# Patient Record
Sex: MALE | Race: WHITE | Employment: OTHER | URBAN - METROPOLITAN AREA
[De-identification: names, ages, dates, MRNs, and addresses within clinical notes are randomized per-mention and may not be internally consistent; named-entity substitution may affect disease eponyms.]

---

## 2017-07-17 ENCOUNTER — HOSPITAL ENCOUNTER (OUTPATIENT)
Age: 62
Setting detail: SPECIMEN
Discharge: HOME | End: 2017-07-17
Payer: COMMERCIAL

## 2017-07-17 DIAGNOSIS — E78.00 PURE HYPERCHOLESTEROLEMIA: ICD-10-CM

## 2017-07-17 DIAGNOSIS — Z13.9 SCREENING: ICD-10-CM

## 2017-07-17 DIAGNOSIS — I10 ESSENTIAL HYPERTENSION: ICD-10-CM

## 2017-07-17 DIAGNOSIS — R73.09 OTHER ABNORMAL GLUCOSE: ICD-10-CM

## 2017-07-17 DIAGNOSIS — I48.20 CHRONIC ATRIAL FIBRILLATION (HCC): ICD-10-CM

## 2017-07-17 LAB
ABSOLUTE EOS #: 0.1 K/UL (ref 0–0.4)
ABSOLUTE LYMPH #: 2.6 K/UL (ref 1–4.8)
ABSOLUTE MONO #: 0.5 K/UL (ref 0.1–1.2)
ALBUMIN SERPL-MCNC: 4.3 G/DL (ref 3.5–5.2)
ALBUMIN/GLOBULIN RATIO: 1.6 (ref 1–2.5)
ALP BLD-CCNC: 78 U/L (ref 40–129)
ALT SERPL-CCNC: 40 U/L (ref 5–41)
ANION GAP SERPL CALCULATED.3IONS-SCNC: 14 MMOL/L (ref 9–17)
AST SERPL-CCNC: 25 U/L
BASOPHILS # BLD: 1 %
BASOPHILS ABSOLUTE: 0 K/UL (ref 0–0.2)
BILIRUB SERPL-MCNC: 0.42 MG/DL (ref 0.3–1.2)
BILIRUBIN DIRECT: 0.11 MG/DL
BILIRUBIN, INDIRECT: 0.31 MG/DL (ref 0–1)
BUN BLDV-MCNC: 12 MG/DL (ref 8–23)
BUN/CREAT BLD: ABNORMAL (ref 9–20)
CALCIUM SERPL-MCNC: 9.1 MG/DL (ref 8.6–10.4)
CHLORIDE BLD-SCNC: 104 MMOL/L (ref 98–107)
CHOLESTEROL/HDL RATIO: 5.1
CHOLESTEROL: 143 MG/DL
CO2: 23 MMOL/L (ref 20–31)
CREAT SERPL-MCNC: 0.8 MG/DL (ref 0.7–1.2)
DIFFERENTIAL TYPE: NORMAL
EOSINOPHILS RELATIVE PERCENT: 2 %
ESTIMATED AVERAGE GLUCOSE: 114 MG/DL
GFR AFRICAN AMERICAN: >60 ML/MIN
GFR NON-AFRICAN AMERICAN: >60 ML/MIN
GFR SERPL CREATININE-BSD FRML MDRD: ABNORMAL ML/MIN/{1.73_M2}
GFR SERPL CREATININE-BSD FRML MDRD: ABNORMAL ML/MIN/{1.73_M2}
GLOBULIN: NORMAL G/DL (ref 1.5–3.8)
GLUCOSE BLD-MCNC: 119 MG/DL (ref 70–99)
HBA1C MFR BLD: 5.6 % (ref 4–6)
HCT VFR BLD CALC: 46.7 % (ref 41–53)
HDLC SERPL-MCNC: 28 MG/DL
HEMOGLOBIN: 15.8 G/DL (ref 13.5–17.5)
LDL CHOLESTEROL: 72 MG/DL (ref 0–130)
LYMPHOCYTES # BLD: 30 %
MCH RBC QN AUTO: 31.2 PG (ref 26–34)
MCHC RBC AUTO-ENTMCNC: 33.8 G/DL (ref 31–37)
MCV RBC AUTO: 92.2 FL (ref 80–100)
MONOCYTES # BLD: 6 %
PDW BLD-RTO: 14 % (ref 12.5–15.4)
PLATELET # BLD: 210 K/UL (ref 140–450)
PLATELET ESTIMATE: NORMAL
PMV BLD AUTO: 7.6 FL (ref 6–12)
POTASSIUM SERPL-SCNC: 4.5 MMOL/L (ref 3.7–5.3)
PROSTATE SPECIFIC ANTIGEN: 19.94 UG/L
RBC # BLD: 5.06 M/UL (ref 4.5–5.9)
RBC # BLD: NORMAL 10*6/UL
SEG NEUTROPHILS: 61 %
SEGMENTED NEUTROPHILS ABSOLUTE COUNT: 5.3 K/UL (ref 1.8–7.7)
SODIUM BLD-SCNC: 141 MMOL/L (ref 135–144)
TOTAL PROTEIN: 7 G/DL (ref 6.4–8.3)
TRIGL SERPL-MCNC: 216 MG/DL
TSH SERPL DL<=0.05 MIU/L-ACNC: 2.24 MIU/L (ref 0.3–5)
VLDLC SERPL CALC-MCNC: ABNORMAL MG/DL (ref 1–30)
WBC # BLD: 8.6 K/UL (ref 3.5–11)
WBC # BLD: NORMAL 10*3/UL

## 2017-08-02 PROBLEM — C61 PROSTATE CANCER (HCC): Status: ACTIVE | Noted: 2017-08-02

## 2017-08-14 ENCOUNTER — HOSPITAL ENCOUNTER (OUTPATIENT)
Age: 62
Setting detail: SPECIMEN
Discharge: HOME | End: 2017-08-14
Payer: COMMERCIAL

## 2017-08-14 DIAGNOSIS — C61 PROSTATE CANCER (HCC): ICD-10-CM

## 2017-08-14 LAB
BUN BLDV-MCNC: 16 MG/DL (ref 8–23)
CREAT SERPL-MCNC: 0.78 MG/DL (ref 0.7–1.2)
GFR AFRICAN AMERICAN: >60 ML/MIN
GFR NON-AFRICAN AMERICAN: >60 ML/MIN
GFR SERPL CREATININE-BSD FRML MDRD: NORMAL ML/MIN/{1.73_M2}
GFR SERPL CREATININE-BSD FRML MDRD: NORMAL ML/MIN/{1.73_M2}

## 2017-09-07 ENCOUNTER — HOSPITAL ENCOUNTER (OUTPATIENT)
Dept: PREADMISSION TESTING | Age: 62
Discharge: HOME | End: 2017-09-07
Payer: COMMERCIAL

## 2017-09-07 VITALS
WEIGHT: 283 LBS | TEMPERATURE: 98.1 F | HEIGHT: 70 IN | HEART RATE: 79 BPM | BODY MASS INDEX: 40.52 KG/M2 | DIASTOLIC BLOOD PRESSURE: 91 MMHG | RESPIRATION RATE: 20 BRPM | OXYGEN SATURATION: 97 % | SYSTOLIC BLOOD PRESSURE: 147 MMHG

## 2017-09-07 LAB
BUN BLDV-MCNC: 16 MG/DL (ref 8–23)
CREAT SERPL-MCNC: 0.64 MG/DL (ref 0.7–1.2)
EKG ATRIAL RATE: 81 BPM
EKG P AXIS: 31 DEGREES
EKG P-R INTERVAL: 136 MS
EKG Q-T INTERVAL: 390 MS
EKG QRS DURATION: 84 MS
EKG QTC CALCULATION (BAZETT): 453 MS
EKG R AXIS: 17 DEGREES
EKG T AXIS: 62 DEGREES
EKG VENTRICULAR RATE: 81 BPM
GFR AFRICAN AMERICAN: >60 ML/MIN
GFR NON-AFRICAN AMERICAN: >60 ML/MIN
GFR SERPL CREATININE-BSD FRML MDRD: ABNORMAL ML/MIN/{1.73_M2}
GFR SERPL CREATININE-BSD FRML MDRD: ABNORMAL ML/MIN/{1.73_M2}
GLUCOSE BLD-MCNC: 114 MG/DL (ref 70–99)

## 2017-09-07 PROCEDURE — 84520 ASSAY OF UREA NITROGEN: CPT

## 2017-09-07 PROCEDURE — 82565 ASSAY OF CREATININE: CPT

## 2017-09-07 PROCEDURE — 93005 ELECTROCARDIOGRAM TRACING: CPT

## 2017-09-07 PROCEDURE — 36415 COLL VENOUS BLD VENIPUNCTURE: CPT

## 2017-09-07 PROCEDURE — 82947 ASSAY GLUCOSE BLOOD QUANT: CPT

## 2017-09-07 RX ORDER — SODIUM CHLORIDE, SODIUM LACTATE, POTASSIUM CHLORIDE, CALCIUM CHLORIDE 600; 310; 30; 20 MG/100ML; MG/100ML; MG/100ML; MG/100ML
1000 INJECTION, SOLUTION INTRAVENOUS CONTINUOUS
Status: CANCELLED | OUTPATIENT
Start: 2017-09-07

## 2017-09-19 ENCOUNTER — HOSPITAL ENCOUNTER (OUTPATIENT)
Dept: CARDIAC CATH/INVASIVE PROCEDURES | Age: 62
Discharge: HOME | End: 2017-09-19
Payer: COMMERCIAL

## 2017-09-19 VITALS
DIASTOLIC BLOOD PRESSURE: 80 MMHG | SYSTOLIC BLOOD PRESSURE: 127 MMHG | TEMPERATURE: 98.3 F | RESPIRATION RATE: 15 BRPM | HEIGHT: 70 IN | BODY MASS INDEX: 40.09 KG/M2 | HEART RATE: 72 BPM | WEIGHT: 280 LBS | OXYGEN SATURATION: 97 %

## 2017-09-19 LAB
BUN BLDV-MCNC: 13 MG/DL (ref 8–23)
GFR NON-AFRICAN AMERICAN: >60 ML/MIN
GFR SERPL CREATININE-BSD FRML MDRD: >60 ML/MIN
GFR SERPL CREATININE-BSD FRML MDRD: NORMAL ML/MIN/{1.73_M2}
GLUCOSE BLD-MCNC: 105 MG/DL (ref 74–100)
PLATELET # BLD: 210 K/UL (ref 140–450)
POC CHLORIDE: 107 MMOL/L (ref 98–107)
POC CREATININE: 0.91 MG/DL (ref 0.51–1.19)
POC HEMATOCRIT: 46 % (ref 41–53)
POC HEMOGLOBIN: 15.5 G/DL (ref 13.5–17.5)
POC POTASSIUM: 5.5 MMOL/L (ref 3.5–4.5)
POC SODIUM: 140 MMOL/L (ref 138–146)

## 2017-09-19 PROCEDURE — 84295 ASSAY OF SERUM SODIUM: CPT

## 2017-09-19 PROCEDURE — 84520 ASSAY OF UREA NITROGEN: CPT

## 2017-09-19 PROCEDURE — 7100000011 HC PHASE II RECOVERY - ADDTL 15 MIN

## 2017-09-19 PROCEDURE — C1760 CLOSURE DEV, VASC: HCPCS

## 2017-09-19 PROCEDURE — C1894 INTRO/SHEATH, NON-LASER: HCPCS

## 2017-09-19 PROCEDURE — 82435 ASSAY OF BLOOD CHLORIDE: CPT

## 2017-09-19 PROCEDURE — 85014 HEMATOCRIT: CPT

## 2017-09-19 PROCEDURE — 82565 ASSAY OF CREATININE: CPT

## 2017-09-19 PROCEDURE — C1769 GUIDE WIRE: HCPCS

## 2017-09-19 PROCEDURE — 7100000010 HC PHASE II RECOVERY - FIRST 15 MIN

## 2017-09-19 PROCEDURE — 84132 ASSAY OF SERUM POTASSIUM: CPT

## 2017-09-19 PROCEDURE — C1725 CATH, TRANSLUMIN NON-LASER: HCPCS

## 2017-09-19 PROCEDURE — 82947 ASSAY GLUCOSE BLOOD QUANT: CPT

## 2017-09-19 PROCEDURE — 6360000002 HC RX W HCPCS

## 2017-09-19 PROCEDURE — 85049 AUTOMATED PLATELET COUNT: CPT

## 2017-09-19 PROCEDURE — 93458 L HRT ARTERY/VENTRICLE ANGIO: CPT | Performed by: INTERNAL MEDICINE

## 2017-09-19 RX ORDER — SODIUM CHLORIDE 9 MG/ML
INJECTION, SOLUTION INTRAVENOUS CONTINUOUS
Status: DISCONTINUED | OUTPATIENT
Start: 2017-09-19 | End: 2017-09-20 | Stop reason: HOSPADM

## 2017-09-19 RX ORDER — ONDANSETRON 2 MG/ML
4 INJECTION INTRAMUSCULAR; INTRAVENOUS EVERY 6 HOURS PRN
Status: DISCONTINUED | OUTPATIENT
Start: 2017-09-19 | End: 2017-09-20 | Stop reason: HOSPADM

## 2017-09-19 RX ORDER — ACETAMINOPHEN 325 MG/1
650 TABLET ORAL EVERY 4 HOURS PRN
Status: DISCONTINUED | OUTPATIENT
Start: 2017-09-19 | End: 2017-09-20 | Stop reason: HOSPADM

## 2017-09-19 RX ORDER — SODIUM CHLORIDE 0.9 % (FLUSH) 0.9 %
10 SYRINGE (ML) INJECTION PRN
Status: DISCONTINUED | OUTPATIENT
Start: 2017-09-19 | End: 2017-09-20 | Stop reason: HOSPADM

## 2017-09-19 RX ORDER — SODIUM CHLORIDE 0.9 % (FLUSH) 0.9 %
10 SYRINGE (ML) INJECTION EVERY 12 HOURS SCHEDULED
Status: DISCONTINUED | OUTPATIENT
Start: 2017-09-19 | End: 2017-09-20 | Stop reason: HOSPADM

## 2017-09-19 RX ORDER — ASPIRIN 81 MG/1
81 TABLET ORAL DAILY
Qty: 30 TABLET | Refills: 3 | Status: ON HOLD | OUTPATIENT
Start: 2017-09-19 | End: 2017-10-18 | Stop reason: HOSPADM

## 2017-09-19 RX ADMIN — SODIUM CHLORIDE: 9 INJECTION, SOLUTION INTRAVENOUS at 12:51

## 2017-10-17 ENCOUNTER — ANESTHESIA EVENT (OUTPATIENT)
Dept: OPERATING ROOM | Age: 62
DRG: 707 | End: 2017-10-17
Payer: COMMERCIAL

## 2017-10-17 ENCOUNTER — HOSPITAL ENCOUNTER (INPATIENT)
Age: 62
LOS: 1 days | Discharge: HOME | DRG: 707 | End: 2017-10-18
Attending: SPECIALIST | Admitting: SPECIALIST
Payer: COMMERCIAL

## 2017-10-17 ENCOUNTER — ANESTHESIA (OUTPATIENT)
Dept: OPERATING ROOM | Age: 62
DRG: 707 | End: 2017-10-17
Payer: COMMERCIAL

## 2017-10-17 VITALS — DIASTOLIC BLOOD PRESSURE: 79 MMHG | OXYGEN SATURATION: 97 % | TEMPERATURE: 97.2 F | SYSTOLIC BLOOD PRESSURE: 149 MMHG

## 2017-10-17 LAB
ABO/RH: NORMAL
ANION GAP SERPL CALCULATED.3IONS-SCNC: 14 MMOL/L (ref 9–17)
ANTIBODY SCREEN: NEGATIVE
ARM BAND NUMBER: NORMAL
BUN BLDV-MCNC: 12 MG/DL (ref 8–23)
BUN/CREAT BLD: ABNORMAL (ref 9–20)
CALCIUM SERPL-MCNC: 8.3 MG/DL (ref 8.6–10.4)
CHLORIDE BLD-SCNC: 103 MMOL/L (ref 98–107)
CO2: 20 MMOL/L (ref 20–31)
CREAT SERPL-MCNC: 0.85 MG/DL (ref 0.7–1.2)
EXPIRATION DATE: NORMAL
GFR AFRICAN AMERICAN: >60 ML/MIN
GFR NON-AFRICAN AMERICAN: >60 ML/MIN
GFR NON-AFRICAN AMERICAN: >60 ML/MIN
GFR SERPL CREATININE-BSD FRML MDRD: >60 ML/MIN
GFR SERPL CREATININE-BSD FRML MDRD: ABNORMAL ML/MIN/{1.73_M2}
GFR SERPL CREATININE-BSD FRML MDRD: ABNORMAL ML/MIN/{1.73_M2}
GFR SERPL CREATININE-BSD FRML MDRD: NORMAL ML/MIN/{1.73_M2}
GLUCOSE BLD-MCNC: 120 MG/DL (ref 74–100)
GLUCOSE BLD-MCNC: 173 MG/DL (ref 70–99)
HCT VFR BLD CALC: 48.8 % (ref 41–53)
HEMOGLOBIN: 16.2 G/DL (ref 13.5–17.5)
PLATELET # BLD: 57 K/UL (ref 140–450)
POC CREATININE: 0.54 MG/DL (ref 0.51–1.19)
POC HEMATOCRIT: 53 % (ref 41–53)
POC HEMOGLOBIN: 18 G/DL (ref 13.5–17.5)
POC POTASSIUM: 4 MMOL/L (ref 3.5–4.5)
POTASSIUM SERPL-SCNC: 4.5 MMOL/L (ref 3.7–5.3)
SODIUM BLD-SCNC: 137 MMOL/L (ref 135–144)

## 2017-10-17 PROCEDURE — 84132 ASSAY OF SERUM POTASSIUM: CPT

## 2017-10-17 PROCEDURE — 7100000001 HC PACU RECOVERY - ADDTL 15 MIN: Performed by: SPECIALIST

## 2017-10-17 PROCEDURE — 2780000010 HC IMPLANT OTHER: Performed by: SPECIALIST

## 2017-10-17 PROCEDURE — 3700000001 HC ADD 15 MINUTES (ANESTHESIA): Performed by: SPECIALIST

## 2017-10-17 PROCEDURE — 85014 HEMATOCRIT: CPT

## 2017-10-17 PROCEDURE — 1200000000 HC SEMI PRIVATE

## 2017-10-17 PROCEDURE — 82947 ASSAY GLUCOSE BLOOD QUANT: CPT

## 2017-10-17 PROCEDURE — 82565 ASSAY OF CREATININE: CPT

## 2017-10-17 PROCEDURE — 2500000003 HC RX 250 WO HCPCS: Performed by: NURSE ANESTHETIST, CERTIFIED REGISTERED

## 2017-10-17 PROCEDURE — 86901 BLOOD TYPING SEROLOGIC RH(D): CPT

## 2017-10-17 PROCEDURE — 8E0W4CZ ROBOTIC ASSISTED PROCEDURE OF TRUNK REGION, PERCUTANEOUS ENDOSCOPIC APPROACH: ICD-10-PCS | Performed by: SPECIALIST

## 2017-10-17 PROCEDURE — 2580000003 HC RX 258: Performed by: NURSE ANESTHETIST, CERTIFIED REGISTERED

## 2017-10-17 PROCEDURE — 6360000002 HC RX W HCPCS: Performed by: ANESTHESIOLOGY

## 2017-10-17 PROCEDURE — 87086 URINE CULTURE/COLONY COUNT: CPT

## 2017-10-17 PROCEDURE — 2580000003 HC RX 258: Performed by: ANESTHESIOLOGY

## 2017-10-17 PROCEDURE — 0VT04ZZ RESECTION OF PROSTATE, PERCUTANEOUS ENDOSCOPIC APPROACH: ICD-10-PCS | Performed by: SPECIALIST

## 2017-10-17 PROCEDURE — 86900 BLOOD TYPING SEROLOGIC ABO: CPT

## 2017-10-17 PROCEDURE — 85018 HEMOGLOBIN: CPT

## 2017-10-17 PROCEDURE — 2720000003 HC MISC SUTURE/STAPLES/RELOADS/ETC: Performed by: SPECIALIST

## 2017-10-17 PROCEDURE — 2580000003 HC RX 258: Performed by: SPECIALIST

## 2017-10-17 PROCEDURE — 3700000000 HC ANESTHESIA ATTENDED CARE: Performed by: SPECIALIST

## 2017-10-17 PROCEDURE — 6360000002 HC RX W HCPCS: Performed by: SPECIALIST

## 2017-10-17 PROCEDURE — C1760 CLOSURE DEV, VASC: HCPCS | Performed by: SPECIALIST

## 2017-10-17 PROCEDURE — 86850 RBC ANTIBODY SCREEN: CPT

## 2017-10-17 PROCEDURE — 6360000002 HC RX W HCPCS: Performed by: NURSE ANESTHETIST, CERTIFIED REGISTERED

## 2017-10-17 PROCEDURE — 80048 BASIC METABOLIC PNL TOTAL CA: CPT

## 2017-10-17 PROCEDURE — 6370000000 HC RX 637 (ALT 250 FOR IP): Performed by: UROLOGY

## 2017-10-17 PROCEDURE — 88307 TISSUE EXAM BY PATHOLOGIST: CPT

## 2017-10-17 PROCEDURE — 2580000003 HC RX 258: Performed by: UROLOGY

## 2017-10-17 PROCEDURE — S2900 ROBOTIC SURGICAL SYSTEM: HCPCS | Performed by: SPECIALIST

## 2017-10-17 PROCEDURE — 07TC4ZZ RESECTION OF PELVIS LYMPHATIC, PERCUTANEOUS ENDOSCOPIC APPROACH: ICD-10-PCS | Performed by: SPECIALIST

## 2017-10-17 PROCEDURE — 2500000003 HC RX 250 WO HCPCS: Performed by: SPECIALIST

## 2017-10-17 PROCEDURE — 85049 AUTOMATED PLATELET COUNT: CPT

## 2017-10-17 PROCEDURE — 7100000000 HC PACU RECOVERY - FIRST 15 MIN: Performed by: SPECIALIST

## 2017-10-17 PROCEDURE — 3600000009 HC SURGERY ROBOT BASE: Performed by: SPECIALIST

## 2017-10-17 PROCEDURE — 3600000019 HC SURGERY ROBOT ADDTL 15MIN: Performed by: SPECIALIST

## 2017-10-17 PROCEDURE — 88309 TISSUE EXAM BY PATHOLOGIST: CPT

## 2017-10-17 PROCEDURE — A6258 TRANSPARENT FILM >16<=48 IN: HCPCS | Performed by: SPECIALIST

## 2017-10-17 PROCEDURE — 6360000002 HC RX W HCPCS: Performed by: UROLOGY

## 2017-10-17 RX ORDER — ONDANSETRON 2 MG/ML
4 INJECTION INTRAMUSCULAR; INTRAVENOUS
Status: DISCONTINUED | OUTPATIENT
Start: 2017-10-17 | End: 2017-10-17 | Stop reason: HOSPADM

## 2017-10-17 RX ORDER — LISINOPRIL 5 MG/1
5 TABLET ORAL DAILY
Status: DISCONTINUED | OUTPATIENT
Start: 2017-10-17 | End: 2017-10-18 | Stop reason: HOSPADM

## 2017-10-17 RX ORDER — MAGNESIUM HYDROXIDE 1200 MG/15ML
LIQUID ORAL CONTINUOUS PRN
Status: DISCONTINUED | OUTPATIENT
Start: 2017-10-17 | End: 2017-10-17 | Stop reason: HOSPADM

## 2017-10-17 RX ORDER — ROCURONIUM BROMIDE 10 MG/ML
INJECTION, SOLUTION INTRAVENOUS PRN
Status: DISCONTINUED | OUTPATIENT
Start: 2017-10-17 | End: 2017-10-17 | Stop reason: SDUPTHER

## 2017-10-17 RX ORDER — DOCUSATE SODIUM 100 MG/1
100 CAPSULE, LIQUID FILLED ORAL 2 TIMES DAILY
Status: DISCONTINUED | OUTPATIENT
Start: 2017-10-17 | End: 2017-10-18 | Stop reason: HOSPADM

## 2017-10-17 RX ORDER — GLYCOPYRROLATE 0.2 MG/ML
INJECTION INTRAMUSCULAR; INTRAVENOUS PRN
Status: DISCONTINUED | OUTPATIENT
Start: 2017-10-17 | End: 2017-10-17 | Stop reason: SDUPTHER

## 2017-10-17 RX ORDER — SODIUM CHLORIDE 9 MG/ML
INJECTION, SOLUTION INTRAVENOUS CONTINUOUS
Status: DISCONTINUED | OUTPATIENT
Start: 2017-10-17 | End: 2017-10-18 | Stop reason: HOSPADM

## 2017-10-17 RX ORDER — ONDANSETRON 2 MG/ML
INJECTION INTRAMUSCULAR; INTRAVENOUS PRN
Status: DISCONTINUED | OUTPATIENT
Start: 2017-10-17 | End: 2017-10-17 | Stop reason: SDUPTHER

## 2017-10-17 RX ORDER — OXYCODONE HYDROCHLORIDE AND ACETAMINOPHEN 5; 325 MG/1; MG/1
1 TABLET ORAL EVERY 4 HOURS PRN
Status: DISCONTINUED | OUTPATIENT
Start: 2017-10-17 | End: 2017-10-18 | Stop reason: HOSPADM

## 2017-10-17 RX ORDER — MORPHINE SULFATE 10 MG/ML
INJECTION, SOLUTION INTRAMUSCULAR; INTRAVENOUS PRN
Status: DISCONTINUED | OUTPATIENT
Start: 2017-10-17 | End: 2017-10-17 | Stop reason: SDUPTHER

## 2017-10-17 RX ORDER — WATER 1000 ML/1000ML
INJECTION, SOLUTION INTRAVENOUS PRN
Status: DISCONTINUED | OUTPATIENT
Start: 2017-10-17 | End: 2017-10-17 | Stop reason: HOSPADM

## 2017-10-17 RX ORDER — MEPERIDINE HYDROCHLORIDE 50 MG/ML
12.5 INJECTION INTRAMUSCULAR; INTRAVENOUS; SUBCUTANEOUS EVERY 5 MIN PRN
Status: DISCONTINUED | OUTPATIENT
Start: 2017-10-17 | End: 2017-10-17 | Stop reason: HOSPADM

## 2017-10-17 RX ORDER — MORPHINE SULFATE 2 MG/ML
2 INJECTION, SOLUTION INTRAMUSCULAR; INTRAVENOUS
Status: DISCONTINUED | OUTPATIENT
Start: 2017-10-17 | End: 2017-10-18 | Stop reason: HOSPADM

## 2017-10-17 RX ORDER — KETOROLAC TROMETHAMINE 30 MG/ML
30 INJECTION, SOLUTION INTRAMUSCULAR; INTRAVENOUS EVERY 6 HOURS
Status: DISCONTINUED | OUTPATIENT
Start: 2017-10-17 | End: 2017-10-18 | Stop reason: HOSPADM

## 2017-10-17 RX ORDER — FENTANYL CITRATE 50 UG/ML
INJECTION, SOLUTION INTRAMUSCULAR; INTRAVENOUS PRN
Status: DISCONTINUED | OUTPATIENT
Start: 2017-10-17 | End: 2017-10-17 | Stop reason: SDUPTHER

## 2017-10-17 RX ORDER — OXYCODONE HYDROCHLORIDE AND ACETAMINOPHEN 5; 325 MG/1; MG/1
2 TABLET ORAL EVERY 4 HOURS PRN
Status: DISCONTINUED | OUTPATIENT
Start: 2017-10-17 | End: 2017-10-18 | Stop reason: HOSPADM

## 2017-10-17 RX ORDER — ATORVASTATIN CALCIUM 80 MG/1
80 TABLET, FILM COATED ORAL EVERY EVENING
Status: DISCONTINUED | OUTPATIENT
Start: 2017-10-17 | End: 2017-10-18 | Stop reason: HOSPADM

## 2017-10-17 RX ORDER — MORPHINE SULFATE 2 MG/ML
4 INJECTION, SOLUTION INTRAMUSCULAR; INTRAVENOUS
Status: DISCONTINUED | OUTPATIENT
Start: 2017-10-17 | End: 2017-10-18 | Stop reason: HOSPADM

## 2017-10-17 RX ORDER — DEXAMETHASONE SODIUM PHOSPHATE 10 MG/ML
INJECTION INTRAMUSCULAR; INTRAVENOUS PRN
Status: DISCONTINUED | OUTPATIENT
Start: 2017-10-17 | End: 2017-10-17 | Stop reason: SDUPTHER

## 2017-10-17 RX ORDER — FENTANYL CITRATE 50 UG/ML
50 INJECTION, SOLUTION INTRAMUSCULAR; INTRAVENOUS EVERY 5 MIN PRN
Status: DISCONTINUED | OUTPATIENT
Start: 2017-10-17 | End: 2017-10-17 | Stop reason: HOSPADM

## 2017-10-17 RX ORDER — SODIUM CHLORIDE 0.9 % (FLUSH) 0.9 %
10 SYRINGE (ML) INJECTION PRN
Status: DISCONTINUED | OUTPATIENT
Start: 2017-10-17 | End: 2017-10-18 | Stop reason: HOSPADM

## 2017-10-17 RX ORDER — ONDANSETRON 2 MG/ML
4 INJECTION INTRAMUSCULAR; INTRAVENOUS EVERY 6 HOURS PRN
Status: DISCONTINUED | OUTPATIENT
Start: 2017-10-17 | End: 2017-10-18 | Stop reason: HOSPADM

## 2017-10-17 RX ORDER — LIDOCAINE HYDROCHLORIDE 10 MG/ML
INJECTION, SOLUTION INFILTRATION; PERINEURAL PRN
Status: DISCONTINUED | OUTPATIENT
Start: 2017-10-17 | End: 2017-10-17 | Stop reason: SDUPTHER

## 2017-10-17 RX ORDER — SODIUM CHLORIDE 0.9 % (FLUSH) 0.9 %
10 SYRINGE (ML) INJECTION EVERY 12 HOURS SCHEDULED
Status: DISCONTINUED | OUTPATIENT
Start: 2017-10-17 | End: 2017-10-18 | Stop reason: HOSPADM

## 2017-10-17 RX ORDER — FENTANYL CITRATE 50 UG/ML
25 INJECTION, SOLUTION INTRAMUSCULAR; INTRAVENOUS EVERY 5 MIN PRN
Status: DISCONTINUED | OUTPATIENT
Start: 2017-10-17 | End: 2017-10-17 | Stop reason: HOSPADM

## 2017-10-17 RX ORDER — PROPOFOL 10 MG/ML
INJECTION, EMULSION INTRAVENOUS PRN
Status: DISCONTINUED | OUTPATIENT
Start: 2017-10-17 | End: 2017-10-17 | Stop reason: SDUPTHER

## 2017-10-17 RX ORDER — FAMOTIDINE 20 MG/1
20 TABLET, FILM COATED ORAL 2 TIMES DAILY
Status: DISCONTINUED | OUTPATIENT
Start: 2017-10-17 | End: 2017-10-18 | Stop reason: HOSPADM

## 2017-10-17 RX ORDER — ASPIRIN 81 MG/1
81 TABLET ORAL DAILY
Status: DISCONTINUED | OUTPATIENT
Start: 2017-10-17 | End: 2017-10-18 | Stop reason: HOSPADM

## 2017-10-17 RX ORDER — PROPAFENONE HYDROCHLORIDE 150 MG/1
150 TABLET, FILM COATED ORAL EVERY 8 HOURS
Status: DISCONTINUED | OUTPATIENT
Start: 2017-10-17 | End: 2017-10-18 | Stop reason: HOSPADM

## 2017-10-17 RX ORDER — SODIUM CHLORIDE, SODIUM LACTATE, POTASSIUM CHLORIDE, CALCIUM CHLORIDE 600; 310; 30; 20 MG/100ML; MG/100ML; MG/100ML; MG/100ML
INJECTION, SOLUTION INTRAVENOUS CONTINUOUS
Status: DISCONTINUED | OUTPATIENT
Start: 2017-10-17 | End: 2017-10-17

## 2017-10-17 RX ADMIN — KETOROLAC TROMETHAMINE 30 MG: 30 INJECTION, SOLUTION INTRAMUSCULAR at 17:00

## 2017-10-17 RX ADMIN — PHENYLEPHRINE HYDROCHLORIDE 25 MCG/MIN: 10 INJECTION INTRAMUSCULAR; INTRAVENOUS; SUBCUTANEOUS at 11:43

## 2017-10-17 RX ADMIN — SODIUM CHLORIDE: 9 INJECTION, SOLUTION INTRAVENOUS at 18:11

## 2017-10-17 RX ADMIN — ONDANSETRON 4 MG: 2 INJECTION, SOLUTION INTRAMUSCULAR; INTRAVENOUS at 15:12

## 2017-10-17 RX ADMIN — DEXTROSE MONOHYDRATE 3 G: 50 INJECTION, SOLUTION INTRAVENOUS at 21:53

## 2017-10-17 RX ADMIN — ROCURONIUM BROMIDE 10 MG: 10 INJECTION INTRAVENOUS at 14:28

## 2017-10-17 RX ADMIN — ROCURONIUM BROMIDE 10 MG: 10 INJECTION INTRAVENOUS at 12:05

## 2017-10-17 RX ADMIN — DEXAMETHASONE SODIUM PHOSPHATE 10 MG: 10 INJECTION INTRAMUSCULAR; INTRAVENOUS at 11:08

## 2017-10-17 RX ADMIN — ROCURONIUM BROMIDE 50 MG: 10 INJECTION INTRAVENOUS at 11:01

## 2017-10-17 RX ADMIN — GLYCOPYRROLATE 0.4 MG: 0.2 INJECTION, SOLUTION INTRAMUSCULAR; INTRAVENOUS at 15:21

## 2017-10-17 RX ADMIN — Medication 3 G: at 11:15

## 2017-10-17 RX ADMIN — MORPHINE SULFATE 4 MG: 2 INJECTION, SOLUTION INTRAMUSCULAR; INTRAVENOUS at 23:35

## 2017-10-17 RX ADMIN — ROCURONIUM BROMIDE 10 MG: 10 INJECTION INTRAVENOUS at 13:21

## 2017-10-17 RX ADMIN — NEOSTIGMINE METHYLSULFATE 2 MG: 1 INJECTION, SOLUTION INTRAMUSCULAR; INTRAVENOUS; SUBCUTANEOUS at 15:21

## 2017-10-17 RX ADMIN — PROPAFENONE HYDROCHLORIDE 150 MG: 150 TABLET, FILM COATED ORAL at 18:44

## 2017-10-17 RX ADMIN — ROCURONIUM BROMIDE 10 MG: 10 INJECTION INTRAVENOUS at 12:26

## 2017-10-17 RX ADMIN — Medication 10 ML: at 21:23

## 2017-10-17 RX ADMIN — SODIUM CHLORIDE, POTASSIUM CHLORIDE, SODIUM LACTATE AND CALCIUM CHLORIDE: 600; 310; 30; 20 INJECTION, SOLUTION INTRAVENOUS at 10:21

## 2017-10-17 RX ADMIN — GLYCOPYRROLATE 0.2 MG: 0.2 INJECTION, SOLUTION INTRAMUSCULAR; INTRAVENOUS at 11:43

## 2017-10-17 RX ADMIN — ROCURONIUM BROMIDE 10 MG: 10 INJECTION INTRAVENOUS at 11:39

## 2017-10-17 RX ADMIN — FENTANYL CITRATE 50 MCG: 50 INJECTION, SOLUTION INTRAMUSCULAR; INTRAVENOUS at 16:08

## 2017-10-17 RX ADMIN — Medication 3 G: at 14:00

## 2017-10-17 RX ADMIN — PHENYLEPHRINE HYDROCHLORIDE 50 MCG: 10 INJECTION INTRAMUSCULAR; INTRAVENOUS; SUBCUTANEOUS at 11:40

## 2017-10-17 RX ADMIN — PROPOFOL 150 MG: 10 INJECTION, EMULSION INTRAVENOUS at 11:04

## 2017-10-17 RX ADMIN — FENTANYL CITRATE 50 MCG: 50 INJECTION, SOLUTION INTRAMUSCULAR; INTRAVENOUS at 16:30

## 2017-10-17 RX ADMIN — KETOROLAC TROMETHAMINE 30 MG: 30 INJECTION, SOLUTION INTRAMUSCULAR at 23:35

## 2017-10-17 RX ADMIN — LIDOCAINE HYDROCHLORIDE 50 MG: 10 INJECTION, SOLUTION INFILTRATION; PERINEURAL at 11:01

## 2017-10-17 RX ADMIN — MORPHINE SULFATE 2 MG: 2 INJECTION, SOLUTION INTRAMUSCULAR; INTRAVENOUS at 18:11

## 2017-10-17 RX ADMIN — FENTANYL CITRATE 25 MCG: 50 INJECTION INTRAMUSCULAR; INTRAVENOUS at 15:30

## 2017-10-17 RX ADMIN — FAMOTIDINE 20 MG: 20 TABLET, FILM COATED ORAL at 21:22

## 2017-10-17 RX ADMIN — FENTANYL CITRATE 100 MCG: 50 INJECTION INTRAMUSCULAR; INTRAVENOUS at 12:04

## 2017-10-17 RX ADMIN — PROPOFOL 200 MG: 10 INJECTION, EMULSION INTRAVENOUS at 11:01

## 2017-10-17 RX ADMIN — FENTANYL CITRATE 25 MCG: 50 INJECTION INTRAMUSCULAR; INTRAVENOUS at 15:15

## 2017-10-17 RX ADMIN — MORPHINE SULFATE 4 MG: 2 INJECTION, SOLUTION INTRAMUSCULAR; INTRAVENOUS at 21:14

## 2017-10-17 RX ADMIN — FENTANYL CITRATE 150 MCG: 50 INJECTION INTRAMUSCULAR; INTRAVENOUS at 11:31

## 2017-10-17 RX ADMIN — DOCUSATE SODIUM 100 MG: 100 CAPSULE, LIQUID FILLED ORAL at 21:23

## 2017-10-17 RX ADMIN — ROCURONIUM BROMIDE 10 MG: 10 INJECTION INTRAVENOUS at 13:06

## 2017-10-17 RX ADMIN — MORPHINE SULFATE 5 MG: 10 INJECTION, SOLUTION INTRAMUSCULAR; INTRAVENOUS at 15:17

## 2017-10-17 RX ADMIN — FENTANYL CITRATE 100 MCG: 50 INJECTION INTRAMUSCULAR; INTRAVENOUS at 11:01

## 2017-10-17 ASSESSMENT — PAIN SCALES - GENERAL
PAINLEVEL_OUTOF10: 7
PAINLEVEL_OUTOF10: 9
PAINLEVEL_OUTOF10: 7
PAINLEVEL_OUTOF10: 5
PAINLEVEL_OUTOF10: 5
PAINLEVEL_OUTOF10: 8
PAINLEVEL_OUTOF10: 8

## 2017-10-17 ASSESSMENT — PAIN DESCRIPTION - PAIN TYPE
TYPE: SURGICAL PAIN
TYPE: SURGICAL PAIN

## 2017-10-17 ASSESSMENT — PAIN DESCRIPTION - LOCATION
LOCATION: ABDOMEN
LOCATION: ABDOMEN

## 2017-10-17 ASSESSMENT — PAIN - FUNCTIONAL ASSESSMENT: PAIN_FUNCTIONAL_ASSESSMENT: 0-10

## 2017-10-17 NOTE — ANESTHESIA PRE PROCEDURE
Component Value Date     07/17/2017    K 4.5 07/17/2017     07/17/2017    CO2 23 07/17/2017    BUN 13 09/19/2017    CREATININE 0.91 09/19/2017    CREATININE 0.64 09/07/2017    GFRAA >60 09/07/2017    LABGLOM >60 09/19/2017    GLUCOSE 114 09/07/2017    PROT 7.0 07/17/2017    CALCIUM 9.1 07/17/2017    BILITOT 0.42 07/17/2017    ALKPHOS 78 07/17/2017    AST 25 07/17/2017    ALT 40 07/17/2017       POC Tests: No results for input(s): POCGLU, POCNA, POCK, POCCL, POCBUN, POCHEMO, POCHCT in the last 72 hours. Coags: No results found for: PROTIME, INR, APTT    HCG (If Applicable): No results found for: PREGTESTUR, PREGSERUM, HCG, HCGQUANT     ABGs: No results found for: PHART, PO2ART, EGB5WZH, RPF3ADE, BEART, K8CSDDFV     Type & Screen (If Applicable):  No results found for: LABABO, LABRH    EKG 9/2017  Normal sinus rhythm  Nonspecific T wave abnormality  Abnormal ECG    Cardiac cath 9/2017  Left main: NL  LAD: mid 30% stenosis  LCX: Codominant Vessel, distal 30% stenosis  OM1: 10% stenosis  OM2: 10% stenosis  RCA: Codominant Vessel, mid to distal 30% stenosis  The LV gram was performed in the GODOY 30 position. LVEF: 55%. LV Wall Motion: Normal      Conclusions:  1. Non obstructive CAD. 2. Overall preserved LV function.         Recommendation:  1. Low risk for prostate surgery, ok to proceed. 2. Medical treatments. 3. Risk factor modification.       Anesthesia Evaluation  Patient summary reviewed and Nursing notes reviewed no history of anesthetic complications:   Airway: Mallampati: III  TM distance: >3 FB   Neck ROM: full  Mouth opening: > = 3 FB Dental:    (+) lower dentures and partials      Pulmonary:normal exam    (+) sleep apnea: on CPAP,                             Cardiovascular:    (+) hypertension:, CAD: non-obstructive,                   Neuro/Psych:   (+) psychiatric history:            GI/Hepatic/Renal: neg ROS  (+) renal disease:,           Endo/Other: negative ROS Comments: Prostate CA Abdominal:   (+) obese,         Vascular:                                    Anesthesia Plan      general     ASA 3     (ASA 3 for CAD with multiple co-morbidities including ADRIAN, HPN, obesity  Cardiology clearance in chart  Anesthesia consent obtained from patient)  Induction: intravenous. arterial line    Anesthetic plan and risks discussed with patient. Plan discussed with CRNA.                 Raj White MD   10/17/2017

## 2017-10-17 NOTE — LETTER
Alcides Freitas MD [facility], [address] [place] [address],[address]  [place], [facility]  P: [phone] / F: [phone]    Brief Postoperative Note  Surgical Facility: [facility]   10/17/17    Mark Galvez  [id number]  1955    Dear Meri Mallory MD,    I've enclosed a Brief Op note on a procedure performed on your patient, Gardenia Rivera today. Pre-operative Diagnosis: Prostate cancer  Post-operative Diagnosis: Same    Procedure: Robotic assisted laparoscopic radical prostatectomy and Pelvic Lymphadenectomy     Anesthesia: General    Surgeon: Robby Sanon; Resident: Daniel Dunn: Observe overnight for possible discharge tomorrow. Thank you for allowing me to participate in the care of this patient. I will keep you updated on this patient's follow up and I look forward to serving you and your patients again in the future.         Electronically signed by Navjot Eagle MD, FACS

## 2017-10-17 NOTE — OP NOTE
FACILITY:  [address], [address] [facility]  [facility]  1955  [id number]     DATE: 10/17/17     SURGEON: Dr. Indra Adams M.D.  Gladis Gee: Dr. Darrin OCONNELL MD  PREOPERATIVE DIAGNOSIS:  Prostate cancer.   POSTOPERATIVE DIAGNOSIS: Prostate cancer. OPERATION:  1. Robotic radical prostatectomy with bilateral pelvic lymph node dissection. ANESTHESIA:  General.   COMPLICATIONS:  None.   ESTIMATED BLOOD LOSS:  Minimal.  FLUIDS:  Crystalloid. DRAINS:  Urethral ceballos catheter,  15 fr jeffrey drain. SPECIMENS:  None.     INDICATIONS FOR THE PROCEDURE:  Harmony Espinosa is a 64 y.o. male with a history of Prostate cancer. After treatment options were discussed including risks, benefits, alternatives, goals and possible complications,  the patient elected to proceed with today's procedure. To note the patients BMI > 40. PSA:   Lab Results   Component Value Date    PSA 19.94 07/17/2017     His prostate volume was 48.64 grams. His clinical TNM stage was: T1c   Prostate cancer with elevated PSA of 19.94 on 7/17/17;   7/28/17 bx (48.64 mL); G3+3=6 RLM (30%), G3+4=7 RM (50%), RA (90%)  He had a prostate biopsy consisting of a total of 14 cores with 3 positive cores. DESCRIPTION OF PROCEDURE:  This patient was given preoperative anticoagulation and antibiotics and was brought back to the operating room and positioned in the supine position. General anesthesia was started. He was secured to the bed and then he was sterilely prepped and draped in standard fashion. An 25 [place] Ceballos catheter was placed and a supraumbilical skin incision was made, a Veress needle was placed through this into the peritoneum. Pneumoperitoneum was obtained. An 8 mm port was placed through this incision into the peritoneum. The peritoneum was examined. No injuries were noted. The rest of the ports were placed in the usual fashion.  Three robotic 8 mm ports were placed into assistant, one 12 mm, one 5 mm ports were placed under

## 2017-10-17 NOTE — H&P
prostate cancer. His staging CT and bone scan were negative except for one spot in the skull which is probably traumatic in nature. He is scheduled for a CT skull on Wednesday. Assuming this is negative and after discussing the various Rx options the patient would like to proceed with Robotic assisted laparoscopic radical prostatectomy and Pelvic Lymphadenectomy .      Procedures Today: N/A     Urinalysis today:  No results found for this visit on 08/28/17. Last several PSA's:        Lab Results   Component Value Date     PSA 19.94 (H) 07/17/2017      Last total testosterone:  No results found for: TESTOSTERONE     Last AUA Symptom Score (QOL): 2 (0)     Last BUN and creatinine:        Lab Results   Component Value Date     BUN 16 08/14/2017            Lab Results   Component Value Date     CREATININE 0.78 08/14/2017         Additional Lab/Culture results: none     Imaging Reviewed during this Office Visit:   8/16/17 CT Abd & Pelvis: No evidence of metastatic disease. 8/16/17 Bone Scan: Single abnormal focus of tracer uptake overlying the left calvarium. Further evaluation with CT head W and WO contrast recommended.   (results were independently reviewed by physician and radiology report verified)     PAST MEDICAL, FAMILY AND SOCIAL HISTORY UPDATE:  Past Medical History        Past Medical History:   Diagnosis Date    A-fib ([address] 4/6/2015    Allergic rhinitis 4/6/2015    Back pain      Basal cell carcinoma of skin      Caffeine use       1 cup tea and 2 cans pop/day    Decreased libido      Diverticulitis of colon (without mention of hemorrhage) 4/6/2015    ED (erectile dysfunction)      Elevated prostate specific antigen (PSA) 4/6/2015    HTN (hypertension) 4/6/2015    Obesity 4/6/2015    Other abnormal glucose 4/6/2015    Pure hypercholesterolemia 4/6/2015    Sebaceous cyst 4/6/2015    Tinnitus      Tobacco use disorder 4/6/2015         Past Surgical History         Past Surgical History: Procedure Laterality Date    COLONOSCOPY        RHINOPLASTY        SKIN CANCER EXCISION   09/09     L Upper Lip Basal CA    TONSILLECTOMY        VASECTOMY             Family History         Family History   Problem Relation Age of Onset    Coronary Art Dis Mother      High Blood Pressure Mother      High Cholesterol Father      Seizures Sister           Current Facility-Administered Medications          Current Outpatient Prescriptions   Medication Sig Dispense Refill    rosuvastatin (CRESTOR) 40 MG tablet Take 1 tablet by mouth every evening 90 tablet 1    lisinopril (PRINIVIL;ZESTRIL) 5 MG tablet TAKE 1 TABLET DAILY 90 tablet 0    propafenone (RYTHMOL) 150 MG tablet Take 1 tablet by mouth every 8 hours 30 tablet 1      No current facility-administered medications for this visit. Review of patient's allergies indicates no known allergies. History   Smoking Status    Former Smoker    Types: Cigarettes    Quit date: 4/1/2016   Smokeless Tobacco    Not on file      (If patient a smoker, smoking cessation counseling offered)        History   Alcohol Use    3.6 oz/week    5 Cans of beer, 1 Standard drinks or equivalent per week         REVIEW OF SYSTEMS:  Constitutional: negative  Eyes: negative  Neurological: negative  Endocrine: negative  Gastrointestinal: negative  Cardiovascular: negative  Skin: negative   Musculoskeletal: negative  Ears/Nose/Throat: negative  Respiratory: negative  Hematological/Lymphatic: negative  Psychological: negative     Physical Exam:    There were no vitals filed for this visit. There is no height or weight on file to calculate BMI. Patient is a 64 y.o. male in no acute distress and alert and oriented to person, place and time. Lungs CTA  Heart R3 without murmur  Abdomen: Soft NT ND       Assessment and Plan      1. Prostate cancer ([address]    2. Elevated prostate specific antigen (PSA)    3.  BPH (benign prostatic hypertrophy) with urinary obstruction

## 2017-10-18 VITALS
RESPIRATION RATE: 14 BRPM | SYSTOLIC BLOOD PRESSURE: 91 MMHG | TEMPERATURE: 98.3 F | HEIGHT: 70 IN | BODY MASS INDEX: 40.94 KG/M2 | OXYGEN SATURATION: 97 % | WEIGHT: 286 LBS | HEART RATE: 60 BPM | DIASTOLIC BLOOD PRESSURE: 46 MMHG

## 2017-10-18 LAB
ANION GAP SERPL CALCULATED.3IONS-SCNC: 13 MMOL/L (ref 9–17)
BUN BLDV-MCNC: 15 MG/DL (ref 8–23)
BUN/CREAT BLD: ABNORMAL (ref 9–20)
CALCIUM SERPL-MCNC: 8 MG/DL (ref 8.6–10.4)
CHLORIDE BLD-SCNC: 100 MMOL/L (ref 98–107)
CO2: 22 MMOL/L (ref 20–31)
CREAT SERPL-MCNC: 1.02 MG/DL (ref 0.7–1.2)
CULTURE: NO GROWTH
CULTURE: NORMAL
GFR AFRICAN AMERICAN: >60 ML/MIN
GFR NON-AFRICAN AMERICAN: >60 ML/MIN
GFR SERPL CREATININE-BSD FRML MDRD: ABNORMAL ML/MIN/{1.73_M2}
GFR SERPL CREATININE-BSD FRML MDRD: ABNORMAL ML/MIN/{1.73_M2}
GLUCOSE BLD-MCNC: 155 MG/DL (ref 70–99)
HCT VFR BLD CALC: 38.6 % (ref 41–53)
HEMOGLOBIN: 13.3 G/DL (ref 13.5–17.5)
Lab: NORMAL
MCH RBC QN AUTO: 31.9 PG (ref 26–34)
MCHC RBC AUTO-ENTMCNC: 34.4 G/DL (ref 31–37)
MCV RBC AUTO: 92.6 FL (ref 80–100)
PDW BLD-RTO: 13.9 % (ref 12.5–15.4)
PLATELET # BLD: 237 K/UL (ref 140–450)
PMV BLD AUTO: 7.3 FL (ref 6–12)
POTASSIUM SERPL-SCNC: 4.6 MMOL/L (ref 3.7–5.3)
RBC # BLD: 4.17 M/UL (ref 4.5–5.9)
SODIUM BLD-SCNC: 135 MMOL/L (ref 135–144)
SPECIMEN DESCRIPTION: NORMAL
STATUS: NORMAL
WBC # BLD: 13.5 K/UL (ref 3.5–11)

## 2017-10-18 PROCEDURE — 85027 COMPLETE CBC AUTOMATED: CPT

## 2017-10-18 PROCEDURE — 2580000003 HC RX 258: Performed by: UROLOGY

## 2017-10-18 PROCEDURE — 94762 N-INVAS EAR/PLS OXIMTRY CONT: CPT

## 2017-10-18 PROCEDURE — 6360000002 HC RX W HCPCS: Performed by: UROLOGY

## 2017-10-18 PROCEDURE — G8978 MOBILITY CURRENT STATUS: HCPCS

## 2017-10-18 PROCEDURE — G8979 MOBILITY GOAL STATUS: HCPCS

## 2017-10-18 PROCEDURE — G8980 MOBILITY D/C STATUS: HCPCS

## 2017-10-18 PROCEDURE — 6370000000 HC RX 637 (ALT 250 FOR IP): Performed by: UROLOGY

## 2017-10-18 PROCEDURE — 36415 COLL VENOUS BLD VENIPUNCTURE: CPT

## 2017-10-18 PROCEDURE — 97162 PT EVAL MOD COMPLEX 30 MIN: CPT

## 2017-10-18 PROCEDURE — 80048 BASIC METABOLIC PNL TOTAL CA: CPT

## 2017-10-18 PROCEDURE — 97530 THERAPEUTIC ACTIVITIES: CPT

## 2017-10-18 PROCEDURE — 82570 ASSAY OF URINE CREATININE: CPT

## 2017-10-18 RX ORDER — DOCUSATE SODIUM 100 MG/1
100 CAPSULE, LIQUID FILLED ORAL 2 TIMES DAILY PRN
Qty: 30 CAPSULE | Refills: 1 | Status: SHIPPED | OUTPATIENT
Start: 2017-10-18 | End: 2017-11-20 | Stop reason: ALTCHOICE

## 2017-10-18 RX ORDER — CIPROFLOXACIN 500 MG/1
500 TABLET, FILM COATED ORAL 2 TIMES DAILY
Qty: 6 TABLET | Refills: 0 | Status: SHIPPED | OUTPATIENT
Start: 2017-10-18 | End: 2017-10-21

## 2017-10-18 RX ORDER — OXYCODONE HYDROCHLORIDE AND ACETAMINOPHEN 5; 325 MG/1; MG/1
1-2 TABLET ORAL EVERY 4 HOURS PRN
Qty: 35 TABLET | Refills: 0 | Status: SHIPPED | OUTPATIENT
Start: 2017-10-18 | End: 2017-10-25

## 2017-10-18 RX ADMIN — DOCUSATE SODIUM 100 MG: 100 CAPSULE, LIQUID FILLED ORAL at 09:34

## 2017-10-18 RX ADMIN — FAMOTIDINE 20 MG: 20 TABLET, FILM COATED ORAL at 09:34

## 2017-10-18 RX ADMIN — OXYCODONE HYDROCHLORIDE AND ACETAMINOPHEN 2 TABLET: 5; 325 TABLET ORAL at 13:57

## 2017-10-18 RX ADMIN — OXYCODONE HYDROCHLORIDE AND ACETAMINOPHEN 2 TABLET: 5; 325 TABLET ORAL at 07:48

## 2017-10-18 RX ADMIN — PROPAFENONE HYDROCHLORIDE 150 MG: 150 TABLET, FILM COATED ORAL at 09:34

## 2017-10-18 RX ADMIN — DEXTROSE MONOHYDRATE 3 G: 50 INJECTION, SOLUTION INTRAVENOUS at 05:38

## 2017-10-18 RX ADMIN — KETOROLAC TROMETHAMINE 30 MG: 30 INJECTION, SOLUTION INTRAMUSCULAR at 05:38

## 2017-10-18 RX ADMIN — PROPAFENONE HYDROCHLORIDE 150 MG: 150 TABLET, FILM COATED ORAL at 02:02

## 2017-10-18 RX ADMIN — LISINOPRIL 5 MG: 5 TABLET ORAL at 09:34

## 2017-10-18 RX ADMIN — MORPHINE SULFATE 2 MG: 2 INJECTION, SOLUTION INTRAMUSCULAR; INTRAVENOUS at 01:57

## 2017-10-18 RX ADMIN — KETOROLAC TROMETHAMINE 30 MG: 30 INJECTION, SOLUTION INTRAMUSCULAR at 11:41

## 2017-10-18 RX ADMIN — Medication 10 ML: at 09:35

## 2017-10-18 ASSESSMENT — PAIN SCALES - GENERAL
PAINLEVEL_OUTOF10: 4
PAINLEVEL_OUTOF10: 7
PAINLEVEL_OUTOF10: 7
PAINLEVEL_OUTOF10: 5
PAINLEVEL_OUTOF10: 3
PAINLEVEL_OUTOF10: 4

## 2017-10-18 ASSESSMENT — PAIN DESCRIPTION - LOCATION
LOCATION: BUTTOCKS
LOCATION: BUTTOCKS;ABDOMEN

## 2017-10-18 NOTE — CARE COORDINATION
Case Management Initial Discharge Plan  Shira Powell Lenny,         Readmission Risk              Readmission Risk:        4.5       Age 72 or Greater:  0    Admitted from SNF or Requires Paid or Family Care:  0    Currently has CHF,COPD,ARF,CRI,or is on dialysis:  0    Takes more than 5 Prescription Medications:  0    Takes Digoxin,Insulin,Anticoagulants,Narcotics or ASA/Plavix:  [address] in Past 12 Months:  0    On Disability:  0    Patient Considers own Health:  2.5            Met with:patient to discuss discharge plans.    Information verified: address, contacts, phone number, , insurance Yes  PCP: Natha Nageotte, MD  Date of last visit: past year    Insurance Provider: DONALD/[facility] [place]    Discharge Planning  Current Residence:  Private Residence  Living Arrangements:  Spouse/Significant Other   Home has 1 stories/2 stairs to climb  Support Systems:  Spouse/Significant Other  Current Services PTA:  C-pap Supplier:   Patient able to perform ADL's:Independent  DME used to aid ambulation prior to admission:  None /during admission none    Potential Assistance Needed:  N/A    Pharmacy: [facility]   Potential Assistance Purchasing Medications:  No  Does patient want to participate in local refill/ meds to beds program?  No    Patient agreeable to home care: No  [place] of choice provided:  n/a      Type of Home Care Services:  None  Patient expects to be discharged to:  Home    Prior SNF/Rehab Placement and Facility:   Agreeable to SNF/Rehab: No  [place] of choice provided: n/a   Evaluation: no    Expected Discharge date:  10/18/17  Follow Up Appointment: Best Day/ Time: Wednesday PM    Transportation provider:  self/family  Transportation arrangements needed for discharge: Yes    Discharge Plan:  Home independently        Electronically signed by Sal RN on 10/18/17 at 3:51 PM

## 2017-10-18 NOTE — PROGRESS NOTES
Tania Spring MD FACS    Urology Progress Note    Subjective: POD#1 s/p RALP. No ambulation yet. No f/c/n/v/cp/sob overnight. Seen with CPAP device on. Pain is well controlled. He has had parasthesias in both hands since last night, which is about the same. No other issues. Denies nerve damage, CVA history, DM, or other pertinent history.      Patient Vitals for the past 24 hrs:   BP Temp Temp src Pulse Resp SpO2 Height Weight   10/18/17 0512 107/60 97.8 °F (36.6 °C) Oral 63 12 96 % - -   10/18/17 0154 110/66 97.5 °F (36.4 °C) Oral 63 14 95 % - -   10/17/17 2000 117/68 97.7 °F (36.5 °C) Oral 79 16 98 % - -   10/17/17 1810 (!) 145/74 97.3 °F (36.3 °C) Oral 81 18 99 % - -   10/17/17 1715 (!) 150/77 97.3 °F (36.3 °C) Temporal 65 12 100 % - -   10/17/17 1700 (!) 143/94 - - 63 12 99 % - -   10/17/17 1645 (!) 162/74 97.3 °F (36.3 °C) Temporal 72 16 99 % - -   10/17/17 1630 (!) 160/83 - - 69 12 100 % - -   10/17/17 1615 (!) 156/87 - - 75 17 100 % - -   10/17/17 1600 (!) 173/99 - - 68 14 100 % - -   10/17/17 1552 (!) 178/84 - - 74 16 98 % - -   10/17/17 1547 - 97.7 °F (36.5 °C) Temporal - 12 - - -   10/17/17 1015 (!) 155/82 97.9 °F (36.6 °C) Temporal 74 16 98 % - -   10/17/17 1000 - - - - - - 5' 10\" (1.778 m) 286 lb (129.7 kg)       Intake/Output Summary (Last 24 hours) at 10/18/17 0600  Last data filed at 10/18/17 0557   Gross per 24 hour   Intake             2150 ml   Output             1275 ml   Net              875 ml       Recent Labs      10/17/17   1637  10/18/17   0604   WBC   --   13.5*   HGB  16.2  13.3*   HCT  48.8  38.6*   MCV   --   92.6   PLT  57*  237     Recent Labs      10/17/17   1014  10/17/17   1637  10/18/17   0604   NA   --   137  135   K   --   4.5  4.6   CL   --   103  100   CO2   --   20  22   BUN   --   12  15   CREATININE  0.54  0.85  1.02       No results for input(s): COLORU, PHUR, LABCAST, WBCUA, RBCUA, MUCUS, TRICHOMONAS, YEAST, BACTERIA, CLARITYU, SPECGRAV, LEUKOCYTESUR,

## 2017-10-18 NOTE — FLOWSHEET NOTE
visited patient per other  referral for post-surgery visit. Patient was sitting up in hospital bed with wife at bedside tending to him. Patient shared that he underwent surgery yesterday to [facility] [place] his prostate. \" Patient shared that surgery \"went very well. \" Patient stated that he is concerned about \"numbness in his hands,\" however. Patient stated that he would be talking to his medical team today about his concerns. Patient and wife were hopeful for his recovery.  provided presence, support, and blessings for patient's health and healing. Chaplains will remain available to offer spiritual and emotional support as needed. Karlie Vega     10/18/17 0605   Encounter Summary   Services provided to: Patient and family together   Referral/Consult From: Other    Support System Spouse   Place of [address] [place] [address] Visiting (10/17/2017)   Complexity of Encounter Low   Length of Encounter 15 minutes   Spiritual Assessment Completed Yes   Routine   Type Initial   Assessment Calm; Approachable; Anxious; Hopeful;Helplessness   Intervention Active listening;Explored feelings, thoughts, concerns;Nurtured hope;[place];Sustaining presence/ Ministry of presence   Outcome Comfort;Expressed gratitude   Spiritual/[employer]   Type Spiritual support

## 2017-10-18 NOTE — PROGRESS NOTES
Physical Therapy    [facility]  Initial Assessment    NAME: Wendy Younger  : 1955  MRN: [id number]    Date of Service: 10/18/2017   Patient has prostate cancer which was first diagnosed on 17. Patient is POD #1 s/p LORELEI on 10/17. Patient Diagnosis(es): There were no encounter diagnoses. has a past medical history of A-fib [address] Allergic rhinitis; Back pain; Basal cell carcinoma of skin; Decreased libido; Diverticulitis of colon (without mention of hemorrhage)(562.11); ED (erectile dysfunction); Elevated prostate specific antigen (PSA); HTN (hypertension); Obesity; [facility] Pure hypercholesterolemia; Sleep apnea; Tinnitus; Tobacco use disorder; and Wears glasses. has a past surgical history that includes rhinoplasty; Skin cancer excision (); Tonsillectomy; Colonoscopy; Vasectomy; Tympanostomy tube placement; Cardiac catheterization (2017); ostatectomy (10/17/2017); and ostatectomy (N/A, 10/17/2017).     Restrictions  Restrictions/Precautions  Restrictions/Precautions: General Precautions  Vision/Hearing  Vision: Within Functional Limits  Hearing: Within functional limits     Subjective  General  Chart Reviewed: Yes  Family / Caregiver Present: No  Subjective  Subjective: Patient reports that he is doing well today  Pain Screening  Patient Currently in Pain: Denies  Vital Signs  Patient Currently in Pain: Denies       Orientation  Orientation  Overall Orientation Status: Within Functional Limits    Social/Functional History  Social/Functional History  Lives With: Spouse (wife)  Type of Home: House  Home Layout: One level  Home Access: Stairs to enter without rails  Entrance Stairs - Number of Steps: 2   Bathroom Shower/Tub: Tub/Shower unit  Home Equipment: Rolling walker, Cane (Were used by wife, pt reports he never used any home equipment)  ADL Assistance: Independent  Homemaking Assistance: Independent  Ambulation Assistance: Independent  Transfer Assistance: Independent  Active : Yes  Mode of Transportation: Car  Occupation: Retired  Additional Comments: Patient reports that 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 Patient reports that his wife is fine now  Objective     Observation/Palpation  Body Mechanics: Forward shoulders and head in seated and standing position    AROM RLE (degrees)  RLE AROM: WFL  AROM LLE (degrees)  LLE AROM : WFL  AROM RUE (degrees)  RUE AROM : WFL  AROM LUE (degrees)  LUE AROM : WFL  Strength RLE  Strength RLE: WFL  Comment: Grossly 5/5 except hip flx 4-/5  Strength LLE  Strength LLE: WFL  Comment: Grossly 5/5 except hip flx 4-/5  Strength RUE  Strength RUE: WFL  Comment: Grossly 5/5  Strength LUE  Strength LUE: WFL  Comment: Grossly 5/5  Motor Control  Gross Motor?: WFL  Sensation  Overall Sensation Status: WFL (Patient reports tingling in index, middle, and thumb from forearm down since surgery on 10/17)  Bed mobility  Rolling to Left: Modified independent  Supine to Sit: Modified independent  Sit to Supine: Modified independent  Scooting: Modified independent  Comment: Patient utilized hospital bed for bed mobility.  Patient reported no signs and symptoms of dizziness during bed mobility  Transfers  Sit to Stand: Stand by assistance  Stand to sit: Stand by assistance  Comment: Patient reported no sign and symptoms of dizzines during transfers  Ambulation  Ambulation?: Yes  More Ambulation?: Yes  Ambulation 1  Surface: level tile  Device: Rolling Walker  Assistance: Stand by assistance  Quality of Gait: decreased step and stride length  Distance: 300 ft  Comments: Patient reported no signs and symptoms of dizziness, pt reported he was tired due to not walking as much recently  Ambulation 2  Surface - 2: level tile  Device 2: No device  Assistance 2: Contact guard assistance  Quality of Gait 2: decresed step and stride length  Distance: 75 ft  Comments: Patient reported no signs and symptoms of dizziness, pt reported he was tired due to not walking as much recently     Balance  Posture: Good  Sitting - Static: Good;-  Sitting - Dynamic: Fair;+  Standing - Static: Good  Standing - Dynamic: Fair;+        Assessment   Assessment: Patient tolerated evaluation well. Patient was Glo for bed mobility, SBA-CGA for ambulation, and SBA for functional transfers. Patient was able to ambulate 300ft using RW w/ SBA followed by 75ft using no assistive device w/ CGA. Patient reports that he has no worries about going home. Patient does not require acute PT at this time. Decision Making: Medium Complexity  No Skilled PT: Safe to return home  REQUIRES PT FOLLOW UP: No  Activity Tolerance  Activity Tolerance: Patient Tolerated treatment well  PT Equipment Recommendations  Equipment Needed: No     Discharge Recommendations:  Home with assist PRN      Plan   Safety Devices  Type of devices: Call light within reach, Left in bed, Gait belt (Patient had 4 arm rests in place upon entrance for evaluation, put back into place upon completion of eval)    G-Code  PT G-Codes  Functional Assessment Tool Used: [place] tool  Score: 23  Functional Limitation: Mobility: Walking and moving around  Mobility: Walking and Moving Around Current Status (): At least 1 percent but less than 20 percent impaired, limited or restricted  Mobility: Walking and Moving Around Goal Status (): At least 1 percent but less than 20 percent impaired, limited or restricted  Mobility: Walking and Moving Around Discharge Status (): At least 1 percent but less than 20 percent impaired, limited or restricted    Therapy Time   Individual Concurrent Group Co-treatment   Time In 0857         Time Out 0924         Minutes 32                 Nile Mic, SHARON  Evaluation/treatment performed by Student PT under the supervision of co-signing PT who agrees with all evaluation/treatment and documentation.

## 2017-10-18 NOTE — FLOWSHEET NOTE
Visited and prayed with patient at his bedside. Patient was in high spirit and seemed to appreciate the treatment and care he was receiving. Family was not present at the time. However, patient did indicate he had strong family support.  maintained listening presence, offered support and reassured patient that he was in good hands. Patient said he was raised Orthodox and a member of [employer]. Patient received sacrament of anointing of the sick. Follow up visits recommended for more spiritual and emotional support. 10/18/17 1050   Encounter Summary   Services provided to: Patient   Support System Family members   [facility] Visiting (10/18/2017)   Complexity of Encounter Moderate   Length of Encounter 30 minutes   Spiritual Assessment Completed Yes   Routine   Type Follow up   Spiritual/Moravian   Type Spiritual support   Assessment Calm; Approachable; Hopeful   Intervention Active listening;Nurtured hope;Prayer; Anointing   Outcome Expressed gratitude   Sacraments   Sacrament of Sick-Anointing Anointed

## 2017-10-19 ENCOUNTER — CARE COORDINATION (OUTPATIENT)
Dept: CASE MANAGEMENT | Age: 62
End: 2017-10-19

## 2017-10-19 LAB
CREATININE FLUID: 1.1 MG/DL
SPECIMEN TYPE: NORMAL

## 2017-10-24 ENCOUNTER — CARE COORDINATION (OUTPATIENT)
Dept: CASE MANAGEMENT | Age: 62
End: 2017-10-24

## 2017-10-27 ENCOUNTER — HOSPITAL ENCOUNTER (OUTPATIENT)
Dept: GENERAL RADIOLOGY | Age: 62
Discharge: HOME | End: 2017-10-27
Payer: COMMERCIAL

## 2017-10-27 DIAGNOSIS — C61 PROSTATE CANCER (HCC): ICD-10-CM

## 2017-10-27 PROCEDURE — 51600 INJECTION FOR BLADDER X-RAY: CPT

## 2017-10-27 PROCEDURE — 6360000004 HC RX CONTRAST MEDICATION: Performed by: SPECIALIST

## 2017-10-27 RX ADMIN — IOTHALAMATE MEGLUMINE 250 ML: 172 INJECTION URETERAL at 09:27

## 2017-10-30 ENCOUNTER — CARE COORDINATION (OUTPATIENT)
Dept: CASE MANAGEMENT | Age: 62
End: 2017-10-30

## 2017-10-30 LAB — SURGICAL PATHOLOGY REPORT: NORMAL

## 2017-10-30 NOTE — CARE COORDINATION
[facility] Transitions Follow Up Call    10/30/2017    Patient: Shikha Galvez  Patient : 1955   MRN: [id number]  Reason for Admission: There are no discharge diagnoses documented for the most recent discharge. Discharge Date: 10/18/17 RARS: Risk Score: 4.5       Spoke with: Shikha Galvez    Patient states he is doing so much better. Denies any ongoing issues. He had cysto on Friday and Serrano catheter is now removed. He has had no urinary symptoms and is voiding clear yellow. He states the numbness and tingling to hands is resolved and steri strips are almost all gone from incisions. Denies any ongoing needs. Care transition episode resolved. Care Transitions Subsequent and Final Call    Schedule Follow Up Appointment with PCP:  Completed  Subsequent and Final Calls  Do you have any ongoing symptoms?:  No  Have your medications changed?:  No  Do you have any questions related to your medications?:  No  Do you currently have any active services?:  No  Do you have any needs or concerns that I can assist you with?:  No  Identified Barriers:  None  Care Transitions Interventions  Other Interventions:             Follow Up  Future Appointments  Date Time Provider Madeleine William   2017 10:00 AM MD Yves Lieberman FP None   2017 11:10 AM Esteban Barrett MD [facility]       [place]illa [place], [place]

## 2017-11-13 ENCOUNTER — HOSPITAL ENCOUNTER (OUTPATIENT)
Age: 62
Setting detail: SPECIMEN
Discharge: HOME | End: 2017-11-13
Payer: COMMERCIAL

## 2017-11-13 LAB — PROSTATE SPECIFIC ANTIGEN: 0.03 UG/L

## 2017-11-14 PROBLEM — Z90.79 HX OF PROSTATECTOMY: Status: ACTIVE | Noted: 2017-11-14

## 2017-11-20 PROBLEM — N39.3 MALE STRESS INCONTINENCE: Status: ACTIVE | Noted: 2017-11-20

## 2017-11-29 NOTE — DISCHARGE SUMMARY
DISCHARGE SUMMARY NOTE:      Patient Identification  PATIENT: Sara Trotter is a 64 y.o. male. MRN: [id number]  :  1955  Admit Date:  10/17/2017  Discharge date:   10/18/2017  3:15 PM                                   Disposition: home  Discharged Condition:  good  Discharge Diagnoses:   Patient Active Problem List   Diagnosis    Pure hypercholesterolemia    HTN (hypertension)    Allergic rhinitis    Diverticulitis of colon    Sebaceous cyst    Other abnormal glucose    Elevated prostate specific antigen (PSA)    A-fib (HCC)    [facility]    History of tobacco abuse    Prostate cancer ([facility]    Hx of prostatectomy    Male stress incontinence       Consults: none    Surgery: RALP, BL PLND    Patient Instructions: Activity: No heavy lifting, no strenuous physical activity  Diet: As tolerated  Patient told to follow up with Dr. Juan C Winkler in 1 week(s). Discharge Medications:    [address] Medication Instructions MMN:[id number]    Printed on:17 9925   Medication Information                      lisinopril (PRINIVIL;ZESTRIL) 5 MG tablet  TAKE 1 TABLET DAILY             nicotine polacrilex (NICORETTE) 2 MG gum  Take 2 mg by mouth as needed for Smoking cessation             rosuvastatin (CRESTOR) 40 MG tablet  Take 1 tablet by mouth every evening                 Hospital course: The patient did well in their hospital course. The patient had pain controlled with PO meds, tolerated diet, and was ambulating appropriately. The patient was afebrile for 24 hrs before discharge. All unnecessary drains and catheters were removed at the appropriate times or proper follow up for removal was scheduled. The patient agrees to discharge, understands discharge instructions, and agrees to follow up.   OF note, patient underwent RALP for prostate cancer, final path was:  1.  PROSTATE AND SEMINAL VESICLES, RADICAL PROSTATECTOMY:       -  ADENOCARCINOMA, GILBERTO SCORE 4+5 = 9, INVOLVING BILATERAL   LOBES.          -  INVASIVE ADENOCARCINOMA EXTENDS TO THE LEFT AND RIGHT   ANTERIOR CAPSULAR MARGINS.     -  PERINEURAL INVASION PRESENT.       -  pT2c, pN0, R1.     2.  LYMPH NODES, BILATERAL PELVIC, DISSECTION:       -  24 LYMPH NODES, NEGATIVE FOR MALIGNANCY (0/24).     He did well and was discharged on POD#1 with appropriate follow up care.   Dc Drains: Serrano catheter  Wound instruction: Keep incisions clean and dry  Rx medications: Percocet, Colace, cipro for catheter removal      Linda Talavera  5:54 PM 11/29/2017

## 2018-02-19 ENCOUNTER — HOSPITAL ENCOUNTER (OUTPATIENT)
Age: 63
Setting detail: SPECIMEN
Discharge: HOME | End: 2018-02-19
Payer: COMMERCIAL

## 2018-02-19 DIAGNOSIS — R73.09 ABNORMAL GLUCOSE: ICD-10-CM

## 2018-02-19 DIAGNOSIS — E78.00 PURE HYPERCHOLESTEROLEMIA: ICD-10-CM

## 2018-02-19 DIAGNOSIS — C61 PROSTATE CANCER (HCC): ICD-10-CM

## 2018-02-19 DIAGNOSIS — I10 ESSENTIAL HYPERTENSION: ICD-10-CM

## 2018-02-19 DIAGNOSIS — Z85.46 PERSONAL HISTORY OF PROSTATE CANCER: ICD-10-CM

## 2018-02-19 LAB
ABSOLUTE EOS #: 0.14 K/UL (ref 0–0.44)
ABSOLUTE IMMATURE GRANULOCYTE: 0.09 K/UL (ref 0–0.3)
ABSOLUTE LYMPH #: 1.85 K/UL (ref 1.1–3.7)
ABSOLUTE MONO #: 0.41 K/UL (ref 0.1–1.2)
ALBUMIN SERPL-MCNC: 4.1 G/DL (ref 3.5–5.2)
ALBUMIN/GLOBULIN RATIO: 1.4 (ref 1–2.5)
ALP BLD-CCNC: 87 U/L (ref 40–129)
ALT SERPL-CCNC: 29 U/L (ref 5–41)
ANION GAP SERPL CALCULATED.3IONS-SCNC: 13 MMOL/L (ref 9–17)
AST SERPL-CCNC: 22 U/L
BASOPHILS # BLD: 1 % (ref 0–2)
BASOPHILS ABSOLUTE: 0.08 K/UL (ref 0–0.2)
BILIRUB SERPL-MCNC: 0.46 MG/DL (ref 0.3–1.2)
BILIRUBIN DIRECT: 0.09 MG/DL
BILIRUBIN, INDIRECT: 0.37 MG/DL (ref 0–1)
BUN BLDV-MCNC: 13 MG/DL (ref 8–23)
BUN/CREAT BLD: ABNORMAL (ref 9–20)
CALCIUM SERPL-MCNC: 9.3 MG/DL (ref 8.6–10.4)
CHLORIDE BLD-SCNC: 101 MMOL/L (ref 98–107)
CO2: 25 MMOL/L (ref 20–31)
CREAT SERPL-MCNC: 0.7 MG/DL (ref 0.7–1.2)
DIFFERENTIAL TYPE: ABNORMAL
EOSINOPHILS RELATIVE PERCENT: 2 % (ref 1–4)
ESTIMATED AVERAGE GLUCOSE: 117 MG/DL
GFR AFRICAN AMERICAN: >60 ML/MIN
GFR NON-AFRICAN AMERICAN: >60 ML/MIN
GFR SERPL CREATININE-BSD FRML MDRD: ABNORMAL ML/MIN/{1.73_M2}
GFR SERPL CREATININE-BSD FRML MDRD: ABNORMAL ML/MIN/{1.73_M2}
GLOBULIN: NORMAL G/DL (ref 1.5–3.8)
GLUCOSE BLD-MCNC: 104 MG/DL (ref 70–99)
HBA1C MFR BLD: 5.7 % (ref 4–6)
HCT VFR BLD CALC: 46.7 % (ref 40.7–50.3)
HEMOGLOBIN: 15.2 G/DL (ref 13–17)
IMMATURE GRANULOCYTES: 1 %
LYMPHOCYTES # BLD: 26 % (ref 24–43)
MCH RBC QN AUTO: 30.2 PG (ref 25.2–33.5)
MCHC RBC AUTO-ENTMCNC: 32.5 G/DL (ref 28.4–34.8)
MCV RBC AUTO: 92.8 FL (ref 82.6–102.9)
MONOCYTES # BLD: 6 % (ref 3–12)
NRBC AUTOMATED: 0 PER 100 WBC
PDW BLD-RTO: 12.8 % (ref 11.8–14.4)
PLATELET # BLD: 250 K/UL (ref 138–453)
PLATELET ESTIMATE: ABNORMAL
PMV BLD AUTO: 9.2 FL (ref 8.1–13.5)
POTASSIUM SERPL-SCNC: 4.6 MMOL/L (ref 3.7–5.3)
PROSTATE SPECIFIC ANTIGEN: <0.01 UG/L
RBC # BLD: 5.03 M/UL (ref 4.21–5.77)
RBC # BLD: ABNORMAL 10*6/UL
SEG NEUTROPHILS: 64 % (ref 36–65)
SEGMENTED NEUTROPHILS ABSOLUTE COUNT: 4.43 K/UL (ref 1.5–8.1)
SODIUM BLD-SCNC: 139 MMOL/L (ref 135–144)
TOTAL PROTEIN: 7.1 G/DL (ref 6.4–8.3)
WBC # BLD: 7 K/UL (ref 3.5–11.3)
WBC # BLD: ABNORMAL 10*3/UL

## 2018-08-25 ENCOUNTER — HOSPITAL ENCOUNTER (OUTPATIENT)
Age: 63
Discharge: HOME | End: 2018-08-25
Payer: COMMERCIAL

## 2018-08-25 LAB — PROSTATE SPECIFIC ANTIGEN: <0.01 UG/L

## 2018-08-25 PROCEDURE — 36415 COLL VENOUS BLD VENIPUNCTURE: CPT

## 2018-08-25 PROCEDURE — 84153 ASSAY OF PSA TOTAL: CPT

## 2018-08-27 PROBLEM — N52.31 ERECTILE DYSFUNCTION AFTER RADICAL PROSTATECTOMY: Status: ACTIVE | Noted: 2018-08-27

## 2018-08-27 PROBLEM — Z85.46 PERSONAL HISTORY OF PROSTATE CANCER: Status: ACTIVE | Noted: 2018-08-27

## 2019-02-21 ENCOUNTER — OFFICE VISIT (OUTPATIENT)
Dept: FAMILY MEDICINE CLINIC | Age: 64
End: 2019-02-21
Payer: COMMERCIAL

## 2019-02-21 VITALS
HEART RATE: 73 BPM | SYSTOLIC BLOOD PRESSURE: 106 MMHG | BODY MASS INDEX: 40.8 KG/M2 | HEIGHT: 70 IN | WEIGHT: 285 LBS | DIASTOLIC BLOOD PRESSURE: 67 MMHG | RESPIRATION RATE: 16 BRPM | TEMPERATURE: 97.9 F

## 2019-02-21 DIAGNOSIS — E66.01 MORBID OBESITY (HCC): ICD-10-CM

## 2019-02-21 DIAGNOSIS — K63.5 BENIGN COLONIC POLYP: ICD-10-CM

## 2019-02-21 DIAGNOSIS — I48.0 PAROXYSMAL ATRIAL FIBRILLATION (HCC): ICD-10-CM

## 2019-02-21 DIAGNOSIS — E78.00 PURE HYPERCHOLESTEROLEMIA: ICD-10-CM

## 2019-02-21 DIAGNOSIS — I10 ESSENTIAL HYPERTENSION: Primary | ICD-10-CM

## 2019-02-21 DIAGNOSIS — Z87.891 PERSONAL HISTORY OF TOBACCO USE: ICD-10-CM

## 2019-02-21 DIAGNOSIS — Z83.3 FAMILY HISTORY OF DIABETES MELLITUS (DM): ICD-10-CM

## 2019-02-21 PROBLEM — Z85.46 PERSONAL HISTORY OF PROSTATE CANCER: Status: RESOLVED | Noted: 2018-08-27 | Resolved: 2019-02-21

## 2019-02-21 LAB — HBA1C MFR BLD: 5.2 %

## 2019-02-21 PROCEDURE — 99204 OFFICE O/P NEW MOD 45 MIN: CPT | Performed by: INTERNAL MEDICINE

## 2019-02-21 PROCEDURE — G0296 VISIT TO DETERM LDCT ELIG: HCPCS | Performed by: INTERNAL MEDICINE

## 2019-02-21 PROCEDURE — 83036 HEMOGLOBIN GLYCOSYLATED A1C: CPT | Performed by: INTERNAL MEDICINE

## 2019-02-21 RX ORDER — ROSUVASTATIN CALCIUM 40 MG/1
40 TABLET, COATED ORAL DAILY
Qty: 30 TABLET | Refills: 5 | Status: SHIPPED | OUTPATIENT
Start: 2019-02-21 | End: 2019-06-04 | Stop reason: SDUPTHER

## 2019-02-21 RX ORDER — PROPAFENONE HYDROCHLORIDE 150 MG/1
150 TABLET, FILM COATED ORAL EVERY 8 HOURS
Qty: 90 TABLET | Refills: 5 | Status: SHIPPED | OUTPATIENT
Start: 2019-02-21 | End: 2019-06-04 | Stop reason: SDUPTHER

## 2019-02-21 RX ORDER — LISINOPRIL 5 MG/1
5 TABLET ORAL DAILY
Qty: 30 TABLET | Refills: 5 | Status: SHIPPED | OUTPATIENT
Start: 2019-02-21 | End: 2019-06-04 | Stop reason: SDUPTHER

## 2019-02-21 ASSESSMENT — PATIENT HEALTH QUESTIONNAIRE - PHQ9
2. FEELING DOWN, DEPRESSED OR HOPELESS: 0
1. LITTLE INTEREST OR PLEASURE IN DOING THINGS: 0
SUM OF ALL RESPONSES TO PHQ QUESTIONS 1-9: 0
SUM OF ALL RESPONSES TO PHQ9 QUESTIONS 1 & 2: 0
SUM OF ALL RESPONSES TO PHQ QUESTIONS 1-9: 0

## 2019-02-26 ENCOUNTER — HOSPITAL ENCOUNTER (OUTPATIENT)
Age: 64
Setting detail: SPECIMEN
Discharge: HOME | End: 2019-02-26
Payer: COMMERCIAL

## 2019-02-26 DIAGNOSIS — Z85.46 PERSONAL HISTORY OF PROSTATE CANCER: ICD-10-CM

## 2019-02-26 DIAGNOSIS — E66.01 MORBID OBESITY (HCC): ICD-10-CM

## 2019-02-26 LAB — PROSTATE SPECIFIC ANTIGEN: <0.01 UG/L

## 2019-02-27 LAB
ANION GAP SERPL CALCULATED.3IONS-SCNC: 15 MMOL/L (ref 9–17)
BUN BLDV-MCNC: 13 MG/DL (ref 8–23)
BUN/CREAT BLD: ABNORMAL (ref 9–20)
CALCIUM SERPL-MCNC: 8.9 MG/DL (ref 8.6–10.4)
CHLORIDE BLD-SCNC: 107 MMOL/L (ref 98–107)
CO2: 17 MMOL/L (ref 20–31)
CREAT SERPL-MCNC: 0.78 MG/DL (ref 0.7–1.2)
GFR AFRICAN AMERICAN: >60 ML/MIN
GFR NON-AFRICAN AMERICAN: >60 ML/MIN
GFR SERPL CREATININE-BSD FRML MDRD: ABNORMAL ML/MIN/{1.73_M2}
GFR SERPL CREATININE-BSD FRML MDRD: ABNORMAL ML/MIN/{1.73_M2}
GLUCOSE BLD-MCNC: 117 MG/DL (ref 70–99)
POTASSIUM SERPL-SCNC: 4.3 MMOL/L (ref 3.7–5.3)
SODIUM BLD-SCNC: 139 MMOL/L (ref 135–144)

## 2019-03-06 ENCOUNTER — TELEPHONE (OUTPATIENT)
Dept: SURGERY | Age: 64
End: 2019-03-06

## 2019-03-06 RX ORDER — POLYETHYLENE GLYCOL 3350, SODIUM CHLORIDE, SODIUM BICARBONATE, POTASSIUM CHLORIDE 420; 11.2; 5.72; 1.48 G/4L; G/4L; G/4L; G/4L
4000 POWDER, FOR SOLUTION ORAL ONCE
Qty: 4000 ML | Refills: 0 | Status: SHIPPED | OUTPATIENT
Start: 2019-03-06 | End: 2019-03-06

## 2019-03-06 NOTE — LETTER
COLONOSCOPY           Day Before Examination: April 25, 2019       Morning:   Mix bowel prep according to directions and refrigerate. Only CLEAR  LIQUIDS are permitted until midnight. NO FOOD THIS DAY!! Clear liquids including any of the following:   Water   Clear broth or bouillon   7-up, Sprite or Ginger ale   Gatorade or Raulito-Aid   Popsicles   Coffee or tea (without milk or sweetener)   Plain Jell-o   NOTHING RED OR PURPLE     At 12 noon take two (2) Dulcolax tablets     Evening: Begin drinking prep at 4:00pm. Drink an 8 ounce glass every 10 minutes. It is  best to drink the whole glass rapidly rather than sipping small amounts. Continue  drinking until the bottle is empty. Bowel movements should begin approximately one(1) hour after the first glass of prep. They will continue periodically one (1) to two (2) hours after drinking the first glass. If you experience bloating, cramping or nausea set the prep aside for 30-40 minutes, the start again. This is temporary and will disappear once bowel movements begin.

## 2019-03-06 NOTE — TELEPHONE ENCOUNTER
[address] [facility]  Dept: [phone]  Loc: [phone]    Medical History Form for Colonoscopy  Name: Nathalie Galvez  YOB: 1955  Procedure: colonoscopy Physician: Dr. Gigi Murry  Chief Complaint (Reason for Procedure): family history of colon cancer  Who is your Primary Care Physician: Dr. Fredrick Allen    Do you smoke? [] Yes    [x] No    How much caffeine do you drink in a day? Cup of tea      Do you drink alcohol? [x] Yes    [] No    Do you have a family history of colon cancer? [x] Yes-mother   [] No    Have you ever had a colonoscopy before? [x] Yes   [] No  When:  2014 Where: [facility]    In the last six (6) months have you experienced any of the following symptoms? [] Blood from the rectum or stool  [] Abdominal Pain   [] Diarrhea  [] Itching of rectum   [] Vomiting  [] Constipation   [] Black stools  [] Bloating   [] Mucous in your stool  [] [place]   [] Change in bowel habits    Do you have allergies? [] Yes  If yes, please list:   [x] No    Do you take Warfarin, Coumadin, Plavix, Eliquis, Xarelto, or aspirin OR do you take a medication that thins your blood?   [] Yes  [x] No    Please list all of your medications including over-the-counter and herbal supplements  Lisinopril 5 mg daily    Propafenone 150 every 8 hours    Rosuvastatin 40 mg daily    rhinoplasty              List your past surgical procedures    Cardiac cath    colonoscopy    prostatectomy    Skin cancer excision to upper lip    Tympanostomy tube placement    vasectomy       Medical History  Do you have any history of:  [] None [] Heart Disease [x] Hypertension  [] Diabetes [] Seizures [] Respiratory/Asthma  [] Sleep Apnea [] G.E.R.D [] Blood Disorder  [] Vascular Disease [] Depression    List the medical problems you are being treated for    A-fib Allergic rhinitis   Diverticulitis of colon Back pain   Elevated PSA Basal cell carcinoma of skin obesity Prostate cancer   Pure hypercholesterolemia Sleep apnea   tinnitus      Spoke with patient at this time scheduled for a colonoscopy. Surgery instructions and bowel prep went over with patient at this time, denies any questions. Bowel prep sent to pharmacy of choice and all instructions mailed at this time. Acknowledges understanding of procedure and will call with any further questions.       Colonoscopy:                   Screen-No     Diagnostic-Yes  Abdominal Pain-No              Melena-No  Anemia-No                           Hematochezia-No  Bloating-No                          Rectal Bleeding-No  Diarrhea-No                         Rectal/Anal Pain-No  Constipation-NoNo                   Pruritis-No  Family History colon cancer-Yes  Previous Colon Cancer/Polyp-Yes  Change in Bowels-No  Decrease Caliber of Stool-No  Change in Color of Stool-No      Surgery Date: April 26, 2019    Surgery Time: 10:00 am    Pharmacy: order signed

## 2019-03-27 ENCOUNTER — TELEPHONE (OUTPATIENT)
Dept: ONCOLOGY | Age: 64
End: 2019-03-27

## 2019-04-02 ENCOUNTER — HOSPITAL ENCOUNTER (OUTPATIENT)
Dept: CT IMAGING | Age: 64
Discharge: HOME | End: 2019-04-04
Payer: COMMERCIAL

## 2019-04-02 DIAGNOSIS — Z87.891 PERSONAL HISTORY OF TOBACCO USE: ICD-10-CM

## 2019-04-02 PROCEDURE — G0297 LDCT FOR LUNG CA SCREEN: HCPCS

## 2019-04-12 ENCOUNTER — TELEPHONE (OUTPATIENT)
Dept: CASE MANAGEMENT | Age: 64
End: 2019-04-12

## 2019-04-12 NOTE — TELEPHONE ENCOUNTER
Navigator reviewing chart and pt. With recent Screening suspicious findings 4A. Pt. Scheduled to see pulmonary 4/18 with PFT.  Plan to follow

## 2019-04-18 ENCOUNTER — OFFICE VISIT (OUTPATIENT)
Dept: PULMONOLOGY | Age: 64
End: 2019-04-18
Payer: COMMERCIAL

## 2019-04-18 VITALS
DIASTOLIC BLOOD PRESSURE: 89 MMHG | HEIGHT: 70 IN | SYSTOLIC BLOOD PRESSURE: 136 MMHG | OXYGEN SATURATION: 99 % | BODY MASS INDEX: 41.52 KG/M2 | RESPIRATION RATE: 18 BRPM | WEIGHT: 290 LBS | HEART RATE: 80 BPM

## 2019-04-18 VITALS — WEIGHT: 290 LBS | OXYGEN SATURATION: 99 % | BODY MASS INDEX: 41.52 KG/M2 | HEART RATE: 80 BPM | HEIGHT: 70 IN

## 2019-04-18 DIAGNOSIS — R91.1 LUNG NODULE: ICD-10-CM

## 2019-04-18 DIAGNOSIS — I10 ESSENTIAL HYPERTENSION: ICD-10-CM

## 2019-04-18 DIAGNOSIS — G47.33 OBSTRUCTIVE SLEEP APNEA: Primary | ICD-10-CM

## 2019-04-18 DIAGNOSIS — C61 PROSTATE CANCER (HCC): ICD-10-CM

## 2019-04-18 DIAGNOSIS — Z90.79 HX OF PROSTATECTOMY: ICD-10-CM

## 2019-04-18 DIAGNOSIS — Z99.89 OSA ON CPAP: ICD-10-CM

## 2019-04-18 DIAGNOSIS — J96.10 CHRONIC RESPIRATORY FAILURE, UNSPECIFIED WHETHER WITH HYPOXIA OR HYPERCAPNIA (HCC): ICD-10-CM

## 2019-04-18 DIAGNOSIS — J42 CHRONIC BRONCHITIS, UNSPECIFIED CHRONIC BRONCHITIS TYPE (HCC): Primary | ICD-10-CM

## 2019-04-18 DIAGNOSIS — N52.31 ERECTILE DYSFUNCTION AFTER RADICAL PROSTATECTOMY: ICD-10-CM

## 2019-04-18 DIAGNOSIS — I48.0 PAROXYSMAL ATRIAL FIBRILLATION (HCC): ICD-10-CM

## 2019-04-18 DIAGNOSIS — G47.33 OSA ON CPAP: ICD-10-CM

## 2019-04-18 DIAGNOSIS — E66.01 MORBID OBESITY (HCC): ICD-10-CM

## 2019-04-18 DIAGNOSIS — E66.01 CLASS 2 SEVERE OBESITY DUE TO EXCESS CALORIES WITH SERIOUS COMORBIDITY IN ADULT, UNSPECIFIED BMI (HCC): ICD-10-CM

## 2019-04-18 DIAGNOSIS — N39.3 MALE STRESS INCONTINENCE: ICD-10-CM

## 2019-04-18 DIAGNOSIS — J42 CHRONIC BRONCHITIS, UNSPECIFIED CHRONIC BRONCHITIS TYPE (HCC): ICD-10-CM

## 2019-04-18 PROCEDURE — 94726 PLETHYSMOGRAPHY LUNG VOLUMES: CPT | Performed by: INTERNAL MEDICINE

## 2019-04-18 PROCEDURE — 94010 BREATHING CAPACITY TEST: CPT | Performed by: INTERNAL MEDICINE

## 2019-04-18 PROCEDURE — 94729 DIFFUSING CAPACITY: CPT | Performed by: INTERNAL MEDICINE

## 2019-04-18 PROCEDURE — 94375 RESPIRATORY FLOW VOLUME LOOP: CPT | Performed by: INTERNAL MEDICINE

## 2019-04-18 PROCEDURE — 99204 OFFICE O/P NEW MOD 45 MIN: CPT | Performed by: INTERNAL MEDICINE

## 2019-04-18 RX ORDER — ALBUTEROL SULFATE 90 UG/1
2 AEROSOL, METERED RESPIRATORY (INHALATION) 4 TIMES DAILY
Qty: 1 INHALER | Refills: 11 | Status: SHIPPED | OUTPATIENT
Start: 2019-04-18 | End: 2019-07-29 | Stop reason: ALTCHOICE

## 2019-04-18 ASSESSMENT — SLEEP AND FATIGUE QUESTIONNAIRES
HOW LIKELY ARE YOU TO NOD OFF OR FALL ASLEEP WHILE WATCHING TV: 0
HOW LIKELY ARE YOU TO NOD OFF OR FALL ASLEEP WHILE SITTING INACTIVE IN A PUBLIC PLACE: 0
HOW LIKELY ARE YOU TO NOD OFF OR FALL ASLEEP WHILE SITTING AND TALKING TO SOMEONE: 0
HOW LIKELY ARE YOU TO NOD OFF OR FALL ASLEEP WHILE SITTING QUIETLY AFTER LUNCH WITHOUT ALCOHOL: 0
ESS TOTAL SCORE: 3
HOW LIKELY ARE YOU TO NOD OFF OR FALL ASLEEP WHILE LYING DOWN TO REST IN THE AFTERNOON WHEN CIRCUMSTANCES PERMIT: 3
HOW LIKELY ARE YOU TO NOD OFF OR FALL ASLEEP WHILE SITTING AND READING: 0
HOW LIKELY ARE YOU TO NOD OFF OR FALL ASLEEP WHEN YOU ARE A PASSENGER IN A CAR FOR AN HOUR WITHOUT A BREAK: 0
HOW LIKELY ARE YOU TO NOD OFF OR FALL ASLEEP IN A CAR, WHILE STOPPED FOR A FEW MINUTES IN TRAFFIC: 0

## 2019-04-18 NOTE — PATIENT INSTRUCTIONS
Patient PET Scan is scheduled at [address] [place] [address] on 04/29/2019 at 8:00am arrival 7:45am. 04/18/2019. Patient was notified. Patient DME order and progress note was faxed to [address] [place] [address] Patient at [phone] on 05/15/2019 TS.

## 2019-04-26 ENCOUNTER — ANESTHESIA (OUTPATIENT)
Dept: OPERATING ROOM | Facility: CLINIC | Age: 64
End: 2019-04-26
Payer: COMMERCIAL

## 2019-04-26 ENCOUNTER — ANESTHESIA EVENT (OUTPATIENT)
Dept: OPERATING ROOM | Facility: CLINIC | Age: 64
End: 2019-04-26
Payer: COMMERCIAL

## 2019-04-26 ENCOUNTER — HOSPITAL ENCOUNTER (OUTPATIENT)
Facility: CLINIC | Age: 64
Setting detail: OUTPATIENT SURGERY
Discharge: HOME | End: 2019-04-26
Attending: SURGERY | Admitting: SURGERY
Payer: COMMERCIAL

## 2019-04-26 VITALS
RESPIRATION RATE: 15 BRPM | BODY MASS INDEX: 40.09 KG/M2 | HEART RATE: 76 BPM | DIASTOLIC BLOOD PRESSURE: 86 MMHG | SYSTOLIC BLOOD PRESSURE: 138 MMHG | TEMPERATURE: 96.8 F | WEIGHT: 280 LBS | OXYGEN SATURATION: 94 % | HEIGHT: 70 IN

## 2019-04-26 VITALS — SYSTOLIC BLOOD PRESSURE: 172 MMHG | DIASTOLIC BLOOD PRESSURE: 106 MMHG | OXYGEN SATURATION: 95 %

## 2019-04-26 PROCEDURE — 7100000000 HC PACU RECOVERY - FIRST 15 MIN: Performed by: SURGERY

## 2019-04-26 PROCEDURE — 3609010400 HC COLONOSCOPY POLYPECTOMY HOT BIOPSY: Performed by: SURGERY

## 2019-04-26 PROCEDURE — 7100000001 HC PACU RECOVERY - ADDTL 15 MIN: Performed by: SURGERY

## 2019-04-26 PROCEDURE — 2709999900 HC NON-CHARGEABLE SUPPLY: Performed by: SURGERY

## 2019-04-26 PROCEDURE — 88305 TISSUE EXAM BY PATHOLOGIST: CPT

## 2019-04-26 PROCEDURE — 7100000010 HC PHASE II RECOVERY - FIRST 15 MIN: Performed by: SURGERY

## 2019-04-26 PROCEDURE — 45384 COLONOSCOPY W/LESION REMOVAL: CPT | Performed by: SURGERY

## 2019-04-26 PROCEDURE — 2500000003 HC RX 250 WO HCPCS: Performed by: NURSE ANESTHETIST, CERTIFIED REGISTERED

## 2019-04-26 PROCEDURE — 3700000000 HC ANESTHESIA ATTENDED CARE: Performed by: SURGERY

## 2019-04-26 PROCEDURE — 3700000001 HC ADD 15 MINUTES (ANESTHESIA): Performed by: SURGERY

## 2019-04-26 PROCEDURE — 2580000003 HC RX 258: Performed by: NURSE ANESTHETIST, CERTIFIED REGISTERED

## 2019-04-26 PROCEDURE — 6360000002 HC RX W HCPCS: Performed by: NURSE ANESTHETIST, CERTIFIED REGISTERED

## 2019-04-26 RX ORDER — PROPOFOL 10 MG/ML
INJECTION, EMULSION INTRAVENOUS PRN
Status: DISCONTINUED | OUTPATIENT
Start: 2019-04-26 | End: 2019-04-26 | Stop reason: SDUPTHER

## 2019-04-26 RX ORDER — MORPHINE SULFATE 2 MG/ML
2 INJECTION, SOLUTION INTRAMUSCULAR; INTRAVENOUS EVERY 5 MIN PRN
Status: DISCONTINUED | OUTPATIENT
Start: 2019-04-26 | End: 2019-04-26 | Stop reason: HOSPADM

## 2019-04-26 RX ORDER — LIDOCAINE HYDROCHLORIDE 10 MG/ML
INJECTION, SOLUTION EPIDURAL; INFILTRATION; INTRACAUDAL; PERINEURAL PRN
Status: DISCONTINUED | OUTPATIENT
Start: 2019-04-26 | End: 2019-04-26 | Stop reason: SDUPTHER

## 2019-04-26 RX ORDER — OXYCODONE HYDROCHLORIDE AND ACETAMINOPHEN 5; 325 MG/1; MG/1
2 TABLET ORAL PRN
Status: DISCONTINUED | OUTPATIENT
Start: 2019-04-26 | End: 2019-04-26 | Stop reason: HOSPADM

## 2019-04-26 RX ORDER — SODIUM CHLORIDE, SODIUM LACTATE, POTASSIUM CHLORIDE, CALCIUM CHLORIDE 600; 310; 30; 20 MG/100ML; MG/100ML; MG/100ML; MG/100ML
INJECTION, SOLUTION INTRAVENOUS CONTINUOUS PRN
Status: DISCONTINUED | OUTPATIENT
Start: 2019-04-26 | End: 2019-04-26 | Stop reason: SDUPTHER

## 2019-04-26 RX ORDER — OXYCODONE HYDROCHLORIDE AND ACETAMINOPHEN 5; 325 MG/1; MG/1
1 TABLET ORAL PRN
Status: DISCONTINUED | OUTPATIENT
Start: 2019-04-26 | End: 2019-04-26 | Stop reason: HOSPADM

## 2019-04-26 RX ORDER — DIPHENHYDRAMINE HYDROCHLORIDE 50 MG/ML
12.5 INJECTION INTRAMUSCULAR; INTRAVENOUS
Status: DISCONTINUED | OUTPATIENT
Start: 2019-04-26 | End: 2019-04-26 | Stop reason: HOSPADM

## 2019-04-26 RX ORDER — PROPOFOL 10 MG/ML
INJECTION, EMULSION INTRAVENOUS CONTINUOUS PRN
Status: DISCONTINUED | OUTPATIENT
Start: 2019-04-26 | End: 2019-04-26 | Stop reason: SDUPTHER

## 2019-04-26 RX ORDER — LABETALOL HYDROCHLORIDE 5 MG/ML
5 INJECTION, SOLUTION INTRAVENOUS EVERY 10 MIN PRN
Status: DISCONTINUED | OUTPATIENT
Start: 2019-04-26 | End: 2019-04-26 | Stop reason: HOSPADM

## 2019-04-26 RX ORDER — FENTANYL CITRATE 50 UG/ML
25 INJECTION, SOLUTION INTRAMUSCULAR; INTRAVENOUS EVERY 5 MIN PRN
Status: DISCONTINUED | OUTPATIENT
Start: 2019-04-26 | End: 2019-04-26 | Stop reason: HOSPADM

## 2019-04-26 RX ORDER — ONDANSETRON 2 MG/ML
4 INJECTION INTRAMUSCULAR; INTRAVENOUS
Status: DISCONTINUED | OUTPATIENT
Start: 2019-04-26 | End: 2019-04-26 | Stop reason: HOSPADM

## 2019-04-26 RX ADMIN — SODIUM CHLORIDE, POTASSIUM CHLORIDE, SODIUM LACTATE AND CALCIUM CHLORIDE: 600; 310; 30; 20 INJECTION, SOLUTION INTRAVENOUS at 09:46

## 2019-04-26 RX ADMIN — PROPOFOL 30 MG: 10 INJECTION, EMULSION INTRAVENOUS at 09:58

## 2019-04-26 RX ADMIN — PROPOFOL 50 MG: 10 INJECTION, EMULSION INTRAVENOUS at 09:49

## 2019-04-26 RX ADMIN — LIDOCAINE HYDROCHLORIDE 50 MG: 10 INJECTION, SOLUTION EPIDURAL; INFILTRATION; INTRACAUDAL at 09:49

## 2019-04-26 RX ADMIN — PROPOFOL 20 MG: 10 INJECTION, EMULSION INTRAVENOUS at 10:00

## 2019-04-26 RX ADMIN — PROPOFOL 100 MCG/KG/MIN: 10 INJECTION, EMULSION INTRAVENOUS at 09:49

## 2019-04-26 ASSESSMENT — PULMONARY FUNCTION TESTS
PIF_VALUE: 0

## 2019-04-26 ASSESSMENT — LIFESTYLE VARIABLES: SMOKING_STATUS: 0

## 2019-04-26 ASSESSMENT — PAIN - FUNCTIONAL ASSESSMENT: PAIN_FUNCTIONAL_ASSESSMENT: 0-10

## 2019-04-26 NOTE — ANESTHESIA POSTPROCEDURE EVALUATION
Department of Anesthesiology  Postprocedure Note    Patient: Dennys Byrd  MRN: [id number]  YOB: 1955  Date of evaluation: 4/26/2019  Time:  11:08 AM     Procedure Summary     Date:  04/26/19 Room / Location:  [facility] [address] [place] [address] [facility]    Anesthesia Start:  6669 Anesthesia Stop:  1263    Procedure:  COLONOSCOPY POLYPECTOMY COLD BIOPSY Diagnosis:  (FAMILY HISTORY COLON CANCER)    Surgeon:  Keron Henderson MD Responsible Provider:  Maribell Ordoñez MD    Anesthesia Type:  MAC ASA Status:  4          Anesthesia Type: MAC    Jimena Phase I: Jimena Score: 8    Jimena Phase II: Jimena Score: 9    Last vitals: Reviewed and per EMR flowsheets.        Anesthesia Post Evaluation    Patient location during evaluation: PACU  Patient participation: complete - patient participated  Level of consciousness: awake and alert  Pain score: 0  Nausea & Vomiting: no nausea  Cardiovascular status: hemodynamically stable  Respiratory status: room air  Hydration status: euvolemic

## 2019-04-26 NOTE — H&P
Jodie Baer is a 61 y.o. male          CC:    . No chief complaint on file.       HISTORY OF PRESENT ILLNESS:    Colonoscopy:                   Screen-No     Diagnostic-Yes  Abdominal Pain-No              Melena-No  Anemia-No                           Hematochezia-No  Bloating-No                          Rectal Bleeding-No  Diarrhea-No                         Rectal/Anal Pain-No  Constipation-NoNo                   Pruritis-No  Family History colon cancer-Yes  Previous Colon Cancer/Polyp-Yes  Change in Bowels-No  Decrease Caliber of Stool-No  Change in Color of Stool-No                Review of Systems:    General:  Fever: Negative  Weight Change:Negative  Night Sweats: Negative    Eye:  Blurry Vision:Negative  Double Vision: Negative    Ent:  Headaches: Negative  Sore throat: Negative    Allergy/Immunology:  Hives: Negative  Latex allergy: Negative    Hematology/Lymphatic:  Bleeding Problems: Negative  Blood Clots: Negative  Swollen Lymph Nodes: Negative    Lungs:  Cough: Negative  SOB: Negative  Wheezing:Negative    Cardiovascular:  Chest Pain: Negative  Palpitations:Negative    GI:   Decreased Appetite: Negative  Heartburn: Negative  Dysphagia: Negative  Nausea/Vomiting: Negative  Abdominal Pain: Negative  Change in Bowels:Negative  Constipation: Negative  Diarrhea: Negative  Rectal Bleeding: Negative    :   Dysuria: Negative  Increase Urinary Frequency/Urgency: Negative    Neuro:  Seizures: Negative  Confusion: Negative        PAST MEDICAL HISTORY:        Family History   Problem Relation Age of Onset    Coronary Art Dis Mother     High Blood Pressure Mother     High Cholesterol Father     Seizures Sister      Social History     Socioeconomic History    Marital status:      Spouse name: Not on file    Number of children: 2    Years of education: Not on file    Highest education level: Not on file   Occupational History    Occupation: [employer]     Employer: [employer]     Comment: retired    Occupation: Retired   Social Needs    Financial resource strain: Not on file    Food insecurity:     Worry: Not on file     Inability: Not on file   [employer] needs:     Medical: Not on file     Non-medical: Not on file   Tobacco Use    Smoking status: Former Smoker     Packs/day: 1.00     Years: 40.00     Pack years: 40.00     Types: Cigarettes     Start date: 1976     Last attempt to quit: 4/1/2016     Years since quitting: 3.0    Smokeless tobacco: Never Used   Substance and Sexual Activity    Alcohol use:  Yes     Alcohol/week: 3.6 oz     Types: 5 Cans of beer, 1 Standard drinks or equivalent per week     Comment: per week    Drug use: No    Sexual activity: Not Currently     Partners: Female   Lifestyle    Physical activity:     Days per week: Not on file     Minutes per session: Not on file    Stress: Not on file   Relationships    Social connections:     Talks on phone: Not on file     Gets together: Not on file     Attends Buddhism service: Not on file     Active member of club or organization: Not on file     Attends meetings of clubs or organizations: Not on file     Relationship status: Not on file    Intimate partner violence:     Fear of current or ex partner: Not on file     Emotionally abused: Not on file     Physically abused: Not on file     Forced sexual activity: Not on file   Other Topics Concern    Not on file   Social History Narrative    Not on file     Past Surgical History:   Procedure Laterality Date    CARDIAC CATHETERIZATION  09/19/2017    LAD: mid 30% stenosis  /  LM NLM / EF 55%  /  LCX: Codominant Vessel, distal 30% stenosis / OM1 AND OM2 10%    COLONOSCOPY      PROSTATECTOMY  10/17/2017    robotic with schuyler pelvic lymph node dissection    PROSTATECTOMY N/A 10/17/2017    XI ROBOTIC PROSTATECTOMY LAPAROSCOPIC WITH PELVIC LYMPH NODE DISSECTION performed by Kendy Rivera MD [facility]  09/09    L Upper Lip Basal CA  TONSILLECTOMY      TYMPANOSTOMY TUBE PLACEMENT      VASECTOMY       Past Medical History:   Diagnosis Date    A-fib ([facility] 4/6/2015    Allergic rhinitis 4/6/2015    Back pain     Basal cell carcinoma of skin     Decreased libido     Diverticulitis of colon (without mention of hemorrhage)(562.11) 4/6/2015    ED (erectile dysfunction)     Elevated prostate specific antigen (PSA) 4/6/2015    HTN (hypertension) 4/6/2015    Obesity 4/6/2015    Prostate cancer ([facility] 09/2017    needs removed    Pure hypercholesterolemia 4/6/2015    Sleep apnea     C-pap    Tinnitus     Tobacco use disorder 4/6/2015    Wears glasses      No current facility-administered medications on file prior to encounter. Current Outpatient Medications on File Prior to Encounter   Medication Sig Dispense Refill    sildenafil (REVATIO) 20 MG tablet Take 1 tablet by mouth as needed (ED) Take 1-5 tablets 1/2 hour prior to sexual intercourse prn ED 30 tablet 11    lisinopril (PRINIVIL;ZESTRIL) 5 MG tablet Take 1 tablet by mouth daily 30 tablet 5    propafenone (RYTHMOL) 150 MG tablet Take 1 tablet by mouth every 8 hours 90 tablet 5    rosuvastatin (CRESTOR) 40 MG tablet Take 1 tablet by mouth daily Due appt 30 tablet 5     Allergies as of 03/20/2019    (No Known Allergies)     PHYSICAL EXAM:    Blood pressure (!) 151/73, pulse 80, temperature 96.8 °F (36 °C), temperature source Temporal, resp. rate 18, height 5' 10\" (1.778 m), weight 280 lb (127 kg), SpO2 97 %. Gen:  A and O x 3, NAD, well nourished  Eyes:  Sclera non icterus, PERRL  Lungs:  CTA, symmetrical  Chest:  RRR, no murmurs  Abd:  Soft, NT, ND, no HSM, no bruits        ASSESS MENT:    1. Hx of polyps  2.   Family hx colon cancer      PLAN:    1.  colonoscopy

## 2019-04-26 NOTE — ANESTHESIA PRE PROCEDURE
Department of Anesthesiology  Preprocedure Note       Name:  Lilia Ellison   Age:  61 y.o.  :  1955                                          MRN:  [id number]         Date:  2019      Surgeon: Myah Ribera):  Lakhwinder Rocha MD    Procedure: COLONOSCOPY DIAGNOSTIC (N/A )    Department of Anesthesiology  Pre-Anesthesia Evaluation/Consultation         Name:  Lilia Ellison                                         Age:  61 y.o. MRN:  [id number]             Medications  No current facility-administered medications for this encounter.         No Known Allergies  Patient Active Problem List   Diagnosis    Pure hypercholesterolemia    Diverticulitis of colon    Morbid obesity ([address]    Prostate cancer ([address]    Hx of prostatectomy    Male stress incontinence    Erectile dysfunction after radical prostatectomy    Paroxysmal atrial fibrillation ([address]    Essential hypertension     Past Medical History:   Diagnosis Date    A-fib ([address] 2015    Allergic rhinitis 2015    Back pain     Basal cell carcinoma of skin     Decreased libido     Diverticulitis of colon (without mention of hemorrhage)(562.11) 2015    ED (erectile dysfunction)     Elevated prostate specific antigen (PSA) 2015    HTN (hypertension) 2015    Obesity 2015    Prostate cancer ([address] 2017    needs removed    Pure hypercholesterolemia 2015    Sleep apnea     C-pap    Tinnitus     Tobacco use disorder 2015    Wears glasses      Past Surgical History:   Procedure Laterality Date    CARDIAC CATHETERIZATION  2017    LAD: mid 30% stenosis  /  LM NLM / EF 55%  /  LCX: Codominant Vessel, distal 30% stenosis / OM1 AND OM2 10%    COLONOSCOPY      PROSTATECTOMY  10/17/2017    robotic with schuyler pelvic lymph node dissection    PROSTATECTOMY N/A 10/17/2017    XI ROBOTIC PROSTATECTOMY LAPAROSCOPIC WITH PELVIC LYMPH NODE DISSECTION performed by Therese Thomson MD at [facility] 09/09    L Upper Lip Basal CA    TONSILLECTOMY      TYMPANOSTOMY TUBE PLACEMENT      VASECTOMY       Social History     Tobacco Use    Smoking status: Former Smoker     Packs/day: 1.00     Years: 40.00     Pack years: 40.00     Types: Cigarettes     Start date: 1976     Last attempt to quit: 4/1/2016     Years since quitting: 3.0    Smokeless tobacco: Never Used   Substance Use Topics    Alcohol use: Yes     Alcohol/week: 3.6 oz     Types: 5 Cans of beer, 1 Standard drinks or equivalent per week     Comment: per week    Drug use: No         Vital Signs (Current) There were no vitals filed for this visit. Vital Signs Statistics (for past 48 hrs)     No data recorded  BP Readings from Last 3 Encounters:   04/18/19 136/89   02/27/19 (!) 146/84   02/21/19 106/67       BMI  There is no height or weight on file to calculate BMI. CBC   Lab Results   Component Value Date    WBC 7.0 02/19/2018    RBC 5.03 02/19/2018    HGB 15.2 02/19/2018    HCT 46.7 02/19/2018    MCV 92.8 02/19/2018    RDW 12.8 02/19/2018     02/19/2018       CMP    Lab Results   Component Value Date     02/26/2019    K 4.3 02/26/2019     02/26/2019    CO2 17 02/26/2019    BUN 13 02/26/2019    CREATININE 0.78 02/26/2019    GFRAA >60 02/26/2019    LABGLOM >60 02/26/2019    GLUCOSE 117 02/26/2019    PROT 7.1 02/19/2018    CALCIUM 8.9 02/26/2019    BILITOT 0.46 02/19/2018    ALKPHOS 87 02/19/2018    AST 22 02/19/2018    ALT 29 02/19/2018       BMP    Lab Results   Component Value Date     02/26/2019    K 4.3 02/26/2019     02/26/2019    CO2 17 02/26/2019    BUN 13 02/26/2019    CREATININE 0.78 02/26/2019    CALCIUM 8.9 02/26/2019    GFRAA >60 02/26/2019    LABGLOM >60 02/26/2019    GLUCOSE 117 02/26/2019       POC Testing  No results for input(s): POCGLU, POCNA, POCK, POCCL, POCBUN, POCHEMO, POCHCT in the last 72 hours.     Coags  No results found for: PROTIME, INR, APTT    HCG (If Applicable) No results found for: PREGTESTUR, PREGSERUM, HCG, HCGQUANT     ABGs No results found for: PHART, PO2ART, BDH4JLM, LSY4HTO, BEART, U3WJXFOU     Type & Screen (If Applicable)  No results found for: LABABO, [address]    Radiology (If Applicable)    Cardiac Testing (If Applicable) nl EF    EKG (If Applicable) NSST changes          Medications prior to admission:   Prior to Admission medications    Medication Sig Start Date End Date Taking? Authorizing Provider   albuterol sulfate HFA (PROAIR HFA) 108 (90 Base) MCG/ACT inhaler Inhale 2 puffs into the lungs 4 times daily 4/18/19 5/18/19  Duane Harris, MD   sildenafil (REVATIO) 20 MG tablet Take 1 tablet by mouth as needed (ED) Take 1-5 tablets 1/2 hour prior to sexual intercourse prn ED 2/27/19   Ike Parrish MD   lisinopril (PRINIVIL;ZESTRIL) 5 MG tablet Take 1 tablet by mouth daily 2/21/19   Rony Tidwell MD   propafenone (RYTHMOL) 150 MG tablet Take 1 tablet by mouth every 8 hours 2/21/19   Rony Tidwell MD   rosuvastatin (CRESTOR) 40 MG tablet Take 1 tablet by mouth daily Due appt 2/21/19   Rony Tidwell MD       Current medications:    No current facility-administered medications for this encounter.         Allergies:  No Known Allergies    Problem List:    Patient Active Problem List   Diagnosis Code    Pure hypercholesterolemia E78.00    Diverticulitis of colon K57.32    Morbid obesity ([facility] E66.01    Prostate cancer ([facility] C61    Hx of prostatectomy Z90.79    Male stress incontinence N39.3    Erectile dysfunction after radical prostatectomy N52.31    Paroxysmal atrial fibrillation ([facility] I48.0    Essential hypertension I10       Past Medical History:        Diagnosis Date    A-fib ([facility] 4/6/2015    Allergic rhinitis 4/6/2015    Back pain     Basal cell carcinoma of skin     Decreased libido     Diverticulitis of colon (without mention of hemorrhage)(562.11) 4/6/2015    ED (erectile dysfunction)     Elevated prostate specific antigen (PSA) 4/6/2015    HTN (hypertension) 4/6/2015    Obesity 4/6/2015    Prostate cancer ([facility] 09/2017    needs removed    Pure hypercholesterolemia 4/6/2015    Sleep apnea     C-pap    Tinnitus     Tobacco use disorder 4/6/2015    Wears glasses        Past Surgical History:        Procedure Laterality Date    CARDIAC CATHETERIZATION  09/19/2017    LAD: mid 30% stenosis  /  LM NLM / EF 55%  /  LCX: Codominant Vessel, distal 30% stenosis / OM1 AND OM2 10%    COLONOSCOPY      PROSTATECTOMY  10/17/2017    robotic with schuyler pelvic lymph node dissection    PROSTATECTOMY N/A 10/17/2017    XI ROBOTIC PROSTATECTOMY LAPAROSCOPIC WITH PELVIC LYMPH NODE DISSECTION [facility]  [facility]    L Upper Lip Basal CA    TONSILLECTOMY      TYMPANOSTOMY TUBE PLACEMENT      VASECTOMY         Social History:    Social History     Tobacco Use    Smoking status: Former Smoker     Packs/day: 1.00     Years: 40.00     Pack years: 40.00     Types: Cigarettes     Start date: 1976     Last attempt to quit: 4/1/2016     Years since quitting: 3.0    Smokeless tobacco: Never Used   Substance Use Topics    Alcohol use: Yes     Alcohol/week: 3.6 oz     Types: 5 Cans of beer, 1 Standard drinks or equivalent per week     Comment: per week                                Counseling given: Not Answered      Vital Signs (Current): There were no vitals filed for this visit.                                            BP Readings from Last 3 Encounters:   04/18/19 136/89   02/27/19 (!) 146/84   02/21/19 106/67       NPO Status: Time of last liquid consumption: 2300                        Time of last solid consumption: 2200                        Date of last liquid consumption: 04/25/19                        Date of last solid food consumption: 04/24/19    BMI:   Wt Readings from Last 3 Encounters:   04/18/19 290 lb (131.5 kg)   04/18/19 290 lb (131.5 kg)   02/27/19 279 lb (126.6 kg) There is no height or weight on file to calculate BMI.    CBC:   Lab Results   Component Value Date    WBC 7.0 02/19/2018    RBC 5.03 02/19/2018    HGB 15.2 02/19/2018    HCT 46.7 02/19/2018    MCV 92.8 02/19/2018    RDW 12.8 02/19/2018     02/19/2018       CMP:   Lab Results   Component Value Date     02/26/2019    K 4.3 02/26/2019     02/26/2019    CO2 17 02/26/2019    BUN 13 02/26/2019    CREATININE 0.78 02/26/2019    GFRAA >60 02/26/2019    LABGLOM >60 02/26/2019    GLUCOSE 117 02/26/2019    PROT 7.1 02/19/2018    CALCIUM 8.9 02/26/2019    BILITOT 0.46 02/19/2018    ALKPHOS 87 02/19/2018    AST 22 02/19/2018    ALT 29 02/19/2018       POC Tests: No results for input(s): POCGLU, POCNA, POCK, POCCL, POCBUN, POCHEMO, POCHCT in the last 72 hours. Coags: No results found for: PROTIME, INR, APTT    HCG (If Applicable): No results found for: PREGTESTUR, PREGSERUM, HCG, HCGQUANT     ABGs: No results found for: PHART, PO2ART, PHP5ISL, IFK1GRD, BEART, H5QJZILI     Type & Screen (If Applicable):  No results found for: LABABO, LABRH    Anesthesia Evaluation   no history of anesthetic complications:   Airway: Mallampati: III     Neck ROM: full   Dental:          Pulmonary:   (+) COPD:  sleep apnea: on CPAP,      (-) recent URI and not a current smoker                          ROS comment: 40 pk yrs   Cardiovascular:    (+) hypertension:, CAD: non-obstructive, dysrhythmias: atrial fibrillation,                   Neuro/Psych:      (-) seizures and CVA           GI/Hepatic/Renal:        (-) GERD      ROS comment: Prostate CA. Endo/Other:        (-) diabetes mellitus               Abdominal:           Vascular:                                        Anesthesia Plan      MAC     ASA 4     (Asa 3 thomas)  Induction: intravenous.                           Maged Holland MD   4/26/2019

## 2019-04-26 NOTE — OP NOTE
COLONOSCOPY PROCEDURE NOTE:      Pre op diagnosis:    1. Family hx colon cancer  2. Hx of polyps    Operative Procedure:    1. Colonoscopy with cold bx    Surgeon:    Dr. Keysha Abreu     Anesthesia:    IV sedation per CRNA    EBL:  minimal    Procedure:    Patient was taken to the endoscopy suite and placed in a left lateral decubitus position. They were given IV sedation as mentioned above. A digital rectal exam was performed. There were no masses and anal tone was normal.  The colonoscope was inserted into the rectum and carefully manipulated up through the sigmoid colon, transverse colon, right colon and into the cecum. The cecum was identified by the ileal cecal valve and transabdominal illumination. Then the scope was slowly withdrawn. The scope was retroflexed in the rectum and the dentate line was examined. The scope was removed. The patient tolerated the procedure well. The following findings were noted. Final Diagnosis:    1. Mild sigmoid diverticulosis  2.  5 mm polyp at 10 cm, removed with cold bx    Plan:    1. Await bx  2. Repeat CS in 5 years due to hx of polyps and a + fam hx colon cancer    ADDENDUM:  Endo Review :  5/10/2019    Pathology:      -- Diagnosis --   COLON, 10 CM, BIOPSY:        -  ADENOMATOUS POLYP (TUBULAR ADENOMA). Plan:    1.   As above in the op note plan

## 2019-04-30 ENCOUNTER — TELEPHONE (OUTPATIENT)
Dept: CASE MANAGEMENT | Age: 64
End: 2019-04-30

## 2019-04-30 LAB — SURGICAL PATHOLOGY REPORT: NORMAL

## 2019-05-02 ENCOUNTER — TELEPHONE (OUTPATIENT)
Dept: ONCOLOGY | Age: 64
End: 2019-05-02

## 2019-05-02 ENCOUNTER — TELEPHONE (OUTPATIENT)
Dept: CASE MANAGEMENT | Age: 64
End: 2019-05-02

## 2019-05-02 NOTE — TELEPHONE ENCOUNTER
Navigator calling pt. To offer rescheduling of PET scan. Pt. Rescheduled for 5/24 SA arrival 10:15, pt. Informed.

## 2019-05-10 NOTE — PROGRESS NOTES
REASON FOR THE CONSULTATION:  COPD    Right apical lung nodule  HISTORY OF PRESENT ILLNESS:    Verner Ludwig is a 61y.o. year old male here for evaluation of right apical lung nodule. It seems to be nodules which were detected on lung screening for carcinoma. Patient be referred to me for further evaluation. I do not have any previous x-rays or CT scan to compare review. Patient has been a chronic smoker and has clinical features of chronic obstructive lung disease. He smoked for more than 30 pack years. He is still smoking. He denies any excessive cough or sputum production. Sputum is not purulent. No hemoptysis. No chest pain, no fever, chills are regular. No weight loss. He has history of carcinoma of the prostate for which he underwent surgery in the past and has developed stress incontinence following the surgery and also developed erectile dysfunction. THE prostatectomy. He has no recurrence of the prostate cancer. No immaturity a, no back pain. No other evidence of any metastatic disease. Patient has about grade 2, effort dyspnea. He is a note to experience wheezing on effort. He also has been having cough which is productive of mucopurulent sputum, no blood in it. No chest pain. He has not had to go to the emergency room or the hospital frequently. No history of any frequent pneumonias. He does not have any home oxygen. He is using bronchodilator for symptomatic relief. Patient also has a clinical features of obstructive sleep apnea syndrome. He does have a CPAP, which according the patient. He does use it fairly regularly. His also known to have systemic hypertension that is under good control. He continues to be morbidly obese. He have had a paroxysmal atrial fibrillation. He is not known to have any hypercapnia.             LUNG CANCER SCREENING     1. CRITERIA MET    [x]     CT ORDERED  []      2. CRITERIA NOT MET   []      3. REFUSED                    []        REASON Celia Grey MD at [facility]  10/17/2017    robotic with schuyler pelvic lymph node dissection    PROSTATECTOMY N/A 10/17/2017    XI ROBOTIC PROSTATECTOMY LAPAROSCOPIC WITH PELVIC LYMPH NODE DISSECTION performed by Radhames Max MD at [facility]  09/09    L Upper Lip Basal CA    TONSILLECTOMY      TYMPANOSTOMY TUBE PLACEMENT      VASECTOMY             SOCIAL AND OCCUPATIONAL HEALTH:      There  is no history of TB or TB exposure. There  is no asbestos or silica dust exposure. The patient reports  no coal, foundry, quarry or [employer] exposure. Travel history reveals negative. There  is no history of recreational or IV drug use. There  is no hot tube exposure. Pets  negative    Occupational history not significant    TOBACCO:   reports that he quit smoking about 3 years ago. His smoking use included cigarettes. He started smoking about 43 years ago. He has a 40.00 pack-year smoking history. He has never used smokeless tobacco.  ETOH:   reports that he drinks about 3.6 oz of alcohol per week. ALLERGIES:      No Known Allergies      Home Meds:   Prior to Admission medications    Medication Sig Start Date End Date Taking?  Authorizing Provider   albuterol sulfate HFA (PROAIR HFA) 108 (90 Base) MCG/ACT inhaler Inhale 2 puffs into the lungs 4 times daily 4/18/19 5/18/19 Yes Mari Burton MD   sildenafil (REVATIO) 20 MG tablet Take 1 tablet by mouth as needed (ED) Take 1-5 tablets 1/2 hour prior to sexual intercourse prn ED 2/27/19  Yes Radhames Max MD   lisinopril (PRINIVIL;ZESTRIL) 5 MG tablet Take 1 tablet by mouth daily 2/21/19  Yes Rony Graves MD   propafenone (RYTHMOL) 150 MG tablet Take 1 tablet by mouth every 8 hours 2/21/19  Yes Rony Graves MD   rosuvastatin (CRESTOR) 40 MG tablet Take 1 tablet by mouth daily Due appt 2/21/19  Yes Rony Graves MD              REVIEW OF SYSTEMS:    CONSTITUTIONAL:  negative for  fevers, chills, sweats, fatigue, malaise, anorexia and weight loss. Overweight, no distress. Are using accessory muscles  EYES:  negative for  double vision, blurred vision, dry eyes, eye discharge and redness. No Giselle's syndrome or jaundice  HEENT:  negative for  hearing loss, tinnitus, ear drainage, earaches and nasal congestion. No nasal polyps. No sinus tenderness  RESPIRATORY:  See hpi  CARDIOVASCULAR:  negative for  chest pain,, palpitations, orthopnea, PND, known to have hypertension. No angina. No coronary artery disease  GASTROINTESTINAL:  negative for nausea, vomiting, change in bowel habits, diarrhea, constipation, abdominal pain, pruritus, abdominal mass and abdominal distention. No nausea, vomiting or diarrhea or abdominal pain  GENITOURINARY:  negative for frequency, dysuria, nocturia, urinary incontinence and hesitancy history of stress incontinence, history of prostatectomy. Her lung Due to prostate cancer. erectile dysfunction after the surgery  INTEGUMENT  negative for rash, skin lesion(s), dryness, skin color change, changes in lesion, pruritus and changes in hair  HEMATOLOGIC/LYMPHATIC:  negative for easy bruising, bleeding, lymphadenopathy, petechiae and swelling/edema. No anemia, no enlarged lymph nodes  ALLERGIC/IMMUNOLOGIC:  negative for recurrent infections, urticaria and drug reactions  ENDOCRINE:  negative for heat intolerance, cold intolerance, tremor, weight changes and change in bowel habits. No diabetes  MUSCULOSKELETAL:  negative for  myalgias, arthralgias, pain, joint swelling, stiff joints and decreased range of motion no acute arthritis  NEUROLOGICAL:  negative for headaches, dizziness, seizures, memory problems, speech problems, visual disturbance and coordination problems, no deficit.   Motor SENSORY  BEHAVIOR/PSYCH:  negative for poor appetite, increased appetite, decreased sleep, increased sleep, decreased energy level, increased energy level and poor concentration no psychotic problem  Skin no rash no dermatitis, no skin  Vitals:  /89   Pulse 80   Resp 18   Ht 5' 10\" (1.778 m)   Wt 290 lb (131.5 kg)   SpO2 99% Comment: room air at rest  BMI 41.61 kg/m²     PHYSICAL EXAM:  General Appearance:    Alert, cooperative, no distress, appears stated age, obese, not in any respiratory distress, not using accessory muscles    Head:    Normocephalic, without obvious abnormality, atraumatic      Eyes:    PERRL, conjunctiva/corneas clear, EOM's intact, no jaundice. No Giselle's syndrome    Ears:    Normal  external ear canals, both ears no polyps or sinus tenderness    Nose:   Nares normal, septum midline, mucosa normal, no drainage        or sinus tenderness   Throat:   Lips, mucosa, and tongue normal; teeth and gums normal, oropharyngeal orifice not compromise    Neck:   Supple, symmetrical, trachea midline, no adenopathy;     thyroid:  no enlargement/tenderness/nodules; no carotid    bruit ,JVD not elevated    Back:     Symmetric, no curvature, ROM normal, no CVA tenderness   Lungs:     AP diameter is increased. Percussion note is hyperresonant expiration prolonged. No rales, rhonchi are audible. No pleural friction rub is audible    Chest Wall:    No tenderness or deformity      Heart:    Regular rate and rhythm, S1 and S2 normal, no murmur, rub        or gallop no rvh                           Abdomen:                                                 Pulses:                              Skin:                  Lymph nodes:                    Neurologic:                  Soft, non-tender, bowel sounds active all four quadrants,     no masses, no organomegaly         2+ and symmetric all extremities     Skin color, texture, turgor normal, no rashes or lesions       Cervical, supraclavicular not enlarged or matted or tender      CNII-XII intact, normal strength 5/5 .   Sensation grossly normal  and reflexes normal 2+  throughout Clubbing No  Lower ext edema . Negative  Upper ext edema . Absent         Musculoskeletal no synovitis. No joint swelling or tenderness          Thyroid:   Lab Results   Component Value Date    TSH 2.24 07/17/2017     Urinalysis: No results for input(s): BACTERIA, BLOODU, CLARITYU, COLORU, PHUR, PROTEINU, RBCUA, SPECGRAV, BILIRUBINUR, NITRU, WBCUA, LEUKOCYTESUR, GLUCOSEU in the last 72 hours. CXR   Consistent with COPD lung nodule in the right apical region. No hilar prominence or mediastinal widening      CT Scans  CT scan confirmed the above findings    Echo    No recent echo            IMPRESSION:    Visit Diagnoses       Codes    Chronic bronchitis, unspecified chronic bronchitis type ([address]    -  Primary J42    Class 2 severe obesity due to excess calories with serious comorbidity in adult, unspecified BMI ([facility])     E66.01    ADRIAN on CPAP     G47.33, Z99.89    Lung nodule     R91.1    Chronic respiratory failure, unspecified whether with hypoxia or hypercapnia ([address]     J96.10        :                PLAN:      Patient has a right apical lung nodule. He is a chronic smoker. There is a high risk for bronchogenic carcinoma. I did arrange for him to have a PET scan done. Depending on the finding of the PET scan. We'll plan further management. We may consider either biopsy of the nodule or proceed to the section. Patient continue the use of bronchodilators as before. He was advised to give up smoking. He'll continue the use of CPAP. Continue present treatment for hypertension. 8.  Continue the present management of the most prostatectomy incontinence under care of urology. He already had a Pneumovax and influenza vaccines. He is a candidate for lung cancer screening. Is not a candidate for home oxygen.     Plan to see in follow-up after the PET scan is performed      Requested Prescriptions     Signed Prescriptions Disp Refills    albuterol sulfate HFA (PROAIR HFA) 108 (90 Base) MCG/ACT inhaler 1 Inhaler 11     Sig: Inhale 2 puffs into the lungs 4 times daily       There are no discontinued medications. Cr Fung received counseling on the following healthy behaviors: nutrition, exercise and medication adherence    Patient given educational materials : see patient instruction       Discussed use, benefit, and side effects of prescribed medications. Barriers to medication compliance addressed. All patient questions answered. Pt voiced understanding. I hope this updates you on my evaluation and clinical thinking. Thank you for allowing me to participate in his care. Sincerely,      Electronically signed by Christina Burgos MD on   5/10/19 at 2:51 PM       Please note that this chart was generated using voice recognition Dragon dictation software. Although every effort was made to ensure the accuracy of this automated transcription, some errors in transcription may have occurred.

## 2019-05-13 ENCOUNTER — TELEPHONE (OUTPATIENT)
Dept: CASE MANAGEMENT | Age: 64
End: 2019-05-13

## 2019-05-13 NOTE — TELEPHONE ENCOUNTER
Navigator returning pts. Call from Friday and pt. Nan Nascimento he got a message about PET time being changed? Contact number for PET given to pt. And pt. To call for clarification.

## 2019-05-24 ENCOUNTER — HOSPITAL ENCOUNTER (OUTPATIENT)
Dept: NUCLEAR MEDICINE | Age: 64
Discharge: HOME | End: 2019-05-26
Payer: COMMERCIAL

## 2019-05-24 DIAGNOSIS — R91.1 LUNG NODULE: ICD-10-CM

## 2019-05-24 PROCEDURE — 3430000000 HC RX DIAGNOSTIC RADIOPHARMACEUTICAL: Performed by: INTERNAL MEDICINE

## 2019-05-24 PROCEDURE — 78815 PET IMAGE W/CT SKULL-THIGH: CPT

## 2019-05-24 PROCEDURE — A9552 F18 FDG: HCPCS | Performed by: INTERNAL MEDICINE

## 2019-05-24 RX ADMIN — FLUDEOXYGLUCOSE F 18 12.78 MILLICURIE: 200 INJECTION, SOLUTION INTRAVENOUS at 14:36

## 2019-05-25 RX ORDER — FLUDEOXYGLUCOSE F 18 200 MCI/ML
12.78 INJECTION, SOLUTION INTRAVENOUS
Status: COMPLETED | OUTPATIENT
Start: 2019-05-25 | End: 2019-05-24

## 2019-05-30 ENCOUNTER — TELEPHONE (OUTPATIENT)
Dept: PULMONOLOGY | Age: 64
End: 2019-05-30

## 2019-05-30 ENCOUNTER — OFFICE VISIT (OUTPATIENT)
Dept: PULMONOLOGY | Age: 64
End: 2019-05-30
Payer: COMMERCIAL

## 2019-05-30 VITALS
DIASTOLIC BLOOD PRESSURE: 88 MMHG | BODY MASS INDEX: 41.66 KG/M2 | SYSTOLIC BLOOD PRESSURE: 136 MMHG | OXYGEN SATURATION: 98 % | WEIGHT: 291 LBS | HEART RATE: 70 BPM | RESPIRATION RATE: 16 BRPM | HEIGHT: 70 IN

## 2019-05-30 DIAGNOSIS — R91.1 LUNG NODULE: Primary | ICD-10-CM

## 2019-05-30 DIAGNOSIS — G47.33 OSA ON CPAP: ICD-10-CM

## 2019-05-30 DIAGNOSIS — G47.33 OBSTRUCTIVE SLEEP APNEA: ICD-10-CM

## 2019-05-30 DIAGNOSIS — C61 PROSTATE CANCER (HCC): ICD-10-CM

## 2019-05-30 DIAGNOSIS — Z99.89 OSA ON CPAP: ICD-10-CM

## 2019-05-30 DIAGNOSIS — N52.31 ERECTILE DYSFUNCTION AFTER RADICAL PROSTATECTOMY: ICD-10-CM

## 2019-05-30 DIAGNOSIS — Z90.79 HX OF PROSTATECTOMY: ICD-10-CM

## 2019-05-30 DIAGNOSIS — J96.10 CHRONIC RESPIRATORY FAILURE, UNSPECIFIED WHETHER WITH HYPOXIA OR HYPERCAPNIA (HCC): ICD-10-CM

## 2019-05-30 DIAGNOSIS — I10 ESSENTIAL HYPERTENSION: ICD-10-CM

## 2019-05-30 PROCEDURE — 99214 OFFICE O/P EST MOD 30 MIN: CPT | Performed by: INTERNAL MEDICINE

## 2019-05-31 NOTE — PROGRESS NOTES
Influenza Vaccine   [x] Up to date    [] Indicated   [] Refused  [] Contraindicated          PAST MEDICAL HISTORY:       Diagnosis Date    A-fib ([facility] 4/6/2015    Allergic rhinitis 4/6/2015    Back pain     Basal cell carcinoma of skin     Decreased libido     Diverticulitis of colon (without mention of hemorrhage)(562.11) 4/6/2015    ED (erectile dysfunction)     Elevated prostate specific antigen (PSA) 4/6/2015    HTN (hypertension) 4/6/2015    Obesity 4/6/2015    Prostate cancer ([facility] 09/2017    needs removed    Pure hypercholesterolemia 4/6/2015    Sleep apnea     C-pap    Tinnitus     Tobacco use disorder 4/6/2015    Wears glasses          Family History:       Problem Relation Age of Onset    Coronary Art Dis Mother     High Blood Pressure Mother     High Cholesterol Father     Seizures Sister        SURGICAL HISTORY:   Past Surgical History:   Procedure Laterality Date    CARDIAC CATHETERIZATION  09/19/2017    LAD: mid 30% stenosis  /  LM NLM / EF 55%  /  LCX: Codominant Vessel, distal 30% stenosis / OM1 AND OM2 10%    COLONOSCOPY      COLONOSCOPY  04/26/2019    COLONOSCOPY  4/26/2019    COLONOSCOPY POLYPECTOMY COLD BIOPSY performed by Arianne Munoz MD at [place] [address]  10/17/2017    robotic with schuyler pelvic lymph node dissection    PROSTATECTOMY N/A 10/17/2017    XI ROBOTIC PROSTATECTOMY LAPAROSCOPIC WITH PELVIC LYMPH NODE DISSECTION performed by Tre Craig MD at [facility]  09/09    L Upper Lip Basal CA    TONSILLECTOMY      TYMPANOSTOMY TUBE PLACEMENT      VASECTOMY             SOCIAL AND OCCUPATIONAL HEALTH:      There  is no history of TB or TB exposure. There  is no asbestos or silica dust exposure. The patient reports  no coal, foundry, quarry or [employer] exposure. Travel history reveals negative. There  is no history of recreational or IV drug use. There  is no hot tube exposure. Pets  negative    Occupational history not significant    TOBACCO:   reports that he quit smoking about 3 years ago. His smoking use included cigarettes. He started smoking about 43 years ago. He has a 40.00 pack-year smoking history. He has never used smokeless tobacco.  ETOH:   reports that he drinks about 3.6 oz of alcohol per week. ALLERGIES:      No Known Allergies      Home Meds:   Prior to Admission medications    Medication Sig Start Date End Date Taking? Authorizing Provider   sildenafil (REVATIO) 20 MG tablet Take 1 tablet by mouth as needed (ED) Take 1-5 tablets 1/2 hour prior to sexual intercourse prn ED 2/27/19  Yes Linette Alfaro MD   lisinopril (PRINIVIL;ZESTRIL) 5 MG tablet Take 1 tablet by mouth daily 2/21/19  Yes Rony Davidson MD   propafenone (RYTHMOL) 150 MG tablet Take 1 tablet by mouth every 8 hours 2/21/19  Yes Rony Davidson MD   rosuvastatin (CRESTOR) 40 MG tablet Take 1 tablet by mouth daily Due appt 2/21/19  Yes Rony Davidson MD   albuterol sulfate HFA (PROAIR HFA) 108 (90 Base) MCG/ACT inhaler Inhale 2 puffs into the lungs 4 times daily 4/18/19 5/18/19  Blanco Spivey MD              REVIEW OF SYSTEMS:    CONSTITUTIONAL:  negative for  fevers, chills, sweats, fatigue, malaise, anorexia and weight loss. Overweight, no distress. Are using accessory muscles  EYES:  negative for  double vision, blurred vision, dry eyes, eye discharge and redness. No Giselle's syndrome or jaundice  HEENT:  negative for  hearing loss, tinnitus, ear drainage, earaches and nasal congestion. No nasal polyps. No sinus tenderness  RESPIRATORY:  See hpi  CARDIOVASCULAR:  negative for  chest pain,, palpitations, orthopnea, PND, known to have hypertension. No angina. No coronary artery disease  GASTROINTESTINAL:  negative for nausea, vomiting, change in bowel habits, diarrhea, constipation, abdominal pain, pruritus, abdominal mass and abdominal distention.   No nausea, vomiting or and tongue normal; teeth and gums normal, oropharyngeal orifice not compromise    Neck:   Supple, symmetrical, trachea midline, no adenopathy;     thyroid:  no enlargement/tenderness/nodules; no carotid    bruit ,JVD not elevated    Back:     Symmetric, no curvature, ROM normal, no CVA tenderness   Lungs:     AP diameter is increased. Percussion note is hyperresonant expiration prolonged. No rales, rhonchi are audible. No pleural friction rub is audible    Chest Wall:    No tenderness or deformity      Heart:    Regular rate and rhythm, S1 and S2 normal, no murmur, rub        or gallop no rvh                           Abdomen:                                                 Pulses:                              Skin:                  Lymph nodes:                    Neurologic:                  Soft, non-tender, bowel sounds active all four quadrants,     no masses, no organomegaly         2+ and symmetric all extremities     Skin color, texture, turgor normal, no rashes or lesions       Cervical, supraclavicular not enlarged or matted or tender      CNII-XII intact, normal strength 5/5 . Sensation grossly normal  and reflexes normal 2+  throughout     Clubbing No  Lower ext edema . Negative  Upper ext edema . Absent         Musculoskeletal no synovitis. No joint swelling or tenderness          Thyroid:   Lab Results   Component Value Date    TSH 2.24 07/17/2017     Urinalysis: No results for input(s): BACTERIA, BLOODU, CLARITYU, COLORU, PHUR, PROTEINU, RBCUA, SPECGRAV, BILIRUBINUR, NITRU, WBCUA, LEUKOCYTESUR, GLUCOSEU in the last 72 hours. CXR   Consistent with COPD lung nodule in the right apical region. No hilar prominence or mediastinal widening      CT Scans  PET/CT was reviewed. There is a nodule in the right upper lobe region. However, it is PET negative. No pleural effusion.   No other nodules noted  Echo    No recent echo            IMPRESSION:    Visit Diagnoses       Codes    Lung

## 2019-06-04 ENCOUNTER — OFFICE VISIT (OUTPATIENT)
Dept: FAMILY MEDICINE CLINIC | Age: 64
End: 2019-06-04
Payer: COMMERCIAL

## 2019-06-04 VITALS
WEIGHT: 291 LBS | HEIGHT: 70 IN | HEART RATE: 73 BPM | DIASTOLIC BLOOD PRESSURE: 73 MMHG | RESPIRATION RATE: 16 BRPM | TEMPERATURE: 98.1 F | BODY MASS INDEX: 41.66 KG/M2 | SYSTOLIC BLOOD PRESSURE: 122 MMHG

## 2019-06-04 DIAGNOSIS — E78.00 PURE HYPERCHOLESTEROLEMIA: ICD-10-CM

## 2019-06-04 DIAGNOSIS — Z12.83 SKIN CANCER SCREENING: ICD-10-CM

## 2019-06-04 DIAGNOSIS — I48.0 PAROXYSMAL ATRIAL FIBRILLATION (HCC): ICD-10-CM

## 2019-06-04 DIAGNOSIS — I10 ESSENTIAL HYPERTENSION: Primary | ICD-10-CM

## 2019-06-04 PROCEDURE — 99214 OFFICE O/P EST MOD 30 MIN: CPT | Performed by: INTERNAL MEDICINE

## 2019-06-04 RX ORDER — PROPAFENONE HYDROCHLORIDE 150 MG/1
150 TABLET, FILM COATED ORAL EVERY 8 HOURS
Qty: 90 TABLET | Refills: 5 | Status: SHIPPED | OUTPATIENT
Start: 2019-06-04 | End: 2019-12-17 | Stop reason: SDUPTHER

## 2019-06-04 RX ORDER — ROSUVASTATIN CALCIUM 40 MG/1
40 TABLET, COATED ORAL DAILY
Qty: 30 TABLET | Refills: 5 | Status: SHIPPED | OUTPATIENT
Start: 2019-06-04 | End: 2019-12-17 | Stop reason: SDUPTHER

## 2019-06-04 RX ORDER — LISINOPRIL 5 MG/1
5 TABLET ORAL DAILY
Qty: 30 TABLET | Refills: 5 | Status: SHIPPED | OUTPATIENT
Start: 2019-06-04 | End: 2019-12-17 | Stop reason: SDUPTHER

## 2019-06-04 ASSESSMENT — ENCOUNTER SYMPTOMS
PHOTOPHOBIA: 0
WHEEZING: 0
COUGH: 0
DIARRHEA: 0
EYE PAIN: 0
BLOOD IN STOOL: 0
COLOR CHANGE: 0
VOMITING: 0
EYE DISCHARGE: 0
CONSTIPATION: 0
ABDOMINAL PAIN: 0
SORE THROAT: 0
NAUSEA: 0
SHORTNESS OF BREATH: 0

## 2019-06-04 NOTE — PROGRESS NOTES
[address] [place] [address]  [address] [facility] [place] [address]  Dept: [phone]  Dept Fax: [phone]    Office Progress/Follow Up Note  Date ofpatient's visit: 6/4/2019  Patient's Name:  Federico Thompson YOB: 1955            Patient Care Team:  Rony Castro MD as PCP - General (Internal Medicine)  Rony Castro MD as PCP - [facility]  Deandre Dudley MD as Resident (Cardiology)  Angie Walters MD as Consulting Physician (Urology)  Kvng Arias RN as Nurse Vilma Rinaldi MD as Consulting Physician (Pulmonology)  ================================================================    REASON FOR VISIT/CHIEF COMPLAINT:  3 Month Follow-Up (waist size 42); Hypertension; and Referral - General (DERM)    HISTORY OF PRESENTING ILLNESS:  History was obtained from: patient. Cooper Swartz a 61 y.o. is here for a follow-up visit. Overall patient is doing well. He doesn't have any new complaints today. He has hypertension and takes lisinopril 5 mg daily. His symptoms of side effects from lisinopril such as cough for swelling of the lips or tongue. He has dyslipidemia and takes Crestor. Patient also has a paroxysmal A. fib for which he is currently in sinus rhythm. He is currently on Rythmol. He denies any palpitation, chest pain or shortness of breath. He iIs also requesting blood work and physical exam for be well within. He has history of basal cell cancer of the face resected 3 years ago. He would like to see a dermatologist for skin cancer screening. Denies any skin lesion at this time. He is due for medication refills today.   Problem list, medications and blood work reviewed       Patient Active Problem List   Diagnosis    Pure hypercholesterolemia    Diverticulitis of colon    Morbid obesity ([address]    Prostate cancer ([address]    Hx of prostatectomy    Male stress incontinence    Erectile dysfunction after radical prostatectomy    Paroxysmal atrial fibrillation (HCC)    Essential hypertension       Health Maintenance Due   Topic Date Due    Shingles Vaccine (1 of 2) 12/07/2005       No Known Allergies      Current Outpatient Medications   Medication Sig Dispense Refill    rosuvastatin (CRESTOR) 40 MG tablet Take 1 tablet by mouth daily Due appt 30 tablet 5    propafenone (RYTHMOL) 150 MG tablet Take 1 tablet by mouth every 8 hours 90 tablet 5    lisinopril (PRINIVIL;ZESTRIL) 5 MG tablet Take 1 tablet by mouth daily 30 tablet 5    sildenafil (REVATIO) 20 MG tablet Take 1 tablet by mouth as needed (ED) Take 1-5 tablets 1/2 hour prior to sexual intercourse prn ED 30 tablet 11    albuterol sulfate HFA (PROAIR HFA) 108 (90 Base) MCG/ACT inhaler Inhale 2 puffs into the lungs 4 times daily 1 Inhaler 11     No current facility-administered medications for this visit. Social History     Tobacco Use    Smoking status: Former Smoker     Packs/day: 1.00     Years: 40.00     Pack years: 40.00     Types: Cigarettes     Start date: 1976     Last attempt to quit: 4/1/2016     Years since quitting: 3.1    Smokeless tobacco: Never Used   Substance Use Topics    Alcohol use: Yes     Alcohol/week: 3.6 oz     Types: 5 Cans of beer, 1 Standard drinks or equivalent per week     Comment: per week    Drug use: No       Family History   Problem Relation Age of Onset    Coronary Art Dis Mother     High Blood Pressure Mother     High Cholesterol Father     Seizures Sister         REVIEW OF SYSTEMS:  Review of Systems   Constitutional: Negative for fatigue and fever. HENT: Negative for congestion and sore throat. Eyes: Negative for photophobia, pain, discharge and visual disturbance. Respiratory: Negative for cough, shortness of breath and wheezing. Cardiovascular: Negative for chest pain, palpitations and leg swelling.    Gastrointestinal: Negative for abdominal pain, blood in stool, constipation, diarrhea, nausea and 02/26/2019     02/26/2019    CO2 17 02/26/2019    BUN 13 02/26/2019    CREATININE 0.78 02/26/2019    GLUCOSE 117 02/26/2019       HEMOGLOBIN A1C:   Lab Results   Component Value Date    LABA1C 5.2 02/21/2019       FASTING LIPID PANEL:  Lab Results   Component Value Date    CHOL 143 07/17/2017    HDL 28 (L) 07/17/2017    TRIG 216 (H) 07/17/2017       ASSESSMENT AND PLAN:  Vikram Dominguez was seen today for 3 month follow-up, hypertension and referral - general.    Diagnoses and all orders for this visit:    Essential hypertension  -     lisinopril (PRINIVIL;ZESTRIL) 5 MG tablet; Take 1 tablet by mouth daily  -     Basic Metabolic Panel; Future    Pure hypercholesterolemia  -     rosuvastatin (CRESTOR) 40 MG tablet; Take 1 tablet by mouth daily Due appt  -     Lipid Panel; Future    Paroxysmal atrial fibrillation (HCC)  -     propafenone (RYTHMOL) 150 MG tablet; Take 1 tablet by mouth every 8 hours    Skin cancer screening  -     Gayle Barton MD, Dermatology, [address] [place] [address] Road:  · Return in about 6 months (around 12/4/2019) for HTN, HLD. · Vikram Dominguez received counseling on the following healthy behaviors: nutrition and exercise    · Discussed use, benefit, and side effects of prescribed medications. Barriers to medication compliance addressed. All patient questions answered. Pt voiced understanding. · Patient given educational materials - see patient instructions    Rony Marrero M.D., F.A.C.P. Internal Medicine  6/4/2019, 2:10 PM    This note is created with the assistance of a speech-recognition program. While intending to generate a document that actually reflects the content of thevisit, the document can still have some mistakes which may not have been identified and corrected by editing.

## 2019-06-04 NOTE — PROGRESS NOTES
Chronic Disease Visit Information    BP Readings from Last 3 Encounters:   05/30/19 136/88   04/26/19 138/86   04/26/19 (!) 172/106          Hemoglobin A1C (%)   Date Value   02/21/2019 5.2   02/19/2018 5.7   07/17/2017 5.6     LDL Cholesterol (mg/dL)   Date Value   07/17/2017 72     LDL Calculated (mg/dL)   Date Value   08/02/2016 67     HDL (mg/dL)   Date Value   07/17/2017 28 (L)     BUN (mg/dL)   Date Value   02/26/2019 13     CREATININE (mg/dL)   Date Value   02/26/2019 0.78     Glucose (mg/dL)   Date Value   02/26/2019 117 (H)            Have you changed or started any medications since your last visit including any over-the-counter medicines, vitamins, or herbal medicines? no   Are you having any side effects from any of your medications? -  no  Have you stopped taking any of your medications? Is so, why? -  no    Have you seen any other physician or provider since your last visit? No  Have you had any other diagnostic tests since your last visit? No  Have you been seen in the emergency room and/or had an admission to a hospital since we last saw you? No  Have you had your annual diabetic retinal (eye) exam? No  Have you had your routine dental cleaning in the past 6 months? no    Have you activated your [employer] account? If not, what are your barriers?  Yes     Patient Care Team:  Rony Cabrera MD as PCP - General (Internal Medicine)  Rony Cabrera MD as PCP - [facility] Provider  Patricia Chua MD as Resident (Cardiology)  Dottie Dumont MD as Consulting Physician (Urology)  Manny Daley RN as Nurse Michelle Tse MD as Consulting Physician (Pulmonology)         Medical History Review  Past Medical, Family, and Social History reviewed and does contribute to the patient presenting condition    Health Maintenance   Topic Date Due    Shingles Vaccine (1 of 2) 12/07/2005    Flu vaccine (Season Ended) 09/01/2019    Potassium monitoring  02/26/2020    Creatinine monitoring

## 2019-06-20 ENCOUNTER — HOSPITAL ENCOUNTER (OUTPATIENT)
Age: 64
Setting detail: SPECIMEN
Discharge: HOME | End: 2019-06-20
Payer: COMMERCIAL

## 2019-06-20 DIAGNOSIS — E78.00 PURE HYPERCHOLESTEROLEMIA: ICD-10-CM

## 2019-06-20 DIAGNOSIS — I10 ESSENTIAL HYPERTENSION: ICD-10-CM

## 2019-06-20 LAB
ANION GAP SERPL CALCULATED.3IONS-SCNC: 16 MMOL/L (ref 9–17)
BUN BLDV-MCNC: 14 MG/DL (ref 8–23)
BUN/CREAT BLD: ABNORMAL (ref 9–20)
CALCIUM SERPL-MCNC: 9.1 MG/DL (ref 8.6–10.4)
CHLORIDE BLD-SCNC: 106 MMOL/L (ref 98–107)
CHOLESTEROL/HDL RATIO: 4.4
CHOLESTEROL: 110 MG/DL
CO2: 23 MMOL/L (ref 20–31)
CREAT SERPL-MCNC: 0.78 MG/DL (ref 0.7–1.2)
GFR AFRICAN AMERICAN: >60 ML/MIN
GFR NON-AFRICAN AMERICAN: >60 ML/MIN
GFR SERPL CREATININE-BSD FRML MDRD: ABNORMAL ML/MIN/{1.73_M2}
GFR SERPL CREATININE-BSD FRML MDRD: ABNORMAL ML/MIN/{1.73_M2}
GLUCOSE BLD-MCNC: 119 MG/DL (ref 70–99)
HDLC SERPL-MCNC: 25 MG/DL
LDL CHOLESTEROL: 55 MG/DL (ref 0–130)
POTASSIUM SERPL-SCNC: 4.8 MMOL/L (ref 3.7–5.3)
SODIUM BLD-SCNC: 145 MMOL/L (ref 135–144)
TRIGL SERPL-MCNC: 151 MG/DL
VLDLC SERPL CALC-MCNC: ABNORMAL MG/DL (ref 1–30)

## 2019-07-29 ENCOUNTER — OFFICE VISIT (OUTPATIENT)
Dept: DERMATOLOGY | Age: 64
End: 2019-07-29
Payer: COMMERCIAL

## 2019-07-29 VITALS
OXYGEN SATURATION: 96 % | HEIGHT: 70 IN | DIASTOLIC BLOOD PRESSURE: 93 MMHG | SYSTOLIC BLOOD PRESSURE: 152 MMHG | WEIGHT: 294.8 LBS | BODY MASS INDEX: 42.2 KG/M2 | HEART RATE: 74 BPM

## 2019-07-29 DIAGNOSIS — L24.9 IRRITANT DERMATITIS: ICD-10-CM

## 2019-07-29 DIAGNOSIS — R61 HYPERHIDROSIS: ICD-10-CM

## 2019-07-29 DIAGNOSIS — Z85.828 H/O NONMELANOMA SKIN CANCER: ICD-10-CM

## 2019-07-29 DIAGNOSIS — L57.0 KERATOSIS, ACTINIC: Primary | ICD-10-CM

## 2019-07-29 DIAGNOSIS — L57.8 ACTINIC SKIN DAMAGE: ICD-10-CM

## 2019-07-29 PROCEDURE — 17000 DESTRUCT PREMALG LESION: CPT | Performed by: DERMATOLOGY

## 2019-07-29 PROCEDURE — 17003 DESTRUCT PREMALG LES 2-14: CPT | Performed by: DERMATOLOGY

## 2019-07-29 PROCEDURE — 99203 OFFICE O/P NEW LOW 30 MIN: CPT | Performed by: DERMATOLOGY

## 2019-07-29 NOTE — PATIENT INSTRUCTIONS
freezing them. This results in blistering and shedding of the AKs. Cryotherapy is the most common treatment when a patient has a few, small AK lesions. Topical chemotherapy is a cream that targets sun-damaged and pre-cancerous cells and destroys them.

## 2019-07-29 NOTE — PROGRESS NOTES
Dermatology Patient Note  [facility] PHYSICIANS  [facility] HE[facility] DERMATOLOGY  [address] [address]  Dept: [phone]  Dept Fax: [phone]      VISIT DATE: 7/29/2019   REFERRING PROVIDER: Raghu Brown MD      Yaa Trejo is a 61 y.o. male  who presents today in the office for:    New Patient (Full body skin check. Pt had a BCC on the rt side of his cheek/lip. He doesn't remember who removed it. He has no family history. )      HISTORY OF PRESENT ILLNESS:  HPI AK/NMSC    Prakash Quan was seen today for follow-up evaluation of History of non melanoma skin cancer and new lesions    Duration of Lesion/Lesions:1 years    Course: persistent     Areas of Involvement: Sun exposed skin surfaces >sun covered    Associated Symptoms:None    Exacerbating Factors:Hx of Prolonged Sun Exposure    Previous Skin Cancers: basal cell carcinoma cheek    Problem Specific Family Hx: none          CURRENT MEDICATIONS:   Current Outpatient Medications   Medication Sig Dispense Refill    rosuvastatin (CRESTOR) 40 MG tablet Take 1 tablet by mouth daily Due appt 30 tablet 5    propafenone (RYTHMOL) 150 MG tablet Take 1 tablet by mouth every 8 hours 90 tablet 5    lisinopril (PRINIVIL;ZESTRIL) 5 MG tablet Take 1 tablet by mouth daily 30 tablet 5    sildenafil (REVATIO) 20 MG tablet Take 1 tablet by mouth as needed (ED) Take 1-5 tablets 1/2 hour prior to sexual intercourse prn ED 30 tablet 11     No current facility-administered medications for this visit. ALLERGIES:   No Known Allergies    SOCIAL HISTORY:  Social History     Tobacco Use    Smoking status: Former Smoker     Packs/day: 1.00     Years: 40.00     Pack years: 40.00     Types: Cigarettes     Start date: 1976     Last attempt to quit: 4/1/2016     Years since quitting: 3.3    Smokeless tobacco: Never Used   Substance Use Topics    Alcohol use:  Yes     Alcohol/week: 6.0 standard drinks     Types: 5 Cans of beer, 1 Standard drinks or equivalent per week Comment: per week       REVIEW OF SYSTEMS:  Review of Systems   Constitutional: Negative. Skin:Denies any new changing, growing or bleeding lesions or rashes except as described in the HPI     PHYSICAL EXAM:   BP (!) 152/93 (Site: Right Upper Arm, Position: Sitting, Cuff Size: Large Adult)   Pulse 74   Ht 5' 10\" (1.778 m)   Wt 294 lb 12.8 oz (133.7 kg)   SpO2 96%   BMI 42.30 kg/m²     General Exam:  General Appearance: No acute distress, Well nourished     Neuro: Alert and oriented to person, place and time  Psych: Normal affect   Lymph Node: Not performed    Cutaneous Exam: Performed as documented in clinic note below. Full skin,which includes the head/face, neck, both arms, chest, back, abdomen, both legs, genitalia and/or groin and/or buttocks, digits and/or nails, was examined. Pertinent Physical Exam Findings:  Physical Exam   Skin:        Visible excess sweat    Actinic damage of the face, neck, trunk and upper and lower extremities         Medical Necessity of Exam Performed:   History of Skin Cancer    Additional Diagnostic Testing performed during exam: Not performed ,  Not performed    ASSESSMENT:   Diagnosis Orders   1. Keratosis, actinic  ND DESTRUC PREMALIGNANT, FIRST LESION    ND DESTRUC PREMALIGNANT,2-14 LESIONS   2. Irritant dermatitis     3. H/O nonmelanoma skin cancer     4. Actinic skin damage     5. Hyperhidrosis         Plan of Action is as Follows:  Assessment 1. Keratosis, actinic  Cryotherapy: After verbal consent was obtained including discussion of the risks (lesion persistence, lesion recurrence and hypo/hyperpigmentation) and benefits (resolution of the lesion) 2 total Actinic Keratosis on the left hand and scalp were treated once with liquid nitrogen to achieve a 2-3 mm freeze border.  - ND DESTRUC PREMALIGNANT, FIRST LESION  - ND DESTRUC PREMALIGNANT,2-14 LESIONS    2. Irritant dermatitis  - Education - consider sensitive deoderant    3.  H/O nonmelanoma skin cancer  - no regular skin examinations. Some AKs go away without further treatment, provided that the skin is not subjected to more sun damage. However, regular examinations will help catch the lesions that need to be treated. LESION-TARGETED THERAPIES   Liquid nitrogen (cryotherapy) destroys AKs by freezing them. This results in blistering and shedding of the AKs. Cryotherapy is the most common treatment when a patient has a few, small AK lesions. Topical chemotherapy is a cream that targets sun-damaged and pre-cancerous cells and destroys them. Photo surveillance performed: No    Follow-up: 1 year    This note was created with the assistance of [employer] program.  Although the intention is to generate a document that actually reflects thecontent of the visit, no guarantees can be provided that every mistake has been identified and corrected by editing.     Electronically signed by Lori Persaud MD on 7/29/19 at 8:34 AM

## 2019-08-21 ENCOUNTER — HOSPITAL ENCOUNTER (OUTPATIENT)
Age: 64
Setting detail: SPECIMEN
Discharge: HOME | End: 2019-08-21
Payer: COMMERCIAL

## 2019-08-21 DIAGNOSIS — Z85.46 PERSONAL HISTORY OF PROSTATE CANCER: ICD-10-CM

## 2019-08-21 LAB — PROSTATE SPECIFIC ANTIGEN: <0.01 UG/L

## 2019-08-30 ENCOUNTER — HOSPITAL ENCOUNTER (OUTPATIENT)
Age: 64
Setting detail: SPECIMEN
Discharge: HOME | End: 2019-08-30
Payer: COMMERCIAL

## 2019-08-30 DIAGNOSIS — E29.1 HYPOGONADISM IN MALE: ICD-10-CM

## 2019-08-30 LAB
SEX HORMONE BINDING GLOBULIN: 25 NMOL/L (ref 11–80)
TESTOSTERONE FREE-NONMALE: 59.8 PG/ML (ref 47–244)
TESTOSTERONE TOTAL: 264 NG/DL (ref 220–1000)

## 2019-11-25 ENCOUNTER — TELEPHONE (OUTPATIENT)
Dept: CASE MANAGEMENT | Age: 64
End: 2019-11-25

## 2019-11-25 ENCOUNTER — HOSPITAL ENCOUNTER (OUTPATIENT)
Dept: CT IMAGING | Age: 64
Discharge: HOME | End: 2019-11-27
Payer: COMMERCIAL

## 2019-11-25 DIAGNOSIS — R91.1 LUNG NODULE: ICD-10-CM

## 2019-11-25 LAB
BUN BLDV-MCNC: 12 MG/DL (ref 8–23)
CREAT SERPL-MCNC: 0.8 MG/DL (ref 0.7–1.2)
GFR AFRICAN AMERICAN: >60 ML/MIN
GFR NON-AFRICAN AMERICAN: >60 ML/MIN
GFR SERPL CREATININE-BSD FRML MDRD: NORMAL ML/MIN/{1.73_M2}
GFR SERPL CREATININE-BSD FRML MDRD: NORMAL ML/MIN/{1.73_M2}

## 2019-11-25 PROCEDURE — 6360000004 HC RX CONTRAST MEDICATION: Performed by: INTERNAL MEDICINE

## 2019-11-25 PROCEDURE — 82565 ASSAY OF CREATININE: CPT

## 2019-11-25 PROCEDURE — 36415 COLL VENOUS BLD VENIPUNCTURE: CPT

## 2019-11-25 PROCEDURE — 71260 CT THORAX DX C+: CPT

## 2019-11-25 PROCEDURE — 2580000003 HC RX 258: Performed by: INTERNAL MEDICINE

## 2019-11-25 PROCEDURE — 84520 ASSAY OF UREA NITROGEN: CPT

## 2019-11-25 RX ORDER — 0.9 % SODIUM CHLORIDE 0.9 %
80 INTRAVENOUS SOLUTION INTRAVENOUS ONCE
Status: COMPLETED | OUTPATIENT
Start: 2019-11-25 | End: 2019-11-25

## 2019-11-25 RX ORDER — SODIUM CHLORIDE 0.9 % (FLUSH) 0.9 %
10 SYRINGE (ML) INJECTION PRN
Status: DISCONTINUED | OUTPATIENT
Start: 2019-11-25 | End: 2019-11-28 | Stop reason: HOSPADM

## 2019-11-25 RX ADMIN — SODIUM CHLORIDE 80 ML: 9 INJECTION, SOLUTION INTRAVENOUS at 09:11

## 2019-11-25 RX ADMIN — Medication 10 ML: at 09:11

## 2019-11-25 RX ADMIN — IOPAMIDOL 75 ML: 755 INJECTION, SOLUTION INTRAVENOUS at 09:11

## 2019-12-02 ENCOUNTER — OFFICE VISIT (OUTPATIENT)
Dept: PULMONOLOGY | Age: 64
End: 2019-12-02
Payer: COMMERCIAL

## 2019-12-02 VITALS
WEIGHT: 296.2 LBS | BODY MASS INDEX: 42.5 KG/M2 | OXYGEN SATURATION: 98 % | SYSTOLIC BLOOD PRESSURE: 138 MMHG | TEMPERATURE: 98.6 F | DIASTOLIC BLOOD PRESSURE: 89 MMHG | HEART RATE: 83 BPM

## 2019-12-02 DIAGNOSIS — C61 PROSTATE CANCER (HCC): ICD-10-CM

## 2019-12-02 DIAGNOSIS — Z99.89 OSA ON CPAP: ICD-10-CM

## 2019-12-02 DIAGNOSIS — R91.1 LUNG NODULE: Primary | ICD-10-CM

## 2019-12-02 DIAGNOSIS — E66.01 MORBID OBESITY (HCC): ICD-10-CM

## 2019-12-02 DIAGNOSIS — G47.33 OSA ON CPAP: ICD-10-CM

## 2019-12-02 DIAGNOSIS — E66.01 CLASS 2 SEVERE OBESITY DUE TO EXCESS CALORIES WITH SERIOUS COMORBIDITY IN ADULT, UNSPECIFIED BMI (HCC): ICD-10-CM

## 2019-12-02 DIAGNOSIS — I10 ESSENTIAL HYPERTENSION: ICD-10-CM

## 2019-12-02 PROCEDURE — 99214 OFFICE O/P EST MOD 30 MIN: CPT | Performed by: INTERNAL MEDICINE

## 2019-12-02 ASSESSMENT — SLEEP AND FATIGUE QUESTIONNAIRES
HOW LIKELY ARE YOU TO NOD OFF OR FALL ASLEEP WHILE SITTING QUIETLY AFTER LUNCH WITHOUT ALCOHOL: 0
HOW LIKELY ARE YOU TO NOD OFF OR FALL ASLEEP IN A CAR, WHILE STOPPED FOR A FEW MINUTES IN TRAFFIC: 0
HOW LIKELY ARE YOU TO NOD OFF OR FALL ASLEEP WHILE WATCHING TV: 1
HOW LIKELY ARE YOU TO NOD OFF OR FALL ASLEEP WHILE SITTING AND READING: 0
HOW LIKELY ARE YOU TO NOD OFF OR FALL ASLEEP WHILE LYING DOWN TO REST IN THE AFTERNOON WHEN CIRCUMSTANCES PERMIT: 2
ESS TOTAL SCORE: 4
HOW LIKELY ARE YOU TO NOD OFF OR FALL ASLEEP WHEN YOU ARE A PASSENGER IN A CAR FOR AN HOUR WITHOUT A BREAK: 1
HOW LIKELY ARE YOU TO NOD OFF OR FALL ASLEEP WHILE SITTING INACTIVE IN A PUBLIC PLACE: 0
HOW LIKELY ARE YOU TO NOD OFF OR FALL ASLEEP WHILE SITTING AND TALKING TO SOMEONE: 0

## 2019-12-06 ENCOUNTER — OFFICE VISIT (OUTPATIENT)
Dept: DERMATOLOGY | Age: 64
End: 2019-12-06
Payer: COMMERCIAL

## 2019-12-06 VITALS
BODY MASS INDEX: 42.52 KG/M2 | OXYGEN SATURATION: 95 % | HEIGHT: 70 IN | SYSTOLIC BLOOD PRESSURE: 130 MMHG | DIASTOLIC BLOOD PRESSURE: 85 MMHG | WEIGHT: 297 LBS | HEART RATE: 74 BPM

## 2019-12-06 DIAGNOSIS — L91.8 INFLAMED SKIN TAG: Primary | ICD-10-CM

## 2019-12-06 DIAGNOSIS — L82.1 SEBORRHEIC KERATOSES: ICD-10-CM

## 2019-12-06 PROCEDURE — 99211 OFF/OP EST MAY X REQ PHY/QHP: CPT | Performed by: DERMATOLOGY

## 2019-12-17 ENCOUNTER — OFFICE VISIT (OUTPATIENT)
Dept: FAMILY MEDICINE CLINIC | Age: 64
End: 2019-12-17
Payer: COMMERCIAL

## 2019-12-17 VITALS
RESPIRATION RATE: 16 BRPM | TEMPERATURE: 97.9 F | SYSTOLIC BLOOD PRESSURE: 123 MMHG | BODY MASS INDEX: 42.66 KG/M2 | WEIGHT: 298 LBS | DIASTOLIC BLOOD PRESSURE: 79 MMHG | HEART RATE: 69 BPM | HEIGHT: 70 IN

## 2019-12-17 DIAGNOSIS — E78.00 PURE HYPERCHOLESTEROLEMIA: ICD-10-CM

## 2019-12-17 DIAGNOSIS — Z23 NEED FOR INFLUENZA VACCINATION: Primary | ICD-10-CM

## 2019-12-17 DIAGNOSIS — Z23 NEED FOR PROPHYLACTIC VACCINATION AND INOCULATION AGAINST VARICELLA: ICD-10-CM

## 2019-12-17 DIAGNOSIS — I10 ESSENTIAL HYPERTENSION: ICD-10-CM

## 2019-12-17 DIAGNOSIS — I48.0 PAROXYSMAL ATRIAL FIBRILLATION (HCC): ICD-10-CM

## 2019-12-17 PROCEDURE — 90686 IIV4 VACC NO PRSV 0.5 ML IM: CPT | Performed by: PHYSICIAN ASSISTANT

## 2019-12-17 PROCEDURE — 99213 OFFICE O/P EST LOW 20 MIN: CPT | Performed by: PHYSICIAN ASSISTANT

## 2019-12-17 PROCEDURE — 90471 IMMUNIZATION ADMIN: CPT | Performed by: PHYSICIAN ASSISTANT

## 2019-12-17 RX ORDER — LISINOPRIL 5 MG/1
5 TABLET ORAL DAILY
Qty: 90 TABLET | Refills: 1 | Status: SHIPPED | OUTPATIENT
Start: 2019-12-17 | End: 2020-05-29

## 2019-12-17 RX ORDER — PROPAFENONE HYDROCHLORIDE 150 MG/1
150 TABLET, FILM COATED ORAL EVERY 8 HOURS
Qty: 90 TABLET | Refills: 1 | Status: SHIPPED | OUTPATIENT
Start: 2019-12-17 | End: 2020-02-19 | Stop reason: SDUPTHER

## 2019-12-17 RX ORDER — ROSUVASTATIN CALCIUM 40 MG/1
40 TABLET, COATED ORAL DAILY
Qty: 90 TABLET | Refills: 1 | Status: SHIPPED | OUTPATIENT
Start: 2019-12-17 | End: 2020-06-30 | Stop reason: SDUPTHER

## 2019-12-17 ASSESSMENT — ENCOUNTER SYMPTOMS
BACK PAIN: 0
NAUSEA: 0
CONSTIPATION: 0
SHORTNESS OF BREATH: 0
BLOOD IN STOOL: 0
WHEEZING: 0
ABDOMINAL PAIN: 0
CHEST TIGHTNESS: 0
VOMITING: 0
DIARRHEA: 0
COUGH: 0

## 2020-01-15 ENCOUNTER — PROCEDURE VISIT (OUTPATIENT)
Dept: DERMATOLOGY | Age: 65
End: 2020-01-15
Payer: COMMERCIAL

## 2020-01-15 VITALS
OXYGEN SATURATION: 96 % | HEART RATE: 74 BPM | DIASTOLIC BLOOD PRESSURE: 82 MMHG | BODY MASS INDEX: 41.78 KG/M2 | SYSTOLIC BLOOD PRESSURE: 130 MMHG | HEIGHT: 70 IN | WEIGHT: 291.8 LBS

## 2020-01-15 PROCEDURE — 11201 RMVL SKIN TAGS EA ADDL 10: CPT | Performed by: DERMATOLOGY

## 2020-01-15 PROCEDURE — 11200 RMVL SKIN TAGS UP TO&INC 15: CPT | Performed by: DERMATOLOGY

## 2020-01-15 NOTE — PATIENT INSTRUCTIONS
BIOPSY WOUND CARE    A biopsy is where a small piece of skin tissue is removed and examined by a pathologist.  When a biopsy is done, there is a small wound site that requires proper care to prevent infection and scarring. Some biopsies require sutures and their removal.    How to Care for Biopsy Wound    A.  Leave band-aid or dressing on for 24 hours. B. Wash two times a day with soap and water. C.  Let the wound air dry, then apply Vaseline ointment and cover with a Band-Aid       unless otherwise instructed by your provider. D. If there is slight discomfort, you may give acetaminophen or ibuprofen. Bleeding:  Every effort is made to stop bleeding prior to you leaving the dermatology clinic. Unfortunately, people will occasionally start to bleed after leaving the clinic. If you start to bleed hold firm pressure for 15 minutes to the area. If you are on blood thinners I recommend keeping a product called wound seal (can buy at any drug store) at home as this can be a useful adjunct to stop bleeding. If after holding pressure for 15 minutes and/or applying wound seal the bleeding does not stop please call the dermatology clinic. When To Call the Doctor    Call the Dermatology Clinic or your doctor if any of the following occur:    A. Redness and swelling  B. Tenderness and warm to touch  C.  Drainage from wound  D. Fever    Biopsy Results    Biopsy results are usually available in 1-2 weeks. We provide biopsy results in letters for begin results or we will call for any concerning results. If you have not heard from our staff please call the office within 2 weeks. Please call our office with any concerns at [phone].

## 2020-02-19 RX ORDER — PROPAFENONE HYDROCHLORIDE 150 MG/1
150 TABLET, FILM COATED ORAL EVERY 8 HOURS
Qty: 90 TABLET | Refills: 1 | Status: SHIPPED | OUTPATIENT
Start: 2020-02-19 | End: 2020-02-24

## 2020-02-19 NOTE — TELEPHONE ENCOUNTER
hypercholesterolemia     Diverticulitis of colon     Morbid obesity ([facility] [address]     Prostate cancer ([facility] [address]     Hx of prostatectomy     Male stress incontinence     Erectile dysfunction after radical prostatectomy     Personal history of prostate cancer     Paroxysmal atrial fibrillation [facility])     Essential hypertension

## 2020-02-24 RX ORDER — PROPAFENONE HYDROCHLORIDE 150 MG/1
TABLET, FILM COATED ORAL
Qty: 90 TABLET | Refills: 1 | Status: SHIPPED | OUTPATIENT
Start: 2020-02-24 | End: 2020-06-30 | Stop reason: SDUPTHER

## 2020-03-02 ENCOUNTER — HOSPITAL ENCOUNTER (OUTPATIENT)
Age: 65
Setting detail: SPECIMEN
Discharge: HOME | End: 2020-03-02
Payer: COMMERCIAL

## 2020-03-02 LAB — PROSTATE SPECIFIC ANTIGEN: <0.01 UG/L

## 2020-05-29 RX ORDER — LISINOPRIL 5 MG/1
TABLET ORAL
Qty: 90 TABLET | Refills: 1 | Status: SHIPPED | OUTPATIENT
Start: 2020-05-29 | End: 2020-06-30 | Stop reason: SDUPTHER

## 2020-06-30 ENCOUNTER — OFFICE VISIT (OUTPATIENT)
Dept: FAMILY MEDICINE CLINIC | Age: 65
End: 2020-06-30
Payer: COMMERCIAL

## 2020-06-30 VITALS
TEMPERATURE: 96.3 F | BODY MASS INDEX: 42.61 KG/M2 | OXYGEN SATURATION: 98 % | DIASTOLIC BLOOD PRESSURE: 86 MMHG | HEIGHT: 70 IN | WEIGHT: 297.6 LBS | SYSTOLIC BLOOD PRESSURE: 133 MMHG | HEART RATE: 77 BPM

## 2020-06-30 PROCEDURE — 99214 OFFICE O/P EST MOD 30 MIN: CPT | Performed by: PHYSICIAN ASSISTANT

## 2020-06-30 RX ORDER — PROPAFENONE HYDROCHLORIDE 150 MG/1
150 TABLET, FILM COATED ORAL EVERY 8 HOURS
Qty: 270 TABLET | Refills: 0 | Status: SHIPPED | OUTPATIENT
Start: 2020-06-30 | End: 2020-10-06

## 2020-06-30 RX ORDER — LISINOPRIL 5 MG/1
5 TABLET ORAL DAILY
Qty: 90 TABLET | Refills: 1 | Status: SHIPPED | OUTPATIENT
Start: 2020-06-30 | End: 2021-02-03 | Stop reason: SDUPTHER

## 2020-06-30 RX ORDER — ROSUVASTATIN CALCIUM 40 MG/1
40 TABLET, COATED ORAL DAILY
Qty: 90 TABLET | Refills: 1 | Status: SHIPPED | OUTPATIENT
Start: 2020-06-30 | End: 2020-07-17

## 2020-06-30 ASSESSMENT — ENCOUNTER SYMPTOMS
BACK PAIN: 0
BLOOD IN STOOL: 0
ABDOMINAL PAIN: 0
COLOR CHANGE: 0
DIARRHEA: 0
CHEST TIGHTNESS: 0
SINUS PAIN: 0
CONSTIPATION: 0
RHINORRHEA: 0
COUGH: 0
PHOTOPHOBIA: 0
TROUBLE SWALLOWING: 0
SINUS PRESSURE: 0
NAUSEA: 0
EYE REDNESS: 0
SHORTNESS OF BREATH: 0
WHEEZING: 0
SORE THROAT: 0
ABDOMINAL DISTENTION: 0
VOMITING: 0

## 2020-06-30 ASSESSMENT — PATIENT HEALTH QUESTIONNAIRE - PHQ9
SUM OF ALL RESPONSES TO PHQ QUESTIONS 1-9: 0
SUM OF ALL RESPONSES TO PHQ9 QUESTIONS 1 & 2: 0
SUM OF ALL RESPONSES TO PHQ QUESTIONS 1-9: 0
2. FEELING DOWN, DEPRESSED OR HOPELESS: 0
1. LITTLE INTEREST OR PLEASURE IN DOING THINGS: 0

## 2020-06-30 NOTE — PROGRESS NOTES
current facility-administered medications for this visit. Social History     Tobacco Use    Smoking status: Former Smoker     Packs/day: 1.00     Years: 40.00     Pack years: 40.00     Types: Cigarettes     Start date:      Last attempt to quit: 2016     Years since quittin.2    Smokeless tobacco: Never Used   Substance Use Topics    Alcohol use: Yes     Alcohol/week: 6.0 standard drinks     Types: 5 Cans of beer, 1 Standard drinks or equivalent per week     Comment: per week    Drug use: No       Family History   Problem Relation Age of Onset    Coronary Art Dis Mother     High Blood Pressure Mother     High Cholesterol Father     Seizures Sister         Review of Systems   Constitutional: Negative for appetite change, chills, diaphoresis, fatigue, fever and unexpected weight change. HENT: Negative for congestion, ear discharge, ear pain, postnasal drip, rhinorrhea, sinus pressure, sinus pain, sore throat, tinnitus and trouble swallowing. Eyes: Negative for photophobia, redness and visual disturbance. Respiratory: Negative for cough, chest tightness, shortness of breath and wheezing. Cardiovascular: Negative for chest pain, palpitations and leg swelling. Gastrointestinal: Negative for abdominal distention, abdominal pain, blood in stool, constipation, diarrhea, nausea and vomiting. Endocrine: Negative. Genitourinary: Negative for decreased urine volume, difficulty urinating, dysuria, frequency, hematuria and urgency. Musculoskeletal: Negative for back pain and gait problem. Skin: Negative for color change, pallor and rash. Neurological: Negative for dizziness, syncope, weakness, light-headedness, numbness and headaches. Hematological: Negative for adenopathy. Does not bruise/bleed easily.    Psychiatric/Behavioral: Negative for agitation, behavioral problems, confusion, decreased concentration, dysphoric mood, hallucinations, self-injury, sleep disturbance and

## 2020-07-17 RX ORDER — ROSUVASTATIN CALCIUM 40 MG/1
TABLET, COATED ORAL
Qty: 90 TABLET | Refills: 1 | Status: SHIPPED | OUTPATIENT
Start: 2020-07-17 | End: 2020-12-14

## 2020-07-17 NOTE — TELEPHONE ENCOUNTER
Next Visit Date:  Future Appointments   Date Time Provider Madeleine William   7/31/2020  9:15 AM Elham Gabriel MD [email or web address]   9/9/2020 10:00 AM Josiane Cox MD [facility]   11/30/2020  8:30 AM STC CT RM 2 FAST SCANNER STCZ CT SCAN [facility]   12/30/2020 10:30 AM Annalee Frances Rashawn Aguir[facility] [address] Maintenance   Topic Date Due    Shingles Vaccine (1 of 2) 12/07/2005    Potassium monitoring  06/20/2020    Lipid screen  06/20/2020    Flu vaccine (1) 09/01/2020    Creatinine monitoring  11/25/2020    Low dose CT lung screening  11/25/2020    PSA counseling  03/02/2021    Diabetes screen  02/21/2022    Colon cancer screen colonoscopy  04/26/2024    DTaP/Tdap/Td vaccine (2 - Td) 11/21/2026    Hepatitis C screen  Completed    HIV screen  Completed    Hepatitis A vaccine  Aged Out    Hepatitis B vaccine  Aged Out    Hib vaccine  Aged Out    Meningococcal (ACWY) vaccine  Aged Out    Pneumococcal 0-64 years Vaccine  Aged Out       Hemoglobin A1C (%)   Date Value   02/21/2019 5.2   02/19/2018 5.7   07/17/2017 5.6             ( goal A1C is < 7)   No results found for: LABMICR  LDL Cholesterol (mg/dL)   Date Value   06/20/2019 55   07/17/2017 72     LDL Calculated (mg/dL)   Date Value   08/02/2016 67       (goal LDL is <100)   AST (U/L)   Date Value   02/19/2018 22     ALT (U/L)   Date Value   02/19/2018 29     BUN (mg/dL)   Date Value   11/25/2019 12     BP Readings from Last 3 Encounters:   06/30/20 133/86   03/04/20 (!) 164/86   01/15/20 130/82          (goal 120/80)    All Future Testing planned in [facility]  Lab Frequency Next Occurrence   CT CHEST HIGH RESOLUTION Once 12/02/2020   PSA, Diagnostic Once 08/31/2020   Lipid, Fasting Once 07/30/2020   CBC Auto Differential Once 07/07/2020   Comprehensive Metabolic Panel Once 59/65/1521               Patient Active Problem List:     Pure hypercholesterolemia     Diverticulitis of colon     Morbid obesity [facility])     Prostate cancer ([facility] [address]     Hx of prostatectomy     Male stress incontinence     Erectile dysfunction after radical prostatectomy     Personal history of prostate cancer     Paroxysmal atrial fibrillation [facility])     Essential hypertension

## 2020-08-03 ENCOUNTER — HOSPITAL ENCOUNTER (OUTPATIENT)
Age: 65
Setting detail: SPECIMEN
Discharge: HOME | End: 2020-08-03
Payer: COMMERCIAL

## 2020-08-03 ENCOUNTER — OFFICE VISIT (OUTPATIENT)
Dept: DERMATOLOGY | Age: 65
End: 2020-08-03
Payer: COMMERCIAL

## 2020-08-03 VITALS
WEIGHT: 299.8 LBS | TEMPERATURE: 96.4 F | HEART RATE: 75 BPM | BODY MASS INDEX: 42.92 KG/M2 | DIASTOLIC BLOOD PRESSURE: 93 MMHG | OXYGEN SATURATION: 97 % | SYSTOLIC BLOOD PRESSURE: 155 MMHG | HEIGHT: 70 IN

## 2020-08-03 PROCEDURE — 17000 DESTRUCT PREMALG LESION: CPT | Performed by: DERMATOLOGY

## 2020-08-03 PROCEDURE — 11102 TANGNTL BX SKIN SINGLE LES: CPT | Performed by: DERMATOLOGY

## 2020-08-03 PROCEDURE — 17003 DESTRUCT PREMALG LES 2-14: CPT | Performed by: DERMATOLOGY

## 2020-08-03 PROCEDURE — 99214 OFFICE O/P EST MOD 30 MIN: CPT | Performed by: DERMATOLOGY

## 2020-08-03 RX ORDER — AMMONIUM LACTATE 12 G/100G
LOTION TOPICAL
Qty: 222 ML | Refills: 11 | Status: SHIPPED | OUTPATIENT
Start: 2020-08-03 | End: 2021-03-10

## 2020-08-03 RX ORDER — LIDOCAINE HYDROCHLORIDE AND EPINEPHRINE 10; 10 MG/ML; UG/ML
0.5 INJECTION, SOLUTION INFILTRATION; PERINEURAL ONCE
Status: COMPLETED | OUTPATIENT
Start: 2020-08-03 | End: 2020-08-03

## 2020-08-03 RX ADMIN — LIDOCAINE HYDROCHLORIDE AND EPINEPHRINE 0.5 ML: 10; 10 INJECTION, SOLUTION INFILTRATION; PERINEURAL at 12:47

## 2020-08-03 NOTE — PATIENT INSTRUCTIONS
- Amlactin lotion daily to arms  -Follow up in the office in 1 year    Cryotherapy    Liquid Nitrogen - \"freeze\" (Cryotherapy)  Your doctor has treated your skin lesions with a very cold substance. The liquid nitrogen is so cold that it may feel like the skin is burning during application. A clear blister or blood blister may form after treatment and may later form a scab. Leave the area alone. Usually this scab will fall of within 1-2 weeks. The area should be kept clean and can be covered with Vaseline or an antibiotic ointment (such as polysporin) and a Band-Aid if needed. If a large blister develops it is ok to use a clean needle to gently pop the blister. Please call our office with any concerns at [phone]. BIOPSY WOUND CARE    A biopsy is where a small piece of skin tissue is removed and examined by a pathologist.  When a biopsy is done, there is a small wound site that requires proper care to prevent infection and scarring. Some biopsies require sutures and their removal.    How to Care for Biopsy Wound    A.  Leave band-aid or dressing on for 24 hours. B. Wash two times a day with soap and water. C.  Let the wound air dry, then apply Vaseline ointment and cover with a Band-Aid       unless otherwise instructed by your provider. D. If there is slight discomfort, you may give acetaminophen or ibuprofen. Bleeding:  Every effort is made to stop bleeding prior to you leaving the dermatology clinic. Unfortunately, people will occasionally start to bleed after leaving the clinic. If you start to bleed hold firm pressure for 15 minutes to the area. If you are on blood thinners I recommend keeping a product called wound seal (can buy at any drug store) at home as this can be a useful adjunct to stop bleeding. If after holding pressure for 15 minutes and/or applying wound seal the bleeding does not stop please call the dermatology clinic.     When To Call the Doctor    Call the Dermatology Clinic or your doctor if any of the following occur:    A. Redness and swelling  B. Tenderness and warm to touch  C.  Drainage from wound  D. Fever    Biopsy Results    Biopsy results are usually available in 1-2 weeks. We provide biopsy results in letters for begin results or we will call for any concerning results. If you have not heard from our staff please call the office within 2 weeks. Please call our office with any concerns at [phone]. Cryotherapy    Liquid Nitrogen - \"freeze\" (Cryotherapy)  Your doctor has treated your skin lesions with a very cold substance. The liquid nitrogen is so cold that it may feel like the skin is burning during application. A clear blister or blood blister may form after treatment and may later form a scab. Leave the area alone. Usually this scab will fall of within 1-2 weeks. The area should be kept clean and can be covered with Vaseline or an antibiotic ointment (such as polysporin) and a Band-Aid if needed. If a large blister develops it is ok to use a clean needle to gently pop the blister. Please call our office with any concerns at [phone]. Actinic Keratosis (AKs)   Actinic Keratosis are skin lesions caused by long-term exposure to the sun. They are scaly, rough, to the touch, irregularly shaped, and skin-colored, reddish brown, or yellowish in color. Recent Studies suggest that some AK lesions actually may be a very early form of a type of skin cancer, squamous cell carcinoma (SCC), that has not spread beyond a small, confined area. It is not yet possible to tell which AK lesions will go on to become skin cancer. Experts from the [facility], the [address] [place] [address], and the [facility] have recommended that all patients with AK lesions be evaluated and undergo some form of treatment. Your dermatologist can determine which type of treatment-either alone or in combination-is right for you.      SUN PROTECTION AND OBSERVATION  Your dermatologist may recommend that you use a sun block, wear a hat and clothing to prevent sun exposure, and have regular skin examinations. Some AKs go away without further treatment, provided that the skin is not subjected to more sun damage. However, regular examinations will help catch the lesions that need to be treated. LESION-TARGETED THERAPIES   Liquid nitrogen (cryotherapy) destroys AKs by freezing them. This results in blistering and shedding of the AKs. Cryotherapy is the most common treatment when a patient has a few, small AK lesions. Topical chemotherapy is a cream that targets sun-damaged and pre-cancerous cells and destroys them. Seborrheic Keratosis  Seborrheic keratoses are common benign growths of unknown cause seen in adults due to a thickening of an area of the top skin layer. Who's At Risk  Although they can occur anytime after puberty, almost everyone over 48 has one or more of these and they increase in number with age. Some families have an inherited tendency to grow multiple lesions. Men and women are equally as likely to develop seborrheic keratoses. Dark-skinned people are less affected than those with light skin; a variant seen in blacks is called dermatosis papulosa nigra. Signs & Symptoms  One or more spots can occur anywhere on the body, except for palms, soles, and mucous membranes (eg, in the mouth or rectum). They do not go away. They do not turn into cancers, but some cancers resemble seborrheic keratosis. They start as light brown to skin-colored, flat areas, which are round to oval and of varying size (usually less than a half inch, but sometimes much larger). As they grow thicker and rise above the skin surface, seborrheic keratoses may become dark brown to almost black with a \"stuck on\" appearance. The surface may feel smooth or rough.   Self-Care Guidelines  No treatment is needed unless there is irritation from clothing with itching or bleeding. There is no way to prevent new spots from forming. Some lotions with alpha hydroxyl acids may make the areas feel smoother with regular use but will not eliminate them. OTC freezing techniques are available but usually not effective. When to [facility]  If a spot on the skin is growing, bleeding, painful, or itchy, or any other concerning changes, then see your doctor.

## 2020-08-03 NOTE — PROGRESS NOTES
Dermatology Patient Note  [facility]  [address]  [address]  Dept: [phone]  Dept Fax: [phone]      VISIT DATE: 8/3/2020   REFERRING PROVIDER: No ref. provider found      Leighton Tran is a 59 y.o. male  who presents today in the office for:    Follow-up (1 year FBSE-spot on the forehead thats rough, lt ear and a rash possibly on the lt arm. Not itchy, just bumpy)      HISTORY OF PRESENT ILLNESS:  HPI AK/NMSC    Adarsh Torres was seen today for follow-up evaluation of new lesions    Duration of Lesion/Lesions:a few months    Course: rough, scaly     Areas of Involvement: forehead, ear    Associated Symptoms:Irritation    Exacerbating Factors:Hx of Prolonged Sun Exposure    Previous Skin Cancers: actinic keratosis      CURRENT MEDICATIONS:   Current Outpatient Medications   Medication Sig Dispense Refill    ammonium lactate (LAC-HYDRIN) 12 % lotion Apply topically daily to forearms 222 mL 11    rosuvastatin (CRESTOR) 40 MG tablet TAKE 1 TABLET BY MOUTH ONE TIME A DAY DUE FOR APPOINTMENT 90 tablet 1    lisinopril (PRINIVIL;ZESTRIL) 5 MG tablet Take 1 tablet by mouth daily 90 tablet 1    propafenone (RYTHMOL) 150 MG tablet Take 1 tablet by mouth every 8 hours 270 tablet 0    sildenafil (VIAGRA) 100 MG tablet Take 1 tablet by mouth as needed for Erectile Dysfunction 10 tablet 11     Current Facility-Administered Medications   Medication Dose Route Frequency Provider Last Rate Last Dose    lidocaine-EPINEPHrine 1 percent-1:226333 injection 0.5 mL  0.5 mL Intradermal Once Pietro Castellanos MD           ALLERGIES:   No Known Allergies    SOCIAL HISTORY:  Social History     Tobacco Use    Smoking status: Former Smoker     Packs/day: 1.00     Years: 40.00     Pack years: 40.00     Types: Cigarettes     Start date:      Last attempt to quit: 2016     Years since quittin.3    Smokeless tobacco: Never Used   Substance Use Topics    Alcohol use:  Yes Alcohol/week: 6.0 standard drinks     Types: 5 Cans of beer, 1 Standard drinks or equivalent per week     Comment: per week       REVIEW OF SYSTEMS:  Review of Systems   Constitutional: Negative. Skin:Denies any new changing, growing or bleeding lesions or rashes except as described in the HPI     PHYSICAL EXAM:   BP (!) 155/93   Pulse 75   Temp 96.4 °F (35.8 °C)   Ht 5' 10\" (1.778 m)   Wt 299 lb 12.8 oz (136 kg)   SpO2 97%   BMI 43.02 kg/m²     General Exam:  General Appearance: No acute distress, Well nourished     Neuro: Alert and oriented to person, place and time  Psych: Normal affect   Lymph Node: Not performed    Cutaneous Exam: Performed as documented in clinic note below. Total skin excluding undergarment areas, which includes the head/face, neck, both arms, chest, back, abdomen, both legs, digits and/or nails, was examined. Pertinent Physical Exam Findings:  Physical Exam  Skin:               Medical Necessity of Exam Performed:   Distribution of patient concerns    Additional Diagnostic Testing performed during exam: Not performed ,  Not performed    ASSESSMENT:   Diagnosis Orders   1. Actinic keratosis  NC DESTRUC PREMALIGNANT, FIRST LESION    NC DESTRUC PREMALIGNANT,2-14 LESIONS   2. Neoplasm of uncertain behavior of skin  Surgical Pathology    NC TANGENTIAL BIOPSY SKIN SINGLE LESION       Plan of Action is as Follows:  Assessment 1. Actinic keratosis  Cryotherapy: After verbal consent was obtained including discussion of the risks (lesion persistence, lesion recurrence and hypo/hyperpigmentation) and benefits (resolution of the lesion) 2 total Actinic Keratosis on the forehead and ear were treated once with liquid nitrogen to achieve a 2-3 mm freeze border.  - NC DESTRUC PREMALIGNANT, FIRST LESION  - NC DESTRUC PREMALIGNANT,2-14 LESIONS    2.  Neoplasm of uncertain behavior of skin  - atypical lesion posterior shoulder   Shave Biopsy: After verbal consent including the risks of bleeding any concerning results. If you have not heard from our staff please call the office within 2 weeks. Please call our office with any concerns at [phone]. Cryotherapy    Liquid Nitrogen - \"freeze\" (Cryotherapy)  Your doctor has treated your skin lesions with a very cold substance. The liquid nitrogen is so cold that it may feel like the skin is burning during application. A clear blister or blood blister may form after treatment and may later form a scab. Leave the area alone. Usually this scab will fall of within 1-2 weeks. The area should be kept clean and can be covered with Vaseline or an antibiotic ointment (such as polysporin) and a Band-Aid if needed. If a large blister develops it is ok to use a clean needle to gently pop the blister. Please call our office with any concerns at [phone]. Actinic Keratosis (AKs)   Actinic Keratosis are skin lesions caused by long-term exposure to the sun. They are scaly, rough, to the touch, irregularly shaped, and skin-colored, reddish brown, or yellowish in color. Recent Studies suggest that some AK lesions actually may be a very early form of a type of skin cancer, squamous cell carcinoma (SCC), that has not spread beyond a small, confined area. It is not yet possible to tell which AK lesions will go on to become skin cancer. Experts from the [employer], the [address] [place] [address], and the [facility] have recommended that all patients with AK lesions be evaluated and undergo some form of treatment. Your dermatologist can determine which type of treatment-either alone or in combination-is right for you. SUN PROTECTION AND OBSERVATION  Your dermatologist may recommend that you use a sun block, wear a hat and clothing to prevent sun exposure, and have regular skin examinations. Some AKs go away without further treatment, provided that the skin is not subjected to more sun damage.  However, regular examinations will help catch the lesions that need to be treated. LESION-TARGETED THERAPIES   Liquid nitrogen (cryotherapy) destroys AKs by freezing them. This results in blistering and shedding of the AKs. Cryotherapy is the most common treatment when a patient has a few, small AK lesions. Topical chemotherapy is a cream that targets sun-damaged and pre-cancerous cells and destroys them. Seborrheic Keratosis  Seborrheic keratoses are common benign growths of unknown cause seen in adults due to a thickening of an area of the top skin layer. Who's At Risk  Although they can occur anytime after puberty, almost everyone over 48 has one or more of these and they increase in number with age. Some families have an inherited tendency to grow multiple lesions. Men and women are equally as likely to develop seborrheic keratoses. Dark-skinned people are less affected than those with light skin; a variant seen in blacks is called dermatosis papulosa nigra. Signs & Symptoms  One or more spots can occur anywhere on the body, except for palms, soles, and mucous membranes (eg, in the mouth or rectum). They do not go away. They do not turn into cancers, but some cancers resemble seborrheic keratosis. They start as light brown to skin-colored, flat areas, which are round to oval and of varying size (usually less than a half inch, but sometimes much larger). As they grow thicker and rise above the skin surface, seborrheic keratoses may become dark brown to almost black with a \"stuck on\" appearance. The surface may feel smooth or rough. Self-Care Guidelines  No treatment is needed unless there is irritation from clothing with itching or bleeding. There is no way to prevent new spots from forming. Some lotions with alpha hydroxyl acids may make the areas feel smoother with regular use but will not eliminate them. OTC freezing techniques are available but usually not effective.   When to [facility]  If a spot on the skin is growing, bleeding, painful, or itchy, or any other concerning changes, then see your doctor. Photo surveillance performed: Yes    Follow-up: 1 year, sooner if needed    This note was created with the assistance of aspeech-recognition program.  Although the intention is to generate a document that actually reflects thecontent of the visit, no guarantees can be provided that every mistake has been identified and corrected by editing.     Electronically signed by Cara Frances MD on 8/3/20 at 9:09 AM EDT

## 2020-08-05 LAB — DERMATOLOGY PATHOLOGY REPORT: NORMAL

## 2020-09-05 ENCOUNTER — HOSPITAL ENCOUNTER (OUTPATIENT)
Age: 65
Discharge: HOME | End: 2020-09-05
Payer: COMMERCIAL

## 2020-09-05 LAB — PROSTATE SPECIFIC ANTIGEN: <0.01 UG/L

## 2020-09-05 PROCEDURE — 36415 COLL VENOUS BLD VENIPUNCTURE: CPT

## 2020-09-05 PROCEDURE — 84153 ASSAY OF PSA TOTAL: CPT

## 2020-10-06 RX ORDER — PROPAFENONE HYDROCHLORIDE 150 MG/1
TABLET, FILM COATED ORAL
Qty: 270 TABLET | Refills: 0 | Status: SHIPPED | OUTPATIENT
Start: 2020-10-06 | End: 2020-12-14

## 2020-10-06 NOTE — TELEPHONE ENCOUNTER
Next Visit Date:  Future Appointments   Date Time Provider Madeleine William   10/9/2020  9:00 AM Tiana Briseno MD Resp Spec [facility]   11/30/2020  8:30 AM STC CT RM 2 FAST Farrukh Stewart CT SCAN [address] [facility] Radiolog   12/30/2020 10:30 AM Constance Mines [facility]   3/10/2021 10:50 AM Tate Michelle MD [facility]   8/4/2021  9:00 AM Pamela Jaime MD [facility] [address] Maintenance   Topic Date Due    Shingles Vaccine (1 of 2) 12/07/2005    Lipid screen  06/20/2020    Potassium monitoring  06/20/2020    Flu vaccine (1) 09/01/2020    Creatinine monitoring  11/25/2020    Low dose CT lung screening  11/25/2020    PSA counseling  09/05/2021    Diabetes screen  02/21/2022    Colon cancer screen colonoscopy  04/26/2024    DTaP/Tdap/Td vaccine (2 - Td) 11/21/2026    Hepatitis C screen  Completed    HIV screen  Completed    Hepatitis A vaccine  Aged Out    Hepatitis B vaccine  Aged Out    Hib vaccine  Aged Out    Meningococcal (ACWY) vaccine  Aged Out    Pneumococcal 0-64 years Vaccine  Aged Out       Hemoglobin A1C (%)   Date Value   02/21/2019 5.2   02/19/2018 5.7   07/17/2017 5.6             ( goal A1C is < 7)   No results found for: LABMICR  LDL Cholesterol (mg/dL)   Date Value   06/20/2019 55   07/17/2017 72     LDL Calculated (mg/dL)   Date Value   08/02/2016 67       (goal LDL is <100)   AST (U/L)   Date Value   02/19/2018 22     ALT (U/L)   Date Value   02/19/2018 29     BUN (mg/dL)   Date Value   11/25/2019 12     BP Readings from Last 3 Encounters:   08/03/20 (!) 155/93   06/30/20 133/86   03/04/20 (!) 164/86          (goal 120/80)    All Future Testing planned in [facility]  Lab Frequency Next Occurrence   CT CHEST HIGH RESOLUTION Once 12/02/2020   Lipid, Fasting Once 07/30/2020   CBC Auto Differential Once 07/07/2020   Comprehensive Metabolic Panel Once 82/75/4346   Surgical Pathology Once 08/03/2020   PSA, Diagnostic Once 03/08/2021               Patient

## 2020-10-09 ENCOUNTER — OFFICE VISIT (OUTPATIENT)
Dept: PULMONOLOGY | Age: 65
End: 2020-10-09
Payer: COMMERCIAL

## 2020-10-09 VITALS
DIASTOLIC BLOOD PRESSURE: 90 MMHG | RESPIRATION RATE: 16 BRPM | HEART RATE: 68 BPM | BODY MASS INDEX: 41.52 KG/M2 | SYSTOLIC BLOOD PRESSURE: 147 MMHG | WEIGHT: 290 LBS | OXYGEN SATURATION: 100 % | HEIGHT: 70 IN

## 2020-10-09 PROCEDURE — 99214 OFFICE O/P EST MOD 30 MIN: CPT | Performed by: INTERNAL MEDICINE

## 2020-10-09 ASSESSMENT — SLEEP AND FATIGUE QUESTIONNAIRES
HOW LIKELY ARE YOU TO NOD OFF OR FALL ASLEEP WHEN YOU ARE A PASSENGER IN A CAR FOR AN HOUR WITHOUT A BREAK: 1
HOW LIKELY ARE YOU TO NOD OFF OR FALL ASLEEP WHILE SITTING AND TALKING TO SOMEONE: 0
HOW LIKELY ARE YOU TO NOD OFF OR FALL ASLEEP WHILE SITTING AND READING: 1
HOW LIKELY ARE YOU TO NOD OFF OR FALL ASLEEP WHILE SITTING QUIETLY AFTER LUNCH WITHOUT ALCOHOL: 0
HOW LIKELY ARE YOU TO NOD OFF OR FALL ASLEEP WHILE SITTING INACTIVE IN A PUBLIC PLACE: 0
HOW LIKELY ARE YOU TO NOD OFF OR FALL ASLEEP WHILE LYING DOWN TO REST IN THE AFTERNOON WHEN CIRCUMSTANCES PERMIT: 3
HOW LIKELY ARE YOU TO NOD OFF OR FALL ASLEEP IN A CAR, WHILE STOPPED FOR A FEW MINUTES IN TRAFFIC: 0
ESS TOTAL SCORE: 6
HOW LIKELY ARE YOU TO NOD OFF OR FALL ASLEEP WHILE WATCHING TV: 1

## 2020-10-09 NOTE — PATIENT INSTRUCTIONS
Order, dictation, sleep study, and demographics sent to [facility] at [phone].  [facility] 10/9/2020

## 2020-10-09 NOTE — PROGRESS NOTES
REASON FOR THE CONSULTATION:  COPD    Chronic respiratory failure  Right apical lung nodule  HISTORY OF PRESENT ILLNESS:  .  Patient known to have chronic obstructive lung disease due to chronic bronchitis and emphysema secondary to chronic smoking. He gave up smoking about 4 years back. He is responding well to the use of bronchodilator therapy. And no evidence of acute exacerbation of COPD no hemoptysis no chest pain no increase in volume of the sputum or change in the color of the sputum. He does have a lung nodule in the right upper lobe which is PET negative and very likely benign. He does not have any pedal edema or thromboembolic process he probably has cor pulmonale. He also has CPAP which he uses every night regularly for about 6 hours this will help to relieve the sleep apnea. He does use supplemental oxygen at night along with a CPAP machine. Does have a history of carcinoma the prostate and have had a prostatectomy. No recurrence of the tumor. His hypertension is under good control with present treatment. No history of PND or chest pain. He continues to be overweight and has not lost any weight. He he already had pneumonia Pneumovax and influenza vaccine. Cancer screening    1. CRITERIA MET    [x]     CT ORDERED  []      2. CRITERIA NOT MET   []      3. REFUSED                    []        REASON CRITERIA NOT MET     1. SMOKING LESS THAN 30 PY  []      2. AGE LESS THAN 55 or GREATER 77 YEARS  []      3. QUIT SMOKING 15 YEARS OR GREATER   []      4. RECENT CT WITH IN 11 MONTHS    []      5. LIFE EXPECTANCY < 5 YEARS   []      6.  SIGNS  AND SYMPTOMS OF LUNG CANCER   []         Immunization   Immunization History   Administered Date(s) Administered    Influenza Vaccine, unspecified formulation 10/26/2016    Influenza, Quadv, IM, PF (6 mo and older Fluzone, Flulaval, Fluarix, and 3 yrs and older Afluria) 11/14/2017, 12/17/2019    Pneumococcal Polysaccharide ([id number]) 11/14/2017    Td, unspecified formulation 02/01/2003    Tdap (Boostrix, Adacel) 11/21/2016        Pneumococcal Vaccine     [x] Up to date    [] Indicated   [] Refused  [] Contraindicated       Influenza Vaccine   [x] Up to date    [] Indicated   [] Refused  [] Contraindicated          PAST MEDICAL HISTORY:       Diagnosis Date    [facility]    Acute and chronic respiratory failure, unspecified whether with hypoxia or hypercapnia (HCC)     Allergic rhinitis 4/6/2015    Back pain     Basal cell carcinoma of skin     Decreased libido     Diverticulitis of colon (without mention of hemorrhage)(562.11) 4/6/2015    ED (erectile dysfunction)     Elevated prostate specific antigen (PSA) 4/6/2015    Former smoker     HTN (hypertension) 4/6/2015    Lung nodule     Obesity 4/6/2015    Obstructive sleep apnea     on cpap    Prostate cancer ([facility] 09/2017    needs removed    Pure hypercholesterolemia 4/6/2015    Tinnitus     Tobacco use disorder 4/6/2015    Wears glasses          Family History:       Problem Relation Age of Onset    Coronary Art Dis Mother     High Blood Pressure Mother     High Cholesterol Father     Seizures Sister        SURGICAL HISTORY:   Past Surgical History:   Procedure Laterality Date    CARDIAC CATHETERIZATION  09/19/2017    LAD: mid 30% stenosis  /  LM NLM / EF 55%  /  LCX: Codominant Vessel, distal 30% stenosis / OM1 AND OM2 10%    COLONOSCOPY      COLONOSCOPY  04/26/2019    COLONOSCOPY  4/26/2019    COLONOSCOPY POLYPECTOMY COLD BIOPSY performed by Rayray Brown MD at [place] [address]  10/17/2017    robotic with schuyler pelvic lymph node dissection    PROSTATECTOMY N/A 10/17/2017    XI ROBOTIC PROSTATECTOMY LAPAROSCOPIC WITH PELVIC LYMPH NODE DISSECTION performed by Tejinder Calderon MD at [facility]  09/09    L Upper Lip Basal CA    TONSILLECTOMY      TYMPANOSTOMY TUBE PLACEMENT      VASECTOMY SOCIAL AND OCCUPATIONAL HEALTH:      There  is no history of TB or TB exposure. There  is no asbestos or silica dust exposure. The patient reports  no coal, foundry, quarry or [employer] exposure. Travel history reveals negative. There  is no history of recreational or IV drug use. There  is no hot tube exposure. Pets  negative    Occupational history not significant    TOBACCO:   reports that he quit smoking about 4 years ago. His smoking use included cigarettes. He started smoking about 44 years ago. He has a 40.00 pack-year smoking history. He has never used smokeless tobacco.  ETOH:   reports current alcohol use of about 6.0 standard drinks of alcohol per week. ALLERGIES:      No Known Allergies      Home Meds:   Prior to Admission medications    Medication Sig Start Date End Date Taking? Authorizing Provider   propafenone (RYTHMOL) 150 MG tablet TAKE ONE TABLET BY MOUTH EVERY 8 HOURS 10/6/20  Yes Bertrand Felton PA-C   ammonium lactate (LAC-HYDRIN) 12 % lotion Apply topically daily to forearms 8/3/20  Yes Genny Cantu MD   rosuvastatin (CRESTOR) 40 MG tablet TAKE 1 TABLET BY MOUTH ONE TIME A DAY DUE FOR APPOINTMENT 7/17/20  Yes Bertrand Felton PA-C   lisinopril (PRINIVIL;ZESTRIL) 5 MG tablet Take 1 tablet by mouth daily 6/30/20  Yes Bertrand Felton PA-C   sildenafil (VIAGRA) 100 MG tablet Take 1 tablet by mouth as needed for Erectile Dysfunction 3/4/20  Yes Víctor Bartlett MD              REVIEW OF SYSTEMS:    CONSTITUTIONAL:  negative for  fevers, chills, sweats, fatigue, malaise, anorexia and weight loss. Overweight, no distress. Are using accessory muscles  EYES:  negative for  double vision, blurred vision, dry eyes, eye discharge and redness. No Giselle's syndrome or jaundice  HEENT:  negative for  hearing loss, tinnitus, ear drainage, earaches and nasal congestion. No nasal polyps.   No sinus tenderness  RESPIRATORY:  See hpi  CARDIOVASCULAR:  negative for chest pain,, palpitations, orthopnea, PND, known to have hypertension. No angina. No coronary artery disease  GASTROINTESTINAL:  negative for nausea, vomiting, change in bowel habits, diarrhea, constipation, abdominal pain, pruritus, abdominal mass and abdominal distention. No nausea, vomiting or diarrhea or abdominal pain  GENITOURINARY:  negative for frequency, dysuria, nocturia, urinary incontinence and hesitancy history of stress incontinence, history of prostatectomy. Her lung Due to prostate cancer. erectile dysfunction after the surgery  INTEGUMENT  negative for rash, skin lesion(s), dryness, skin color change, changes in lesion, pruritus and changes in hair  HEMATOLOGIC/LYMPHATIC:  negative for easy bruising, bleeding, lymphadenopathy, petechiae and swelling/edema. No anemia, no enlarged lymph nodes  ALLERGIC/IMMUNOLOGIC:  negative for recurrent infections, urticaria and drug reactions  ENDOCRINE:  negative for heat intolerance, cold intolerance, tremor, weight changes and change in bowel habits. No diabetes  MUSCULOSKELETAL:  negative for  myalgias, arthralgias, pain, joint swelling, stiff joints and decreased range of motion no acute arthritis  NEUROLOGICAL:  negative for headaches, dizziness, seizures, memory problems, speech problems, visual disturbance and coordination problems, no deficit.   Motor SENSORY  BEHAVIOR/PSYCH:  negative for poor appetite, increased appetite, decreased sleep, increased sleep, decreased energy level, increased energy level and poor concentration no psychotic problem  Skin no rash no dermatitis, no skin  Vitals:  BP (!) 147/90   Pulse 68   Resp 16   Ht 5' 10\" (1.778 m)   Wt 290 lb (131.5 kg)   SpO2 100% Comment: Room air at rest  BMI 41.61 kg/m²     PHYSICAL EXAM: Cooperative no respiratory distress not using accessory muscles  General Appearance:    Alert, cooperative, no distress, appears stated age, obese, not in any respiratory distress, not using accessory muscles    Head:    Normocephalic, without obvious abnormality, atraumatic      Eyes:    PERRL, conjunctiva/corneas clear, EOM's intact, no jaundice. No Giselle's syndrome    Ears:    Normal  external ear canals, both ears no polyps or sinus tenderness    Nose:   Nares normal, septum midline, mucosa normal, no drainage        or sinus tenderness   Throat:   Lips, mucosa, and tongue normal; teeth and gums normal, oropharyngeal orifice not compromise    Neck:   Supple, symmetrical, trachea midline, no adenopathy;     thyroid:  no enlargement/tenderness/nodules; no carotid    bruit ,JVD not elevated    Back:     Symmetric, no curvature, ROM normal, no CVA tenderness   Lungs:     AP diameter is increased. Percussion note is hyperresonant expiration prolonged. No rales, rhonchi are audible. No pleural friction rub is audible    Chest Wall:    No tenderness or deformity      Heart:    Regular rate and rhythm, S1 and S2 normal, no murmur, rub        or gallop no rvh                           Abdomen:                                                 Pulses:                              Skin:                  Lymph nodes:                    Neurologic:                  Soft, non-tender, bowel sounds active all four quadrants,     no masses, no organomegaly         2+ and symmetric all extremities     Skin color, texture, turgor normal, no rashes or lesions       Cervical, supraclavicular not enlarged or matted or tender      CNII-XII intact, normal strength 5/5 . Sensation grossly normal  and reflexes normal 2+  throughout     Clubbing No  Lower ext edema . Negative  Upper ext edema . Absent         Musculoskeletal no synovitis.   No joint swelling or tenderness          Thyroid:   Lab Results   Component Value Date    TSH 2.24 07/17/2017     Urinalysis: No results for input(s): BACTERIA, BLOODU, CLARITYU, COLORU, PHUR, PROTEINU, RBCUA, SPECGRAV, BILIRUBINUR, NITRU, WBCUA, LEUKOCYTESUR, GLUCOSEU in the last 72 hours.            CXR  No recent chest x-ray  CT Scans  . No recent PET scan. Echo    No recent echo            IMPRESSION:    Visit Diagnoses       Codes    ADRIAN on CPAP    -  Primary G47.33, Z99.89    Lung nodule     R91.1    Chronic bronchitis, unspecified [facility] bronch[facility] type [facility]     J42        :                PLAN:      Patient has a right a  REASON FOR THE CONSULTATION:  COPD    Chronic respiratory failure  Right apical lung nodule  HISTORY OF PRESENT ILLNESS:  .  This patient is known to have a chronic obstructive lung disease due to chronic bronchitis and emphysema secondary to prior smoking. Fortunately has given up smoking for the last 4 years. His pulmonary status is stable denies any excessive dyspnea cough or sputum production no hemoptysis no chest pain    There is no increase in the volume or change in the color of the sputum. Patient also been noted to have a lung nodule in the right upper lobe region which is PET negative. It is not changed since the last visit. Most likely is benign. He has not noted any edema. No thromboembolic process. He is known to have sleep apnea syndrome which is responding well to the use of CPAP that he uses regularly every night for about 6 hours. He does use supplemental oxygen at night along with a CPAP machine as well. He have had a history of carcinoma the prostate and had prostatectomy. There is been no recurrence of the tumor. Patient has a history of systemic hypertension that is under good control does not have any diabetes no no history of myocardial infarction. He is overweight and has not been able to lose much weight. Cancer screening    4. CRITERIA MET    [x]     CT ORDERED  []      5. CRITERIA NOT MET   []      6. REFUSED                    []        REASON CRITERIA NOT MET     7. SMOKING LESS THAN 30 PY  []      8. AGE LESS THAN 55 or GREATER 77 YEARS  []      9.  QUIT SMOKING 15 YEARS OR GREATER   []      10. RECENT CT WITH IN 11 MONTHS    []      11. LIFE EXPECTANCY < 5 YEARS   []      12.  SIGNS  AND SYMPTOMS OF LUNG CANCER   []         Immunization   Immunization History   Administered Date(s) Administered    Influenza Vaccine, unspecified formulation 10/26/2016    Influenza, Quadv, IM, PF (6 mo and older Fluzone, Flulaval, Fluarix, and 3 yrs and older Afluria) 11/14/2017, 12/17/2019    Pneumococcal Polysaccharide ([id number]) 11/14/2017    Td, unspecified formulation 02/01/2003    Tdap (Boostrix, Adacel) 11/21/2016        Pneumococcal Vaccine     [x] Up to date    [] Indicated   [] Refused  [] Contraindicated       Influenza Vaccine   [x] Up to date    [] Indicated   [] Refused  [] Contraindicated          PAST MEDICAL HISTORY:       Diagnosis Date    [facility]    Acute and chronic respiratory failure, unspecified whether with hypoxia or hypercapnia (HCC)     Allergic rhinitis 4/6/2015    Back pain     Basal cell carcinoma of skin     Decreased libido     Diverticulitis of colon (without mention of hemorrhage)(562.11) 4/6/2015    ED (erectile dysfunction)     Elevated prostate specific antigen (PSA) 4/6/2015    Former smoker     HTN (hypertension) 4/6/2015    Lung nodule     Obesity 4/6/2015    Obstructive sleep apnea     on cpap    [facility]    needs removed    Pure hypercholesterolemia 4/6/2015    Tinnitus     Tobacco use disorder 4/6/2015    Wears glasses          Family History:       Problem Relation Age of Onset    Coronary Art Dis Mother     High Blood Pressure Mother     High Cholesterol Father     Seizures Sister        SURGICAL HISTORY:   Past Surgical History:   Procedure Laterality Date    CARDIAC CATHETERIZATION  09/19/2017    LAD: mid 30% stenosis  /  LM NLM / EF 55%  /  LCX: Codominant Vessel, distal 30% stenosis / OM1 AND OM2 10%    COLONOSCOPY      COLONOSCOPY  04/26/2019    COLONOSCOPY  4/26/2019    COLONOSCOPY POLYPECTOMY COLD BIOPSY performed by Z[facility]  10/17/2017    robotic with schuyler pelvic lymph node dissection    PROSTATECTOMY N/A 10/17/2017    XI ROBOTIC PROSTATECTOMY LAPAROSCOPIC WITH PELVIC LYMPH NODE DISSECTION performed by Gil [facility]onso [facility] at [facility]  09/09    L Upper Lip Basal CA    TONSILLECTOMY      TYMPANOSTOMY TUBE PLACEMENT      VASECTOMY             SOCIAL AND OCCUPATIONAL HEALTH:      There  is no history of TB or TB exposure. There  is no asbestos or silica dust exposure. The patient reports  no coal, foundry, quarry or [employer] exposure. Travel history reveals negative. There  is no history of recreational or IV drug use. There  is no hot tube exposure. Pets  negative    Occupational history not significant    TOBACCO:   reports that he quit smoking about 4 years ago. His smoking use included cigarettes. He started smoking about 44 years ago. He has a 40.00 pack-year smoking history. He has never used smokeless tobacco.  ETOH:   reports current alcohol use of about 6.0 standard drinks of alcohol per week. ALLERGIES:      No Known Allergies      Home Meds:   Prior to Admission medications    Medication Sig Start Date End Date Taking?  Authorizing Provider   propafenone (RYTHMOL) 150 MG tablet TAKE ONE TABLET BY MOUTH EVERY 8 HOURS 10/6/20  Yes Farzana Rainey PA-C   ammonium lactate (LAC-HYDRIN) 12 % lotion Apply topically daily to forearms 8/3/20  Yes Cyrus Wetzel MD   rosuvastatin (CRESTOR) 40 MG tablet TAKE 1 TABLET BY MOUTH ONE TIME A DAY DUE FOR APPOINTMENT 7/17/20  Yes Farzana Rainey PA-C   lisinopril (PRINIVIL;ZESTRIL) 5 MG tablet Take 1 tablet by mouth daily 6/30/20  Yes Farzana Rainey PA-C   sildenafil (VIAGRA) 100 MG tablet Take 1 tablet by mouth as needed for Erectile Dysfunction 3/4/20  Yes Gil Alonso MD              REVIEW OF SYSTEMS:    CONSTITUTIONAL:  negative for  fevers, chills, problem  Skin no rash no dermatitis, no skin  Vitals:  BP (!) 147/90   Pulse 68   Resp 16   Ht 5' 10\" (1.778 m)   Wt 290 lb (131.5 kg)   SpO2 100% Comment: Room air at rest  BMI 41.61 kg/m²     PHYSICAL EXAM:  General Appearance:    Alert, cooperative, no distress, appears stated age, obese, not in any respiratory distress, not using accessory muscles    Head:    Normocephalic, without obvious abnormality, atraumatic      Eyes:    PERRL, conjunctiva/corneas clear, EOM's intact, no jaundice. No Giselle's syndrome    Ears:    Normal  external ear canals, both ears no polyps or sinus tenderness    Nose:   Nares normal, septum midline, mucosa normal, no drainage        or sinus tenderness   Throat:   Lips, mucosa, and tongue normal; teeth and gums normal, oropharyngeal orifice not compromise    Neck:   Supple, symmetrical, trachea midline, no adenopathy;     thyroid:  no enlargement/tenderness/nodules; no carotid    bruit ,JVD not elevated    Back:     Symmetric, no curvature, ROM normal, no CVA tenderness   Lungs:     AP diameter is increased. Percussion note is hyperresonant expiration prolonged. No rales, rhonchi are audible. No pleural friction rub is audible    Chest Wall:    No tenderness or deformity      Heart:    Regular rate and rhythm, S1 and S2 normal, no murmur, rub        or gallop no rvh                           Abdomen:                                                 Pulses:                              Skin:                  Lymph nodes:                    Neurologic:                  Soft, non-tender, bowel sounds active all four quadrants,     no masses, no organomegaly         2+ and symmetric all extremities     Skin color, texture, turgor normal, no rashes or lesions       Cervical, supraclavicular not enlarged or matted or tender      CNII-XII intact, normal strength 5/5 . Sensation grossly normal  and reflexes normal 2+  throughout     Clubbing No  Lower ext edema .   Negative  Upper ext edema . Absent         Musculoskeletal no synovitis. No joint swelling or tenderness          Thyroid:   Lab Results   Component Value Date    TSH 2.24 07/17/2017     Urinalysis: No results for input(s): BACTERIA, BLOODU, CLARITYU, COLORU, PHUR, PROTEINU, RBCUA, SPECGRAV, BILIRUBINUR, NITRU, WBCUA, LEUKOCYTESUR, GLUCOSEU in the last 72 hours. CXR  No recent chest x-ray  CT Scans  PET CT was negative for most of them for the lung nodule. Echo    No recent echo            IMPRESSION:    Visit Diagnoses       Codes    ADRIAN on CPAP    -  Primary G47.33, Z99.89    Lung nodule     R91.1    Chronic bronchitis, unspecified chronic bronchitis type ([facility]     J42        :                PLAN:      2 lung nodule is stable is very likely nonmalignant. We will continue to follow we will get a repeat CT in a year. His COPD is also under good control denies any evidence of an acute exacerbation no yellow sputum no increase in the volume of sputum no chest pain no hemoptysis    Sleep apnea is under good control. He will continue use of CPAP as before    He using his bronchodilators regularly. He already had his flu vaccine. No evidence of any pedal edema although very likely has cor pulmonale. He has respiratory failure and is on home oxygen that he uses every night. Patient questions were answered. He was accompanied by his wife.

## 2020-11-30 ENCOUNTER — HOSPITAL ENCOUNTER (OUTPATIENT)
Dept: CT IMAGING | Age: 65
Discharge: HOME | End: 2020-12-02
Payer: COMMERCIAL

## 2020-11-30 PROCEDURE — 71250 CT THORAX DX C-: CPT

## 2020-12-14 RX ORDER — PROPAFENONE HYDROCHLORIDE 150 MG/1
TABLET, FILM COATED ORAL
Qty: 270 TABLET | Refills: 0 | Status: SHIPPED | OUTPATIENT
Start: 2020-12-14 | End: 2021-02-03 | Stop reason: SDUPTHER

## 2020-12-14 RX ORDER — ROSUVASTATIN CALCIUM 40 MG/1
TABLET, COATED ORAL
Qty: 90 TABLET | Refills: 1 | Status: SHIPPED | OUTPATIENT
Start: 2020-12-14 | End: 2021-02-03 | Stop reason: SDUPTHER

## 2020-12-14 NOTE — TELEPHONE ENCOUNTER
Electronic medication refill request. Pharmacy on file. Please advise.       Next Visit Date:  Future Appointments   Date Time Provider Madeleine Nataliia   12[facility]30[facility]2020 10[facility]   3/10/2021 10:50 AM Rico Maddox MD [facility]   4/9/2021  9:30 AM Tequila Kim MD Resp Spec [facility]   8/4/2021  9:00 AM Carlita Driver MD [facility] [address] Maintenance   Topic Date Due    Shingles Vaccine (1 of 2) 12/07/2005    Lipid screen  06/20/2020    Potassium monitoring  06/20/2020    Creatinine monitoring  11/25/2020    PSA counseling  09/05/2021    Low dose CT lung screening  11/30/2021    Diabetes screen  02/21/2022    Pneumococcal 65+ years Vaccine (1 of 1 - PPSV23) 11/14/2022    Colon cancer screen colonoscopy  04/26/2024    DTaP/Tdap/Td vaccine (2 - Td) 11/21/2026    Flu vaccine  Completed    AAA screen  Completed    Hepatitis C screen  Completed    HIV screen  Completed    Hepatitis A vaccine  Aged Out    Hepatitis B vaccine  Aged Out    Hib vaccine  Aged Out    Meningococcal (ACWY) vaccine  Aged Out       Hemoglobin A1C (%)   Date Value   02/21/2019 5.2   02/19/2018 5.7   07/17/2017 5.6             ( goal A1C is < 7)   No results found for: LABMICR  LDL Cholesterol (mg/dL)   Date Value   06/20/2019 55   07/17/2017 72     LDL Calculated (mg/dL)   Date Value   08/02/2016 67       (goal LDL is <100)   AST (U/L)   Date Value   02/19/2018 22     ALT (U/L)   Date Value   02/19/2018 29     BUN (mg/dL)   Date Value   11/25/2019 12     BP Readings from Last 3 Encounters:   10/09/20 (!) 147/90   08/03/20 (!) 155/93   06/30/20 133/86          (goal 120/80)    All Future Testing planned in [facility]  Lab Frequency Next Occurrence   Lipid, Fasting Once 07/30/2020   CBC Auto Differential Once 07/07/2020   Comprehensive Metabolic Panel Once 06/77/0780   Surgical Pathology Once 08/03/2020   PSA, Diagnostic Once 03/08/2021               Patient Active Problem List:     Pure hypercholesterolemia     Diverticulitis of colon     Morbid obesity ([address]     Prostate cancer ([facility]     Hx of prostatectomy     Male stress incontinence     Erectile dysfunction after radical prostatectomy     Personal history of prostate cancer     Paroxysmal atrial fibrillation [facility])     Essential hypertension

## 2020-12-30 ENCOUNTER — OFFICE VISIT (OUTPATIENT)
Dept: FAMILY MEDICINE CLINIC | Age: 65
End: 2020-12-30
Payer: COMMERCIAL

## 2020-12-30 VITALS
TEMPERATURE: 98.6 F | SYSTOLIC BLOOD PRESSURE: 134 MMHG | DIASTOLIC BLOOD PRESSURE: 88 MMHG | WEIGHT: 299.2 LBS | RESPIRATION RATE: 14 BRPM | HEART RATE: 73 BPM | OXYGEN SATURATION: 99 % | HEIGHT: 70 IN | BODY MASS INDEX: 42.83 KG/M2

## 2020-12-30 PROCEDURE — G8427 DOCREV CUR MEDS BY ELIG CLIN: HCPCS | Performed by: PHYSICIAN ASSISTANT

## 2020-12-30 PROCEDURE — 3017F COLORECTAL CA SCREEN DOC REV: CPT | Performed by: PHYSICIAN ASSISTANT

## 2020-12-30 PROCEDURE — 1036F TOBACCO NON-USER: CPT | Performed by: PHYSICIAN ASSISTANT

## 2020-12-30 PROCEDURE — 99214 OFFICE O/P EST MOD 30 MIN: CPT | Performed by: PHYSICIAN ASSISTANT

## 2020-12-30 PROCEDURE — G8417 CALC BMI ABV UP PARAM F/U: HCPCS | Performed by: PHYSICIAN ASSISTANT

## 2020-12-30 PROCEDURE — 4040F PNEUMOC VAC/ADMIN/RCVD: CPT | Performed by: PHYSICIAN ASSISTANT

## 2020-12-30 PROCEDURE — G8484 FLU IMMUNIZE NO ADMIN: HCPCS | Performed by: PHYSICIAN ASSISTANT

## 2020-12-30 PROCEDURE — 1123F ACP DISCUSS/DSCN MKR DOCD: CPT | Performed by: PHYSICIAN ASSISTANT

## 2020-12-30 PROCEDURE — 3288F FALL RISK ASSESSMENT DOCD: CPT | Performed by: PHYSICIAN ASSISTANT

## 2020-12-30 RX ORDER — ZOSTER VACCINE RECOMBINANT, ADJUVANTED 50 MCG/0.5
0.5 KIT INTRAMUSCULAR ONCE
Qty: 1 EACH | Refills: 0 | Status: SHIPPED | OUTPATIENT
Start: 2020-12-30 | End: 2020-12-30

## 2020-12-30 RX ORDER — BUPROPION HYDROCHLORIDE 150 MG/1
150 TABLET ORAL EVERY MORNING
Qty: 30 TABLET | Refills: 3 | Status: SHIPPED | OUTPATIENT
Start: 2020-12-30 | End: 2021-01-29 | Stop reason: SDUPTHER

## 2020-12-30 ASSESSMENT — ENCOUNTER SYMPTOMS
VOMITING: 0
RECTAL PAIN: 0
DIARRHEA: 0
NAUSEA: 0
CHEST TIGHTNESS: 0
BACK PAIN: 0
BLOOD IN STOOL: 0
CONSTIPATION: 0
SHORTNESS OF BREATH: 0
ABDOMINAL DISTENTION: 0
ABDOMINAL PAIN: 0
ANAL BLEEDING: 0
COUGH: 0
WHEEZING: 0

## 2020-12-30 NOTE — PROGRESS NOTES
[address] PRIMARY CARE  [address] [place] [address] [facility]  Dept: [phone]  Dept Fax: [phone]    Office Progress Note  Date of patient's visit: 12/30/2020  Patient's Name:  Tom Mcwilliams YOB: 1955            CHON VALERIO  ================================================================    REASON FOR VISIT/CHIEF COMPLAINT:  Hypertension (6 month follow up )    HISTORY OF PRESENTING ILLNESS:  History was obtained from: patient. Tom Mcwilliams is a 72 y.o. is here for follow up for hypertension and hyperlipidemia. Blood pressure well controlled with medications. He denies any chest pain, shortness of breath, headaches or dizziness. Patient has had some mild depression symptoms which she states have worsened recently due to stress of his elderly demented father living with him. He states that he is struggling to do daily activities due to his mood and feeling unmotivated. He denies any suicidal or homicidal ideations. Patient has never been on medications previously.       Patient Active Problem List   Diagnosis    Pure hypercholesterolemia    Diverticulitis of colon    Morbid obesity ([address]    Prostate cancer ([address]    Hx of prostatectomy    Male stress incontinence    Erectile dysfunction after radical prostatectomy    Personal history of prostate cancer    Paroxysmal atrial fibrillation ([address]    Essential hypertension       Health Maintenance Due   Topic Date Due    Shingles Vaccine (1 of 2) 12/07/2005    Lipid screen  06/20/2020    Potassium monitoring  06/20/2020    Creatinine monitoring  11/25/2020       No Known Allergies      Current Outpatient Medications   Medication Sig Dispense Refill    zoster recombinant adjuvanted vaccine (SHINGRIX) 50 MCG/0.5ML SUSR injection Inject 0.5 mLs into the muscle once for 1 dose 1 each 0    buPROPion (WELLBUTRIN XL) 150 MG extended release tablet Take 1 tablet by mouth every morning 30 tablet 3  rosuvastatin (CRESTOR) 40 MG tablet TAKE 1 TABLET BY MOUTH ONE TIME A DAY ( DUE FOR A APPOINTMENT ) 90 tablet 1    propafenone (RYTHMOL) 150 MG tablet TAKE ONE TABLET BY MOUTH EVERY 8 HOURS 270 tablet 0    lisinopril (PRINIVIL;ZESTRIL) 5 MG tablet Take 1 tablet by mouth daily 90 tablet 1    ammonium lactate (LAC-HYDRIN) 12 % lotion Apply topically daily to forearms (Patient not taking: Reported on 2020) 222 mL 11    sildenafil (VIAGRA) 100 MG tablet Take 1 tablet by mouth as needed for Erectile Dysfunction (Patient not taking: Reported on 2020) 10 tablet 11     No current facility-administered medications for this visit. Social History     Tobacco Use    Smoking status: Former Smoker     Packs/day: 1.00     Years: 40.00     Pack years: 40.00     Types: Cigarettes     Start date: 12     Quit date: 2016     Years since quittin.7    Smokeless tobacco: Never Used   Substance Use Topics    Alcohol use: Yes     Alcohol/week: 6.0 standard drinks     Types: 5 Cans of beer, 1 Standard drinks or equivalent per week     Comment: per week    Drug use: No       Family History   Problem Relation Age of Onset    Coronary Art Dis Mother     High Blood Pressure Mother     High Cholesterol Father     Seizures Sister         Review of Systems   Constitutional: Negative for appetite change, chills, diaphoresis, fatigue, fever and unexpected weight change. Respiratory: Negative for cough, chest tightness, shortness of breath and wheezing. Cardiovascular: Negative for chest pain, palpitations and leg swelling. Gastrointestinal: Negative for abdominal distention, abdominal pain, anal bleeding, blood in stool, constipation, diarrhea, nausea, rectal pain and vomiting. Genitourinary: Negative for dysuria, frequency and urgency. Musculoskeletal: Negative for back pain and myalgias. Neurological: Negative for dizziness, syncope, weakness, light-headedness and headaches. Psychiatric/Behavioral: Positive for dysphoric mood and sleep disturbance. Negative for agitation, behavioral problems, confusion, decreased concentration, hallucinations, self-injury and suicidal ideas. The patient is not nervous/anxious and is not hyperactive. Physical Exam  Vitals signs and nursing note reviewed. Constitutional:       General: He is not in acute distress. Appearance: Normal appearance. He is well-developed. He is not ill-appearing, toxic-appearing or diaphoretic. HENT:      Head: Normocephalic and atraumatic. Eyes:      General: No scleral icterus. Right eye: No discharge. Left eye: No discharge. Conjunctiva/sclera: Conjunctivae normal.   Neck:      Musculoskeletal: Normal range of motion and neck supple. Thyroid: No thyroid mass, thyromegaly or thyroid tenderness. Cardiovascular:      Rate and Rhythm: Normal rate and regular rhythm. Heart sounds: Normal heart sounds. No murmur. No friction rub. No gallop. Pulmonary:      Effort: Pulmonary effort is normal. No respiratory distress. Breath sounds: Normal breath sounds. No stridor. No wheezing, rhonchi or rales. Chest:      Chest wall: No tenderness. Abdominal:      General: Bowel sounds are normal.      Palpations: Abdomen is soft. Tenderness: There is no abdominal tenderness. Musculoskeletal: Normal range of motion. General: No tenderness. Skin:     General: Skin is warm and dry. Neurological:      General: No focal deficit present. Mental Status: He is alert and oriented to person, place, and time. Psychiatric:         Attention and Perception: Attention and perception normal.         Mood and Affect: Mood and affect normal.         Speech: Speech normal.         Behavior: Behavior normal. Behavior is cooperative. Thought Content:  Thought content normal.         Cognition and Memory: Cognition and memory normal.         Judgment: Judgment normal. Vitals:    12/30/20 1038   BP: 134/88   Pulse: 73   Resp: 14   Temp: 98.6 °F (37 °C)   TempSrc: Temporal   SpO2: 99%   Weight: 299 lb 3.2 oz (135.7 kg)   Height: 5' 10\" (1.778 m)     BP Readings from Last 3 Encounters:   12/30/20 134/88   10/09/20 (!) 147/90   08/03/20 (!) 155/93              DIAGNOSTIC FINDINGS:  CBC:  Lab Results   Component Value Date    WBC 7.0 02/19/2018    HGB 15.2 02/19/2018     02/19/2018       BMP:    Lab Results   Component Value Date     06/20/2019    K 4.8 06/20/2019     06/20/2019    CO2 23 06/20/2019    BUN 12 11/25/2019    CREATININE 0.80 11/25/2019    GLUCOSE 119 06/20/2019         FASTING LIPID PANEL:  Lab Results   Component Value Date    CHOL 110 06/20/2019    HDL 25 (L) 06/20/2019    TRIG 151 (H) 06/20/2019       No results found for this visit on 12/30/20. ASSESSMENT AND PLAN:   Diagnosis Orders   1. Current mild episode of major depressive disorder without prior episode (HCC)  buPROPion (WELLBUTRIN XL) 150 MG extended release tablet   2. Need for shingles vaccine  zoster recombinant adjuvanted vaccine [facility]) 50 MCG/0.5ML SUSR injection   3. Pure hypercholesterolemia  Continue meds   4. Essential hypertension   well-controlled. Continue medications as previously prescribed       FOLLOW UP AND INSTRUCTIONS:  · Return in about 4 weeks (around 1/27/2021), or if symptoms worsen or fail to improve. · Discussed use, benefit, and side effects of prescribed medications. Barriers to medication compliance addressed. All patient questions answered. Pt voiced understanding. · Patient instructed to return to the office if symptoms do not resolve or go directly to the ER if the symptoms worsen - patient voiced understanding.     · Patient given educational materials - see patient instructions    [address] PA  [facility]  12/30/2020, 12:19 PM    This note is created with the assistance of a speech-recognition program. While intending to generate a document that actually reflects the content of the visit, the document can still have some mistakes which may not have been identified and corrected by editing.

## 2021-01-29 DIAGNOSIS — F32.0 CURRENT MILD EPISODE OF MAJOR DEPRESSIVE DISORDER WITHOUT PRIOR EPISODE (HCC): ICD-10-CM

## 2021-01-29 RX ORDER — BUPROPION HYDROCHLORIDE 150 MG/1
150 TABLET ORAL EVERY MORNING
Qty: 30 TABLET | Refills: 3 | Status: SHIPPED | OUTPATIENT
Start: 2021-01-29 | End: 2021-06-11 | Stop reason: SINTOL

## 2021-01-29 NOTE — TELEPHONE ENCOUNTER
Script was called into [email or web address], PT said he called and canceled it and needs it called into [employer]. Please contact PT if you have any additional questions. Thank you.

## 2021-02-03 DIAGNOSIS — I48.0 PAROXYSMAL ATRIAL FIBRILLATION (HCC): ICD-10-CM

## 2021-02-03 DIAGNOSIS — I10 ESSENTIAL HYPERTENSION: ICD-10-CM

## 2021-02-03 DIAGNOSIS — E78.00 PURE HYPERCHOLESTEROLEMIA: ICD-10-CM

## 2021-02-03 RX ORDER — ROSUVASTATIN CALCIUM 40 MG/1
40 TABLET, COATED ORAL DAILY
Qty: 90 TABLET | Refills: 1 | Status: SHIPPED | OUTPATIENT
Start: 2021-02-03 | End: 2021-06-11 | Stop reason: SDUPTHER

## 2021-02-03 RX ORDER — LISINOPRIL 5 MG/1
5 TABLET ORAL DAILY
Qty: 90 TABLET | Refills: 1 | Status: SHIPPED | OUTPATIENT
Start: 2021-02-03 | End: 2021-06-11 | Stop reason: SDUPTHER

## 2021-02-03 RX ORDER — PROPAFENONE HYDROCHLORIDE 150 MG/1
150 TABLET, FILM COATED ORAL EVERY 8 HOURS
Qty: 270 TABLET | Refills: 0 | Status: SHIPPED | OUTPATIENT
Start: 2021-02-03 | End: 2021-06-11 | Stop reason: SDUPTHER

## 2021-02-03 NOTE — TELEPHONE ENCOUNTER
Electronic medication refill request. Pharmacy on file. Please advise.         Next Visit Date:  Future Appointments   Date Time Provider Madeleine Damoni   3/10/2021 10:50 AM Beau Casillas MD [facility]   4/9/2021  9:30 AM Gm Biggs MD Resp Spec [facility]   6/9/2021  1:30 PM Yoshi Cansa MD fp sc [id number]   8/4/2021  9:00 AM Janie Garcia MD [facility] [address] Maintenance   Topic Date Due    Shingles Vaccine (1 of 2) 12/07/2005    Lipid screen  06/20/2020    Potassium monitoring  06/20/2020    Creatinine monitoring  11/25/2020    Annual Wellness Visit (AWV)  12/30/2020    PSA counseling  09/05/2021    Low dose CT lung screening  11/30/2021    Diabetes screen  02/21/2022    Pneumococcal 65+ years Vaccine (1 of 1 - PPSV23) 11/14/2022    Colon cancer screen colonoscopy  04/26/2024    DTaP/Tdap/Td vaccine (2 - Td) 11/21/2026    Flu vaccine  Completed    AAA screen  Completed    Hepatitis C screen  Completed    HIV screen  Completed    Hepatitis A vaccine  Aged Out    Hepatitis B vaccine  Aged Out    Hib vaccine  Aged Out    Meningococcal (ACWY) vaccine  Aged Out       Hemoglobin A1C (%)   Date Value   02/21/2019 5.2   02/19/2018 5.7   07/17/2017 5.6             ( goal A1C is < 7)   No results found for: LABMICR  LDL Cholesterol (mg/dL)   Date Value   06/20/2019 55   07/17/2017 72     LDL Calculated (mg/dL)   Date Value   08/02/2016 67       (goal LDL is <100)   AST (U/L)   Date Value   02/19/2018 22     ALT (U/L)   Date Value   02/19/2018 29     BUN (mg/dL)   Date Value   11/25/2019 12     BP Readings from Last 3 Encounters:   12/30/20 134/88   10/09/20 (!) 147/90   08/03/20 (!) 155/93          (goal 120/80)    All Future Testing planned in [facility]  Lab Frequency Next Occurrence   Lipid, Fasting Once 07/30/2020   PSA, Diagnostic Once 03/08/2021               Patient Active Problem List:     Pure hypercholesterolemia     Diverticulitis of colon     Morbid obesity ([facility]     Prostate cancer ([facility]     Hx of prostatectomy     Male stress incontinence     Erectile dysfunction after radical prostatectomy     Personal history of prostate cancer     Paroxysmal atrial fibrillation [facility])     Essential hypertension

## 2021-02-23 ENCOUNTER — TELEPHONE (OUTPATIENT)
Dept: PULMONOLOGY | Age: 66
End: 2021-02-23

## 2021-03-04 ENCOUNTER — HOSPITAL ENCOUNTER (OUTPATIENT)
Age: 66
Setting detail: SPECIMEN
Discharge: HOME | End: 2021-03-04
Payer: MEDICARE

## 2021-03-04 DIAGNOSIS — Z85.46 PERSONAL HISTORY OF PROSTATE CANCER: ICD-10-CM

## 2021-03-04 LAB — PROSTATE SPECIFIC ANTIGEN: <0.01 UG/L

## 2021-04-09 ENCOUNTER — OFFICE VISIT (OUTPATIENT)
Dept: PULMONOLOGY | Age: 66
End: 2021-04-09
Payer: MEDICARE

## 2021-04-09 VITALS
TEMPERATURE: 96 F | OXYGEN SATURATION: 99 % | RESPIRATION RATE: 16 BRPM | SYSTOLIC BLOOD PRESSURE: 130 MMHG | HEART RATE: 61 BPM | BODY MASS INDEX: 42.92 KG/M2 | DIASTOLIC BLOOD PRESSURE: 79 MMHG | WEIGHT: 299.8 LBS | HEIGHT: 70 IN

## 2021-04-09 DIAGNOSIS — Z90.79 HX OF PROSTATECTOMY: ICD-10-CM

## 2021-04-09 DIAGNOSIS — J44.0 CHRONIC OBSTRUCTIVE PULMONARY DISEASE WITH ACUTE LOWER RESPIRATORY INFECTION (HCC): Primary | ICD-10-CM

## 2021-04-09 DIAGNOSIS — G47.33 OSA (OBSTRUCTIVE SLEEP APNEA): ICD-10-CM

## 2021-04-09 DIAGNOSIS — E66.01 MORBID OBESITY (HCC): ICD-10-CM

## 2021-04-09 DIAGNOSIS — C61 PROSTATE CANCER (HCC): ICD-10-CM

## 2021-04-09 DIAGNOSIS — R91.1 LUNG NODULE: ICD-10-CM

## 2021-04-09 PROCEDURE — G8427 DOCREV CUR MEDS BY ELIG CLIN: HCPCS | Performed by: INTERNAL MEDICINE

## 2021-04-09 PROCEDURE — 1123F ACP DISCUSS/DSCN MKR DOCD: CPT | Performed by: INTERNAL MEDICINE

## 2021-04-09 PROCEDURE — 99214 OFFICE O/P EST MOD 30 MIN: CPT | Performed by: INTERNAL MEDICINE

## 2021-04-09 PROCEDURE — 3017F COLORECTAL CA SCREEN DOC REV: CPT | Performed by: INTERNAL MEDICINE

## 2021-04-09 PROCEDURE — 4040F PNEUMOC VAC/ADMIN/RCVD: CPT | Performed by: INTERNAL MEDICINE

## 2021-04-09 NOTE — PROGRESS NOTES
PLACEMENT      VASECTOMY             SOCIAL AND OCCUPATIONAL HEALTH:      There  is no history of TB or TB exposure. There  is no asbestos or silica dust exposure. The patient reports  no coal, foundry, quarry or [employer] exposure. Travel history reveals negative. There  is no history of recreational or IV drug use. There  is no hot tube exposure. Pets  negative    Occupational history not significant    TOBACCO:   reports that he quit smoking about 5 years ago. His smoking use included cigarettes. He started smoking about 45 years ago. He has a 40.00 pack-year smoking history. He has never used smokeless tobacco.  ETOH:   reports current alcohol use of about 6.0 standard drinks of alcohol per week. ALLERGIES:      No Known Allergies      Home Meds:   Prior to Admission medications    Medication Sig Start Date End Date Taking? Authorizing Provider   lisinopril (PRINIVIL;ZESTRIL) 5 MG tablet Take 1 tablet by mouth daily 2/3/21  Yes Jenny Aguilera MD   rosuvastatin (CRESTOR) 40 MG tablet Take 1 tablet by mouth daily 2/3/21  Yes Jenny Aguilera MD   propafenone (RYTHMOL) 150 MG tablet Take 1 tablet by mouth every 8 hours 2/3/21  Yes Jenny Aguilera MD   buPROPion (WELLBUTRIN XL) 150 MG extended release tablet Take 1 tablet by mouth every morning  Patient not taking: Reported on 4/9/2021 1/29/21   Jenny Aguilera MD              REVIEW OF SYSTEMS:    CONSTITUTIONAL:  negative for  fevers, chills, sweats, fatigue, malaise, anorexia and weight loss. Overweight, no distress. Are using accessory muscles  EYES:  negative for  double vision, blurred vision, dry eyes, eye discharge and redness. No Giselle's syndrome or jaundice  HEENT:  negative for  hearing loss, tinnitus, ear drainage, earaches and nasal congestion. No nasal polyps. No sinus tenderness  RESPIRATORY:  See hpi  CARDIOVASCULAR:  negative for  chest pain,, palpitations, orthopnea, PND, known to have hypertension. No angina.   No coronary artery disease  GASTROINTESTINAL:  negative for nausea, vomiting, change in bowel habits, diarrhea, constipation, abdominal pain, pruritus, abdominal mass and abdominal distention. No nausea, vomiting or diarrhea or abdominal pain  GENITOURINARY:  negative for frequency, dysuria, nocturia, urinary incontinence and hesitancy history of stress incontinence, history of prostatectomy. Her lung Due to prostate cancer. erectile dysfunction after the surgery  INTEGUMENT  negative for rash, skin lesion(s), dryness, skin color change, changes in lesion, pruritus and changes in hair  HEMATOLOGIC/LYMPHATIC:  negative for easy bruising, bleeding, lymphadenopathy, petechiae and swelling/edema. No anemia, no enlarged lymph nodes  ALLERGIC/IMMUNOLOGIC:  negative for recurrent infections, urticaria and drug reactions  ENDOCRINE:  negative for heat intolerance, cold intolerance, tremor, weight changes and change in bowel habits. No diabetes  MUSCULOSKELETAL:  negative for  myalgias, arthralgias, pain, joint swelling, stiff joints and decreased range of motion no acute arthritis  NEUROLOGICAL:  negative for headaches, dizziness, seizures, memory problems, speech problems, visual disturbance and coordination problems, no deficit.   Motor SENSORY  BEHAVIOR/PSYCH:  negative for poor appetite, increased appetite, decreased sleep, increased sleep, decreased energy level, increased energy level and poor concentration no psychotic problem  Skin no rash no dermatitis, no skin  Vitals:  /79   Pulse 61   Temp 96 °F (35.6 °C)   Resp 16   Ht 5' 10\" (1.778 m)   Wt 299 lb 12.8 oz (136 kg)   SpO2 99%   BMI 43.02 kg/m²     PHYSICAL EXAM: Cooperative no respiratory distress not using accessory muscles  General Appearance:    Alert, cooperative, no distress, appears stated age, obese, not in any respiratory distress, not using accessory muscles    Head:    Normocephalic, without obvious abnormality, atraumatic      Eyes:    PERRL, conjunctiva/corneas clear, EOM's intact, no jaundice. No Giselle's syndrome    Ears:    Normal  external ear canals, both ears no polyps or sinus tenderness    Nose:   Nares normal, septum midline, mucosa normal, no drainage        or sinus tenderness   Throat:   Lips, mucosa, and tongue normal; teeth and gums normal, oropharyngeal orifice not compromise    Neck:   Supple, symmetrical, trachea midline, no adenopathy;     thyroid:  no enlargement/tenderness/nodules; no carotid    bruit ,JVD not elevated    Back:     Symmetric, no curvature, ROM normal, no CVA tenderness   Lungs:     AP diameter is increased. Percussion note is hyperresonant expiration prolonged. No rales, rhonchi are audible. No pleural friction rub is audible    Chest Wall:    No tenderness or deformity      Heart:    Regular rate and rhythm, S1 and S2 normal, no murmur, rub        or gallop no rvh                           Abdomen:                                                 Pulses:                              Skin:                  Lymph nodes:                    Neurologic:                  Soft, non-tender, bowel sounds active all four quadrants,     no masses, no organomegaly         2+ and symmetric all extremities     Skin color, texture, turgor normal, no rashes or lesions       Cervical, supraclavicular not enlarged or matted or tender      CNII-XII intact, normal strength 5/5 . Sensation grossly normal  and reflexes normal 2+  throughout     Clubbing No  Lower ext edema . Negative  Upper ext edema . Absent         Musculoskeletal no synovitis. No joint swelling or tenderness          Thyroid:   Lab Results   Component Value Date    TSH 2.24 07/17/2017     Urinalysis: No results for input(s): BACTERIA, BLOODU, CLARITYU, COLORU, PHUR, PROTEINU, RBCUA, SPECGRAV, BILIRUBINUR, NITRU, WBCUA, LEUKOCYTESUR, GLUCOSEU in the last 72 hours. CXR  No recent chest x-ray  CT Scans  . No recent PET scan.   Echo    No recent echo            IMPRESSION:    COPD no acute exacerbation  Hypertension  Possible cor pulmonale  Morbid obesity  Sleep apnea syndrome  History of carcinoma of the prostate treated with prostatectomy  Lung nodule PET negative  :                PLAN:      Patient pulmonary status very stable. No evidence of an acute exacerbation    Continue present bronchodilator therapy    He has given up smoking    He already had Pneumovax influenza vaccine and Covid vaccines. He has a lung nodule which is being followed with radiological studies neck CT scan is scheduled for November of this year. His hypertension is under good control. Sleep apnea responding well to use of CPAP. Encouraged him to lose weight. He probably has cor pulmonale although there is no clinical evidence of heart failure. REASON FOR THE CONSULTATION:  COPD    Chronic respiratory failure  Right apical lung nodule  HISTORY OF PRESENT ILLNESS:  .  This patient is known to have a chronic obstructive lung disease due to chronic bronchitis and emphysema secondary to prior smoking. Fortunately has given up smoking for the last 4 years. His pulmonary status is stable denies any excessive dyspnea cough or sputum production no hemoptysis no chest pain    There is no increase in the volume or change in the color of the sputum. Patient also been noted to have a lung nodule in the right upper lobe region which is PET negative. It is not changed since the last visit. Most likely is benign. He has not noted any edema. No thromboembolic process. He is known to have sleep apnea syndrome which is responding well to the use of CPAP that he uses regularly every night for about 6 hours. He does use supplemental oxygen at night along with a CPAP machine as well. He have had a history of carcinoma the prostate and had prostatectomy. There is been no recurrence of the tumor.     Patient has a history of systemic hypertension that is under good control does not have any diabetes no no history of myocardial infarction. He is overweight and has not been able to lose much weight. Cancer screening    4. CRITERIA MET    [x]     CT ORDERED  []      5. CRITERIA NOT MET   []      6. REFUSED                    []        REASON CRITERIA NOT MET     7. SMOKING LESS THAN 30 PY  []      8. AGE LESS THAN 55 or GREATER 77 YEARS  []      9. QUIT SMOKING 15 YEARS OR GREATER   []      10. RECENT CT WITH IN 11 MONTHS    []      11. LIFE EXPECTANCY < 5 YEARS   []      12.  SIGNS  AND SYMPTOMS OF LUNG CANCER   []         Immunization   Immunization History   Administered Date(s) Administered    COVID-19, Moderna, PF, 100mcg/0.5mL 02/26/2021, 03/27/2021    Influenza Vaccine, unspecified formulation 10/26/2016    Influenza, Quadv, IM, PF (6 mo and older Fluzone, Flulaval, Fluarix, and 3 yrs and older Afluria) 11/14/2017, 12/17/2019    Pneumococcal Polysaccharide ([id number]) 11/14/2017    Td, unspecified formulation 02/01/2003    Tdap (Boostrix, Adacel) 11/21/2016        Pneumococcal Vaccine     [x] Up to date    [] Indicated   [] Refused  [] Contraindicated       Influenza Vaccine   [x] Up to date    [] Indicated   [] Refused  [] Contraindicated          PAST MEDICAL HISTORY:       Diagnosis Date    A-fib ([facility] [address] 4/6/2015    Acute and chronic respiratory failure, unspecified whether with hypoxia or hypercapnia (HCC)     Allergic rhinitis 4/6/2015    Back pain     Basal cell carcinoma of skin     Decreased libido     Diverticulitis of colon (without mention of hemorrhage)(562.11) 4/6/2015    ED (erectile dysfunction)     Elevated prostate specific antigen (PSA) 4/6/2015    Former smoker     HTN (hypertension) 4/6/2015    Lung nodule     Obesity 4/6/2015    Obstructive sleep apnea     on cpap    Prostate cancer ([facility] [address] 09/2017    needs removed    Pure hypercholesterolemia 4/6/2015    Tinnitus     Tobacco use disorder 4/6/2015    Wears glasses tablet by mouth daily 2/3/21  Yes Argelia Longo MD   propafenone (RYTHMOL) 150 MG tablet Take 1 tablet by mouth every 8 hours 2/3/21  Yes Argelia Longo MD   buPROPion (WELLBUTRIN XL) 150 MG extended release tablet Take 1 tablet by mouth every morning  Patient not taking: Reported on 4/9/2021 1/29/21   Argelia Longo MD              REVIEW OF SYSTEMS:    CONSTITUTIONAL:  negative for  fevers, chills, sweats, fatigue, malaise, anorexia and weight loss. Overweight, no distress. Are using accessory muscles  EYES:  negative for  double vision, blurred vision, dry eyes, eye discharge and redness. No Giselle's syndrome or jaundice  HEENT:  negative for  hearing loss, tinnitus, ear drainage, earaches and nasal congestion. No nasal polyps. No sinus tenderness  RESPIRATORY:  See hpi  CARDIOVASCULAR:  negative for  chest pain,, palpitations, orthopnea, PND, known to have hypertension. No angina. No coronary artery disease  GASTROINTESTINAL:  negative for nausea, vomiting, change in bowel habits, diarrhea, constipation, abdominal pain, pruritus, abdominal mass and abdominal distention. No nausea, vomiting or diarrhea or abdominal pain  GENITOURINARY:  negative for frequency, dysuria, nocturia, urinary incontinence and hesitancy history of stress incontinence, history of prostatectomy. Her lung Due to prostate cancer. erectile dysfunction after the surgery  INTEGUMENT  negative for rash, skin lesion(s), dryness, skin color change, changes in lesion, pruritus and changes in hair  HEMATOLOGIC/LYMPHATIC:  negative for easy bruising, bleeding, lymphadenopathy, petechiae and swelling/edema. No anemia, no enlarged lymph nodes  ALLERGIC/IMMUNOLOGIC:  negative for recurrent infections, urticaria and drug reactions  ENDOCRINE:  negative for heat intolerance, cold intolerance, tremor, weight changes and change in bowel habits.   No diabetes  MUSCULOSKELETAL:  negative for  myalgias, arthralgias, pain, joint swelling, stiff joints and decreased range of motion no acute arthritis  NEUROLOGICAL:  negative for headaches, dizziness, seizures, memory problems, speech problems, visual disturbance and coordination problems, no deficit. Motor SENSORY  BEHAVIOR/PSYCH:  negative for poor appetite, increased appetite, decreased sleep, increased sleep, decreased energy level, increased energy level and poor concentration no psychotic problem  Skin no rash no dermatitis, no skin  Vitals:  /79   Pulse 61   Temp 96 °F (35.6 °C)   Resp 16   Ht 5' 10\" (1.778 m)   Wt 299 lb 12.8 oz (136 kg)   SpO2 99%   BMI 43.02 kg/m²     PHYSICAL EXAM:  General Appearance:    Alert, cooperative, no distress, appears stated age, obese, not in any respiratory distress, not using accessory muscles    Head:    Normocephalic, without obvious abnormality, atraumatic      Eyes:    PERRL, conjunctiva/corneas clear, EOM's intact, no jaundice. No Giselle's syndrome    Ears:    Normal  external ear canals, both ears no polyps or sinus tenderness    Nose:   Nares normal, septum midline, mucosa normal, no drainage        or sinus tenderness   Throat:   Lips, mucosa, and tongue normal; teeth and gums normal, oropharyngeal orifice not compromise    Neck:   Supple, symmetrical, trachea midline, no adenopathy;     thyroid:  no enlargement/tenderness/nodules; no carotid    bruit ,JVD not elevated    Back:     Symmetric, no curvature, ROM normal, no CVA tenderness   Lungs:     AP diameter is increased. Percussion note is hyperresonant expiration prolonged. No rales, rhonchi are audible.   No pleural friction rub is audible    Chest Wall:    No tenderness or deformity      Heart:    Regular rate and rhythm, S1 and S2 normal, no murmur, rub        or gallop no rvh                           Abdomen:                                                 Pulses:                              Skin:                  Lymph nodes:                    Neurologic:                  Soft,

## 2021-06-11 ENCOUNTER — OFFICE VISIT (OUTPATIENT)
Dept: FAMILY MEDICINE CLINIC | Age: 66
End: 2021-06-11
Payer: MEDICARE

## 2021-06-11 VITALS
DIASTOLIC BLOOD PRESSURE: 80 MMHG | OXYGEN SATURATION: 99 % | HEIGHT: 70 IN | TEMPERATURE: 98.4 F | BODY MASS INDEX: 42.66 KG/M2 | WEIGHT: 298 LBS | HEART RATE: 80 BPM | SYSTOLIC BLOOD PRESSURE: 132 MMHG

## 2021-06-11 DIAGNOSIS — F32.0 CURRENT MILD EPISODE OF MAJOR DEPRESSIVE DISORDER WITHOUT PRIOR EPISODE (HCC): ICD-10-CM

## 2021-06-11 DIAGNOSIS — Z87.891 PERSONAL HISTORY OF SMOKING: ICD-10-CM

## 2021-06-11 DIAGNOSIS — G47.33 OSA ON CPAP: ICD-10-CM

## 2021-06-11 DIAGNOSIS — Z80.0 FAMILY HISTORY OF COLON CANCER IN MOTHER: ICD-10-CM

## 2021-06-11 DIAGNOSIS — Z13.6 ENCOUNTER FOR ABDOMINAL AORTIC ANEURYSM (AAA) SCREENING: ICD-10-CM

## 2021-06-11 DIAGNOSIS — Z23 NEED FOR SHINGLES VACCINE: ICD-10-CM

## 2021-06-11 DIAGNOSIS — I10 ESSENTIAL HYPERTENSION: Primary | ICD-10-CM

## 2021-06-11 DIAGNOSIS — E78.5 HYPERLIPIDEMIA WITH TARGET LDL LESS THAN 100: ICD-10-CM

## 2021-06-11 DIAGNOSIS — Z99.89 OSA ON CPAP: ICD-10-CM

## 2021-06-11 DIAGNOSIS — H93.13 TINNITUS AURIUM, BILATERAL: ICD-10-CM

## 2021-06-11 DIAGNOSIS — I48.0 PAROXYSMAL ATRIAL FIBRILLATION (HCC): ICD-10-CM

## 2021-06-11 DIAGNOSIS — Z85.46 PERSONAL HISTORY OF PROSTATE CANCER: ICD-10-CM

## 2021-06-11 PROCEDURE — 1123F ACP DISCUSS/DSCN MKR DOCD: CPT | Performed by: FAMILY MEDICINE

## 2021-06-11 PROCEDURE — G8427 DOCREV CUR MEDS BY ELIG CLIN: HCPCS | Performed by: FAMILY MEDICINE

## 2021-06-11 PROCEDURE — G8417 CALC BMI ABV UP PARAM F/U: HCPCS | Performed by: FAMILY MEDICINE

## 2021-06-11 PROCEDURE — 99215 OFFICE O/P EST HI 40 MIN: CPT | Performed by: FAMILY MEDICINE

## 2021-06-11 PROCEDURE — 4040F PNEUMOC VAC/ADMIN/RCVD: CPT | Performed by: FAMILY MEDICINE

## 2021-06-11 PROCEDURE — 1036F TOBACCO NON-USER: CPT | Performed by: FAMILY MEDICINE

## 2021-06-11 PROCEDURE — 3017F COLORECTAL CA SCREEN DOC REV: CPT | Performed by: FAMILY MEDICINE

## 2021-06-11 RX ORDER — ASPIRIN 81 MG/1
81 TABLET ORAL DAILY
Qty: 90 TABLET | Refills: 1
Start: 2021-06-11 | End: 2022-02-18

## 2021-06-11 RX ORDER — FLUOXETINE 10 MG/1
10 CAPSULE ORAL DAILY
Qty: 30 CAPSULE | Refills: 3 | Status: SHIPPED | OUTPATIENT
Start: 2021-06-11 | End: 2021-10-11

## 2021-06-11 RX ORDER — LISINOPRIL 5 MG/1
5 TABLET ORAL DAILY
Qty: 90 TABLET | Refills: 3 | Status: SHIPPED | OUTPATIENT
Start: 2021-06-11 | End: 2022-03-28

## 2021-06-11 RX ORDER — ROSUVASTATIN CALCIUM 40 MG/1
40 TABLET, COATED ORAL DAILY
Qty: 90 TABLET | Refills: 3 | Status: SHIPPED | OUTPATIENT
Start: 2021-06-11 | End: 2022-05-13

## 2021-06-11 RX ORDER — PROPAFENONE HYDROCHLORIDE 150 MG/1
150 TABLET, FILM COATED ORAL EVERY 8 HOURS
Qty: 270 TABLET | Refills: 3 | Status: SHIPPED | OUTPATIENT
Start: 2021-06-11 | End: 2022-02-18

## 2021-06-11 RX ORDER — ZOSTER VACCINE RECOMBINANT, ADJUVANTED 50 MCG/0.5
0.5 KIT INTRAMUSCULAR SEE ADMIN INSTRUCTIONS
Qty: 0.5 ML | Refills: 1 | Status: SHIPPED | OUTPATIENT
Start: 2021-06-11 | End: 2021-06-12

## 2021-06-11 SDOH — ECONOMIC STABILITY: FOOD INSECURITY: WITHIN THE PAST 12 MONTHS, YOU WORRIED THAT YOUR FOOD WOULD RUN OUT BEFORE YOU GOT MONEY TO BUY MORE.: NEVER TRUE

## 2021-06-11 SDOH — ECONOMIC STABILITY: FOOD INSECURITY: WITHIN THE PAST 12 MONTHS, THE FOOD YOU BOUGHT JUST DIDN'T LAST AND YOU DIDN'T HAVE MONEY TO GET MORE.: NEVER TRUE

## 2021-06-11 ASSESSMENT — PATIENT HEALTH QUESTIONNAIRE - PHQ9
1. LITTLE INTEREST OR PLEASURE IN DOING THINGS: 1
SUM OF ALL RESPONSES TO PHQ9 QUESTIONS 1 & 2: 1
SUM OF ALL RESPONSES TO PHQ QUESTIONS 1-9: 1
SUM OF ALL RESPONSES TO PHQ QUESTIONS 1-9: 1
2. FEELING DOWN, DEPRESSED OR HOPELESS: 0
SUM OF ALL RESPONSES TO PHQ QUESTIONS 1-9: 1

## 2021-06-11 ASSESSMENT — ENCOUNTER SYMPTOMS
ABDOMINAL DISTENTION: 0
VOMITING: 0
APNEA: 1
ABDOMINAL PAIN: 0
CONSTIPATION: 0
NAUSEA: 0
SHORTNESS OF BREATH: 0
WHEEZING: 0
COUGH: 0
CHEST TIGHTNESS: 0
DIARRHEA: 0

## 2021-06-11 ASSESSMENT — SOCIAL DETERMINANTS OF HEALTH (SDOH): HOW HARD IS IT FOR YOU TO PAY FOR THE VERY BASICS LIKE FOOD, HOUSING, MEDICAL CARE, AND HEATING?: NOT VERY HARD

## 2021-06-11 NOTE — PROGRESS NOTES
Rasheed Galvez (:  1955) is a 72 y.o. male,Established patient, prior PCP in [facility],  here for evaluation of the following chief complaint(s): Established New Doctor, Hypertension, Hyperlipidemia, Depression, Tinnitus, and Health Maintenance      ASSESSMENT/PLAN:    1. Essential hypertension  Well controlled. Continue current treatment. Will recheck labs. Discussed low salt diet and BP and pulse monitoring.    -     lisinopril (PRINIVIL;ZESTRIL) 5 MG tablet; Take 1 tablet by mouth daily, Disp-90 tablet, R-3Normal  -     aspirin EC 81 MG EC tablet; Take 1 tablet by mouth daily, Disp-90 tablet, R-1NO PRINT  -     CBC; Future  -     Comprehensive Metabolic Panel; Future  -     TSH without Reflex; Future  -     Magnesium; Future  2. Paroxysmal atrial fibrillation (HCC)  Stable  Start aspirin 81 mg    -     propafenone (RYTHMOL) 150 MG tablet; Take 1 tablet by mouth every 8 hours, Disp-270 tablet, R-3Normal  I advised Rasheed Galvez to make appointment with cardiology for Monday. The patient verbalizes understanding and agrees with the plan. Careful review of urgent symptoms and need for immediate medical attention if condition worsens. Patient expressed understanding. Advised to go to ED if severe symptoms develop. Patient expressed understanding. He declines EKG at this time    3. ADRIAN on CPAP  Improved with CPAP  Patient benefits from CPAP    4. Hyperlipidemia with target LDL less than 100  Improved    -     rosuvastatin (CRESTOR) 40 MG tablet; Take 1 tablet by mouth daily, Disp-90 tablet, R-3Normal  -     Lipid Panel; Future  5. Tinnitus aurium, bilateral  worsening    -     AFL - Emil Andrew MD, Otolaryngology, [place]  6. Current mild episode of major depressive disorder without prior episode (HCC)  worsening    -start     FLUoxetine (PROZAC) 10 MG capsule; Take 1 capsule by mouth daily, Disp-30 capsule, R-3Normal  7.  Personal history of prostate cancer  In remission  He will need to talk with urology for DEXA scan    Lab Results   Component Value Date    PSA <0.01 03/04/2021    PSA <0.01 09/05/2020    PSA <0.01 03/02/2020       8. Family history of colon cancer in mother  colonoscopy in 3 years from prior  5. Encounter for abdominal aortic aneurysm (AAA) screening  -     VL AAA SCREENING; Future  10. Personal history of smoking  -     VL AAA SCREENING; Future  11. Need for shingles vaccine  -     zoster recombinant adjuvanted vaccine [facility]) 50 MCG/0.5ML SUSR injection; Inject 0.5 mLs into the muscle See Admin Instructions for 1 day, Disp-0.5 mL, R-1Normal          Lance Dryer received counseling on the following healthy behaviors: nutrition, exercise, medication adherence and weight loss    Reviewed prior labs and health maintenance  Discussed use, benefit, and side effects of prescribed medications. Barriers to medication compliance addressed. Patient given educational materials - see patient instructions  All patient questions answered. Patient voiced understanding. The patient's past medical,surgical, social, and family history as well as his current medications and allergies were reviewed as documented in today's encounter. Medications, labs, diagnostic studies, consultations and follow-up as documented in this encounter. Return in about 3 months (around 9/11/2021) for MEDICARE, VISION, PHQ9, MINICOG, HRA QUESTIONS. Data Unavailable      Prior to Visit Medications    Medication Sig Taking?  Authorizing Provider   lisinopril (PRINIVIL;ZESTRIL) 5 MG tablet Take 1 tablet by mouth daily Yes Vito Gonzalez MD   rosuvastatin (CRESTOR) 40 MG tablet Take 1 tablet by mouth daily Yes Vito Gonzalez MD   propafenone (RYTHMOL) 150 MG tablet Take 1 tablet by mouth every 8 hours Yes Vito Gonzalez MD   buPROPion (WELLBUTRIN XL) 150 MG extended release tablet Take 1 tablet by mouth every morning  Patient not taking: Reported on 6/11/2021  Vito Gonzalez MD       No Known Allergies    Past Medical History:   Diagnosis Date    A-fib [facility]) 2015    Acute and chronic respiratory failure, unspecified whether with hypoxia or hypercapnia (HCC)     Allergic rhinitis 2015    Back pain     Basal cell carcinoma of skin     Decreased libido     Diverticulitis of colon (without mention of hemorrhage)(562.11) 2015    ED (erectile dysfunction)     Elevated prostate specific antigen (PSA) 2015    Former smoker     HTN (hypertension) 2015    Lung nodule     Obesity 2015    Obstructive sleep apnea     on cpap    Prostate cancer ([address] 2017    needs removed    Pure hypercholesterolemia 2015    Tinnitus     Tobacco use disorder 2015    Wears glasses        Past Surgical History:   Procedure Laterality Date    CARDIAC CATHETERIZATION  2017    LAD: mid 30% stenosis  /  LM NLM / EF 55%  /  LCX: Codominant Vessel, distal 30% stenosis / OM1 AND OM2 10%    COLONOSCOPY      COLONOSCOPY  2019    COLONOSCOPY  2019    COLONOSCOPY POLYPECTOMY COLD BIOPSY performed by Vergil Meckel, MD at [place] [address]  10/17/2017    robotic with schuyler pelvic lymph node dissection    PROSTATECTOMY N/A 10/17/2017    XI ROBOTIC PROSTATECTOMY LAPAROSCOPIC WITH PELVIC LYMPH NODE DISSECTION performed by Linda Brown MD at [address] [place], [place] [address] SKIN CANCER EXCISION      L Upper Lip Basal CA    TONSILLECTOMY      TYMPANOSTOMY TUBE PLACEMENT      VASECTOMY         Family History   Problem Relation Age of Onset    Coronary Art Dis Mother     High Blood Pressure Mother     High Cholesterol Father     Seizures Sister        Social History     Tobacco Use    Smoking status: Former Smoker     Packs/day: 1.00     Years: 40.00     Pack years: 40.00     Types: Cigarettes     Start date:      Quit date: 2016     Years since quittin.2    Smokeless tobacco: Never Used   Vaping Use    Vaping Use: Never used   Substance Use Topics    Alcohol use:  Yes [address] 08/02/2016    LDLCHOLESTEROL 55 06/20/2019     Lab Results   Component Value Date    TRIG 151 (H) 06/20/2019    TRIG 216 (H) 07/17/2017    TRIG 290 08/02/2016         Has History of prostate cancer, had prostate removed , radical prostatectomy 10/17/2017  Sees Dr. Deshawn Fleming  Reports Erectile Dysfunction since the surgery. He never had hormonotherapy  His wife would like him to get a DEXA scan for osteoporosis screening    Lab Results   Component Value Date    PSA <0.01 03/04/2021    PSA <0.01 09/05/2020    PSA <0.01 03/02/2020     He is asking when he is due for colonoscopy. He is due for colonoscopy 2022  Had colonoscopy in 2019 with polypectomy  Has family history of colon cancer in mother  Denies nausea, vomiting, abdominal pain, blood in the stool or constipation    Reports having tinnitus bilateral for many years. Saw ENT 25 yrs ago  Patient keeps TV loud and his wife tells him that sometimes does not hear. Patient says tinnitus is bothering him a lot all the time and is worsening. Denies ear pain    Patient's wife wants him to start an antidepressant medication. He says \"I don't know if depression, just  no motivation to do things, like golfing\"    PHQ-2 Over the past 2 weeks, how often have you been bothered by any of the following problems? Little interest or pleasure in doing things: Several days  Feeling down, depressed, or hopeless: Not at all  PHQ-2 Score: 1  PHQ-9 Over the past 2 weeks, how often have you been bothered by any of the following problems? PHQ-9 Total Score: 1    Denies suicidal ideation, plan or intent. PHQ Scores 6/11/2021 6/30/2020 2/21/2019 3/1/2018 11/14/2017 11/21/2016 8/10/2016   PHQ2 Score 1 0 0 0 0 0 0   PHQ9 Score 1 0 0 0 0 0 0     Patient reports that he quit smoking many years ago. He is due for AAA screening and he would like that ordered. Windy Hoffmann is due for Shingles vaccine. his  indication is age over 48.   Benefits of getting immunization against shingles discussed, and patient is agreeable. he  denies side effects to prior immunizations. Review of Systems   Constitutional: Positive for fatigue. Negative for activity change, appetite change, chills, diaphoresis, fever and unexpected weight change. HENT: Positive for hearing loss and tinnitus. Negative for ear discharge and ear pain. Eyes: Positive for visual disturbance (stable , wears glasses). Respiratory: Positive for apnea (on CPAP). Negative for cough, chest tightness, shortness of breath and wheezing. Cardiovascular: Positive for leg swelling. Negative for chest pain and palpitations. Gastrointestinal: Negative for abdominal distention, abdominal pain, constipation, diarrhea, nausea and vomiting. Endocrine: Negative for cold intolerance, heat intolerance, polydipsia, polyphagia and polyuria. Genitourinary: Negative for difficulty urinating. Neurological: Negative for weakness. Hematological: Does not bruise/bleed easily. Psychiatric/Behavioral: Positive for dysphoric mood. Negative for sleep disturbance. The patient is not nervous/anxious.          -vital signs stable and within normal limits except Morbid obesity per BMI. /80   Pulse 80   Temp 98.4 °F (36.9 °C)   Ht 5' 10\" (1.778 m)   Wt 298 lb (135.2 kg)   SpO2 99%   BMI 42.76 kg/m²        Physical Exam  Vitals and nursing note reviewed. Constitutional:       General: He is not in acute distress. Appearance: Normal appearance. He is well-developed. He is obese. He is not diaphoretic. HENT:      Head: Normocephalic and atraumatic. Right Ear: External ear normal.      Left Ear: External ear normal.      Mouth/Throat:      Comments: I did not examine the mouth due to coronavirus pandemic and wearing masks. Eyes:      General: Lids are normal. No scleral icterus. Right eye: No discharge. Left eye: No discharge. Extraocular Movements: Extraocular movements intact. Conjunctiva/sclera: Conjunctivae normal.   Neck:      Thyroid: No thyromegaly. Comments: Thick neck. Cardiovascular:      Rate and Rhythm: Normal rate and regular rhythm. Extrasystoles are present. Heart sounds: Normal heart sounds. No murmur heard. Pulmonary:      Effort: Pulmonary effort is normal. No respiratory distress. Breath sounds: Normal breath sounds. No wheezing or rales. Chest:      Chest wall: No tenderness. Abdominal:      General: Bowel sounds are normal. There is no distension. Palpations: Abdomen is soft. There is no hepatomegaly or splenomegaly. Tenderness: There is no abdominal tenderness. Comments: Obese abdomen. Musculoskeletal:         General: Normal range of motion. Cervical back: Normal range of motion and neck supple. Right lower le+ Pitting Edema present. Left lower le+ Pitting Edema present. Lymphadenopathy:      Cervical: No cervical adenopathy. Skin:     General: Skin is warm and dry. Capillary Refill: Capillary refill takes less than 2 seconds. Findings: No rash. Neurological:      Mental Status: He is alert and oriented to person, place, and time. Deep Tendon Reflexes: Reflexes are normal and symmetric. Psychiatric:         Mood and Affect: Mood normal.         Behavior: Behavior normal.         Thought Content:  Thought content normal.         Judgment: Judgment normal.       Orders Placed This Encounter   Medications    lisinopril (PRINIVIL;ZESTRIL) 5 MG tablet     Sig: Take 1 tablet by mouth daily     Dispense:  90 tablet     Refill:  3    propafenone (RYTHMOL) 150 MG tablet     Sig: Take 1 tablet by mouth every 8 hours     Dispense:  270 tablet     Refill:  3    rosuvastatin (CRESTOR) 40 MG tablet     Sig: Take 1 tablet by mouth daily     Dispense:  90 tablet     Refill:  3    zoster recombinant adjuvanted vaccine (SHINGRIX) 50 MCG/0.5ML SUSR injection     Sig: Inject 0.5 mLs into the muscle See Admin Instructions for 1 day     Dispense:  0.5 mL     Refill:  1    aspirin EC 81 MG EC tablet     Sig: Take 1 tablet by mouth daily     Dispense:  90 tablet     Refill:  1    FLUoxetine (PROZAC) 10 MG capsule     Sig: Take 1 capsule by mouth daily     Dispense:  30 capsule     Refill:  3       Orders Placed This Encounter   Procedures    VL AAA SCREENING     Standing Status:   Future     Standing Expiration Date:   6/11/2022    CBC     Standing Status:   Future     Standing Expiration Date:   8/7/2021    Comprehensive Metabolic Panel     Standing Status:   Future     Standing Expiration Date:   8/7/2021    Lipid Panel     Standing Status:   Future     Standing Expiration Date:   8/7/2021     Order Specific Question:   Is Patient Fasting?/# of Hours     Answer:   8-10 Hours, water ok to drink    TSH without Reflex     Standing Status:   Future     Standing Expiration Date:   8/7/2021    Magnesium     Standing Status:   Future     Standing Expiration Date:   8/7/2021    NABOR - Leilani Prince MD, Otolaryngology, [place]     Referral Priority:   Routine     Referral Type:   Eval and Treat     Referral Reason:   Specialty Services Required     Referred to Provider:   Delilah Gibson MD     Requested Specialty:   Otolaryngology     Number of Visits Requested:   1       Medications Discontinued During This Encounter   Medication Reason    buPROPion (WELLBUTRIN XL) 150 MG extended release tablet Side effects    lisinopril (PRINIVIL;ZESTRIL) 5 MG tablet REORDER    rosuvastatin (CRESTOR) 40 MG tablet REORDER    propafenone (RYTHMOL) 150 MG tablet REORDER         On this date 6/11/2021 I have spent  45 minutes reviewing previous notes, test results and face to face with the patient discussing the diagnosis and importance of compliance with the treatment plan as well as documenting on the day of the visit.      Future Appointments   Date Time Provider Madeleine William   8/4/2021  9:00 AM Willow Bai MD anth[facility]   10/6/2021 10:30 AM Edith Le MD [facility]   10/15/2021 10:30 AM Trish Gowers, MD Resp Spec [facility]       This note was completed by using the assistance of a speech-recognition program. However, inadvertent computerized transcription errors may be present. Although every effort was made to ensure accuracy, no guarantees can be provided that every mistake has been identified and corrected by editing. An electronic signature was used to authenticate this note.   Electronically signed by Edith Le MD on 6/12/2021 at 10:33 PM

## 2021-06-11 NOTE — PROGRESS NOTES
Visit Information    Have you changed or started any medications since your last visit including any over-the-counter medicines, vitamins, or herbal medicines? no   Are you having any side effects from any of your medications? -  no  Have you stopped taking any of your medications? Is so, why? -  no    Have you seen any other physician or provider since your last visit? Yes - Records Obtained  Have you had any other diagnostic tests since your last visit? Yes - Records Obtained  Have you been seen in the emergency room and/or had an admission to a hospital since we last saw you? No  Have you had your routine dental cleaning in the past 6 months? yes     Have you activated your [employer] account? If not, what are your barriers?  Yes     Patient Care Team:  Ebenezer Benavidez PA-C as PCP - General (Physician Assistant)  Edelmira Abbasi MD as Resident (Cardiology)  Mateo Christianson MD as Consulting Physician (Urology)  Jaiden Orozco RN as Nurse You Up MD as Consulting Physician (Pulmonology)    Medical History Review  Past Medical, Family, and Social History reviewed and does contribute to the patient presenting condition    Health Maintenance   Topic Date Due    Shingles Vaccine (1 of 2) Never done    Lipid screen  06/20/2020    Potassium monitoring  06/20/2020    Creatinine monitoring  11/25/2020    Annual Wellness Visit (AWV)  Never done    Low dose CT lung screening  11/30/2021    Diabetes screen  02/21/2022    PSA counseling  03/04/2022    Pneumococcal 65+ years Vaccine (1 of 1 - PPSV23) 11/14/2022    Colon cancer screen colonoscopy  04/26/2024    DTaP/Tdap/Td vaccine (2 - Td or Tdap) 11/21/2026    Flu vaccine  Completed    COVID-19 Vaccine  Completed    AAA screen  Completed    Hepatitis C screen  Completed    HIV screen  Completed    Hepatitis A vaccine  Aged Out    Hepatitis B vaccine  Aged Out    Hib vaccine  Aged Out    Meningococcal (ACWY) vaccine  Aged Out

## 2021-06-12 PROBLEM — Z99.89 OSA ON CPAP: Status: ACTIVE | Noted: 2021-06-12

## 2021-06-12 PROBLEM — F32.0 CURRENT MILD EPISODE OF MAJOR DEPRESSIVE DISORDER WITHOUT PRIOR EPISODE (HCC): Status: ACTIVE | Noted: 2021-06-12

## 2021-06-12 PROBLEM — H93.13 TINNITUS AURIUM, BILATERAL: Status: ACTIVE | Noted: 2021-06-12

## 2021-06-12 PROBLEM — E78.5 HYPERLIPIDEMIA WITH TARGET LDL LESS THAN 100: Status: ACTIVE | Noted: 2021-06-12

## 2021-06-12 PROBLEM — G47.33 OSA ON CPAP: Status: ACTIVE | Noted: 2021-06-12

## 2021-06-18 ENCOUNTER — HOSPITAL ENCOUNTER (OUTPATIENT)
Age: 66
Setting detail: SPECIMEN
Discharge: HOME | End: 2021-06-18
Payer: MEDICARE

## 2021-06-18 DIAGNOSIS — I10 ESSENTIAL HYPERTENSION: ICD-10-CM

## 2021-06-18 DIAGNOSIS — E78.5 HYPERLIPIDEMIA WITH TARGET LDL LESS THAN 100: ICD-10-CM

## 2021-06-18 LAB
ALBUMIN SERPL-MCNC: 4.2 G/DL (ref 3.5–5.2)
ALBUMIN/GLOBULIN RATIO: 1.5 (ref 1–2.5)
ALP BLD-CCNC: 91 U/L (ref 40–129)
ALT SERPL-CCNC: 32 U/L (ref 5–41)
ANION GAP SERPL CALCULATED.3IONS-SCNC: 9 MMOL/L (ref 9–17)
AST SERPL-CCNC: 21 U/L
BILIRUB SERPL-MCNC: 0.54 MG/DL (ref 0.3–1.2)
BUN BLDV-MCNC: 12 MG/DL (ref 8–23)
BUN/CREAT BLD: ABNORMAL (ref 9–20)
CALCIUM SERPL-MCNC: 9 MG/DL (ref 8.6–10.4)
CHLORIDE BLD-SCNC: 104 MMOL/L (ref 98–107)
CHOLESTEROL/HDL RATIO: 3.8
CHOLESTEROL: 105 MG/DL
CO2: 27 MMOL/L (ref 20–31)
CREAT SERPL-MCNC: 0.77 MG/DL (ref 0.7–1.2)
GFR AFRICAN AMERICAN: >60 ML/MIN
GFR NON-AFRICAN AMERICAN: >60 ML/MIN
GFR SERPL CREATININE-BSD FRML MDRD: ABNORMAL ML/MIN/{1.73_M2}
GFR SERPL CREATININE-BSD FRML MDRD: ABNORMAL ML/MIN/{1.73_M2}
GLUCOSE BLD-MCNC: 121 MG/DL (ref 70–99)
HCT VFR BLD CALC: 52.7 % (ref 40.7–50.3)
HDLC SERPL-MCNC: 28 MG/DL
HEMOGLOBIN: 17.2 G/DL (ref 13–17)
LDL CHOLESTEROL: 50 MG/DL (ref 0–130)
MAGNESIUM: 2.2 MG/DL (ref 1.6–2.6)
MCH RBC QN AUTO: 30.4 PG (ref 25.2–33.5)
MCHC RBC AUTO-ENTMCNC: 32.6 G/DL (ref 28.4–34.8)
MCV RBC AUTO: 93.1 FL (ref 82.6–102.9)
NRBC AUTOMATED: 0 PER 100 WBC
PDW BLD-RTO: 12.8 % (ref 11.8–14.4)
PLATELET # BLD: 203 K/UL (ref 138–453)
PMV BLD AUTO: 9.2 FL (ref 8.1–13.5)
POTASSIUM SERPL-SCNC: 4 MMOL/L (ref 3.7–5.3)
RBC # BLD: 5.66 M/UL (ref 4.21–5.77)
SODIUM BLD-SCNC: 140 MMOL/L (ref 135–144)
TOTAL PROTEIN: 7 G/DL (ref 6.4–8.3)
TRIGL SERPL-MCNC: 137 MG/DL
TSH SERPL DL<=0.05 MIU/L-ACNC: 1.94 MIU/L (ref 0.3–5)
VLDLC SERPL CALC-MCNC: ABNORMAL MG/DL (ref 1–30)
WBC # BLD: 7.3 K/UL (ref 3.5–11.3)

## 2021-08-04 ENCOUNTER — OFFICE VISIT (OUTPATIENT)
Dept: DERMATOLOGY | Age: 66
End: 2021-08-04
Payer: MEDICARE

## 2021-08-04 VITALS
HEART RATE: 126 BPM | WEIGHT: 298.8 LBS | BODY MASS INDEX: 42.78 KG/M2 | SYSTOLIC BLOOD PRESSURE: 175 MMHG | TEMPERATURE: 97.3 F | OXYGEN SATURATION: 97 % | HEIGHT: 70 IN | DIASTOLIC BLOOD PRESSURE: 109 MMHG

## 2021-08-04 DIAGNOSIS — Z85.828 HISTORY OF BASAL CELL CARCINOMA: ICD-10-CM

## 2021-08-04 DIAGNOSIS — L85.8 KERATOSIS PILARIS: ICD-10-CM

## 2021-08-04 DIAGNOSIS — L82.1 SEBORRHEIC KERATOSES: Primary | ICD-10-CM

## 2021-08-04 PROCEDURE — 3017F COLORECTAL CA SCREEN DOC REV: CPT | Performed by: DERMATOLOGY

## 2021-08-04 PROCEDURE — 4040F PNEUMOC VAC/ADMIN/RCVD: CPT | Performed by: DERMATOLOGY

## 2021-08-04 PROCEDURE — 99212 OFFICE O/P EST SF 10 MIN: CPT | Performed by: DERMATOLOGY

## 2021-08-04 PROCEDURE — G8427 DOCREV CUR MEDS BY ELIG CLIN: HCPCS | Performed by: DERMATOLOGY

## 2021-08-04 PROCEDURE — 1123F ACP DISCUSS/DSCN MKR DOCD: CPT | Performed by: DERMATOLOGY

## 2021-08-04 PROCEDURE — G8417 CALC BMI ABV UP PARAM F/U: HCPCS | Performed by: DERMATOLOGY

## 2021-08-04 PROCEDURE — 1036F TOBACCO NON-USER: CPT | Performed by: DERMATOLOGY

## 2021-08-04 NOTE — PROGRESS NOTES
Dermatology Patient Note  [facility] [address]  [address]  [address]  Dept: [phone]  Dept Fax: [phone]      VISITDATE: 8/4/2021   REFERRING PROVIDER: No ref. provider found      Ignacio Wynn is a 72 y.o. male  who presents today in the office for:    Follow-up (1 year FBSE- spot on forehead, had previously been frozen, bumps on arm )      HISTORY OF PRESENT ILLNESS:  As above. Bumps on arms and legs, states that Dr. Magalie Richard prescribed amlactin for. Reports history of BCC ~15 years ago. Patient has high blood pressure today. He denies chest pain, headaches, or other symptoms.     MEDICAL PROBLEMS:  Patient Active Problem List    Diagnosis Date Noted    ADRIAN on CPAP 06/12/2021    Tinnitus aurium, bilateral 06/12/2021    Current mild episode of major depressive disorder without prior episode ([facility] 06/12/2021    Hyperlipidemia with target LDL less than 100 06/12/2021    Family history of colon cancer in mother 06/11/2021    Paroxysmal atrial fibrillation ([facility] 02/21/2019    Essential hypertension 02/21/2019    Erectile dysfunction after radical prostatectomy 08/27/2018    Personal history of prostate cancer 08/27/2018    Male stress incontinence 11/20/2017    Hx of prostatectomy 11/14/2017    Prostate cancer ([facility] 08/02/2017    Personal history of smoking 11/21/2016    Pure hypercholesterolemia 04/06/2015    Diverticulitis of colon 04/06/2015    Morbid obesity ([facility] 04/06/2015       CURRENT MEDICATIONS:   Current Outpatient Medications   Medication Sig Dispense Refill    lisinopril (PRINIVIL;ZESTRIL) 5 MG tablet Take 1 tablet by mouth daily 90 tablet 3    propafenone (RYTHMOL) 150 MG tablet Take 1 tablet by mouth every 8 hours 270 tablet 3    rosuvastatin (CRESTOR) 40 MG tablet Take 1 tablet by mouth daily 90 tablet 3    aspirin EC 81 MG EC tablet Take 1 tablet by mouth daily 90 tablet 1    FLUoxetine (PROZAC) 10 MG capsule Take 1 capsule by mouth daily 30 capsule 3     No current facility-administered medications for this visit. ALLERGIES:   No Known Allergies    SOCIAL HISTORY:  Social History     Tobacco Use    Smoking status: Former Smoker     Packs/day: 1.00     Years: 40.00     Pack years: 40.00     Types: Cigarettes     Start date: 12     Quit date: 2016     Years since quittin.3    Smokeless tobacco: Never Used   Substance Use Topics    Alcohol use: Yes     Alcohol/week: 6.0 standard drinks     Types: 5 Cans of beer, 1 Standard drinks or equivalent per week     Comment: per week       Pertinent ROS:  Review of Systems  Skin: Denies any new changing, growing or bleeding lesions or rashes except as described in the HPI   Constitutional: Denies fevers, chills, and malaise. PHYSICAL EXAM:   BP (!) 164/110   Pulse 95   Temp 97.3 °F (36.3 °C)   Ht 5' 10\" (1.778 m)   Wt 298 lb 12.8 oz (135.5 kg)   SpO2 98%   BMI 42.87 kg/m²     The patient is generally well appearing, well nourished, alert and conversational. Affect is normal.    Cutaneous Exam:  Physical Exam  Total body skin exam excluding external genitalia: head/face, neck, both arms, chest, back, abdomen, both legs, buttocks, digits and/or nails, was examined. Genital exam was deferred as patient denied having any lesions in this area. Complete visualization of scalp may be limited by hair density, length, and/or style    Facial covering was removed during examination. Diagnoses/exam findings/medical history pertinent to this visit are listed below:    Assessment:   Diagnosis Orders   1. Seborrheic keratoses     2. Keratosis pilaris     3.  History of basal cell carcinoma          Plan:  History of BCC  - well healed scar with no nodularity  - no evidence of recurrence    Seborrheic keratoses of face, abdomen, scalp  - reassurance and education    Keratosis pilaris  -reassurance and education  -may continue amlactin    Elevated blood pressure  - patient takes blood pressure meds, will f/u with PCP  - he was very diaphoretic during exam, states he is always like this when nervous  - denies CP, SOB  - seek immediate care if symptoms worsen or if BP cannot be controlled    RTC 1 year     Future Appointments   Date Time Provider Madeleine William   10[facility]6/2021 10:30 AM [facility]   10/15/2021 10:30 AM Nara Chaudhary MD Resp Spec [email or web address]         Patient Instructions   Seborrheic Keratosis  Seborrheic keratoses are common benign growths of unknown cause seen in adults due to a thickening of an area of the top skin layer. Who's At Risk  Although they can occur anytime after puberty, almost everyone over 48 has one or more of these and they increase in number with age. Some families have an inherited tendency to grow multiple lesions. Men and women are equally as likely to develop seborrheic keratoses. Dark-skinned people are less affected than those with light skin; a variant seen in blacks is called dermatosis papulosa nigra. Signs & Symptoms  One or more spots can occur anywhere on the body, except for palms, soles, and mucous membranes (eg, in the mouth or rectum). They do not go away. They do not turn into cancers, but some cancers resemble seborrheic keratosis. They start as light brown to skin-colored, flat areas, which are round to oval and of varying size (usually less than a half inch, but sometimes much larger). As they grow thicker and rise above the skin surface, seborrheic keratoses may become dark brown to almost black with a \"stuck on\" appearance. The surface may feel smooth or rough. Self-Care Guidelines  No treatment is needed unless there is irritation from clothing with itching or bleeding. There is no way to prevent new spots from forming. Some lotions with alpha hydroxyl acids may make the areas feel smoother with regular use but will not eliminate them. OTC freezing techniques are available but usually not effective.   When to Seek Medical Care  If a spot on the skin is growing, bleeding, painful, or itchy, or any other concerning changes, then see your doctor. Sun Protection     There are two types of sun rays that are harmful to the skin. UVA rays cause skin aging and skin cancer, such as melanoma. UVB rays cause sunburns, cataracts, and also contribute to skin cancer. The American-Academy of Dermatology recommends that children and adults wear a broad spectrum, waterproof sunscreen with a Sun Protection Factor (SPF) of 30 or higher. It is important to check the ingredient label to be sure the sunscreen will protect the skin from both UVA and UVB sunrays. Your sunscreen should contain at least one of the following ingredients: titanium dioxide, zinc oxide, or avobenzone. Sunscreen will not be effective unless it is applied to all exposed skin. Sunscreens work best if they are applied 30 minutes before sun exposure. They should be reapplied every 2 hours and after any water exposure. Sunscreen is not perfect. It is important to use other methods to protect the skin from sun exposure also. Wear hats, sunglasses and other sun protective clothing when outdoors. Stay in the shade during the peak hours of sun exposure between 10 AM and 4 PM.        This note was created with the assistance of a speech-recognition program.  Although the intention is to generate a document that actually reflects the content of the visit, no guarantees can be provided that every mistake has been identified and corrected by editing. I, Dr. Nereyda Murphy, personally performed the services described in this documentation, as scribed by [facility] in my presence, and it is both accurate and complete.      Electronically signed by Erasto Grady MD on 8/4/21 at 9:07 AM EDT

## 2021-08-04 NOTE — PATIENT INSTRUCTIONS
a broad spectrum, waterproof sunscreen with a Sun Protection Factor (SPF) of 30 or higher. It is important to check the ingredient label to be sure the sunscreen will protect the skin from both UVA and UVB sunrays. Your sunscreen should contain at least one of the following ingredients: titanium dioxide, zinc oxide, or avobenzone. Sunscreen will not be effective unless it is applied to all exposed skin. Sunscreens work best if they are applied 30 minutes before sun exposure. They should be reapplied every 2 hours and after any water exposure. Sunscreen is not perfect. It is important to use other methods to protect the skin from sun exposure also. Wear hats, sunglasses and other sun protective clothing when outdoors.   Stay in the shade during the peak hours of sun exposure between 10 AM and 4 PM.

## 2021-09-29 ASSESSMENT — PATIENT HEALTH QUESTIONNAIRE - PHQ9
SUM OF ALL RESPONSES TO PHQ QUESTIONS 1-9: 2
1. LITTLE INTEREST OR PLEASURE IN DOING THINGS: 1
SUM OF ALL RESPONSES TO PHQ QUESTIONS 1-9: 2
SUM OF ALL RESPONSES TO PHQ9 QUESTIONS 1 & 2: 2
2. FEELING DOWN, DEPRESSED OR HOPELESS: 1
SUM OF ALL RESPONSES TO PHQ QUESTIONS 1-9: 2

## 2021-09-29 ASSESSMENT — LIFESTYLE VARIABLES
AUDIT TOTAL SCORE: 4
HOW MANY STANDARD DRINKS CONTAINING ALCOHOL DO YOU HAVE ON A TYPICAL DAY: ONE OR TWO
HOW OFTEN DURING THE LAST YEAR HAVE YOU HAD A FEELING OF GUILT OR REMORSE AFTER DRINKING: 0
HOW OFTEN DURING THE LAST YEAR HAVE YOU FAILED TO DO WHAT WAS NORMALLY EXPECTED FROM YOU BECAUSE OF DRINKING: NEVER
AUDIT TOTAL SCORE: 0
HOW OFTEN DO YOU HAVE SIX OR MORE DRINKS ON ONE OCCASION: NEVER
HOW OFTEN DURING THE LAST YEAR HAVE YOU FOUND THAT YOU WERE NOT ABLE TO STOP DRINKING ONCE YOU HAD STARTED: 0
AUDIT-C TOTAL SCORE: 4
HOW OFTEN DO YOU HAVE A DRINK CONTAINING ALCOHOL: FOUR OR MORE TIMES A WEEK
HOW OFTEN DURING THE LAST YEAR HAVE YOU FOUND THAT YOU WERE NOT ABLE TO STOP DRINKING ONCE YOU HAD STARTED: NEVER
HAS A RELATIVE, FRIEND, DOCTOR, OR ANOTHER HEALTH PROFESSIONAL EXPRESSED CONCERN ABOUT YOUR DRINKING OR SUGGESTED YOU CUT DOWN: 0
AUDIT-C TOTAL SCORE: 0
HAVE YOU OR SOMEONE ELSE BEEN INJURED AS A RESULT OF YOUR DRINKING: 0
HAVE YOU OR SOMEONE ELSE BEEN INJURED AS A RESULT OF YOUR DRINKING: NO
HOW OFTEN DURING THE LAST YEAR HAVE YOU NEEDED AN ALCOHOLIC DRINK FIRST THING IN THE MORNING TO GET YOURSELF GOING AFTER A NIGHT OF HEAVY DRINKING: NEVER
HOW OFTEN DO YOU HAVE SIX OR MORE DRINKS ON ONE OCCASION: 0
HOW OFTEN DURING THE LAST YEAR HAVE YOU BEEN UNABLE TO REMEMBER WHAT HAPPENED THE NIGHT BEFORE BECAUSE YOU HAD BEEN DRINKING: 0
HOW OFTEN DURING THE LAST YEAR HAVE YOU NEEDED AN ALCOHOLIC DRINK FIRST THING IN THE MORNING TO GET YOURSELF GOING AFTER A NIGHT OF HEAVY DRINKING: 0
HAS A RELATIVE, FRIEND, DOCTOR, OR ANOTHER HEALTH PROFESSIONAL EXPRESSED CONCERN ABOUT YOUR DRINKING OR SUGGESTED YOU CUT DOWN: NO
HOW OFTEN DURING THE LAST YEAR HAVE YOU BEEN UNABLE TO REMEMBER WHAT HAPPENED THE NIGHT BEFORE BECAUSE YOU HAD BEEN DRINKING: NEVER
HOW MANY STANDARD DRINKS CONTAINING ALCOHOL DO YOU HAVE ON A TYPICAL DAY: 0
HOW OFTEN DO YOU HAVE A DRINK CONTAINING ALCOHOL: 4
HOW OFTEN DURING THE LAST YEAR HAVE YOU HAD A FEELING OF GUILT OR REMORSE AFTER DRINKING: NEVER
HOW OFTEN DURING THE LAST YEAR HAVE YOU FAILED TO DO WHAT WAS NORMALLY EXPECTED FROM YOU BECAUSE OF DRINKING: 0

## 2021-10-06 ENCOUNTER — OFFICE VISIT (OUTPATIENT)
Dept: FAMILY MEDICINE CLINIC | Age: 66
End: 2021-10-06
Payer: MEDICARE

## 2021-10-06 VITALS
OXYGEN SATURATION: 98 % | BODY MASS INDEX: 43.09 KG/M2 | SYSTOLIC BLOOD PRESSURE: 130 MMHG | HEART RATE: 103 BPM | HEIGHT: 70 IN | DIASTOLIC BLOOD PRESSURE: 86 MMHG | TEMPERATURE: 97.6 F | WEIGHT: 301 LBS

## 2021-10-06 DIAGNOSIS — D75.1 POLYCYTHEMIA: ICD-10-CM

## 2021-10-06 DIAGNOSIS — I10 ESSENTIAL HYPERTENSION: ICD-10-CM

## 2021-10-06 DIAGNOSIS — M79.89 SYMPTOM OF LEG SWELLING: ICD-10-CM

## 2021-10-06 DIAGNOSIS — Z23 ENCOUNTER FOR IMMUNIZATION: ICD-10-CM

## 2021-10-06 DIAGNOSIS — Z00.00 ROUTINE GENERAL MEDICAL EXAMINATION AT A HEALTH CARE FACILITY: Primary | ICD-10-CM

## 2021-10-06 DIAGNOSIS — R06.09 DOE (DYSPNEA ON EXERTION): ICD-10-CM

## 2021-10-06 DIAGNOSIS — F32.0 CURRENT MILD EPISODE OF MAJOR DEPRESSIVE DISORDER WITHOUT PRIOR EPISODE (HCC): ICD-10-CM

## 2021-10-06 DIAGNOSIS — R73.9 HYPERGLYCEMIA: ICD-10-CM

## 2021-10-06 DIAGNOSIS — G47.33 OSA ON CPAP: ICD-10-CM

## 2021-10-06 DIAGNOSIS — Z99.89 OSA ON CPAP: ICD-10-CM

## 2021-10-06 DIAGNOSIS — I48.0 PAROXYSMAL ATRIAL FIBRILLATION (HCC): ICD-10-CM

## 2021-10-06 PROCEDURE — 4040F PNEUMOC VAC/ADMIN/RCVD: CPT | Performed by: FAMILY MEDICINE

## 2021-10-06 PROCEDURE — 1123F ACP DISCUSS/DSCN MKR DOCD: CPT | Performed by: FAMILY MEDICINE

## 2021-10-06 PROCEDURE — G0402 INITIAL PREVENTIVE EXAM: HCPCS | Performed by: FAMILY MEDICINE

## 2021-10-06 PROCEDURE — G0008 ADMIN INFLUENZA VIRUS VAC: HCPCS | Performed by: FAMILY MEDICINE

## 2021-10-06 PROCEDURE — 3017F COLORECTAL CA SCREEN DOC REV: CPT | Performed by: FAMILY MEDICINE

## 2021-10-06 PROCEDURE — 90694 VACC AIIV4 NO PRSRV 0.5ML IM: CPT | Performed by: FAMILY MEDICINE

## 2021-10-06 ASSESSMENT — VISUAL ACUITY
OS_CC: 20/20
OD_CC: 20/20

## 2021-10-06 NOTE — PATIENT INSTRUCTIONS
Personalized Preventive Plan for Epi Beckman - 10/6/2021  Medicare offers a range of preventive health benefits. Some of the tests and screenings are paid in full while other may be subject to a deductible, co-insurance, and/or copay. Some of these benefits include a comprehensive review of your medical history including lifestyle, illnesses that may run in your family, and various assessments and screenings as appropriate. After reviewing your medical record and screening and assessments performed today your provider may have ordered immunizations, labs, imaging, and/or referrals for you. A list of these orders (if applicable) as well as your Preventive Care list are included within your After Visit Summary for your review. Other Preventive Recommendations:    · A preventive eye exam performed by an eye specialist is recommended every 1-2 years to screen for glaucoma; cataracts, macular degeneration, and other eye disorders. · A preventive dental visit is recommended every 6 months. · Try to get at least 150 minutes of exercise per week or 10,000 steps per day on a pedometer . · Order or download the FREE \"Exercise & Physical Activity: Your Everyday Guide\" from [employer]. Call 8-641.228.6284 or search [employer] online. · You need 3732-3541 mg of calcium and 2066-8972 IU of vitamin D per day. It is possible to meet your calcium requirement with diet alone, but a vitamin D supplement is usually necessary to meet this goal.  · When exposed to the sun, use a sunscreen that protects against both UVA and UVB radiation with an SPF of 30 or greater. Reapply every 2 to 3 hours or after sweating, drying off with a towel, or swimming. · Always wear a seat belt when traveling in a car. Always wear a helmet when riding a bicycle or motorcycle. Heart-Healthy Diet   Sodium, Fat, and Cholesterol Controlled Diet       What Is a Heart Healthy Diet?    A heart-healthy diet is one that limits sodium , certain types of fat , and cholesterol . This type of diet is recommended for:   People with any form of cardiovascular disease (eg, coronary heart disease , peripheral vascular disease , previous heart attack , previous stroke )   People with risk factors for cardiovascular disease, such as high blood pressure , high cholesterol , or diabetes   Anyone who wants to lower their risk of developing cardiovascular disease   Sodium    Sodium is a mineral found in many foods. In general, most people consume much more sodium than they need. Diets high in sodium can increase blood pressure and lead to edema (water retention). On a heart-healthy diet, you should consume no more than 2,300 mg (milligrams) of sodium per dayabout the amount in one teaspoon of table salt. The foods highest in sodium include table salt (about 50% sodium), processed foods, convenience foods, and preserved foods. Cholesterol    Cholesterol is a fat-like, waxy substance in your blood. Our bodies make some cholesterol. It is also found in animal products, with the highest amounts in fatty meat, egg yolks, whole milk, cheese, shellfish, and organ meats. On a heart-healthy diet, you should limit your cholesterol intake to less than 200 mg per day. It is normal and important to have some cholesterol in your bloodstream. But too much cholesterol can cause plaque to build up within your arteries, which can eventually lead to a heart attack or stroke. The two types of cholesterol that are most commonly referred to are:   Low-density lipoprotein (LDL) cholesterol  Also known as bad cholesterol, this is the cholesterol that tends to build up along your arteries. Bad cholesterol levels are increased by eating fats that are saturated or hydrogenated. Optimal level of this cholesterol is less than 100. Over 130 starts to get risky for heart disease.    High-density lipoprotein (HDL) cholesterol  Also known as good cholesterol, this type of cholesterol actually carries cholesterol away from your arteries and may, therefore, help lower your risk of having a heart attack. You want this level to be high (ideally greater than 60). It is a risk to have a level less than 40. You can raise this good cholesterol by eating olive oil, canola oil, avocados, or nuts. Exercise raises this level, too. Fat    Fat is calorie dense and packs a lot of calories into a small amount of food. Even though fats should be limited due to their high calorie content, not all fats are bad. In fact, some fats are quite healthful. Fat can be broken down into four main types. The good-for-you fats are:   Monounsaturated fat  found in oils such as olive and canola, avocados, and nuts and natural nut butters; can decrease cholesterol levels, while keeping levels of HDL cholesterol high   Polyunsaturated fat  found in oils such as safflower, sunflower, soybean, corn, and sesame; can decrease total cholesterol and LDL cholesterol   Omega-3 fatty acids  particularly those found in fatty fish (such as salmon, trout, tuna, mackerel, herring, and sardines); can decrease risk of arrhythmias, decrease triglyceride levels, and slightly lower blood pressure   The fats that you want to limit are:   Saturated fat  found in animal products, many fast foods, and a few vegetables; increases total blood cholesterol, including LDL levels   Animal fats that are saturated include: butter, lard, whole-milk dairy products, meat fat, and poultry skin   Vegetable fats that are saturated include: hydrogenated shortening, palm oil, coconut oil, cocoa butter   Hydrogenated or trans fat  found in margarine and vegetable shortening, most shelf stable snack foods, and fried foods; increases LDL and decreases HDL     It is generally recommended that you limit your total fat for the day to less than 30% of your total calories.  If you follow an 1800-calorie heart healthy diet, for example, this would mean 60 grams of fat or less per day. Saturated fat and trans fat in your diet raises your blood cholesterol the most, much more than dietary cholesterol does. For this reason, on a heart-healthy diet, less than 7% of your calories should come from saturated fat and ideally 0% from trans fat. On an 1800-calorie diet, this translates into less than 14 grams of saturated fat per day, leaving 46 grams of fat to come from mono- and polyunsaturated fats.    Food Choices on a Heart Healthy Diet   Food Category   Foods Recommended   Foods to Avoid   Grains   Breads and rolls without salted tops Most dry and cooked cereals Unsalted crackers and breadsticks Low-sodium or homemade breadcrumbs or stuffing All rice and pastas   Breads, rolls, and crackers with salted tops High-fat baked goods (eg, muffins, donuts, pastries) Quick breads, self-rising flour, and biscuit mixes Regular bread crumbs Instant hot cereals Commercially prepared rice, pasta, or stuffing mixes   Vegetables   Most fresh, frozen, and low-sodium canned vegetables Low-sodium and salt-free vegetable juices Canned vegetables if unsalted or rinsed   Regular canned vegetables and juices, including sauerkraut and pickled vegetables Frozen vegetables with sauces Commercially prepared potato and vegetable mixes   Fruits   Most fresh, frozen, and canned fruits All fruit juices   Fruits processed with salt or sodium   Milk   Nonfat or low-fat (1%) milk Nonfat or low-fat yogurt Cottage cheese, low-fat ricotta, cheeses labeled as low-fat and low-sodium   Whole milk Reduced-fat (2%) milk Malted and chocolate milk Full fat yogurt Most cheeses (unless low-fat and low salt) Buttermilk (no more than 1 cup per week)   Meats and Beans   Lean cuts of fresh or frozen beef, veal, lamb, or pork (look for the word loin) Fresh or frozen poultry without the skin Fresh or frozen fish and some shellfish Egg whites and egg substitutes (Limit whole eggs to three per week) Tofu Nuts or amounts. For products low in sodium, look for sodium free, very low sodium, low sodium, no added salt, and unsalted   Skip the salt when cooking or at the table; if food needs more flavor, get creative and try out different herbs and spices. Garlic and onion also add substantial flavor to foods. Trim any visible fat off meat and poultry before cooking, and drain the fat off after claire. Use cooking methods that require little or no added fat, such as grilling, boiling, baking, poaching, broiling, roasting, steaming, stir-frying, and sauting. Avoid fast food and convenience food. They tend to be high in saturated and trans fat and have a lot of added salt. Talk to a registered dietitian for individualized diet advice. Last Reviewed: March 2011 Adelina Gao MS, MPH, RD   Updated: 3/29/2011   ·     Heart-Healthy Diet   Sodium, Fat, and Cholesterol Controlled Diet       What Is a Heart Healthy Diet? A heart-healthy diet is one that limits sodium , certain types of fat , and cholesterol . This type of diet is recommended for:   People with any form of cardiovascular disease (eg, coronary heart disease , peripheral vascular disease , previous heart attack , previous stroke )   People with risk factors for cardiovascular disease, such as high blood pressure , high cholesterol , or diabetes   Anyone who wants to lower their risk of developing cardiovascular disease   Sodium    Sodium is a mineral found in many foods. In general, most people consume much more sodium than they need. Diets high in sodium can increase blood pressure and lead to edema (water retention). On a heart-healthy diet, you should consume no more than 2,300 mg (milligrams) of sodium per dayabout the amount in one teaspoon of table salt. The foods highest in sodium include table salt (about 50% sodium), processed foods, convenience foods, and preserved foods. Cholesterol    Cholesterol is a fat-like, waxy substance in your blood. Omega-3 fatty acids  particularly those found in fatty fish (such as salmon, trout, tuna, mackerel, herring, and sardines); can decrease risk of arrhythmias, decrease triglyceride levels, and slightly lower blood pressure   The fats that you want to limit are:   Saturated fat  found in animal products, many fast foods, and a few vegetables; increases total blood cholesterol, including LDL levels   Animal fats that are saturated include: butter, lard, whole-milk dairy products, meat fat, and poultry skin   Vegetable fats that are saturated include: hydrogenated shortening, palm oil, coconut oil, cocoa butter   Hydrogenated or trans fat  found in margarine and vegetable shortening, most shelf stable snack foods, and fried foods; increases LDL and decreases HDL     It is generally recommended that you limit your total fat for the day to less than 30% of your total calories. If you follow an 1800-calorie heart healthy diet, for example, this would mean 60 grams of fat or less per day. Saturated fat and trans fat in your diet raises your blood cholesterol the most, much more than dietary cholesterol does. For this reason, on a heart-healthy diet, less than 7% of your calories should come from saturated fat and ideally 0% from trans fat. On an 1800-calorie diet, this translates into less than 14 grams of saturated fat per day, leaving 46 grams of fat to come from mono- and polyunsaturated fats.    Food Choices on a Heart Healthy Diet   Food Category   Foods Recommended   Foods to Avoid   Grains   Breads and rolls without salted tops Most dry and cooked cereals Unsalted crackers and breadsticks Low-sodium or homemade breadcrumbs or stuffing All rice and pastas   Breads, rolls, and crackers with salted tops High-fat baked goods (eg, muffins, donuts, pastries) Quick breads, self-rising flour, and biscuit mixes Regular bread crumbs Instant hot cereals Commercially prepared rice, pasta, or stuffing mixes   Vegetables   Most fresh, frozen, and low-sodium canned vegetables Low-sodium and salt-free vegetable juices Canned vegetables if unsalted or rinsed   Regular canned vegetables and juices, including sauerkraut and pickled vegetables Frozen vegetables with sauces Commercially prepared potato and vegetable mixes   Fruits   Most fresh, frozen, and canned fruits All fruit juices   Fruits processed with salt or sodium   Milk   Nonfat or low-fat (1%) milk Nonfat or low-fat yogurt Cottage cheese, low-fat ricotta, cheeses labeled as low-fat and low-sodium   Whole milk Reduced-fat (2%) milk Malted and chocolate milk Full fat yogurt Most cheeses (unless low-fat and low salt) Buttermilk (no more than 1 cup per week)   Meats and Beans   Lean cuts of fresh or frozen beef, veal, lamb, or pork (look for the word loin) Fresh or frozen poultry without the skin Fresh or frozen fish and some shellfish Egg whites and egg substitutes (Limit whole eggs to three per week) Tofu Nuts or seeds (unsalted, dry-roasted), low-sodium peanut butter Dried peas, beans, and lentils   Any smoked, cured, salted, or canned meat, fish, or poultry (including freeman, chipped beef, cold cuts, hot dogs, sausages, sardines, and anchovies) Poultry skins Breaded and/or fried fish or meats Canned peas, beans, and lentils Salted nuts   Fats and Oils   Olive oil and canola oil Low-sodium, low-fat salad dressings and mayonnaise   Butter, margarine, coconut and palm oils, freeman fat   Snacks, Sweets, and Condiments   Low-sodium or unsalted versions of broths, soups, soy sauce, and condiments Pepper, herbs, and spices; vinegar, lemon, or lime juice Low-fat frozen desserts (yogurt, sherbet, fruit bars) Sugar, cocoa powder, honey, syrup, jam, and preserves Low-fat, trans-fat free cookies, cakes, and pies Praveen and animal crackers, fig bars, alphonso snaps   High-fat desserts Broth, soups, gravies, and sauces, made from instant mixes or other high-sodium ingredients Salted snack foods Canned olives Meat tenderizers, seasoning salt, and most flavored vinegars   Beverages   Low-sodium carbonated beverages Tea and coffee in moderation Soy milk   Commercially softened water   Suggestions   Make whole grains, fruits, and vegetables the base of your diet. Choose heart-healthy fats such as canola, olive, and flaxseed oil, and foods high in heart-healthy fats, such as nuts, seeds, soybeans, tofu, and fish. Eat fish at least twice per week; the fish highest in omega-3 fatty acids and lowest in mercury include salmon, herring, mackerel, sardines, and canned chunk light tuna. If you eat fish less than twice per week or have high triglycerides, talk to your doctor about taking fish oil supplements. Read food labels. For products low in fat and cholesterol, look for fat free, low-fat, cholesterol free, saturated fat free, and trans fat freeAlso scan the Nutrition Facts Label, which lists saturated fat, trans fat, and cholesterol amounts. For products low in sodium, look for sodium free, very low sodium, low sodium, no added salt, and unsalted   Skip the salt when cooking or at the table; if food needs more flavor, get creative and try out different herbs and spices. Garlic and onion also add substantial flavor to foods. Trim any visible fat off meat and poultry before cooking, and drain the fat off after claire. Use cooking methods that require little or no added fat, such as grilling, boiling, baking, poaching, broiling, roasting, steaming, stir-frying, and sauting. Avoid fast food and convenience food. They tend to be high in saturated and trans fat and have a lot of added salt. Talk to a registered dietitian for individualized diet advice. Last Reviewed: March 2011 Padmini Vale MS, MPH, RD   Updated: 3/29/2011   ·     Alcohol Abuse and Alcoholism   (Alcohol Dependence; Alcohol Use Disorder)       Definition   Alcohol abuse is excessive or problematic alcohol consumption.  It can progress to alcoholism. Alcoholism is chronic alcohol abuse that results in a physical dependence on alcohol (withdrawal symptoms) and an inability to stop or limit drinking. Causes   Several factors can contribute to alcohol abuse and alcoholism, including:   Genes   Brain chemicals that may be different than normal   Social pressure   Emotional stress   Pain   Depression and other mental health problems   Problem drinking behaviors learned from family or friends   Risk Factors   These factors increase your chance of developing alcoholism. Tell your doctor if you have any of these risk factors:   Sex: male   Family members who abuse alcohol (especially men whose fathers or brothers are alcoholic)   Starting to use alcohol at an early age (younger than 15)   Using illicit drugs or non-medical use of prescription drugs   Peer pressure   Easy access to alcoholic beverages   Psychiatric disorders, such as depression or anxiety   Smoking   Symptoms   It is common to deny an alcohol problem. Alcohol abuse can occur without physical dependence. Alcohol abuse symptoms include:   Repeated work, school, or home problems due to drinking   Risking physical safety   Recurring trouble with the law, often including drinking and driving   Continuing to drink despite alcohol-related difficulties   Symptoms of alcoholism include:   Craving a drink   Unable to stop or limit drinking   Needing greater amounts of alcohol to feel the same effect   Giving up activities in order to drink or recover from alcohol   Drinking that continues even when it causes or worsens health problems   Wanting to stop or reduce drinking, but not being able   Withdrawal symptoms if alcohol is stopped include:   Nausea   Sweating   Shaking   Anxiety   Increased blood pressure   Seizures ( delirium tremens [DTs])   The brain, nervous system, heart, liver, stomach, gastrointestinal tract, and pancreas can all be damaged by alcoholism.      Some of the Organs Damaged in Alcohol Abuse        2011 [address] [facility].   [facility]   Doctors ask a series of questions to assess possible alcohol-related problems, including:   Have you tried to reduce your drinking? Have you felt bad about drinking? Have you been annoyed by another person's criticism of your drinking? Do you drink in the morning to steady your nerves or cure a hangover? Do you have problems with a job, your family, or the law? Do you drive under the influence of alcohol? Blood tests may be done to:   Look at the size of your red blood cells and to check for a substance called carbohydrate-deficient transferrin   Check for alcohol-related liver disease and other health problems   Treatment   Treatment for alcohol abuse or dependence is aimed at teaching patients how to manage the disease. Most professionals believe that this means giving up alcohol completely and permanently. The first and most important step is recognizing a problem exists. Successful treatment depends on your desire to change. Your doctor can help you withdraw from alcohol safely. This could require hospitalization in a detoxification center. They will carefully monitor you for side effects. You may need medication while you are undergoing detoxification. Treatments include:   Medications    Drugs can help relieve some of the symptoms of withdrawal and help prevent relapse. The doctor may prescribe medication to reduce cravings for alcohol. Medications used to treat alcoholism and to try to prevent drinking include:   Naltrexone (ReVia, Vivitrol)blocks the high that makes you crave alcohol   Disulfiram (Antabuse)makes you very sick if you drink alcohol   Acamprosate (Campral)reduces your craving for alcohol   A study showed that an anticonvulsant drug, topiramate (Topamax), may reduce alcohol dependence. Education and Counseling    Therapy helps you to recognize alcohol's dangers.  Education raises awareness of underlying issues and lifestyles that promote drinking. In therapy, you work to improve coping skills and learn other ways of dealing with stress or pain. Mentoring and Community Help    Alcoholics Anonymous (AA) helps many people to stop drinking and stay sober. Members meet regularly and support each other. Your family members may also benefit from attending meetings of Marcus. Living with an alcoholic can be a painful, stressful situation. Here are some general statistics on treatment outcomes of individuals one year after attempting to stop drinkin/3 remained abstinent   1/3 resumed drinking but at a lower level   1/3 relapsed completely   If you are diagnosed with alcohol abuse or alcoholism, follow your doctor's instructions . Prevention   Realizing that alcohol causes problems helps some people avoid it. Suggestions to decrease the risk of alcohol abuse and dependence include:   Socialize without alcohol. Avoid going to bars. Do not keep alcohol in your home. Avoid situations and people that encourage drinking. Make new nondrinking friends. Do fun things that do not involve alcohol. Avoid reaching for a drink when stressed or upset. Limit your alcohol intake to a moderate level. Moderate is two or fewer drinks per day for men and one or fewer for women and older adults   A 12-ounce bottle of beer, a five-ounce glass of wine, or 1.5 ounces of liquor is considered one drink   If you are a parent, having a good relationship with your children may reduce their risk of alcohol abuse. Most professionals who treat alcohol abuse and dependence believe that complete abstinence is the only effective prevention. Last Reviewed: 2010 Capo Holbrook MD, PhD, MPH   Updated: 2010   ·   Patient information: Weight loss treatments    INTRODUCTION -- Obesity is a major international problem, and Americans are among the heaviest people in the world.  The percentage of obese people Many people have a \"dream\" weight that is difficult or impossible to achieve. People at high risk of developing diabetes who are able to lose 5 percent of their body weight and maintain this weight will reduce their risk of developing diabetes by about 50 percent and reduce their blood pressure. This is a success. Losing more than 15 percent of your body weight and staying at this weight is an extremely good result, even if you never reach your \"dream\" or \"ideal\" weight. LIFESTYLE CHANGES -- Programs that help you to change your lifestyle are usually run by psychologists or other professionals. The goals of lifestyle changes are to help you change your eating habits, become more active, and be more aware of how much you eat and exercise, helping you to make healthier choices. This type of treatment can be broken down into three steps: The triggers that make you want to eat   Eating   What happens after you eat  Triggers to eat -- Determining what triggers you to eat involves figuring out what foods you eat and where and when you eat. To figure out what triggers you to eat, keep a record for a few days of everything you eat, the places where you eat, how often you eat, and the emotions you were feeling when you ate. For some people, the trigger is related to a certain time of day or night. For others, the trigger is related to a certain place, like sitting at a desk working. Eating -- You can change your eating habits by breaking the chain of events between the trigger for eating and eating itself. There are many ways to do this. For instance, you can:  Limit where you eat to a few places (eg, dining room)   Restrict the number of utensils (eg, only a fork) used for eating   Drink a sip of water between each bite   Chew your food a certain number of times   Get up and stop eating every few minutes  What happens after you eat -- Rewarding yourself for good eating behaviors can help you to develop better habits. This is not a reward for weight loss; instead, it is a reward for changing unhealthy behaviors. Do not use food as a reward. Some people find money, clothing, or personal care (eg, a hair cut, manicure, or massage) to be effective rewards. Treat yourself immediately after making better eating choices to reinforce the value of the good behavior. You need to have clear behavior goals, and you must have a time frame for reaching your goals. Reward small changes along the way to your final goal.  Other factors that contribute to successful weight loss -- Changing your behavior involves more than just changing unhealthy eating habits; it also involves finding people around you to support your weight loss, reducing stress, and learning to be strong when tempted by food. Establish a \"pillo\" system -- Having a friend or family member available to provide support and reinforce good behavior is very helpful. The support person needs to understand your goals. Learn to be strong -- Learning to be strong when tempted by food is an important part of losing weight. As an example, you will need to learn how to say \"no\" and continue to say no when urged to eat at parties and social gatherings. Develop strategies for events before you go, such as eating before you go or taking low-calorie snacks and drinks with you. Develop a support system -- Having a support system is helpful when losing weight. This is why many [employer] groups are successful. Family support is also essential; if your family does not support your efforts to lose weight, this can slow your progress or even keep you from losing weight. Positive thinking -- People often have conversations with themselves in their head; these conversations can be positive or negative. If you eat a piece of cake that was not planned, you may respond by thinking, \"Oh, you stupid idiot, you've blown your diet! \" and as a result, you may eat more cake.   A positive thought for the same event could be, \"Well, I ate cake when it was not on my plan. Now I should do something to get back on track. \" A positive approach is much more likely to be successful than a negative one. Reduce stress -- Although stress is a part of everyday life, it can trigger uncontrolled eating in some people. It is important to find a way to get through these difficult times without eating or by eating low-calorie food, like raw vegetables. It may be helpful to imagine a relaxing place that allows you to temporarily escape from stress. With deep breaths and closed eyes, you can imagine this relaxing place for a few minutes. Self-help programs -- Self-help programs like Bindo Watchers®, Overeaters Anonymous®, and [employer] ([employer])© work for some people. As with all weight loss programs, you are most likely to be successful with these plans if you make long-term changes in how you eat. CHOOSING A DIET -- A calorie is a unit of energy found in food. Your body needs calories to function. The goal of any diet is to burn up more calories than you eat. How quickly you lose weight depends upon several factors, such as your age, gender, and starting weight. Older people have a slower metabolism than young people, so they lose weight more slowly. Men lose more weight than women of similar height and weight when dieting because they use more energy. People who are extremely overweight lose weight more quickly than those who are only mildly overweight. Try not to drink alcohol or drinks with added sugar, and most sweets (candy, cakes, cookies), since they rarely contain important nutrients. Portion-controlled diets -- One simple way to diet is to buy packaged foods, like frozen low-calorie meals or meal-replacement canned drinks.  A typical meal plan for one day may include:  A meal-replacement drink or breakfast bar for breakfast   A meal-replacement drink or a frozen low-calorie (250 to 350 calories) meal for lunch   A frozen low-calorie meal or other prepackaged, calorie-controlled meal, along with extra vegetables for dinner  This would give you 1000 to 1500 calories per day. Low-fat diet -- To reduce the amount of fat in your diet, you can:  Eat low-fat foods. Low-fat foods are those that contain less than 30 percent of calories from fat. Fat is listed on the food facts label (figure 1). Count fat grams. For a 1500 calorie diet, this would mean about 45 g or fewer of fat per day. Low-carbohydrate diet -- Low- and very-low-carbohydrate diets (eg, Atkins diet, [employer]) have become popular ways to lose weight quickly. With a very-low-carbohydrate diet, you eat between 0 and 60 grams of carbohydrates per day (a standard diet contains 200 to 300 grams of carbohydrates)   With a low-carbohydrate diet, you eat between 60 and 130 grams of carbohydrates per day  Carbohydrates are found in fruits, vegetables, and grains (including breads, rice, pasta, and cereal), alcoholic beverages, and in dairy products. Meat and fish do not contain carbohydrates. Side effects of very-low-carbohydrate diets can include constipation, headache, bad breath, muscle cramps, diarrhea, and weakness. Mediterranean diet -- The term \"Mediterranean diet\" refers to a way of eating that is common in [place] regions around the [facility]. Although there is some variation in Mediterranean diets, there are some similarities. Most Mediterranean diets include:  A high level of monounsaturated fats (from olive or canola oil, walnuts, pecans, almonds) and a low level of saturated fats (from butter)   A high amount of vegetables, fruits, legumes, and grains (7 to 10 servings of fruits and vegetables per day)   A moderate amount of milk and dairy products, mostly in the form of cheese. Use low-fat dairy products (skim milk, fat-free yogurt, low-fat cheese). A relatively low amount of red meat and meat products.  Substitute fish or poultry for red meat. For those who drink alcohol, a modest amount (mainly as red wine) may help to protect against cardiovascular disease. A modest amount is up to one (4 ounce) glass per day for women and up to two glasses per day for men. Which diet is best? -- Studies have compared different diets, including:  Very-low-carbohydrate (Atkins)   Macronutrient balance controlling glycemic load (Zone®)   Reduced-calorie (Weight Watchers®)   Very-low-fat (Ornish)  No one diet is \"best\" for weight loss. Any diet will help you to lose weight if you stick with the diet. Therefore, it is important to choose a diet that includes foods you like. Fad diets -- Fad diets often promise quick weight loss (more than 1 to 2 pounds per week) and may claim that you do not need to exercise or give up favorite foods. Some fad diets cost a lot of money, because you have to pay for seminars or pills. Fad diets generally lack any scientific evidence that they are safe and effective, but instead rely on \"before\" and \"after\" photos or testimonials. Diets that sound too good to be true usually are. These plans are a waste of time and money and are not recommended. A doctor, nurse, or nutritionist can help you find a safe and effective way to lose weight and keep it off. WEIGHT LOSS MEDICINES -- Taking a weight loss medicine may be helpful when used in combination with diet, exercise, and lifestyle changes. However, it is important to understand the risks and benefits of these medicines. It is also important to be realistic about your goal weight using a weight loss medicine; you may not reach your \"dream\" weight, but you may be able to reduce your risk of diabetes or heart disease.    Weight loss medicines may be recommended for people who have not been able to lose weight with diet and exercise who have a:  BMI of 30 or more    BMI between 27 and 29.9 and have other medical problems, such as diabetes, high cholesterol, or high blood pressure  Two weight loss medicines are approved in the [facility] for long-term use. These are sibutramine and orlistat. Other weight loss medicines (phentermine, diethylpropion) are available but are only approved for short-term use (up to 12 weeks). Sibutramine -- Sibutramine (Meridia®, Reductil®) is a medicine that reduces your appetite. In people who take the medicine for one year, the average weight loss is 10 percent of the initial body weight (5 percent more than those who took a placebo treatment). Side effects of sibutramine include insomnia, dry mouth, and constipation. Increases in blood pressure can occur. Therefore, blood pressure is usually monitored during treatment. There is no evidence that sibutramine causes heart or lung problems (like dexfenfluramine and fenfluramine (Phen/Fen)). However, experts agree that sibutramine should not used by people with coronary heart disease, heart failure, uncontrolled hypertension, stroke, irregular heart rhythms, or peripheral vascular disease (poor circulation in the legs). Orlistat -- Orlistat (Xenical® 120 mg capsules) is a medicine that reduces the amount of fat your body absorbs from the foods you eat. A lower-dose version is now available without a prescription (To® 60 mg capsules) in many countries, including the [facility]. The medicine is recommended three times per day, taken with a meal; you can skip a dose if you skip a meal or if the meal contains no fat. After one year of treatment with orlistat, the average weight loss is approximately 8 to 10 percent of initial body weight (4 percent more than in those who took a placebo). Cholesterol levels often improve, and blood pressure sometimes falls. In people with diabetes, orlistat may help control blood sugar levels. Side effects occur in 15 to 10 percent of people and may include stomach cramps, gas, diarrhea, leakage of stool, or oily stools.  These problems are more likely when you take orlistat with a high-fat meal (if more than 30 percent of calories in the meal are from fat). Side effects usually improve as you learn to avoid high-fat foods. Severe liver injury has been reported rarely in patients taking orlistat, but it is not known if orlistat caused the liver problems. Diet supplements -- Diet supplements are widely used by people who are trying to lose weight, although the safety and efficacy of these supplements are often unproven. A few of the more common diet supplements are discussed below; none of these are recommended because they have not been studied carefully, and there is no proof they are safe or effective. Chitosan and wheat dextrin are ineffective for weight loss, and their use is not recommended. Ephedra, a compound related to ephedrine, is no longer available in the [place] due to safety concerns. Many nonprescription diet pills previously contained ephedra. Although some studies have shown that ephedra helps with weight loss, there can be serious side effects (psychiatric symptoms, palpitations, and stomach upset), including death. There are not enough data about the safety and efficacy of chromium, ginseng, glucomannan, green tea, hydroxycitric acid, L carnitine, psyllium, pyruvate supplements, Kimball wort, and conjugated linoleic acid. Two supplements from [facility], [address] [place] (also known as the Ruthy Garcia 15 pill) and Herbathin dietary supplement, have been shown to contain prescription drugs. Hoodia gordonii is a dietary supplement derived from a plant in [place]. It is not recommended because there is no proof that it is safe or effective. Bitter orange (Citrus aurantium) can increase your heart rate and blood pressure and is not recommended.   SHOULD I HAVE SURGERY TO LOSE WEIGHT? -- Weight loss surgery is recommended ONLY for people with one of the following:  Severe obesity (body mass index above 40) (calculator 1 and calculator 2) who have not responded to diet, exercise, or weight loss medicines   Body mass index between 35 and 40, along with a serious medical problem (including diabetes, severe joint pain, or sleep apnea) that would improve with weight loss  You should be sure that you understand the potential risks and benefits of weight loss surgery. You must be motivated and willing to make lifelong changes in how you eat to reach and maintain a healthier weight after surgery. You must also be realistic about weight loss after surgery (see 'Effectiveness of weight loss surgery' below). PREPARING FOR WEIGHT LOSS SURGERY -- Most people who have weight loss surgery will meet with several specialists before surgery is scheduled. This often includes a dietitian, mental health counselor, a doctor who specializes in care of obese people, and a surgeon who performs weight loss surgery (bariatric surgeon). You may need to work with these providers for several weeks or months before surgery. The nutritionist will explain what and how much you will be able to eat after surgery. You may also need to lose a small amount of weight before surgery. The mental health specialist will help you to cope with stress and other factors that can make it harder to lose weight or trigger you to eat   The medical doctor will determine whether you need other tests, counseling, or treatment before surgery. He or she might also help you begin a medical weight loss program so that you can lose some weight before surgery. The bariatric surgeon will meet with you to discuss the surgeries available to treat obesity. He or she will also make sure you are a good candidate for surgery. TYPES OF WEIGHT LOSS SURGERY -- There are several types of weight loss surgeries, the most common being lap banding, gastric bypass, and gastric sleeve.   Lap banding -- Laparoscopic adjustable gastric banding (LAGB), or lap banding, is a surgery that uses an adjustable band around the opening to the stomach (figure 1). This reduces the amount of food that you can eat at one time. Lap banding is done through small incisions, with a laparoscope. The band can be adjusted after surgery, allowing you to eat more or less food. Adjustments to the size and tightness of the band are made by using a needle to add or remove fluid from a port (a small container under the skin that is connected to the band). Adding fluid to the band makes it tighter which restricts the amount of food you can eat and may help you to lose more weight. Lap banding is a popular choice because it is relatively simple to perform, can be adjusted or removed, and has a low risk of serious complications immediately after surgery. However, weight loss with the lap band depends on your ability to follow the program closely. You will need to prepare nutritious meals that [facility] with\" the band, not against it. For example, the lap band will not work well if you eat or drink a large amount of liquid calories (like ice cream). The band will not help you to feel full when you eat/drink liquid calories. Weight loss ranges from 45 to 75 percent after two years. As an example, a person who is 120 pounds overweight could expect to lose approximately 54 to 90 pounds in the two years after lap banding. Gastric bypass -- Vic-en-Y gastric bypass, also called gastric bypass, helps you to lose weight by reducing the amount of food you can eat and reducing the number of calories and nutrients you absorb from the food you eat. To perform gastric bypass, a surgeon creates a small stomach pouch by dividing the stomach and attaching it to the small intestine. This helps you to lose weight in two ways: The smaller stomach can hold less food than before surgery. This causes you to feel full after eating a very small amount of food or liquid. Over time, the pouch might stretch, allowing you to eat more food.    The body absorbs fewer calories, since food bypasses most of the stomach as well as the upper small intestine. This new arrangement seems to decrease your appetite and change how you break down foods by changing the release of various hormones. Gastric bypass can be performed as open surgery (through an incision on the abdomen) or laparoscopically, which uses smaller incisions and smaller instruments. Both the laparoscopic and open techniques have risks and benefits. You and your surgeon should work together to decide which surgery, if any, is right for you. Gastric bypass has a high success rate, and people lose an average of 62 to 68 percent of their excess body weight in the first year. Weight loss typically levels off after one to two years, with an overall excess weight loss between 50 and 75 percent. For a person who is 120 pounds overweight, an average of 60 to 90 pounds of weight loss would be expected. Gastric sleeve -- Gastric sleeve, also known as sleeve gastrectomy, is a surgery that reduces the size of the stomach and makes it into a narrow tube (figure 3). The new stomach is much smaller and produces less of the hormone (ghrelin) that causes hunger, helping you feel satisfied with less food. Sleeve gastrectomy is safer than gastric bypass because the intestines are not rearranged, and there is less chance of malnutrition. It also appears to control hunger better than lap banding. It might be safer than the lap banding because no foreign materials are used. The gastric sleeve has a good success rate, and people lose an average of 33 percent of their excess body weight in the first year. For a person who is 120 pounds overweight, this would mean losing about 40 pounds in the first year. WEIGHT LOSS SURGERY COMPLICATIONS -- A variety of complications can occur with weight loss surgery. The risks of surgery depend upon which surgery you have and any medical problems you had before surgery.  Some of the more common early surgical complications (one to six weeks after surgery) include:  Bleeding   Infection   Blockage or tear in the bowels   Need for further surgery  Important medical complications after surgery can include blood clots in the legs or lungs, heart attack, pneumonia, and urinary tract infection. Complications are less likely when surgery is performed in centers that are experienced in weight loss surgery. In general, centers with experience in weight loss surgery have:  Board-certified doctors and surgeons   A team of support staff (dietitians, counselors, nurses)   Long-term follow-up after surgery   Hospital staff experienced with the care of weight loss patients. This includes nurses who are trained in the care of patients immediately after surgery and anesthesiologists who are experienced in caring for the morbidly obese. EFFECTIVENESS OF WEIGHT LOSS SURGERY -- The goal of weight loss surgery is to reduce the risk of illness or death associated with obesity. Weight loss surgery can also help you to feel and look better, reduce the amount of money you spend on medicines, and cut down on sick days. As an example, weight loss surgery can improve health problems related to obesity (diabetes, high blood pressure, high cholesterol, sleep apnea) to the point that you need less or no medicine. Finally, weight loss surgery might reduce your risk of developing heart disease, cancer, and certain infections. AFTER WEIGHT LOSS SURGERY -- You will need to stay in the hospital until your team feels that it is safe for you to leave (on average, one to three days). Do not drive if you are taking prescription pain medicine. Begin exercising as soon as possible once you have healed; most weight loss centers will design an exercise program for you. Once you are home, it is important to eat and drink exactly what your doctor and dietitian recommend.  You will see your doctor, nurse, and dietitian on a regular basis after surgery to monitor your health, diet, and weight loss. You will be able to slowly increase how much you eat over time, although it will always be important to:  Eat small, frequent meals and not skip meals   Chew your food slowly and completely   Avoid eating while \"distracted\" (such as eating while watching TV)   Stop eating when you feel full   Drink liquids at least 30 minutes before or after eating   Avoid foods high in fat or sugar   Take vitamin supplements, as recommended  It can take several months to learn to listen to your body so that you know when you are hungry and when you are full. You may dislike foods you previously loved, and you may begin to prefer new foods. This can be a frustrating process for some people, so talk to your dietitian if you are having trouble. It usually takes between one and two years to lose weight after surgery. After reaching their goal weight, some people have plastic surgery (called \"body contouring\") to remove excess skin from the body, particularly in the abdominal area. Before you decide to have weight loss surgery, you must commit to staying healthy for life. This includes following up with your healthcare team, exercising most days of the week, and eating a sensible diet every day. It can be difficult to develop new eating and exercise habits after weight loss surgery, and you will have to work hard to stick to your goals. Recovering from surgery and losing weight can be stressful and emotional, and it is important to have the support of family and friends. Working with a , therapist, or support group can help you through the ups and downs. WHERE TO GET MORE INFORMATION -- Your healthcare provider is the best source of information for questions and concerns related to your medical problem. This article will be updated as needed every four months on our Web site ([email or web address])  ·     High-Fiber Diet     What Is Fiber?    Dietary fiber is a form of garrido beans All nuts and seeds, especially almonds, peanuts, [place] nuts, cashews, peanut butter, walnuts, sesame and sunflower seeds All meat, poultry, fish, and eggs   Increase fiber in meat dishes by adding garrido beans, kidney beans, black-eyed peas, bran, or oatmeal. If you are following a low-fat diet, use nuts and seeds only in moderation. Fats and Oils   All in moderation   Fats and oils do not provide fiber   Snacks, Sweets, and Condiments   Fruit Nuts Popcorn, whole-wheat pretzels, or trail mix made with dried fruits, nuts, and seeds Cakes, breads, and cookies made with oatmeal or whole-wheat flour   Most snack foods do not provide much fiber. Choose snacks with at least 2 grams of fiber per serving. Last Reviewed: March 2011 Driss Menon MS, MPH, RD   Updated: 3/29/2011   ·     Safer Sex: After Your Visit  Your Care Instructions  Safer sex is a way to reduce your risk of getting an infection spread through sex. It can also help prevent pregnancy. Most infections that are spread through sex, also called sexually transmitted infections or STIs, can be cured. But some can decrease your chances of getting pregnant if they are not treated early. Others, such as herpes, have no cure. And some, such as HIV, can be deadly. Several products can help you practice safer sex and reduce your chance of STIs. One of the best is a condom. There are condoms for men and for women. The female condom is a tube of soft plastic with a closed end that is placed deep into the vagina. You can use a special rubber sheet (dental dam) for protection during oral sex. Latex gloves can keep your hands from touching blood, semen, or other body fluids that can carry infections. Remember that birth control methods such as diaphragms, IUDs, foams, and birth control pills do not stop you from getting STIs. Follow-up care is a key part of your treatment and safety.  Be sure to make and go to all appointments, and call your doctor if you are having problems. Its also a good idea to know your test results and keep a list of the medicines you take. How can you care for yourself at home? Think about getting shots to prevent hepatitis A and hepatitis B. These two diseases can be spread through sex. You also can get hepatitis A if you eat infected food. Use condoms or female condoms each time and every time you have sex. Learn the right way to use a male condom:  Condoms come in several sizes. Make sure you use the right size. A condom that is too small can break easily. A condom that is too big can slip off during sex. Use a new condom each time you have sex. Be careful not to poke a hole in the condom when you open the wrapper. Squeeze the tip of the condom to keep out air. Pull down the loose skin (foreskin) from the head of an uncircumcised penis. While squeezing the tip of the condom, unroll it all the way down to the base of the firm penis. Never use petroleum jelly (such as Vaseline), grease, hand lotion, baby oil, or anything with oil in it. These products can make holes in the condom. After sex, hold the condom on your penis as you remove your penis from your partner. This will keep semen from spilling out of the condom. Learn to use a female condom:  You can put in a female condom up to 8 hours before sex. Squeeze the smaller ring at the closed end and insert it deep into the vagina. The larger ring at the open end should stay outside the vagina. During sex, make sure the penis goes into the condom. After the penis is removed, close the open end of the condom by twisting it. Remove the condom. Do not use a female condom and male condom at the same time. Do not have sex with anyone who has symptoms of an STI, such as sores on the genitals or mouth. The herpes virus that causes cold sores can spread to and from the penis and vagina. Do not drink a lot of alcohol or use drugs before sex.  This can cause you to let down your guard and not practice safer sex. Having one sex partner (who does not have STIs and does not have sex with anyone else) is a sure way to avoid STIs. Talk to your partner before you have sex. Find out if he or she has or is at risk for any STI. Keep in mind that a person may be able to spread an STI even if he or she does not have symptoms. You and your partner may want to get an HIV test. You should get tested again 6 months later. © 2120-0715 Healthwise, Incorporated. Care instructions adapted under license by [facility]. This care instruction is for use with your licensed healthcare professional. If you have questions about a medical condition or this instruction, always ask your healthcare professional. [address] any warranty or liability for your use of this information. Content Version: 9.4.09937; Last Revised: January 19, 2012              ·     Keep Your Memory Garon Wharton       Many factors can affect your ability to remembera hectic lifestyle, aging, stress, chronic disease, and certain medicines. But, there are steps you can take to sharpen your mind and help preserve your memory. Challenge Your Brain   Regularly challenging your mind may help keeps it in top shape. Good mental exercises include:   Crossword puzzlesUse a dictionary if you need it; you will learn more that way. Brainteasers Try some! Crafts, such as wood working and sewing   Hobbies, such as gardening and building model airplanes   SocializingVisit old friends or join groups to meet new ones.    Reading   Learning a new language   Taking a class, whether it be art history or yumiko chi   TravelingExperience the food, history, and culture of your destination   Learning to use a computer   Going to museums, the theater, or thought-provoking movies   Changing things in your daily life, such as reversing your pattern in the grocery store or brushing your teeth using your nondominant hand   Use Memory Aids   There is no need to remember every detail on your own. These memory aids can help:   Calendars and day planners   Electronic organizers to store all sorts of helpful informationThese devices can \"beep\" to remind you of appointments. A book of days to record birthdays, anniversaries, and other occasions that occur on the same date every year   Detailed \"to-do\" lists and strategically placed sticky notes   Quick \"study\" sessionsBefore a gathering, review who will be there so their names will be fresh in your mind. Establish routinesFor example, keep your keys, wallet, and umbrella in the same place all the time or take medicine with your 8:00 AM glass of juice   Live a Healthy Life   Many actions that will keep your body strong will do the same for your mind. For example:   Talk to Your Doctor About Herbs and Supplements    Malnutrition and vitamin deficiencies can impair your mental function. For example, vitamin B12 deficiency can cause a range of symptoms, including confusion. But, what if your nutritional needs are being met? Can herbs and supplements still offer a benefit? Researchers have investigated a range of natural remedies, such as ginkgo , ginseng , and the supplement phosphatidylserine (PS). So far, though, the evidence is inconsistent as to whether these products can improve memory or thinking. If you are interested in taking herbs and supplements, talk to your doctor first because they may interact with other medicines that you are taking. Exercise Regularly    Among the many benefits of regular exercise are increased blood flow to the brain and decreased risk of certain diseases that can interfere with memory function. One study found that even moderate exercise has a beneficial effect.  Examples of \"moderate\" exercise include:   Playing 18 holes of golf once a week, without a cart   Playing tennis twice a week   Walking one mile per day   Manage Stress    It can be tough to remember medicines can cause dizziness. If you have problems with sleep, talk to your doctor. Limit your intake of alcohol. If necessary, use a cane or walker to help maintain your balance. Wear supportive, rubber-soled shoes, even at home. If you live in a region that gets wintry weather, you may want to put special cleats on your shoes to prevent you from slipping on the snow and ice. Exercise regularly to help maintain muscle tone, agility, and balance. Always hold the banister when going up or down stairs. Also, use  bars when getting in or out of the bath or shower, or using the toilet. To avoid dizziness, get up slowly from a lying down position. Sit up first, dangling your legs for a minute or two before rising to a standing position. Overall Home Safety Check   According to the Consumer Product Safety Commision's \"Older Consumer Home Safety Checklist,\" it is important to check for potential hazards in each room. And remember, proper lighting is an essential factor in home safety. If you cannot see clearly, you are more likely to fall. Important questions to ask yourself include:   Are lamp, electric, extension, and telephone cords placed out of the flow of traffic and maintained in good condition? Have frayed cords been replaced? Are all small rugs and runners slip resistant? If not, you can secure them to the floor with a special double-sided carpet tape. Are smoke detectors properly locatedone on every floor of your home and one outside of every sleeping area? Are they in good working order? Are batteries replaced at least once a year? Do you have a well-maintained carbon monoxide detector outside every sleeping are in your home? Does your furniture layout leave plenty of space to maneuver between and around chairs, tables, beds, and sofas? Are hallways, stairs and passages between rooms well lit? Can you reach a lamp without getting out of bed? Are floor surfaces well maintained? Shag rugs, high-pile carpeting, tile floors, and polished wood floors can be particularly slippery. Stairs should always have handrails and be carpeted or fitted with a non-skid tread. Is your telephone easily reachable. Is the cord safely tucked away? Room by Room   According to the Association of Aging, bathrooms and renzo are the two most potentially hazardous rooms in your home. In the Kitchen    Be sure your stove is in proper working order and always make sure burners and the oven are off before you go out or go to sleep. Keep pots on the back burners, turn handles away from the front of the stove, and keep stove clean and free of grease build-up. Kitchen ventilation systems and range exhausts should be working properly. Keep flammable objects such as towels and pot holders away from the cooking area except when in use. Make sure kitchen curtains are tied back. Move cords and appliances away from the sink and hot surfaces. If extension cords are needed, install wiring guides so they do not hang over the sink, range, or working areas. Look for coffee pots, kettles and toaster ovens with automatic shut-offs. Keep a mop handy in the kitchen so you can wipe up spills instantly. You should also have a small fire extinguisher. Arrange your kitchen with frequently used items on lower shelves to avoid the need to stand on a stepstool to reach them. Make sure countertops are well-lit to avoid injuries while cutting and preparing food. In the Bathroom    Use a non-slip mat or decals in the tub and shower, since wet, soapy tile or porcelain surfaces are extremely slippery. Make sure bathroom rugs are non-skid or tape them firmly to the floor. Bathtubs should have at least one, preferably two, grab bars, firmly attached to structural supports in the wall.  (Do not use built-in soap holders or glass shower doors as grab bars.)    Tub seats fitted with non-slip material on the legs allow you to wash sitting down. For people with limited mobility, bathtub transfer benches allow you to slide safely into the tub. Raised toilet seats and toilet safety rails are helpful for those with knee or hip problems. In the [facility]    Make sure you use a nightlight and that the area around your bed is clear of potential obstacles. Be careful with electric blankets and never go to sleep with a heating pad, which can cause serious burns even if on a low setting. Use fire-resistant mattress covers and pillows, and NEVER smoke in bed. Keep a phone next to the bed that is programmed to dial 911 at the push of a button. If you have a chronic condition, you may want to sign on with an automatic call-in service. Typically the system includes a small pendant that connects directly to an emergency medical voice-response system. You should also make arrangements to stay in contact with someonefriend, neighbor, family memberon a regular schedule. Fire Prevention   According to the [facility]. (Smoke Alarms for Every) [address] [place] [address], senior citizens are one of the two highest risk groups for death and serious injuries due to residential fires. When cooking, wear short-sleeved items, never a bulky long-sleeved robe. The [facility]'s Safety Checklist for Older Consumers emphasizes the importance of checking basements, garages, workshops and storage areas for fire hazards, such as volatile liquids, piles of old rags or clothing and overloaded circuits. Never smoke in bed or when lying down on a couch or recliner chair. Small portable electric or kerosene heaters are responsible for many home fires and should be used cautiously if at all. If you do use one, be sure to keep them away from flammable materials. In case of fire, make sure you have a pre-established emergency exit plan. Have a professional check your fireplace and other fuel-burning appliances yearly.     Helping Hands   Baby boomers entering the banks years will continue to see the development of new products to help older adults live safely and independently in spite of age-related changes. Making Life More Livable  , by [employer], lists over 1,000 products for \"living well in the mature years,\" such as bathing and mobility aids, household security devices, ergonomically designed knives and peelers, and faucet valves and knobs for temperature control. Medical supply stores and organizations are good sources of information about products that improve your quality of life and insure your safety. Last Reviewed: November 2009 Samir Ruiz MD   Updated: 3/7/2011     ·        Learning About Living Ar Ammy  What is a living will? A living will, also called a declaration, is a legal form. It tells your family and your doctor your wishes when you can't speak for yourself. It's used by the health professionals who will treat you as you near the end of your life or if you get seriously hurt or ill. If you put your wishes in writing, your loved ones and others will know what kind of care you want. They won't need to guess. This can ease your mind and be helpful to others. And you can change or cancel your living will at any time. A living will is not the same as an estate or property will. An estate will explains what you want to happen with your money and property after you die. How do you use it? A living will is used to describe the kinds of treatment or life support you want as you near the end of your life or if you get seriously hurt or ill. Keep these facts in mind about living almaraz. Your living will is used only if you can't speak or make decisions for yourself. Most often, one or more doctors must certify that you can't speak or decide for yourself before your living will takes effect. If you get better and can speak for yourself again, you can accept or refuse any treatment.  It doesn't matter what you said in your living will. Some states may limit your right to refuse treatment in certain cases. For example, you may need to clearly state in your living will that you don't want artificial hydration and nutrition, such as being fed through a tube. Is a living will a legal document? A living will is a legal document. Each state has its own laws about living almaraz. And a living will may be called something else in your state. Here are some things to know about living almaraz. You don't need an  to complete a living will. But legal advice can be helpful if your state's laws are unclear. It can also help if your health history is complicated or your family can't agree on what should be in your living will. You can change your living will at any time. Some people find that their wishes about end-of-life care change as their health changes. If you make big changes to your living will, complete a new form. If you move to another state, make sure that your living will is legal in the state where you now live. In most cases, doctors will respect your wishes even if you have a form from a different state. You might use a universal form that has been approved by many states. This kind of form can sometimes be filled out and stored online. Your digital copy will then be available wherever you have a connection to the internet. The doctors and nurses who need to treat you can find it right away. Your state may offer an online registry. This is another place where you can store your living will online. It's a good idea to get your living will notarized. This means using a person called a  to watch two people sign, or witness, your living will. What should you know when you create a living will? Here are some questions to ask yourself as you make your living will:  Do you know enough about life support methods that might be used?  If not, talk to your doctor so you know what might be done if you can't breathe on your own, your heart stops, or you can't swallow. What things would you still want to be able to do after you receive life-support methods? Would you want to be able to walk? To speak? To eat on your own? To live without the help of machines? Do you want certain [employer] practices performed if you become very ill? If you have a choice, where do you want to be cared for? In your home? At a hospital or nursing home? If you have a choice at the end of your life, where would you prefer to die? At home? In a hospital or nursing home? Somewhere else? Would you prefer to be buried or cremated? Do you want your organs to be donated after you die? What should you do with your living will? Make sure that your family members and your health care agent have copies of your living will (also called a declaration). Give your doctor a copy of your living will. Ask him or her to keep it as part of your medical record. If you have more than one doctor, make sure that each one has a copy. Put a copy of your living will where it can be easily found. For example, some people may put a copy on their refrigerator door. If you are using a digital copy, be sure your doctor, family members, and health care agent know how to find and access it. Where can you learn more? Go to [email or web address] and sign in to your [employer] account. Enter X615 in the [employer] box to learn more about \"Learning About Living Blake Awad. \"     If you do not have an account, please click on the \"Sign Up Now\" link. Current as of: March 17, 2021               Content Version: 13.0  © 0923-0690 Healthwise, Incorporated. Care instructions adapted under license by [facility] ([facility]). If you have questions about a medical condition or this instruction, always ask your healthcare professional. [place] [address] any warranty or liability for your use of this information.     ·

## 2021-10-06 NOTE — PROGRESS NOTES
Vaccine Information Sheet, \"Influenza - Inactivated\"  given to Shimon Daughters, or parent/legal guardian of  Shimon Daughters and verbalized understanding. Patient responses:    Have you ever had a reaction to a flu vaccine? No  Do you have any current illness? No  Have you ever had Guillian Holden Syndrome? No  Do you have a serious allergy to any of the following: Neomycin, Polymyxin, Thimerosal, eggs or egg products? No    Flu vaccine given per order. Please see immunization tab. Risks and benefits explained. Current VIS given. Visit Information    Have you changed or started any medications since your last visit including any over-the-counter medicines, vitamins, or herbal medicines? no   Are you having any side effects from any of your medications? -  no  Have you stopped taking any of your medications? Is so, why? -  no    Have you seen any other physician or provider since your last visit? No  Have you had any other diagnostic tests since your last visit? No  Have you been seen in the emergency room and/or had an admission to a hospital since we last saw you? No  Have you had your routine dental cleaning in the past 6 months? yes     Have you activated your [employer] How To account? If not, what are your barriers?  Yes     Patient Care Team:  Madison Hatchet, MD as PCP - General (Family Medicine)  Madison Hatchet, [facility]  Nimo Rojas MD as Resident (Cardiology)  Brigitte Muir MD as Consulting Physician (Urology)  Carlos Phillips RN as Nurse Hortencia Bentley MD as Consulting Physician (Pulmonology)  Em Buenrostro MD as Consulting Physician (Cardiology)  Juan Ryan MD as Consulting Physician (Dermatology)    Medical History Review  Past Medical, Family, and Social History reviewed and does contribute to the patient presenting condition    Health Maintenance   Topic Date Due    Shingles Vaccine (1 of 2) Never done   [employer] Visit (AWV)  Never done   Melissajillian Gomez Flu vaccine (1) 09/01/2021    Low dose CT lung screening  11/30/2021    Diabetes screen  02/21/2022    PSA counseling  03/04/2022    Colon cancer screen colonoscopy  04/26/2022    Lipid screen  06/18/2022    Potassium monitoring  06/18/2022    Creatinine monitoring  06/18/2022    Pneumococcal 65+ years Vaccine (1 of 1 - PPSV23) 11/14/2022    DTaP/Tdap/Td vaccine (2 - Td or Tdap) 11/21/2026    COVID-19 Vaccine  Completed    AAA screen  Completed    Hepatitis C screen  Completed    HIV screen  Completed    Hepatitis A vaccine  Aged Out    Hepatitis B vaccine  Aged Out    Hib vaccine  Aged Out    Meningococcal (ACWY) vaccine  Aged Out

## 2021-10-06 NOTE — PROGRESS NOTES
Medicare Annual Wellness Visit  Name: Margaux Field Date: 10/6/2021   MRN: [id number] Sex: Male   Age: 72 y.o. Ethnicity: Non- / Non    : 1955 Race: White (non-)      Matilda Pan is here for Medicare AWV    Screenings for behavioral, psychosocial and functional/safety risks, and cognitive dysfunction are all negative except as indicated below. These results, as well as other patient data from the [address] form, are documented in Flowsheets linked to this Encounter. No Known Allergies    Prior to Visit Medications    Medication Sig Taking?  Authorizing Provider   lisinopril (PRINIVIL;ZESTRIL) 5 MG tablet Take 1 tablet by mouth daily Yes Jesus Manuel Hayes MD   propafenone (RYTHMOL) 150 MG tablet Take 1 tablet by mouth every 8 hours Yes Jesus Manuel Hayes MD   rosuvastatin (CRESTOR) 40 MG tablet Take 1 tablet by mouth daily Yes Jesus Manuel Hayes MD   aspirin EC 81 MG EC tablet Take 1 tablet by mouth daily Yes Jesus Manuel Hayes MD   FLUoxetine (PROZAC) 10 MG capsule Take 1 capsule by mouth daily Yes Jesus Manuel Hayes MD         Past Medical History:   Diagnosis Date    A-fib ([facility] [address] 2015    Acute and chronic respiratory failure, unspecified whether with hypoxia or hypercapnia (HCC)     Allergic rhinitis 2015    Back pain     Basal cell carcinoma of skin     Decreased libido     Diverticulitis of colon (without mention of hemorrhage)(562.11) 2015    ED (erectile dysfunction)     Elevated prostate specific antigen (PSA) 2015    Former smoker     HTN (hypertension) 2015    Lung nodule     Obesity 2015    Obstructive sleep apnea     on cpap    Prostate cancer ([facility] [address] 2017    needs removed    Pure hypercholesterolemia 2015    Tinnitus     Tobacco use disorder 2015    Wears glasses        Past Surgical History:   Procedure Laterality Date    CARDIAC CATHETERIZATION  2017    LAD: mid 30% stenosis  /  LM NLM / EF 55%  /  LCX: Codominant Vessel, distal 30% stenosis / OM1 AND OM2 10%    COLONOSCOPY      COLONOSCOPY  04/26/2019    COLONOSCOPY  4/26/2019    COLONOSCOPY POLYPECTOMY COLD BIOPSY performed by Kari Mcintosh MD at [facility]  10/17/2017    robotic with schuyler pelvic lymph node dissection    PROSTATECTOMY N/A 10/17/2017    XI ROBOTIC PROSTATECTOMY LAPAROSCOPIC WITH PELVIC LYMPH NODE DISSECTION performed by Yessenia Hawkins MD at [address] [place] [address] Road SKIN CANCER EXCISION  09/09    L Upper Lip Basal CA    TONSILLECTOMY      TYMPANOSTOMY TUBE PLACEMENT      VASECTOMY           Family History   Problem Relation Age of Onset    Coronary Art Dis Mother     High Blood Pressure Mother     High Cholesterol Father     Seizures Sister        CareTeam (Including outside providers/suppliers regularly involved in providing care):   Patient Care Team:  Trinh Baltazar MD as PCP - General (Family Medicine)  Trinh Baltazar [facility]  Greyson Hurd MD as Resident (Cardiology)  Yessenia Hawkins MD as Consulting Physician (Urology)  Brenton Lay RN as Nurse Shana Conrad MD as Consulting Physician (Pulmonology)  Bertrand Gomez MD as Consulting Physician (Cardiology)  Caro Lamb MD as Consulting Physician (Dermatology)      Vital signs within normal limits except morbid obesity per BMI, and tachycardia  Wt Readings from Last 3 Encounters:   10/06/21 (!) 301 lb (136.5 kg)   08/04/21 298 lb 12.8 oz (135.5 kg)   06/11/21 298 lb (135.2 kg)     Vitals:    10/06/21 1021   BP: 130/86   Pulse: 103   Temp: 97.6 °F (36.4 °C)   SpO2: 98%   Weight: (!) 301 lb (136.5 kg)   Height: 5' 10\" (1.778 m)     Body mass index is 43.19 kg/m². Based upon direct observation of the patient, evaluation of cognition reveals recent and remote memory intact.     Physical exam  General Appearance: alert and oriented to person, place and time, well-developed and well-nourished, in no acute distress    Neck: thick, sweaty on the neck  Pulmonary/Chest: clear to auscultation bilaterally- no wheezes, rales or rhonchi, normal air movement, no respiratory distress  Cardiovascular: irregularly irregular rhythm noted and tachycardic  Patient says he might have missed meds, he has known paroxysmal atrial fibrillation  Abdomen: soft, non-tender, non-distended, normal bowel sounds, no masses or organomegaly and very obese  Extremities: 2 + edema-  bilateral  Wears compression stockings          Patient reports dyspnea on exertion with exertion  Leg edema not improving  He denies chest pain or palpitations. He says he might have missed propafenone a few times. He is not on chronic anticoagulation. He is only on aspirin  BP controlled. Arianedonnell  reports compliance with BP medications, and tolerates them well, denies side effects. Hypertension is well controlled      Sleep Apnea:  Current treatment: cPAP. Compliance: compliant all of the time. Residual symptoms include: daytime fatigue. Sees pulmonology      Prediabetes and hyperglycemia improved, blood glucose 121 on 6/18/2021, 119 on 6/20/2019, 173 on 10/17/2017  Denies increased appetite, thirst or polyuria. Lab Results   Component Value Date    LABA1C 5.2 02/21/2019    LABA1C 5.7 02/19/2018    LABA1C 5.6 07/17/2017         Patient's complete Health Risk Assessment and screening values have been reviewed and are found in Flowsheets. The following problems were reviewed today and where indicated follow up appointments were made and/or referrals ordered. Positive Risk Factor Screenings with Interventions:            General Health and ACP:  General  In general, how would you say your health is?: Very Good  In the past 7 days, have you experienced any of the following?  New or Increased Pain, New or Increased Fatigue, Loneliness, Social Isolation, Stress or Anger?: (!) Stress  Do you get the social and emotional support that you need?: Yes  Do you have a Living Will?: Yes  Advance Directives     Power of  Living Will ACP-Advance Directive ACP-Power of     Not on File Not on File Not on File Not on File      General Health Risk Interventions:  · Stress: patient's comments regarding reasons for stress and/or anger: dad is over 81 yo living with him and his wife, and he is very sick with COPD, TIAs; relaxation techniques discussed, he says he does have good report from his wife  ·   · No Living Will: ACP documents already completed- patient asked to provide copy to the office    Health Habits/Nutrition:  Health Habits/Nutrition  Do you exercise for at least 20 minutes 2-3 times per week?: Yes  Have you lost any weight without trying in the past 3 months?: No  Do you eat only one meal per day?: No  Have you seen the dentist within the past year?: Yes  Body mass index: (!) 43.18  Health Habits/Nutrition Interventions:  · Nutritional issues:  educational materials for healthy, well-balanced diet provided, BMI obesity per BMI.   · Low-carb, low-fat diet, attempt to be more active  Wearing compression stockings for leg swelling  Gained 3 lbs in  3 months unintentionally    Wt Readings from Last 3 Encounters:   10/06/21 (!) 301 lb (136.5 kg)   08/04/21 298 lb 12.8 oz (135.5 kg)   06/11/21 298 lb (135.2 kg)          Safety:  Safety  Do you have working smoke detectors?: Yes  Have all throw rugs been removed or fastened?: Yes  Do you have non-slip mats or surfaces in all bathtubs/showers?: (!) No  Do all of your stairways have a railing or banister?: Yes  Are your doorways, halls and stairs free of clutter?: Yes  Do you always fasten your seatbelt when you are in a car?: Yes  Safety Interventions:  · Home safety tips provided  · Patient says he has no slippery surface in the bathtub and showers     Personalized Preventive Plan   Current Health Maintenance Status  Immunization History   Administered Date(s) Administered    COVID-19, Moderna, PF, 100mcg/0.5mL 02/26/2021, 03/27/2021    Influenza Vaccine, unspecified formulation 10/26/2016    Influenza, Quadv, IM, PF (6 mo and older Fluzone, Flulaval, Fluarix, and 3 yrs and older Afluria) 11/14/2017, 12/17/2019    Influenza, [place], Recombinant, IM PF (Flublok 18 yrs and older) 10/18/2020    Influenza, Quadv, adjuvanted, 65 yrs +, IM, PF (Fluad) 10/06/2021    Pneumococcal Polysaccharide ([id number]) 11/14/2017    Td (Adult), 5 Lf Tetanus Toxoid, Pf (Tenivac, Decavac) 02/01/2003    Td vaccine (adult) 04/10/1997, 02/21/2003    Td, unspecified formulation 02/01/2003    Tdap (Boostrix, Adacel) 11/21/2016        Health Maintenance   Topic Date Due    Shingles Vaccine (1 of 2) Never done   [employer] Visit (AWV)  Never done    Low dose CT lung screening  11/30/2021    Diabetes screen  02/21/2022    PSA counseling  03/04/2022    Colon cancer screen colonoscopy  04/26/2022    Lipid screen  06/18/2022    Potassium monitoring  06/18/2022    Creatinine monitoring  06/18/2022    Pneumococcal 65+ years Vaccine (1 of 1 - PPSV23) 11/14/2022    DTaP/Tdap/Td vaccine (2 - Td or Tdap) 11/21/2026    Flu vaccine  Completed    COVID-19 Vaccine  Completed    AAA screen  Completed    Hepatitis C screen  Completed    HIV screen  Completed    Hepatitis A vaccine  Aged Out    Hepatitis B vaccine  Aged Out    Hib vaccine  Aged Out    Meningococcal (ACWY) vaccine  Aged Out     Recommendations for [employer] Due: see orders and patient instructions/AVS.  . Recommended screening schedule for the next 5-10 years is provided to the patient in written form: see Patient Instructions/AVS.    Anish Kimball was seen today for medicare awv.     Diagnoses and all orders for this visit:    Routine general medical examination at a health care facility    Paroxysmal atrial fibrillation [facility])  Likely inadequately controlled  Patient has irregular heart rate today, most likely he is in atrial fibrillation  We will do EKG today, continue propafenone 150 mg 3 times a day, compliance with medications discussed and follow-up with cardiologist  He is not on chronic anticoagulation, he is only on baby aspirin 81 mg daily per cardiology  -     EKG 12 Lead; Future-he was advised to do his EKG today    Current mild episode of major depressive disorder without prior episode ([address]  Slightly worsening, due to stress at home related to his elderly frail father  [facility] [place] Scores 10/11/2021 9/29/2021 6/11/2021 6/30/2020 2/21/2019 3/1/2018 11/14/2017   PHQ2 Score 2 2 1 0 0 0 0   PHQ9 Score 2 2 1 0 0 0 0   Continue Prozac 10 mg daily  He does have support from his wife  Declines cognitive behavioral therapy    Symptom of leg swelling  Failing to change as expected. Continue compression stockings, we need to rule out congestive heart failure, will refer to vascular surgeon as well  -     AFL - Massiel Ozuna MD, Vascular Surgery, [place]    Essential hypertension  Well controlled. Continue current treatment. Lisinopril 5 mg daily  Will recheck labs. Discussed low salt diet and BP and pulse monitoring.    -     CBC With Auto Differential; Future  -     Basic Metabolic Panel; Future    Encounter for immunization  -     zoster recombinant adjuvanted vaccine [facility]) 50 MCG/0.5ML SUSR injection;  Inject 0.5 mLs into the muscle See Admin Instructions 1 dose now and repeat in 2-6 months  -     INFLUENZA, QUADV, ADJUVANTED, 65 YRS =, IM, PF, PREFILL SYR, 0.5ML (FLUAD)    Hyperglycemia  Improving  -     Hemoglobin A1C; Future 5.2, improving prediabetes  Low-carb diet advised  Lab Results   Component Value Date    LABA1C 5.2 02/21/2019    LABA1C 5.7 02/19/2018    LABA1C 5.6 07/17/2017       Polycythemia  Mild, worsening, likely related to sleep apnea  Lab Results   Component Value Date    WBC 7.3 06/18/2021    HGB 17.2 (H) 06/18/2021    HCT 52.7 (H) 06/18/2021    MCV 93.1 06/18/2021     06/18/2021     Recheck labs  -     CBC With Auto Differential; Future    FARFAN (dyspnea on exertion)  Worsening  -     Brain Natriuretic Peptide;  Future  -     EKG 12 Lead; Future    We will start Lasix with KCL daily if BNP high, or  every other day if not high to help with leg edema and dyspnea, and to see cardiology and pulmonologist    ADRIAN on CPAP  Compliant with CPAP, he benefits from using the CPAP, follow-up with pulmonologist for adjustment                  Future Appointments   Date Time Provider Madeleine William   10/15/2021 10:30 AM Tania Dent MD Resp [facility]   2/18/2022 10:30 AM Flavia Luna MD [facility]   8/4/2022 10:00 AM Krystal Ramirez MD [facility]   Electronically signed by Flavia Luna MD on 10/11/21 at 1:23 PM EDT

## 2021-10-07 ENCOUNTER — HOSPITAL ENCOUNTER (OUTPATIENT)
Age: 66
Discharge: HOME | End: 2021-10-07
Payer: MEDICARE

## 2021-10-07 DIAGNOSIS — I48.0 PAROXYSMAL ATRIAL FIBRILLATION (HCC): ICD-10-CM

## 2021-10-07 DIAGNOSIS — R06.09 DOE (DYSPNEA ON EXERTION): ICD-10-CM

## 2021-10-07 PROCEDURE — 93005 ELECTROCARDIOGRAM TRACING: CPT

## 2021-10-07 NOTE — RESULT ENCOUNTER NOTE
Please notify patient.   The EKG is abnormal in atrial flutter read by the cardiologist over there however that could be also very well atrial fibrillation he needs to call the cardiologist right now, please fax the report to the cardiologist office, [facility] cardiology, needs appointment ASAP    Patient Care Team:  Ralph Castle MD as PCP - General (Family Medicine)  Ralph Castle MD as PCP - [facility]  Raphael Argueta MD as Resident (Cardiology)  Yash Mercado MD as Consulting Physician (Urology)  Swapnil Mir, RN as Nurse Nerissa Alcaraz MD as Consulting Physician (Pulmonology)  Teri Culver MD as Consulting Physician (Cardiology)  Kyara Valdovinos MD as Consulting Physician (Dermatology)    Future Appointments  10/15/2021 10:30 AM   Gabriel Whitfield MD        Resp Spec           [facility]  2/18/2022  10:30 AM   Ralph Castle MD     [facility]               [facility]  8/4/2022   10:00 AM   Kyara Valdovinos MD          derm             [facility]

## 2021-10-07 NOTE — RESULT ENCOUNTER NOTE
Noted  Future Appointments  10/15/2021 10:30 AM   Nimo Celaya MD        Resp Spec           [address] [facility]  2/18/2022  10:30 AM   [facility]rique [facility]     [facility]  8/4/2022   10:00 AM   [facility] Suarez [facility]         [facility]

## 2021-10-09 LAB
EKG ATRIAL RATE: 352 BPM
EKG Q-T INTERVAL: 294 MS
EKG QRS DURATION: 82 MS
EKG QTC CALCULATION (BAZETT): 379 MS
EKG R AXIS: 58 DEGREES
EKG T AXIS: -106 DEGREES
EKG VENTRICULAR RATE: 100 BPM

## 2021-10-09 PROCEDURE — 93010 ELECTROCARDIOGRAM REPORT: CPT | Performed by: INTERNAL MEDICINE

## 2021-10-11 DIAGNOSIS — F32.0 CURRENT MILD EPISODE OF MAJOR DEPRESSIVE DISORDER WITHOUT PRIOR EPISODE (HCC): ICD-10-CM

## 2021-10-11 RX ORDER — FLUOXETINE 10 MG/1
10 CAPSULE ORAL DAILY
Qty: 30 CAPSULE | Refills: 3 | Status: SHIPPED | OUTPATIENT
Start: 2021-10-11 | End: 2022-01-12

## 2021-10-11 ASSESSMENT — PATIENT HEALTH QUESTIONNAIRE - PHQ9
SUM OF ALL RESPONSES TO PHQ9 QUESTIONS 1 & 2: 2
SUM OF ALL RESPONSES TO PHQ QUESTIONS 1-9: 2
2. FEELING DOWN, DEPRESSED OR HOPELESS: 1
SUM OF ALL RESPONSES TO PHQ QUESTIONS 1-9: 2
1. LITTLE INTEREST OR PLEASURE IN DOING THINGS: 1
SUM OF ALL RESPONSES TO PHQ QUESTIONS 1-9: 2

## 2021-10-15 ENCOUNTER — OFFICE VISIT (OUTPATIENT)
Dept: PULMONOLOGY | Age: 66
End: 2021-10-15
Payer: MEDICARE

## 2021-10-15 VITALS
RESPIRATION RATE: 20 BRPM | OXYGEN SATURATION: 98 % | BODY MASS INDEX: 43.52 KG/M2 | TEMPERATURE: 96.8 F | HEART RATE: 102 BPM | HEIGHT: 70 IN | DIASTOLIC BLOOD PRESSURE: 91 MMHG | WEIGHT: 304 LBS | SYSTOLIC BLOOD PRESSURE: 145 MMHG

## 2021-10-15 DIAGNOSIS — I10 PRIMARY HYPERTENSION: ICD-10-CM

## 2021-10-15 DIAGNOSIS — R91.1 LUNG NODULE: Primary | ICD-10-CM

## 2021-10-15 DIAGNOSIS — G47.33 OSA ON CPAP: ICD-10-CM

## 2021-10-15 DIAGNOSIS — C61 PROSTATE CANCER (HCC): ICD-10-CM

## 2021-10-15 DIAGNOSIS — E66.01 MORBID OBESITY (HCC): ICD-10-CM

## 2021-10-15 DIAGNOSIS — Z99.89 OSA ON CPAP: ICD-10-CM

## 2021-10-15 DIAGNOSIS — J44.9 CHRONIC OBSTRUCTIVE PULMONARY DISEASE, UNSPECIFIED COPD TYPE (HCC): ICD-10-CM

## 2021-10-15 PROCEDURE — G8484 FLU IMMUNIZE NO ADMIN: HCPCS | Performed by: INTERNAL MEDICINE

## 2021-10-15 PROCEDURE — 3023F SPIROM DOC REV: CPT | Performed by: INTERNAL MEDICINE

## 2021-10-15 PROCEDURE — 4040F PNEUMOC VAC/ADMIN/RCVD: CPT | Performed by: INTERNAL MEDICINE

## 2021-10-15 PROCEDURE — 99214 OFFICE O/P EST MOD 30 MIN: CPT | Performed by: INTERNAL MEDICINE

## 2021-10-15 PROCEDURE — 1036F TOBACCO NON-USER: CPT | Performed by: INTERNAL MEDICINE

## 2021-10-15 PROCEDURE — 1123F ACP DISCUSS/DSCN MKR DOCD: CPT | Performed by: INTERNAL MEDICINE

## 2021-10-15 PROCEDURE — G8926 SPIRO NO PERF OR DOC: HCPCS | Performed by: INTERNAL MEDICINE

## 2021-10-15 PROCEDURE — G8417 CALC BMI ABV UP PARAM F/U: HCPCS | Performed by: INTERNAL MEDICINE

## 2021-10-15 PROCEDURE — G8427 DOCREV CUR MEDS BY ELIG CLIN: HCPCS | Performed by: INTERNAL MEDICINE

## 2021-10-15 PROCEDURE — 3017F COLORECTAL CA SCREEN DOC REV: CPT | Performed by: INTERNAL MEDICINE

## 2021-10-15 ASSESSMENT — SLEEP AND FATIGUE QUESTIONNAIRES
HOW LIKELY ARE YOU TO NOD OFF OR FALL ASLEEP WHILE LYING DOWN TO REST IN THE AFTERNOON WHEN CIRCUMSTANCES PERMIT: 3
HOW LIKELY ARE YOU TO NOD OFF OR FALL ASLEEP WHILE SITTING QUIETLY AFTER LUNCH WITHOUT ALCOHOL: 0
ESS TOTAL SCORE: 5
HOW LIKELY ARE YOU TO NOD OFF OR FALL ASLEEP WHILE SITTING AND TALKING TO SOMEONE: 0
HOW LIKELY ARE YOU TO NOD OFF OR FALL ASLEEP WHILE SITTING INACTIVE IN A PUBLIC PLACE: 0
HOW LIKELY ARE YOU TO NOD OFF OR FALL ASLEEP WHILE WATCHING TV: 1
HOW LIKELY ARE YOU TO NOD OFF OR FALL ASLEEP WHEN YOU ARE A PASSENGER IN A CAR FOR AN HOUR WITHOUT A BREAK: 0
HOW LIKELY ARE YOU TO NOD OFF OR FALL ASLEEP WHILE SITTING AND READING: 1
HOW LIKELY ARE YOU TO NOD OFF OR FALL ASLEEP IN A CAR, WHILE STOPPED FOR A FEW MINUTES IN TRAFFIC: 0

## 2021-10-15 NOTE — PROGRESS NOTES
REASON FOR THE CONSULTATION:  COPD    Chronic respiratory failure  Right apical lung nodule  HISTORY OF PRESENT ILLNESS: He is known to have chronic obstructive lung disease due to chronic bronchitis and emphysema which is being treated with appropriate bronchodilators his pulmonary status has been stable no evidence of acute exacerbation no hemoptysis no chest pain no fever chills or her or any edema no thromboembolic process. Taking her bronchodilators regularly. Is also been noted to have lung nodules in the upper lobe. There was they have been PET negative. Most likely benign. Been we have been watching it for almost 2 years. He also has sleep apnea which is responding well to the use of CPAP he uses CPAP regularly about 6 to 8 hours. Does not feel sleepy tired fatigue during the daytime    Continue to be morbidly obese has not been able to lose any weight. He had a prostatectomy for carcinoma of the prostate no recurrence. He has systemic hypertension that is under good control no PND no angina. He already Covid vaccine. He also had flu vaccine. Cancer screening    1. CRITERIA MET    [x]     CT ORDERED  []      2. CRITERIA NOT MET   []      3. REFUSED                    []        REASON CRITERIA NOT MET     1. SMOKING LESS THAN 30 PY  []      2. AGE LESS THAN 55 or GREATER 77 YEARS  []      3. QUIT SMOKING 15 YEARS OR GREATER   []      4. RECENT CT WITH IN 11 MONTHS    []      5. LIFE EXPECTANCY < 5 YEARS   []      6.  SIGNS  AND SYMPTOMS OF LUNG CANCER   []         Immunization   Immunization History   Administered Date(s) Administered    COVID-19, Moderna, PF, 100mcg/0.5mL 02/26/2021, 03/27/2021    Influenza Vaccine, unspecified formulation 10/26/2016    Influenza, Quadv, IM, PF (6 mo and older Fluzone, Flulaval, Fluarix, and 3 yrs and older Afluria) 11/14/2017, 12/17/2019    Influenza, Romero Mckeon, Recombinant, IM PF (Flublok 18 yrs and older) 10/18/2020    Influenza, Romero Linda, adjuvanted, 65 yrs +, IM, PF (Fluad) 10/06/2021    Pneumococcal Polysaccharide ([id number]) 11/14/2017    Td (Adult), 5 Lf Tetanus Toxoid, Pf (Tenivac, Decavac) 02/01/2003    Td vaccine (adult) 04/10/1997, 02/21/2003    Td, unspecified formulation 02/01/2003    Tdap (Boostrix, Adacel) 11/21/2016        Pneumococcal Vaccine     [x] Up to date    [] Indicated   [] Refused  [] Contraindicated       Influenza Vaccine   [x] Up to date    [] Indicated   [] Refused  [] Contraindicated          PAST MEDICAL HISTORY:       Diagnosis Date    A-fib ([facility] 4/6/2015    Acute and chronic respiratory failure, unspecified whether with hypoxia or hypercapnia (HCC)     Allergic rhinitis 4/6/2015    Back pain     Basal cell carcinoma of skin     Decreased libido     Diverticulitis of colon (without mention of hemorrhage)(562.11) 4/6/2015    ED (erectile dysfunction)     Elevated prostate specific antigen (PSA) 4/6/2015    Former smoker     HTN (hypertension) 4/6/2015    Lung nodule     Obesity 4/6/2015    Obstructive sleep apnea     on cpap    Prostate cancer ([facility] 09/2017    needs removed    Pure hypercholesterolemia 4/6/2015    Tinnitus     Tobacco use disorder 4/6/2015    Wears glasses          Family History:       Problem Relation Age of Onset    Coronary Art Dis Mother     High Blood Pressure Mother     High Cholesterol Father     Seizures Sister        SURGICAL HISTORY:   Past Surgical History:   Procedure Laterality Date    CARDIAC CATHETERIZATION  09/19/2017    LAD: mid 30% stenosis  /  LM NLM / EF 55%  /  LCX: Codominant Vessel, distal 30% stenosis / OM1 AND OM2 10%    COLONOSCOPY      COLONOSCOPY  04/26/2019    COLONOSCOPY  4/26/2019    COLONOSCOPY POLYPECTOMY COLD BIOPSY performed by Malik Chowdhury MD at [place] [address]  10/17/2017    robotic with schuyler pelvic lymph node dissection    PROSTATECTOMY N/A 10/17/2017    XI ROBOTIC PROSTATECTOMY LAPAROSCOPIC WITH PELVIC LYMPH NODE DISSECTION performed by Michelle Figueroa MD at [facility]  09/09    L Upper Lip Basal CA    TONSILLECTOMY      TYMPANOSTOMY TUBE PLACEMENT      VASECTOMY             SOCIAL AND OCCUPATIONAL HEALTH:      There  is no history of TB or TB exposure. There  is no asbestos or silica dust exposure. The patient reports  no coal, foundry, quarry or [employer] exposure. Travel history reveals negative. There  is no history of recreational or IV drug use. There  is no hot tube exposure. Pets  negative    Occupational history not significant    TOBACCO:   reports that he quit smoking about 5 years ago. His smoking use included cigarettes. He started smoking about 45 years ago. He has a 40.00 pack-year smoking history. He has never used smokeless tobacco.  ETOH:   reports current alcohol use of about 6.0 standard drinks of alcohol per week. ALLERGIES:      No Known Allergies      Home Meds:   Prior to Admission medications    Medication Sig Start Date End Date Taking? Authorizing Provider   FLUoxetine (PROZAC) 10 MG capsule TAKE 1 CAPSULE BY MOUTH DAILY 10/11/21  Yes Rebekah Cruz MD   zoster recombinant adjuvanted vaccine [facility]) 50 MCG/0.5ML SUSR injection Inject 0.5 mLs into the muscle See Admin Instructions 1 dose now and repeat in 2-6 months 10/6/21  Yes Rebekah Cruz MD   lisinopril (PRINIVIL;ZESTRIL) 5 MG tablet Take 1 tablet by mouth daily 6/11/21  Yes Rebekah Cruz MD   propafenone (RYTHMOL) 150 MG tablet Take 1 tablet by mouth every 8 hours 6/11/21  Yes Rebekah Cruz MD   rosuvastatin (CRESTOR) 40 MG tablet Take 1 tablet by mouth daily 6/11/21  Yes Rebekah Cruz MD   aspirin EC 81 MG EC tablet Take 1 tablet by mouth daily 6/11/21  Yes Rebekah Cruz MD              REVIEW OF SYSTEMS:    CONSTITUTIONAL:  negative for  fevers, chills, sweats, fatigue, malaise, anorexia and weight loss. Overweight, no distress. Are using accessory muscles  EYES:  negative for  double vision, blurred vision, dry eyes, eye discharge and redness. No Giselle's syndrome or jaundice  HEENT:  negative for  hearing loss, tinnitus, ear drainage, earaches and nasal congestion. No nasal polyps. No sinus tenderness  RESPIRATORY:  See hpi  CARDIOVASCULAR:  negative for  chest pain,, palpitations, orthopnea, PND, known to have hypertension. No angina. No coronary artery disease  GASTROINTESTINAL:  negative for nausea, vomiting, change in bowel habits, diarrhea, constipation, abdominal pain, pruritus, abdominal mass and abdominal distention. No nausea, vomiting or diarrhea or abdominal pain  GENITOURINARY:  negative for frequency, dysuria, nocturia, urinary incontinence and hesitancy history of stress incontinence, history of prostatectomy. Her lung Due to prostate cancer. erectile dysfunction after the surgery  INTEGUMENT  negative for rash, skin lesion(s), dryness, skin color change, changes in lesion, pruritus and changes in hair  HEMATOLOGIC/LYMPHATIC:  negative for easy bruising, bleeding, lymphadenopathy, petechiae and swelling/edema. No anemia, no enlarged lymph nodes  ALLERGIC/IMMUNOLOGIC:  negative for recurrent infections, urticaria and drug reactions  ENDOCRINE:  negative for heat intolerance, cold intolerance, tremor, weight changes and change in bowel habits. No diabetes  MUSCULOSKELETAL:  negative for  myalgias, arthralgias, pain, joint swelling, stiff joints and decreased range of motion no acute arthritis  NEUROLOGICAL:  negative for headaches, dizziness, seizures, memory problems, speech problems, visual disturbance and coordination problems, no deficit.   Motor SENSORY  BEHAVIOR/PSYCH:  negative for poor appetite, increased appetite, decreased sleep, increased sleep, decreased energy level, increased energy level and poor concentration no psychotic problem  Skin no rash no dermatitis, no skin  Vitals:  BP (!) 145/91 (Site: Left Upper Arm, Position: Sitting, Cuff Size: Large Adult)   Pulse 102   Temp 96.8 °F (36 °C) (Temporal)   Resp 20   Ht 5' 10\" (1.778 m)   Wt (!) 304 lb (137.9 kg)   SpO2 98% Comment: anthony  BMI 43.62 kg/m²     PHYSICAL EXAM: Cooperative no respiratory distress not using accessory muscles  General Appearance:    Alert, cooperative, no distress, appears stated age, obese, not in any respiratory distress, not using accessory muscles    Head:    Normocephalic, without obvious abnormality, atraumatic      Eyes:    PERRL, conjunctiva/corneas clear, EOM's intact, no jaundice. No Giselle's syndrome    Ears:    Normal  external ear canals, both ears no polyps or sinus tenderness    Nose:   Nares normal, septum midline, mucosa normal, no drainage        or sinus tenderness   Throat:   Lips, mucosa, and tongue normal; teeth and gums normal, oropharyngeal orifice not compromise    Neck:   Supple, symmetrical, trachea midline, no adenopathy;     thyroid:  no enlargement/tenderness/nodules; no carotid    bruit ,JVD not elevated    Back:     Symmetric, no curvature, ROM normal, no CVA tenderness   Lungs:     AP diameter is increased. Percussion note is hyperresonant expiration prolonged. No rales, rhonchi are audible. No pleural friction rub is audible    Chest Wall:    No tenderness or deformity      Heart:    Regular rate and rhythm, S1 and S2 normal, no murmur, rub        or gallop no rvh                           Abdomen:                                                 Pulses:                              Skin:                  Lymph nodes:                    Neurologic:                  Soft, non-tender, bowel sounds active all four quadrants,     no masses, no organomegaly         2+ and symmetric all extremities     Skin color, texture, turgor normal, no rashes or lesions       Cervical, supraclavicular not enlarged or matted or tender      CNII-XII intact, normal strength 5/5 .   Sensation grossly normal  and reflexes normal 2+  throughout     Clubbing No  Lower ext edema . Negative  Upper ext edema . Absent         Musculoskeletal no synovitis. No joint swelling or tenderness          Thyroid:   Lab Results   Component Value Date    TSH 1.94 06/18/2021     Urinalysis: No results for input(s): BACTERIA, BLOODU, CLARITYU, COLORU, PHUR, PROTEINU, RBCUA, SPECGRAV, BILIRUBINUR, NITRU, WBCUA, LEUKOCYTESUR, GLUCOSEU in the last 72 hours. CXR  No recent chest x-ray  CT Scans  . No recent PET scan. Echo    No recent echo            IMPRESSION:    Visit Diagnoses       Codes    Lung nodule    -  Primary R91.1    Chronic obstructive pulmonary disease, unspecified COPD type ([facility]     J44.9    Primary hypertension     I10      COPD no acute exacerbation  Hypertension  Possible cor pulmonale  Morbid obesity  Sleep apnea syndrome  History of carcinoma of the prostate treated with prostatectomy  Lung nodule PET negative  :                PLAN:      Patient pulmonary status very stable. No evidence of an acute exacerbation    Continue present bronchodilator therapy    He has given up smoking    He already had Pneumovax influenza vaccine and Covid vaccines. He has a lung nodule which is being followed with radiological studies neck CT scan is scheduled for November of this year. His hypertension is under good control. Sleep apnea responding well to use of CPAP. Encouraged him to lose weight. He probably has cor pulmonale although there is no clinical evidence of heart failure. REASON FOR THE CONSULTATION:  COPD    Chronic respiratory failure  Right apical lung nodule  HISTORY OF PRESENT ILLNESS:  .  This patient is known to have a chronic obstructive lung disease due to chronic bronchitis and emphysema secondary to prior smoking. Fortunately has given up smoking for the last 4 years.   His pulmonary status is stable denies any excessive dyspnea cough or sputum production no hemoptysis no chest formulation 02/01/2003    Tdap (Boostrix, Adacel) 11/21/2016        Pneumococcal Vaccine     [x] Up to date    [] Indicated   [] Refused  [] Contraindicated       Influenza Vaccine   [x] Up to date    [] Indicated   [] Refused  [] Contraindicated          PAST MEDICAL HISTORY:       Diagnosis Date    A-fib ([facility] 4/6/2015    Acute and chronic respiratory failure, unspecified whether with hypoxia or hypercapnia (HCC)     Allergic rhinitis 4/6/2015    Back pain     Basal cell carcinoma of skin     Decreased libido     Diverticulitis of colon (without mention of hemorrhage)(562.11) 4/6/2015    ED (erectile dysfunction)     Elevated prostate specific antigen (PSA) 4/6/2015    Former smoker     HTN (hypertension) 4/6/2015    Lung nodule     Obesity 4/6/2015    Obstructive sleep apnea     on cpap    Prostate cancer ([facility] 09/2017    needs removed    Pure hypercholesterolemia 4/6/2015    Tinnitus     Tobacco use disorder 4/6/2015    Wears glasses          Family History:       Problem Relation Age of Onset    Coronary Art Dis Mother     High Blood Pressure Mother     High Cholesterol Father     Seizures Sister        SURGICAL HISTORY:   Past Surgical History:   Procedure Laterality Date    CARDIAC CATHETERIZATION  09/19/2017    LAD: mid 30% stenosis  /  LM NLM / EF 55%  /  LCX: Codominant Vessel, distal 30% stenosis / OM1 AND OM2 10%    COLONOSCOPY      COLONOSCOPY  04/26/2019    COLONOSCOPY  4/26/2019    COLONOSCOPY POLYPECTOMY COLD BIOPSY performed by Jaxson Cornell MD at [place] [address]  10/17/2017    robotic with schuyler pelvic lymph node dissection    PROSTATECTOMY N/A 10/17/2017    XI ROBOTIC PROSTATECTOMY LAPAROSCOPIC WITH PELVIC LYMPH NODE DISSECTION performed by Gopi Smith MD at [facility]  09/09    L Upper Lip Basal CA    TONSILLECTOMY      TYMPANOSTOMY TUBE PLACEMENT      VASECTOMY             SOCIAL AND OCCUPATIONAL HEALTH:      There  is no history of TB or TB exposure. There  is no asbestos or silica dust exposure. The patient reports  no coal, foundry, quarry or [employer] exposure. Travel history reveals negative. There  is no history of recreational or IV drug use. There  is no hot tube exposure. Pets  negative    Occupational history not significant    TOBACCO:   reports that he quit smoking about 5 years ago. His smoking use included cigarettes. He started smoking about 45 years ago. He has a 40.00 pack-year smoking history. He has never used smokeless tobacco.  ETOH:   reports current alcohol use of about 6.0 standard drinks of alcohol per week. ALLERGIES:      No Known Allergies      Home Meds:   Prior to Admission medications    Medication Sig Start Date End Date Taking? Authorizing Provider   FLUoxetine (PROZAC) 10 MG capsule TAKE 1 CAPSULE BY MOUTH DAILY 10/11/21  Yes Catalina Paz MD   zoster recombinant adjuvanted vaccine [facility]) 50 MCG/0.5ML SUSR injection Inject 0.5 mLs into the muscle See Admin Instructions 1 dose now and repeat in 2-6 months 10/6/21  Yes Catalina Paz MD   lisinopril (PRINIVIL;ZESTRIL) 5 MG tablet Take 1 tablet by mouth daily 6/11/21  Yes Catalina Paz MD   propafenone (RYTHMOL) 150 MG tablet Take 1 tablet by mouth every 8 hours 6/11/21  Yes Catalina Paz MD   rosuvastatin (CRESTOR) 40 MG tablet Take 1 tablet by mouth daily 6/11/21  Yes Catalina Paz MD   aspirin EC 81 MG EC tablet Take 1 tablet by mouth daily 6/11/21  Yes Catalina Paz MD              REVIEW OF SYSTEMS:    CONSTITUTIONAL:  negative for  fevers, chills, sweats, fatigue, malaise, anorexia and weight loss. Overweight, no distress. Are using accessory muscles  EYES:  negative for  double vision, blurred vision, dry eyes, eye discharge and redness.   No Giselle's syndrome or jaundice  HEENT:  negative for  hearing loss, tinnitus, ear drainage, earaches and nasal congestion. No nasal polyps. No sinus tenderness  RESPIRATORY:  See hpi  CARDIOVASCULAR:  negative for  chest pain,, palpitations, orthopnea, PND, known to have hypertension. No angina. No coronary artery disease  GASTROINTESTINAL:  negative for nausea, vomiting, change in bowel habits, diarrhea, constipation, abdominal pain, pruritus, abdominal mass and abdominal distention. No nausea, vomiting or diarrhea or abdominal pain  GENITOURINARY:  negative for frequency, dysuria, nocturia, urinary incontinence and hesitancy history of stress incontinence, history of prostatectomy. Her lung Due to prostate cancer. erectile dysfunction after the surgery  INTEGUMENT  negative for rash, skin lesion(s), dryness, skin color change, changes in lesion, pruritus and changes in hair  HEMATOLOGIC/LYMPHATIC:  negative for easy bruising, bleeding, lymphadenopathy, petechiae and swelling/edema. No anemia, no enlarged lymph nodes  ALLERGIC/IMMUNOLOGIC:  negative for recurrent infections, urticaria and drug reactions  ENDOCRINE:  negative for heat intolerance, cold intolerance, tremor, weight changes and change in bowel habits. No diabetes  MUSCULOSKELETAL:  negative for  myalgias, arthralgias, pain, joint swelling, stiff joints and decreased range of motion no acute arthritis  NEUROLOGICAL:  negative for headaches, dizziness, seizures, memory problems, speech problems, visual disturbance and coordination problems, no deficit.   Motor SENSORY  BEHAVIOR/PSYCH:  negative for poor appetite, increased appetite, decreased sleep, increased sleep, decreased energy level, increased energy level and poor concentration no psychotic problem  Skin no rash no dermatitis, no skin  Vitals:  BP (!) 145/91 (Site: Left Upper Arm, Position: Sitting, Cuff Size: Large Adult)   Pulse 102   Temp 96.8 °F (36 °C) (Temporal)   Resp 20   Ht 5' 10\" (1.778 m)   Wt (!) 304 lb (137.9 kg)   SpO2 98% Comment: arsalanar  BMI 43.62 kg/m²     PHYSICAL EXAM:  General Appearance:    Alert, cooperative, no distress, appears stated age, obese, not in any respiratory distress, not using accessory muscles    Head:    Normocephalic, without obvious abnormality, atraumatic      Eyes:    PERRL, conjunctiva/corneas clear, EOM's intact, no jaundice. No Giselle's syndrome    Ears:    Normal  external ear canals, both ears no polyps or sinus tenderness    Nose:   Nares normal, septum midline, mucosa normal, no drainage        or sinus tenderness   Throat:   Lips, mucosa, and tongue normal; teeth and gums normal, oropharyngeal orifice not compromise    Neck:   Supple, symmetrical, trachea midline, no adenopathy;     thyroid:  no enlargement/tenderness/nodules; no carotid    bruit ,JVD not elevated    Back:     Symmetric, no curvature, ROM normal, no CVA tenderness   Lungs:     AP diameter is increased. Percussion note is hyperresonant expiration prolonged. No rales, rhonchi are audible. No pleural friction rub is audible    Chest Wall:    No tenderness or deformity      Heart:    Regular rate and rhythm, S1 and S2 normal, no murmur, rub        or gallop no rvh                           Abdomen:                                                 Pulses:                              Skin:                  Lymph nodes:                    Neurologic:                  Soft, non-tender, bowel sounds active all four quadrants,     no masses, no organomegaly         2+ and symmetric all extremities     Skin color, texture, turgor normal, no rashes or lesions       Cervical, supraclavicular not enlarged or matted or tender      CNII-XII intact, normal strength 5/5 . Sensation grossly normal  and reflexes normal 2+  throughout     Clubbing No  Lower ext edema . Negative  Upper ext edema . Absent         Musculoskeletal no synovitis.   No joint swelling or tenderness          Thyroid:   Lab Results   Component Value Date    TSH 1.94 06/18/2021     Urinalysis: No results for input(s): BACTERIA, BLOODU, CLARITYU, COLORU, PHUR, PROTEINU, RBCUA, SPECGRAV, BILIRUBINUR, NITRU, WBCUA, LEUKOCYTESUR, GLUCOSEU in the last 72 hours. CXR  No recent chest x-ray  CT Scans  PET CT was negative for most of them for the lung nodule. Echo    No recent echo            IMPRESSION:    Visit Diagnoses       Codes    Lung nodule    -  Primary R91.1    Chronic obstructive pulmonary disease, unspecified COPD type ([facility]     J44.9    Primary hypertension     I10      Obstructive sleep apnea syndrome  :                PLAN:      COPD stable no evidence of acute exacerbation continue same treatment as before. He did not require any prescriptions. Lung nodules are very likely benign granulomas. The been PET negative. We will get a repeat CT scan this visit and if the lung nodules are unchanged there will be no need to follow them anymore. Is some blood pressure is under good control. He has not been able to lose weight he was encouraged to lose weight. He already had Covid vaccine    He already had flu vaccine. Patient has sleep apnea syndrome which is responding well to the use of CPAP. Advised him to continue use of CPAP as before. Not sleepy tired fatigue during the daytime. Quit smoking 5 years back.   Dictated by Dr. Sue Mclaughlin MD dictation over thank you

## 2021-11-01 ENCOUNTER — TELEPHONE (OUTPATIENT)
Dept: PULMONOLOGY | Age: 66
End: 2021-11-01

## 2021-11-01 NOTE — TELEPHONE ENCOUNTER
Called pt today in order to see if they were getting lab work done. Pt states they forgot and will get them done right away.  [facility]

## 2021-11-03 ENCOUNTER — TELEPHONE (OUTPATIENT)
Dept: PULMONOLOGY | Age: 66
End: 2021-11-03

## 2021-11-03 NOTE — TELEPHONE ENCOUNTER
Contacted pt in regards to scheduling Ct. There was no answer. Voicemail was left stating to contact the office for assistance.  Isabela Olguin

## 2021-11-04 NOTE — TELEPHONE ENCOUNTER
Contacted pt checking to see if we could get the ct scheduled per the order. Pt states he will get the order completed before the next visit with the provider.  Bob Koo

## 2021-11-11 ENCOUNTER — TELEPHONE (OUTPATIENT)
Dept: PULMONOLOGY | Age: 66
End: 2021-11-11

## 2021-11-11 DIAGNOSIS — G47.33 OSA ON CPAP: Primary | ICD-10-CM

## 2021-11-11 DIAGNOSIS — Z99.89 OSA ON CPAP: Primary | ICD-10-CM

## 2021-11-11 NOTE — TELEPHONE ENCOUNTER
Pt called to request an order for CPAP supplies to be sent to [facility]. Shoshana placed and signed order. Manually faxed to [facility] per pt request. Pt informed.

## 2021-12-22 ENCOUNTER — HOSPITAL ENCOUNTER (OUTPATIENT)
Age: 66
Setting detail: SPECIMEN
Discharge: HOME | End: 2021-12-22

## 2021-12-22 ENCOUNTER — HOSPITAL ENCOUNTER (OUTPATIENT)
Dept: CT IMAGING | Facility: CLINIC | Age: 66
Discharge: HOME | End: 2021-12-24
Payer: MEDICARE

## 2021-12-22 DIAGNOSIS — R73.9 HYPERGLYCEMIA: ICD-10-CM

## 2021-12-22 DIAGNOSIS — R91.1 LUNG NODULE: ICD-10-CM

## 2021-12-22 DIAGNOSIS — I10 ESSENTIAL HYPERTENSION: ICD-10-CM

## 2021-12-22 DIAGNOSIS — R06.09 DOE (DYSPNEA ON EXERTION): ICD-10-CM

## 2021-12-22 DIAGNOSIS — D75.1 POLYCYTHEMIA: ICD-10-CM

## 2021-12-22 LAB
ABSOLUTE EOS #: 0.18 K/UL (ref 0–0.44)
ABSOLUTE IMMATURE GRANULOCYTE: 0.09 K/UL (ref 0–0.3)
ABSOLUTE LYMPH #: 1.61 K/UL (ref 1.1–3.7)
ABSOLUTE MONO #: 0.71 K/UL (ref 0.1–1.2)
ANION GAP SERPL CALCULATED.3IONS-SCNC: 13 MMOL/L (ref 9–17)
BASOPHILS # BLD: 1 % (ref 0–2)
BASOPHILS ABSOLUTE: 0.1 K/UL (ref 0–0.2)
BNP INTERPRETATION: ABNORMAL
BUN BLDV-MCNC: 12 MG/DL (ref 8–23)
BUN/CREAT BLD: ABNORMAL (ref 9–20)
CALCIUM SERPL-MCNC: 9.4 MG/DL (ref 8.6–10.4)
CHLORIDE BLD-SCNC: 105 MMOL/L (ref 98–107)
CO2: 24 MMOL/L (ref 20–31)
CREAT SERPL-MCNC: 0.81 MG/DL (ref 0.7–1.2)
DIFFERENTIAL TYPE: ABNORMAL
EOSINOPHILS RELATIVE PERCENT: 2 % (ref 1–4)
GFR AFRICAN AMERICAN: >60 ML/MIN
GFR NON-AFRICAN AMERICAN: >60 ML/MIN
GFR SERPL CREATININE-BSD FRML MDRD: ABNORMAL ML/MIN/{1.73_M2}
GFR SERPL CREATININE-BSD FRML MDRD: ABNORMAL ML/MIN/{1.73_M2}
GLUCOSE BLD-MCNC: 100 MG/DL (ref 70–99)
HCT VFR BLD CALC: 54.5 % (ref 40.7–50.3)
HEMOGLOBIN: 17.8 G/DL (ref 13–17)
IMMATURE GRANULOCYTES: 1 %
LYMPHOCYTES # BLD: 16 % (ref 24–43)
MCH RBC QN AUTO: 32.4 PG (ref 25.2–33.5)
MCHC RBC AUTO-ENTMCNC: 32.7 G/DL (ref 28.4–34.8)
MCV RBC AUTO: 99.1 FL (ref 82.6–102.9)
MONOCYTES # BLD: 7 % (ref 3–12)
NRBC AUTOMATED: 0 PER 100 WBC
PDW BLD-RTO: 12.9 % (ref 11.8–14.4)
PLATELET # BLD: 237 K/UL (ref 138–453)
PLATELET ESTIMATE: ABNORMAL
PMV BLD AUTO: 9.4 FL (ref 8.1–13.5)
POTASSIUM SERPL-SCNC: 5.3 MMOL/L (ref 3.7–5.3)
PRO-BNP: 787 PG/ML
RBC # BLD: 5.5 M/UL (ref 4.21–5.77)
RBC # BLD: ABNORMAL 10*6/UL
SEG NEUTROPHILS: 73 % (ref 36–65)
SEGMENTED NEUTROPHILS ABSOLUTE COUNT: 7.16 K/UL (ref 1.5–8.1)
SODIUM BLD-SCNC: 142 MMOL/L (ref 135–144)
WBC # BLD: 9.9 K/UL (ref 3.5–11.3)
WBC # BLD: ABNORMAL 10*3/UL

## 2021-12-22 PROCEDURE — 71250 CT THORAX DX C-: CPT

## 2021-12-23 DIAGNOSIS — D75.1 POLYCYTHEMIA: Primary | ICD-10-CM

## 2021-12-23 LAB
ESTIMATED AVERAGE GLUCOSE: 123 MG/DL
HBA1C MFR BLD: 5.9 % (ref 4–6)

## 2021-12-23 NOTE — RESULT ENCOUNTER NOTE
Please notify patient: Hemoglobin A1c 5.9, prediabetes is worsening  Blood glucose 100 mildly increased low-carb diet is advised    1 cardiac test shows that he might be retaining water due to his heart, I would like him to see the cardiologist, and if he agrees I would like to start water pill small dosage like furosemide he is retaining water due to heart issues and can develop congestive heart failure  Please let me know if he agrees for furosemide, and repeat blood work in 1 week if we start furosemide, to see cardiologist as well. What pharmacy?     Very high red blood cells worse than before, I will refer him to a blood doctor    Otherwise labs within normal limits  continue current treatment  Please advise him to get his booster COVID-19 vaccine      Future Appointments  2/18/2022  10:30 AM   Cherri Spurling, MD     [facility]  4/19/2022  1:15 PM    aJne Juárez MD        Resp Spec           [facility]ari[facility] Sheldon  8/18/2022  10:30 AM   Nga Ibrahim MD         [facility]             [id number]

## 2021-12-27 NOTE — RESULT ENCOUNTER NOTE
Noted response from patient  PLEASE FAX BNP RESULT TO THE cardiologist      Future Appointments  2/18/2022  10:30 AM   Madison Hatchet, MD     [facility]  4/19/2022  1:15 PM    Phan Sanchez MD        Resp Spec           [facility]  8/18/2022  10:30 AM   Juan Ryan MD         [facility]             [id number]

## 2021-12-28 ENCOUNTER — TELEPHONE (OUTPATIENT)
Dept: ONCOLOGY | Age: 66
End: 2021-12-28

## 2021-12-28 NOTE — TELEPHONE ENCOUNTER
Order Details  Procedure:  AMB REFERRAL TO HEMATOLOGY ONCOLOGY   Associated diagnosis: D75.1 (ICD-10-CM) - Polycythemia   Date: 12/23/2021   Provider: Mickey Essex, MD   Department: [facility]     LVM to setup consult, referral in deferred workque

## 2022-01-12 DIAGNOSIS — F32.0 CURRENT MILD EPISODE OF MAJOR DEPRESSIVE DISORDER WITHOUT PRIOR EPISODE (HCC): ICD-10-CM

## 2022-01-12 RX ORDER — FLUOXETINE 10 MG/1
10 CAPSULE ORAL DAILY
Qty: 30 CAPSULE | Refills: 3 | Status: SHIPPED | OUTPATIENT
Start: 2022-01-12 | End: 2022-06-07

## 2022-02-17 NOTE — PROGRESS NOTES
Visit Information    Have you changed or started any medications since your last visit including any over-the-counter medicines, vitamins, or herbal medicines? no   Have you stopped taking any of your medications? Is so, why? -  no  Are you having any side effects from any of your medications? - no    Have you seen any other physician or provider since your last visit? yes -    Have you had any other diagnostic tests since your last visit? yes -    Have you been seen in the emergency room and/or had an admission in a hospital since we last saw you?  no   Have you had your routine dental cleaning in the past 6 months?  yes -      Do you have an active MyChart account? If no, what is the barrier?   Yes    Patient Care Team:  Jonh Bear MD as PCP - General (Family Medicine)  John Bear MD as PCP - [facility]  Marleni Brunson MD as Resident (Cardiology)  Nathanial Fabry, MD as Consulting Physician (Urology)  Gautam Gibson RN as Nurse Harlen Angelucci, MD as Consulting Physician (Pulmonology)  Namita Baltazar MD as Consulting Physician (Cardiology)  Efraín Wells MD as Consulting Physician (Dermatology)    Medical History Review  Past Medical, Family, and Social History reviewed and does contribute to the patient presenting condition    Health Maintenance   Topic Date Due    Shingles Vaccine (1 of 2) Never done    AAA screen  12/07/2020    PSA counseling  03/04/2022    Colorectal Cancer Screen  04/26/2022    Lipid screen  06/18/2022    Annual Wellness Visit (AWV)  10/07/2022    Depression Monitoring  10/11/2022    Pneumococcal 65+ years Vaccine (1 of 1 - PPSV23) 11/14/2022    A1C test (Diabetic or Prediabetic)  12/22/2022    Potassium monitoring  12/22/2022    Creatinine monitoring  12/22/2022    Low dose CT lung screening  12/22/2022    DTaP/Tdap/Td vaccine (2 - Td or Tdap) 11/21/2026    Flu vaccine  Completed    COVID-19 Vaccine  Completed    Hepatitis C screen Completed    Hepatitis A vaccine  Aged Out    Hepatitis B vaccine  Aged Out    Hib vaccine  Aged Out    Meningococcal (ACWY) vaccine  Aged Out

## 2022-02-18 ENCOUNTER — OFFICE VISIT (OUTPATIENT)
Dept: FAMILY MEDICINE CLINIC | Age: 67
End: 2022-02-18
Payer: MEDICARE

## 2022-02-18 VITALS
OXYGEN SATURATION: 98 % | TEMPERATURE: 96.4 F | HEIGHT: 70 IN | DIASTOLIC BLOOD PRESSURE: 76 MMHG | SYSTOLIC BLOOD PRESSURE: 134 MMHG | BODY MASS INDEX: 44.61 KG/M2 | WEIGHT: 311.6 LBS | HEART RATE: 73 BPM

## 2022-02-18 DIAGNOSIS — D75.1 POLYCYTHEMIA: ICD-10-CM

## 2022-02-18 DIAGNOSIS — E78.5 HYPERLIPIDEMIA WITH TARGET LDL LESS THAN 100: ICD-10-CM

## 2022-02-18 DIAGNOSIS — Z85.46 PERSONAL HISTORY OF PROSTATE CANCER: ICD-10-CM

## 2022-02-18 DIAGNOSIS — F32.4 MAJOR DEPRESSIVE DISORDER WITH SINGLE EPISODE, IN PARTIAL REMISSION (HCC): ICD-10-CM

## 2022-02-18 DIAGNOSIS — Z99.89 OSA ON CPAP: ICD-10-CM

## 2022-02-18 DIAGNOSIS — I25.10 CORONARY ARTERY DISEASE INVOLVING NATIVE CORONARY ARTERY OF NATIVE HEART WITHOUT ANGINA PECTORIS: ICD-10-CM

## 2022-02-18 DIAGNOSIS — I10 ESSENTIAL HYPERTENSION: Primary | ICD-10-CM

## 2022-02-18 DIAGNOSIS — I48.0 PAROXYSMAL ATRIAL FIBRILLATION (HCC): ICD-10-CM

## 2022-02-18 DIAGNOSIS — Z13.6 ENCOUNTER FOR ABDOMINAL AORTIC ANEURYSM (AAA) SCREENING: ICD-10-CM

## 2022-02-18 DIAGNOSIS — J44.9 CHRONIC OBSTRUCTIVE PULMONARY DISEASE, UNSPECIFIED COPD TYPE (HCC): ICD-10-CM

## 2022-02-18 DIAGNOSIS — G47.33 OSA ON CPAP: ICD-10-CM

## 2022-02-18 DIAGNOSIS — E66.01 OBESITY, CLASS III, BMI 40-49.9 (MORBID OBESITY) (HCC): ICD-10-CM

## 2022-02-18 PROBLEM — C61 PROSTATE CANCER (HCC): Status: RESOLVED | Noted: 2017-08-02 | Resolved: 2022-02-18

## 2022-02-18 PROCEDURE — 3023F SPIROM DOC REV: CPT | Performed by: FAMILY MEDICINE

## 2022-02-18 PROCEDURE — 99214 OFFICE O/P EST MOD 30 MIN: CPT | Performed by: FAMILY MEDICINE

## 2022-02-18 PROCEDURE — 3017F COLORECTAL CA SCREEN DOC REV: CPT | Performed by: FAMILY MEDICINE

## 2022-02-18 PROCEDURE — 4040F PNEUMOC VAC/ADMIN/RCVD: CPT | Performed by: FAMILY MEDICINE

## 2022-02-18 PROCEDURE — G8417 CALC BMI ABV UP PARAM F/U: HCPCS | Performed by: FAMILY MEDICINE

## 2022-02-18 PROCEDURE — 1123F ACP DISCUSS/DSCN MKR DOCD: CPT | Performed by: FAMILY MEDICINE

## 2022-02-18 PROCEDURE — 1036F TOBACCO NON-USER: CPT | Performed by: FAMILY MEDICINE

## 2022-02-18 PROCEDURE — G8484 FLU IMMUNIZE NO ADMIN: HCPCS | Performed by: FAMILY MEDICINE

## 2022-02-18 PROCEDURE — G8427 DOCREV CUR MEDS BY ELIG CLIN: HCPCS | Performed by: FAMILY MEDICINE

## 2022-02-18 RX ORDER — UMECLIDINIUM 62.5 UG/1
1 AEROSOL, POWDER ORAL DAILY
Qty: 30 EACH | Refills: 5 | Status: SHIPPED | OUTPATIENT
Start: 2022-02-18

## 2022-02-18 RX ORDER — FUROSEMIDE 20 MG/1
20 TABLET ORAL EVERY OTHER DAY
Qty: 90 TABLET | Refills: 3 | Status: SHIPPED | OUTPATIENT
Start: 2022-02-18 | End: 2022-07-27 | Stop reason: SDUPTHER

## 2022-02-18 RX ORDER — RIVAROXABAN 20 MG/1
TABLET, FILM COATED ORAL
COMMUNITY
Start: 2022-02-07

## 2022-02-18 RX ORDER — ALBUTEROL SULFATE 90 UG/1
2 AEROSOL, METERED RESPIRATORY (INHALATION) 4 TIMES DAILY PRN
Qty: 18 G | Refills: 0 | Status: SHIPPED | OUTPATIENT
Start: 2022-02-18

## 2022-02-18 ASSESSMENT — ENCOUNTER SYMPTOMS
CONSTIPATION: 0
SHORTNESS OF BREATH: 1
VOMITING: 0
WHEEZING: 0
CHEST TIGHTNESS: 0
ABDOMINAL DISTENTION: 0
NAUSEA: 0
APNEA: 1
DIARRHEA: 0
ABDOMINAL PAIN: 0
COUGH: 0

## 2022-02-18 ASSESSMENT — PATIENT HEALTH QUESTIONNAIRE - PHQ9
SUM OF ALL RESPONSES TO PHQ QUESTIONS 1-9: 0
2. FEELING DOWN, DEPRESSED OR HOPELESS: 0
1. LITTLE INTEREST OR PLEASURE IN DOING THINGS: 0
SUM OF ALL RESPONSES TO PHQ9 QUESTIONS 1 & 2: 0

## 2022-02-18 NOTE — PATIENT INSTRUCTIONS
Patient Education        Low Sodium Diet (2,000 Milligram): Care Instructions  Overview     Limiting sodium can be an important part of managing some health problems. The most common source of sodium is salt. People get most of the salt in their diet from canned, prepared, and packaged foods. Fast food and restaurant meals also are very high in sodium. Your doctor will probably limit your sodium to less than 2,000 milligrams (mg) a day. This limit counts all the sodium in prepared and packaged foods and any salt you add to your food. Follow-up care is a key part of your treatment and safety. Be sure to make and go to all appointments, and call your doctor if you are having problems. It's also a good idea to know your test results and keep a list of the medicines you take. How can you care for yourself at home? Read food labels  · Read labels on cans and food packages. The labels tell you how much sodium is in each serving. Make sure that you look at the serving size. If you eat more than the serving size, you have eaten more sodium. · Food labels also tell you the Percent Daily Value for sodium. Choose products with low Percent Daily Values for sodium. · Be aware that sodium can come in forms other than salt, including monosodium glutamate (MSG), sodium citrate, and sodium bicarbonate (baking soda). MSG is often added to Asian food. When you eat out, you can sometimes ask for food without MSG or added salt. Buy low-sodium foods  · Buy foods that are labeled \"unsalted\" (no salt added), \"sodium-free\" (less than 5 mg of sodium per serving), or \"low-sodium\" (140 mg or less of sodium per serving). Foods labeled \"reduced-sodium\" and \"light sodium\" may still have too much sodium. Be sure to read the label to see how much sodium you are getting. · Buy fresh vegetables, or frozen vegetables without added sauces. Buy low-sodium versions of canned vegetables, soups, and other canned goods.   Prepare low-sodium meals  · Cut back on the amount of salt you use in cooking. This will help you adjust to the taste. Do not add salt after cooking. One teaspoon of salt has about 2,300 mg of sodium. · Take the salt shaker off the table. · Flavor your food with garlic, lemon juice, onion, vinegar, herbs, and spices. Do not use soy sauce, lite soy sauce, steak sauce, onion salt, garlic salt, celery salt, or ketchup on your food. · Use low-sodium salad dressings, sauces, and ketchup. Or make your own salad dressings and sauces without adding salt. · Use less salt (or none) when recipes call for it. You can often use half the salt a recipe calls for without losing flavor. Other foods such as rice, pasta, and grains do not need added salt. · Rinse canned vegetables, and cook them in fresh water. This removes some--but not all--of the salt. · Avoid water that is naturally high in sodium or that has been treated with water softeners, which add sodium. If you buy bottled water, read the label and choose a sodium-free brand. Avoid high-sodium foods  · Avoid eating:  ? Smoked, cured, salted, and canned meat, fish, and poultry. ? Ham, freeman, hot dogs, and luncheon meats. ? Regular, hard, and processed cheese and regular peanut butter. ? Crackers with salted tops, and other salted snack foods such as pretzels, chips, and salted popcorn. ? Frozen prepared meals, unless labeled low-sodium. ? Canned and dried soups, broths, and bouillon, unless labeled sodium-free or low-sodium. ? Canned vegetables, unless labeled sodium-free or low-sodium. ? Western Kandy fries, pizza, tacos, and other fast foods. ? Pickles, olives, ketchup, and other condiments, especially soy sauce, unless labeled sodium-free or low-sodium. Where can you learn more? Go to [email or web address] org and sign in to your [employer] account.  Enter W960 in the [facility] box to learn more about \"Low Sodium Diet (2,000 Milligram): Care Instructions. \"     If you do not have an account, please click on the \"Sign Up Now\" link. Current as of: September 8, 2021               Content Version: 13.1  © 3305-1223 [employer]. Care instructions adapted under license by [facility] ([facility]). If you have questions about a medical condition or this instruction, always ask your healthcare professional. [address] any warranty or liability for your use of this information.

## 2022-02-18 NOTE — PROGRESS NOTES
Ambika Galvez (:  1955) is a 77 y.o. male,Established patient, here for evaluation of the following chief complaint(s): Hypertension, Hyperlipidemia, Depression, COPD, and Health Maintenance      ASSESSMENT/PLAN:    1. Essential hypertension  Well controlled. Will recheck labs. Discussed low salt diet and BP and pulse monitoring.    -     Comprehensive Metabolic Panel; Future  -     Magnesium; Future  -     Uric Acid; Future  We'll start furosemide due to persistent leg swelling  To continue with metoprolol 25 mg twice a day and lisinopril 5 mg daily  -     furosemide (LASIX) 20 MG tablet; Take 1 tablet by mouth every other day Morning, Disp-90 tablet, R-3Normal    2. Paroxysmal atrial fibrillation (HCC)  Stable  Continue metoprolol 25 mg twice a day for rate control, Xarelto 20 mg daily for chronic anticoagulation    3. Hyperlipidemia with target LDL less than 100  Well-controlled  Continue Crestor 40 mg daily  Lab Results   Component Value Date    LDLCALC 67 2016    LDLCHOLESTEROL 50 2021       4. Chronic obstructive pulmonary disease, unspecified COPD type ([facility] [address]  Worsening  -To start   Umeclidinium Bromide (INCRUSE ELLIPTA) 62.5 MCG/INH AEPB; Inhale 1 actuation into the lungs daily Maintenance, Disp-30 each, R-5Normal  -To start     albuterol sulfate HFA (VENTOLIN HFA) 108 (90 Base) MCG/ACT inhaler; Inhale 2 puffs into the lungs 4 times daily as needed for Wheezing or Shortness of Breath Rescue, Disp-18 g, R-0Normal  5. Major depressive disorder with single episode, in partial remission (HCC)   Improved  Continue Prozac 10 mg daily  We'll continue to monitor  6.  Obesity, Class III, BMI 40-49.9 (morbid obesity) (HCC)  Worsening  There is unintentional weight gain of 10 pounds in 4 months  Wt Readings from Last 3 Encounters:   22 (!) 311 lb 9.6 oz (141.3 kg)   10/15/21 (!) 304 lb (137.9 kg)   10/06/21 (!) 301 lb (136.5 kg)     Low carb, low fat diet, increase fruits and vegetables, and exercise 4-5 times a week 30-40 minutes a day, or walk 1-2 hours per day, or wear a pedometer and get at least 10,000 steps per day. 7. Personal history of prostate cancer  In remission  Has PSA order to do in a month for urology  Lab Results   Component Value Date    PSA <0.01 03/04/2021    PSA <0.01 09/05/2020    PSA <0.01 03/02/2020       8. ADRIAN on CPAP  Patient reports compliance with CPAP  Patient benefits from using the CPAP    9. Polycythemia  Worsening  I discussed possible causes, possibly secondary to COPD and sleep apnea and intermittent hypoxia  Patient was referred to hematologist oncologist, advised to schedule appointment, contact information given, we need to rule out other causes  -     CBC with Auto Differential; Future  -     Path Review, Smear; Future    10. Encounter for abdominal aortic aneurysm (AAA) screening  -      AAA SCREENING; Future    11. Coronary artery disease involving native coronary artery of native heart without angina pectoris. Mild   Likely stable  Continue crestor, lisinopril and metoprolol    Jber Im received counseling on the following healthy behaviors: nutrition, exercise, medication adherence and weight loss    Reviewed prior labs and health maintenance  Discussed use, benefit, and side effects of prescribed medications. Barriers to medication compliance addressed. Patient given educational materials - see patient instructions  Was a self-tracking handout given in paper form or via [employer]?  yes  All patient questions answered. Patient voiced understanding. The patient's past medical,surgical, social, and family history as well as his current medications and allergies were reviewed as documented in today's encounter. Medications, labs, diagnostic studies, consultations and follow-up as documented in this encounter. Return in about 4 months (around 6/18/2022) for ALWAYS NEEDS 30 MIN. APPT, HTN,HLP, LABS F/U.     Data Unavailable    Future Appointments   Date Time Provider Madeleine William   4/19/2022  1:15 PM Sabine Singh MD Resp Spec [facility]   7/27/2022 11:00 AM Shadi Tomlinson MD [facility]   8/18/2022 10:30 AM [facility]         SUBJECTIVE/OBJECTIVE:    Hypertension, Paroxismal AFib   Patient here for follow-up. He is not exercising, but staying active,  and is adherent to low salt diet. Blood pressure is well controlled at home. Cardiac symptoms dyspnea, fatigue and lower extremity edema. Patient denies chest pain, chest pressure/discomfort, claudication, exertional chest pressure/discomfort, irregular heart beat, near-syncope, orthopnea, palpitations, paroxysmal nocturnal dyspnea, syncope and tachypnea. Cardiovascular risk factors: advanced age (older than 54 for men, 72 for women), dyslipidemia, hypertension, male gender, obesity (BMI >= 30 kg/m2) and smoking/ tobacco exposure. Use of agents associated with hypertension: none. History of target organ damage: Mild coronary artery disease. Patient says he was seen by cardiology November 2021 by [facility], he was told he is stable, they did an EKG. Cardiac cath 9/19/17, report in epic, 30% mild stenosis LAD and 20% left circumflex    \"Angiographic Findings    Cardiac Arteries and Lesion Findings     LMCA: Normal 0% stenosis.     LAD: has mid 30% stenosis.     LCx: is a co-dominant vessel with distal 20% stenosis. The OM1 has 10% stenosis. The OM2 has 10% stenosis.     RCA: is a co-dominant vessel with mid-distal 30% stenosis. \"      BP controlled. Tom Tang reports compliance with BP medications, and tolerates them well, denies side effects. BP Readings from Last 3 Encounters:   02/18/22 134/76   10/15/21 (!) 145/91   10/06/21 130/86          Pulse is Normal.    Pulse Readings from Last 3 Encounters:   02/18/22 73   10/15/21 102   10/06/21 103     Morbid obesity per BMI. Body mass index is 44.71 kg/m².   He admits he hasn't been very active during winter and has been eating more during holidays. Weight has been increased, there is unintentional weight gain of 10 pounds in 4 months    Wt Readings from Last 3 Encounters:   02/18/22 (!) 311 lb 9.6 oz (141.3 kg)   10/15/21 (!) 304 lb (137.9 kg)   10/06/21 (!) 301 lb (136.5 kg)         Hyperlipidemia:  No new myalgias or GI upset on rosuvastatin (Crestor). Medication compliance: compliant all of the time. Patient is  following a low fat, low cholesterol diet. LDL is normal with improved triglycerides  Lab Results   Component Value Date    LDLCALC 67 08/02/2016    LDLCHOLESTEROL 50 06/18/2021     Lab Results   Component Value Date    TRIG 137 06/18/2021    TRIG 151 (H) 06/20/2019    TRIG 216 (H) 07/17/2017       COPD  Patient says he is not on inhalers now, but was on inhalers before. Denies cough or wheezing. Reports dyspnea on exertion with intense exertion or when shoveling snow, and changing temperature from warm to cold. He is an ex-smoker    PFTs 4/18/19 mild obstructive, normal DLCO, report in media      CT chest high-resolution completed on 12/22/2021  Noticed coronary artery disease, recently seen by cardiologist, cardiac cath done 2017. Right upper lobe nodule 1.2 cm, for which she follows with pulmonologist, stable. Centrilobular emphysema. Bilateral renal cysts for which she follows with urologist.      FINDINGS:   Mediastinum: The heart and great vessels are normal in size.  Calcified   atheromatous plaque and coronary calcifications are noted.  Trace pericardial   fluid.  No enlarged or suspicious-appearing lymph nodes are identified.       HRCT Findings/Lungs/pleura: Redemonstration of pulmonary nodules, notable for   a lobulated 1.2 cm nodule in the right lung apex.  This appears stable since   the earliest available exam in 2019.  No acute airspace disease or effusion.    The central airway is patent.       Centrilobular emphysematous disease is again demonstrated and without   significant change.  No evidence for fibrosis, honeycombing or traction   bronchiectasis.       Upper Abdomen: No acute findings.  Bilateral renal cysts.       Soft Tissues/Bones: No acute findings     Sleep Apnea:  Current treatment: cPAP. Compliance: compliant all of the time. Residual symptoms include: daytime fatigue. Patient does have history of prostate cancer, with history of total prostatectomy 10/17/2017 and pelvic lymph node dissection  PSA low, in remission  Denies difficulty urinating, frequency or urgency of urination, or pelvic pain. Lab Results   Component Value Date    PSA <0.01 03/04/2021    PSA <0.01 09/05/2020    PSA <0.01 03/02/2020         Depression better  He is on Prozac, tolerated well, denies side effects  Denies suicidal ideation, plan or intent. PHQ-2 Over the past 2 weeks, how often have you been bothered by any of the following problems? Little interest or pleasure in doing things: Not at all  Feeling down, depressed, or hopeless: Not at all  PHQ-2 Score: 0  PHQ-9 Over the past 2 weeks, how often have you been bothered by any of the following problems? PHQ-9 Total Score: 0  PHQ-9 Total Score: 0      PHQ Scores 2/18/2022 10/11/2021 9/29/2021 6/11/2021 6/30/2020 2/21/2019 3/1/2018   PHQ2 Score 0 2 2 1 0 0 0   PHQ9 Score 0 2 2 1 0 0 0     Polycythemia for the past 2 CBC, I did refer him to hematologist oncologist, but he has questions why we did that, I explained to him because of the persistence of increased H&H    Lab Results   Component Value Date    WBC 9.9 12/22/2021    HGB 17.8 (H) 12/22/2021    HCT 54.5 (H) 12/22/2021    MCV 99.1 12/22/2021     12/22/2021     AAA screening was not done      Prior to Visit Medications    Medication Sig Taking?  Authorizing Provider   XARELTO 20 MG TABS tablet TAKE 1 TABLET BY MOUTH EVERY DAY Yes Historical Provider, MD   metoprolol tartrate (LOPRESSOR) 25 MG tablet TAKE 1 TABLET BY MOUTH TWICE DAILY Yes Historical Provider, MD   FLUoxetine (PROZAC) 10 MG capsule TAKE 1 CAPSULE BY MOUTH DAILY Yes Emilie Reis MD   zoster recombinant adjuvanted vaccine (SHINGRIX) 50 MCG/0.5ML SUSR injection Inject 0.5 mLs into the muscle See Admin Instructions 1 dose now and repeat in 2-6 months Yes Emilie Reis MD   lisinopril (PRINIVIL;ZESTRIL) 5 MG tablet Take 1 tablet by mouth daily Yes Emilie Reis MD   rosuvastatin (CRESTOR) 40 MG tablet Take 1 tablet by mouth daily Yes Emilie Reis MD       Social History     Tobacco Use    Smoking status: Former Smoker     Packs/day: 1.00     Years: 40.00     Pack years: 40.00     Types: Cigarettes     Start date: 12     Quit date: 2016     Years since quittin.8    Smokeless tobacco: Never Used   Vaping Use    Vaping Use: Never used   Substance Use Topics    Alcohol use: Yes     Alcohol/week: 5.0 standard drinks     Types: 5 Cans of beer per week     Comment: per week    Drug use: No         Review of Systems   Constitutional: Positive for fatigue and unexpected weight change. Negative for activity change, appetite change, chills, diaphoresis and fever. HENT: Positive for hearing loss (chronic) and tinnitus (chronic). Eyes: Positive for visual disturbance (stable , wears glasses). Respiratory: Positive for apnea (on CPAP) and shortness of breath (FARFAN). Negative for cough, chest tightness and wheezing. Cardiovascular: Positive for leg swelling. Negative for chest pain and palpitations. Gastrointestinal: Negative for abdominal distention, abdominal pain, constipation, diarrhea, nausea and vomiting. Endocrine: Negative for cold intolerance, heat intolerance, polydipsia, polyphagia and polyuria. Genitourinary: Negative for decreased urine volume, difficulty urinating, frequency and hematuria. Neurological: Negative for weakness. Hematological: Does not bruise/bleed easily. Psychiatric/Behavioral: Negative for dysphoric mood, self-injury, sleep disturbance and suicidal ideas.  The patient is not nervous/anxious.          -vital signs stable and within normal limits except morbid obesity per BMI    /76   Pulse 73   Temp 96.4 °F (35.8 °C)   Ht 5' 10\" (1.778 m)   Wt (!) 311 lb 9.6 oz (141.3 kg)   SpO2 98%   BMI 44.71 kg/m²        Physical Exam  Vitals and nursing note reviewed. Constitutional:       General: He is not in acute distress. Appearance: Normal appearance. He is well-developed. He is obese. He is not diaphoretic. HENT:      Head: Normocephalic and atraumatic. Right Ear: External ear normal.      Left Ear: External ear normal.      Mouth/Throat:      Comments: I did not examine the mouth due to coronavirus pandemic and wearing masks. Eyes:      General: Lids are normal. No scleral icterus. Right eye: No discharge. Left eye: No discharge. Extraocular Movements: Extraocular movements intact. Conjunctiva/sclera: Conjunctivae normal.   Neck:      Thyroid: No thyromegaly. Comments: Thick neck. Cardiovascular:      Rate and Rhythm: Normal rate and regular rhythm. Heart sounds: Murmur heard. Crescendo systolic murmur is present with a grade of 2/6. Diastolic murmur is present. Comments: Has compression stockings at home, but not always wearing them  Pulmonary:      Effort: Pulmonary effort is normal. No respiratory distress. Breath sounds: Normal breath sounds. No wheezing or rales. Chest:      Chest wall: No tenderness. Abdominal:      General: Bowel sounds are normal. There is no distension. Palpations: Abdomen is soft. There is no hepatomegaly or splenomegaly. Tenderness: There is no abdominal tenderness. Comments: Obese abdomen. Musculoskeletal:         General: No tenderness. Normal range of motion. Cervical back: Normal range of motion and neck supple. Right lower le+ Pitting Edema present. Left lower le+ Pitting Edema present.    Lymphadenopathy:      Cervical: No cervical adenopathy. Skin:     General: Skin is warm and dry. Capillary Refill: Capillary refill takes less than 2 seconds. Findings: No rash. Neurological:      Mental Status: He is alert and oriented to person, place, and time. Deep Tendon Reflexes: Reflexes are normal and symmetric. Psychiatric:         Mood and Affect: Mood normal.         Behavior: Behavior normal.         Thought Content: Thought content normal.         Judgment: Judgment normal.           I personally reviewed testing with patient and all questions fully answered. Polycythemia worsening  Hyperglycemia, mild prediabetes  Hyperlipidemia and high triglycerides improved  Prior BNP increased  Otherwise labs within normal limits    Hospital Outpatient Visit on 12/22/2021   Component Date Value Ref Range Status    Hemoglobin A1C 12/22/2021 5.9  4.0 - 6.0 % Final    Estimated Avg Glucose 12/22/2021 123  mg/dL Final    Comment: The ADA and AACC recommend providing the estimated average glucose result to permit better   patient understanding of their HBA1c result.       WBC 12/22/2021 9.9  3.5 - 11.3 k/uL Final    RBC 12/22/2021 5.50  4.21 - 5.77 m/uL Final    Hemoglobin 12/22/2021 17.8* 13.0 - 17.0 g/dL Final    Hematocrit 12/22/2021 54.5* 40.7 - 50.3 % Final    MCV 12/22/2021 99.1  82.6 - 102.9 fL Final    MCH 12/22/2021 32.4  25.2 - 33.5 pg Final    MCHC 12/22/2021 32.7  28.4 - 34.8 g/dL Final    RDW 12/22/2021 12.9  11.8 - 14.4 % Final    Platelets 60/88/0507 237  138 - 453 k/uL Final    MPV 12/22/2021 9.4  8.1 - 13.5 fL Final    NRBC Automated 12/22/2021 0.0  0.0 per 100 WBC Final    Differential Type 12/22/2021 NOT REPORTED   Final    Seg Neutrophils 12/22/2021 73* 36 - 65 % Final    Lymphocytes 12/22/2021 16* 24 - 43 % Final    Monocytes 12/22/2021 7  3 - 12 % Final    Eosinophils % 12/22/2021 2  1 - 4 % Final    Basophils 12/22/2021 1  0 - 2 % Final    Immature Granulocytes 12/22/2021 1* 0 % Final    Segs Absolute 12/22/2021 7.16  1.50 - 8.10 k/uL Final    Absolute Lymph # 12/22/2021 1.61  1.10 - 3.70 k/uL Final    Absolute Mono # 12/22/2021 0.71  0.10 - 1.20 k/uL Final    Absolute Eos # 12/22/2021 0.18  0.00 - 0.44 k/uL Final    Basophils Absolute 12/22/2021 0.10  0.00 - 0.20 k/uL Final    Absolute Immature Granulocyte 12/22/2021 0.09  0.00 - 0.30 k/uL Final    WBC Morphology 12/22/2021 NOT REPORTED   Final    RBC Morphology 12/22/2021 NOT REPORTED   Final    Platelet Estimate 67/99/0213 NOT REPORTED   Final    Glucose 12/22/2021 100* 70 - 99 mg/dL Final    BUN 12/22/2021 12  8 - 23 mg/dL Final    CREATININE 12/22/2021 0.81  0.70 - 1.20 mg/dL Final    Bun/Cre Ratio 12/22/2021 NOT REPORTED  9 - 20 Final    Calcium 12/22/2021 9.4  8.6 - 10.4 mg/dL Final    Sodium 12/22/2021 142  135 - 144 mmol/L Final    Potassium 12/22/2021 5.3  3.7 - 5.3 mmol/L Final    Chloride 12/22/2021 105  98 - 107 mmol/L Final    CO2 12/22/2021 24  20 - 31 mmol/L Final    Anion Gap 12/22/2021 13  9 - 17 mmol/L Final    GFR Non- 12/22/2021 >60  >60 mL/min Final    GFR  12/22/2021 >60  >60 mL/min Final    GFR Comment 12/22/2021        Final    Comment: Average GFR for 61-76 years old:   80 mL/min/1.73sq m  Chronic Kidney Disease:   <60 mL/min/1.73sq m  Kidney failure:   <15 mL/min/1.73sq m              eGFR calculated using average adult body mass. Additional eGFR calculator available at:        [employer]            GFR Staging 12/22/2021 NOT REPORTED   Final    Pro-BNP 12/22/2021 787* <300 pg/mL Final    Comment:       An age-independent cutoff point of 300 pg/ml has a 98% negative predictive value excluding   acute heart failure.       BNP Interpretation 12/22/2021 NOT REPORTED   Final       Lab Results   Component Value Date    ALT 32 06/18/2021    AST 21 06/18/2021    ALKPHOS 91 06/18/2021    BILITOT 0.54 06/18/2021       Lab Results   Component Value Date    TSH 1.94 06/18/2021       Lab Results   Component Value Date    CHOL 105 06/18/2021    CHOL 110 06/20/2019    CHOL 143 07/17/2017     Lab Results   Component Value Date    TRIG 137 06/18/2021    TRIG 151 (H) 06/20/2019    TRIG 216 (H) 07/17/2017     Lab Results   Component Value Date    HDL 28 (L) 06/18/2021    HDL 25 (L) 06/20/2019    HDL 28 (L) 07/17/2017     Lab Results   Component Value Date    LDLCALC 67 08/02/2016    LDLCHOLESTEROL 50 06/18/2021    LDLCHOLESTEROL 55 06/20/2019    LDLCHOLESTEROL 72 07/17/2017     Lab Results   Component Value Date    CHOLHDLRATIO 3.8 06/18/2021    CHOLHDLRATIO 4.4 06/20/2019    CHOLHDLRATIO 5.1 (H) 07/17/2017       No results found for: KNVPRSPF09  No results found for: FOLATE  No results found for: VITD25      Orders Placed This Encounter   Medications    Umeclidinium Bromide (INCRUSE ELLIPTA) 62.5 MCG/INH AEPB     Sig: Inhale 1 actuation into the lungs daily Maintenance     Dispense:  30 each     Refill:  5    albuterol sulfate HFA (VENTOLIN HFA) 108 (90 Base) MCG/ACT inhaler     Sig: Inhale 2 puffs into the lungs 4 times daily as needed for Wheezing or Shortness of Breath Rescue     Dispense:  18 g     Refill:  0    furosemide (LASIX) 20 MG tablet     Sig: Take 1 tablet by mouth every other day Morning     Dispense:  90 tablet     Refill:  3       Orders Placed This Encounter   Procedures    VL AAA SCREENING     Standing Status:   Future     Standing Expiration Date:   2/18/2023    CBC with Auto Differential     Standing Status:   Future     Standing Expiration Date:   2/18/2023    Comprehensive Metabolic Panel     Standing Status:   Future     Standing Expiration Date:   2/18/2023    Magnesium     Standing Status:   Future     Standing Expiration Date:   2/18/2023    Uric Acid     Standing Status:   Future     Standing Expiration Date:   2/18/2023    Path Review, Smear     Standing Status:   Future     Standing Expiration Date:   2/18/2023

## 2022-02-20 PROBLEM — I25.10 CORONARY ARTERY DISEASE INVOLVING NATIVE CORONARY ARTERY OF NATIVE HEART WITHOUT ANGINA PECTORIS: Status: ACTIVE | Noted: 2022-02-20

## 2022-02-25 ENCOUNTER — HOSPITAL ENCOUNTER (OUTPATIENT)
Age: 67
Setting detail: SPECIMEN
Discharge: HOME | End: 2022-02-25

## 2022-02-25 DIAGNOSIS — Z85.46 PERSONAL HISTORY OF PROSTATE CANCER: ICD-10-CM

## 2022-02-25 DIAGNOSIS — I10 ESSENTIAL HYPERTENSION: ICD-10-CM

## 2022-02-25 DIAGNOSIS — D75.1 POLYCYTHEMIA: ICD-10-CM

## 2022-02-25 LAB
ABSOLUTE EOS #: 0.2 K/UL (ref 0–0.44)
ABSOLUTE IMMATURE GRANULOCYTE: 0.05 K/UL (ref 0–0.3)
ABSOLUTE LYMPH #: 1.59 K/UL (ref 1.1–3.7)
ABSOLUTE MONO #: 0.6 K/UL (ref 0.1–1.2)
ABSOLUTE RETIC #: 0.09 M/UL (ref 0.03–0.08)
ALBUMIN SERPL-MCNC: 4.1 G/DL (ref 3.5–5.2)
ALBUMIN/GLOBULIN RATIO: 1.5 (ref 1–2.5)
ALP BLD-CCNC: 100 U/L (ref 40–129)
ALT SERPL-CCNC: 38 U/L (ref 5–41)
ANION GAP SERPL CALCULATED.3IONS-SCNC: 12 MMOL/L (ref 9–17)
AST SERPL-CCNC: 33 U/L
BASOPHILS # BLD: 1 % (ref 0–2)
BASOPHILS ABSOLUTE: 0.09 K/UL (ref 0–0.2)
BILIRUB SERPL-MCNC: 0.47 MG/DL (ref 0.3–1.2)
BUN BLDV-MCNC: 11 MG/DL (ref 8–23)
CALCIUM SERPL-MCNC: 9.4 MG/DL (ref 8.6–10.4)
CHLORIDE BLD-SCNC: 105 MMOL/L (ref 98–107)
CO2: 26 MMOL/L (ref 20–31)
CREAT SERPL-MCNC: 0.72 MG/DL (ref 0.7–1.2)
EOSINOPHILS RELATIVE PERCENT: 3 % (ref 1–4)
GFR AFRICAN AMERICAN: >60 ML/MIN
GFR NON-AFRICAN AMERICAN: >60 ML/MIN
GFR SERPL CREATININE-BSD FRML MDRD: ABNORMAL ML/MIN/{1.73_M2}
GLUCOSE BLD-MCNC: 128 MG/DL (ref 70–99)
HCT VFR BLD CALC: 52.3 % (ref 40.7–50.3)
HEMOGLOBIN: 17 G/DL (ref 13–17)
IMMATURE GRANULOCYTES: 1 %
IMMATURE RETIC FRACT: 16.4 % (ref 2.7–18.3)
LYMPHOCYTES # BLD: 21 % (ref 24–43)
MAGNESIUM: 2.3 MG/DL (ref 1.6–2.6)
MCH RBC QN AUTO: 32 PG (ref 25.2–33.5)
MCHC RBC AUTO-ENTMCNC: 32.5 G/DL (ref 28.4–34.8)
MCV RBC AUTO: 98.5 FL (ref 82.6–102.9)
MONOCYTES # BLD: 8 % (ref 3–12)
NRBC AUTOMATED: 0 PER 100 WBC
PDW BLD-RTO: 12.8 % (ref 11.8–14.4)
PLATELET # BLD: 236 K/UL (ref 138–453)
PMV BLD AUTO: 9.6 FL (ref 8.1–13.5)
POTASSIUM SERPL-SCNC: 4.5 MMOL/L (ref 3.7–5.3)
PROSTATE SPECIFIC ANTIGEN: <0.02 UG/L
RBC # BLD: 5.31 M/UL (ref 4.21–5.77)
RETIC %: 1.8 % (ref 0.5–1.9)
RETIC HEMOGLOBIN: 37.5 PG (ref 28.2–35.7)
SEG NEUTROPHILS: 66 % (ref 36–65)
SEGMENTED NEUTROPHILS ABSOLUTE COUNT: 5.17 K/UL (ref 1.5–8.1)
SODIUM BLD-SCNC: 143 MMOL/L (ref 135–144)
TOTAL PROTEIN: 6.8 G/DL (ref 6.4–8.3)
URIC ACID: 5.7 MG/DL (ref 3.4–7)
WBC # BLD: 7.7 K/UL (ref 3.5–11.3)

## 2022-02-28 LAB — SURGICAL PATHOLOGY REPORT: NORMAL

## 2022-03-01 LAB — PATHOLOGIST REVIEW: NORMAL

## 2022-03-01 NOTE — RESULT ENCOUNTER NOTE
Please notify patient: Blood glucose well controlled 128.   Improving hemoglobin and hematocrit, that means the oxygen and his blood improved but he still has some small abnormalities in the blood and increased number of very young blood cells, I would like him to see a blood doctor and already referred him    to hematologist oncologist, please give him contact information to make appointment, the changes are persistent, so we need to evaluate for any blood disorder  [facility]   [address] [facility], [address] [place] [address],[address] [place], [place] [phone]    Otherwise labs within normal limits  continue current treatment    Future Appointments  4/19/2022  1:15 PM    Henri Ramey MD        Resp Spec           [facility]  7/27/2022  11:00 AM   Yeny Hartley MD     [facility]               [facility]  8/18/2022  10:30 AM   Mal Goltz, MD          derm             [facility]

## 2022-03-26 DIAGNOSIS — I10 ESSENTIAL HYPERTENSION: ICD-10-CM

## 2022-03-28 RX ORDER — LISINOPRIL 5 MG/1
5 TABLET ORAL DAILY
Qty: 90 TABLET | Refills: 3 | Status: SHIPPED | OUTPATIENT
Start: 2022-03-28 | End: 2022-08-22 | Stop reason: SDUPTHER

## 2022-04-15 ENCOUNTER — HOSPITAL ENCOUNTER (OUTPATIENT)
Dept: VASCULAR LAB | Age: 67
Discharge: HOME | End: 2022-04-15
Payer: MEDICARE

## 2022-04-15 DIAGNOSIS — Z13.6 ENCOUNTER FOR ABDOMINAL AORTIC ANEURYSM (AAA) SCREENING: ICD-10-CM

## 2022-04-15 PROCEDURE — 76706 US ABDL AORTA SCREEN AAA: CPT

## 2022-04-19 ENCOUNTER — OFFICE VISIT (OUTPATIENT)
Dept: PULMONOLOGY | Age: 67
End: 2022-04-19
Payer: MEDICARE

## 2022-04-19 VITALS
HEART RATE: 86 BPM | HEIGHT: 70 IN | WEIGHT: 309 LBS | SYSTOLIC BLOOD PRESSURE: 154 MMHG | BODY MASS INDEX: 44.24 KG/M2 | OXYGEN SATURATION: 100 % | RESPIRATION RATE: 18 BRPM | DIASTOLIC BLOOD PRESSURE: 94 MMHG

## 2022-04-19 DIAGNOSIS — Z99.89 OSA ON CPAP: Primary | ICD-10-CM

## 2022-04-19 DIAGNOSIS — G47.33 OSA ON CPAP: Primary | ICD-10-CM

## 2022-04-19 DIAGNOSIS — C61 PROSTATE CANCER (HCC): ICD-10-CM

## 2022-04-19 DIAGNOSIS — I10 PRIMARY HYPERTENSION: ICD-10-CM

## 2022-04-19 DIAGNOSIS — R91.1 LUNG NODULE: ICD-10-CM

## 2022-04-19 DIAGNOSIS — J44.9 CHRONIC OBSTRUCTIVE PULMONARY DISEASE, UNSPECIFIED COPD TYPE (HCC): ICD-10-CM

## 2022-04-19 DIAGNOSIS — E66.01 MORBID OBESITY (HCC): ICD-10-CM

## 2022-04-19 DIAGNOSIS — Z90.79 HX OF PROSTATECTOMY: ICD-10-CM

## 2022-04-19 PROCEDURE — G8427 DOCREV CUR MEDS BY ELIG CLIN: HCPCS | Performed by: INTERNAL MEDICINE

## 2022-04-19 PROCEDURE — 3023F SPIROM DOC REV: CPT | Performed by: INTERNAL MEDICINE

## 2022-04-19 PROCEDURE — 3017F COLORECTAL CA SCREEN DOC REV: CPT | Performed by: INTERNAL MEDICINE

## 2022-04-19 PROCEDURE — 1036F TOBACCO NON-USER: CPT | Performed by: INTERNAL MEDICINE

## 2022-04-19 PROCEDURE — 99214 OFFICE O/P EST MOD 30 MIN: CPT | Performed by: INTERNAL MEDICINE

## 2022-04-19 PROCEDURE — 1123F ACP DISCUSS/DSCN MKR DOCD: CPT | Performed by: INTERNAL MEDICINE

## 2022-04-19 PROCEDURE — 4040F PNEUMOC VAC/ADMIN/RCVD: CPT | Performed by: INTERNAL MEDICINE

## 2022-04-19 PROCEDURE — G8417 CALC BMI ABV UP PARAM F/U: HCPCS | Performed by: INTERNAL MEDICINE

## 2022-04-19 ASSESSMENT — SLEEP AND FATIGUE QUESTIONNAIRES
HOW LIKELY ARE YOU TO NOD OFF OR FALL ASLEEP IN A CAR, WHILE STOPPED FOR A FEW MINUTES IN TRAFFIC: 0
HOW LIKELY ARE YOU TO NOD OFF OR FALL ASLEEP WHILE LYING DOWN TO REST IN THE AFTERNOON WHEN CIRCUMSTANCES PERMIT: 0
HOW LIKELY ARE YOU TO NOD OFF OR FALL ASLEEP WHILE SITTING AND READING: 1
HOW LIKELY ARE YOU TO NOD OFF OR FALL ASLEEP WHILE SITTING INACTIVE IN A PUBLIC PLACE: 0
HOW LIKELY ARE YOU TO NOD OFF OR FALL ASLEEP WHEN YOU ARE A PASSENGER IN A CAR FOR AN HOUR WITHOUT A BREAK: 1
HOW LIKELY ARE YOU TO NOD OFF OR FALL ASLEEP WHILE SITTING AND TALKING TO SOMEONE: 0
HOW LIKELY ARE YOU TO NOD OFF OR FALL ASLEEP WHILE SITTING QUIETLY AFTER LUNCH WITHOUT ALCOHOL: 0
HOW LIKELY ARE YOU TO NOD OFF OR FALL ASLEEP WHILE WATCHING TV: 1
ESS TOTAL SCORE: 3

## 2022-04-19 NOTE — PROGRESS NOTES
REASON FOR THE CONSULTATION:  COPD  Right lung nodule  Obesity  Hypertension   obstructive sleep apnea syndrome        HISTORY OF PRESENT ILLNESS: I  Diabetes under good control with no evidence for acute exacerbation of COPD yellow sputum, mopped assist.    I compared the recent scan with the scans done in 2019 the right upper lobe nodule is stable but is likely benign PET was negative    Blood pressure was elevated today in the office however he is I advised him to check his blood pressure at home twice a day and keep a note of it. He is using the CPAP regularly does not feel sleepy tired fatigue during the daytime did not drink make a Matthews Sleepiness Scale    No evidence of recurrence of the prostatic CA    He continues to be morbidly obese advised to lose weight. Ready had COVID-vaccine        Cancer screening    1. CRITERIA MET    [x]     CT ORDERED  []      2. CRITERIA NOT MET   []      3. REFUSED                    []        REASON CRITERIA NOT MET     1. SMOKING LESS THAN 30 PY  []      2. AGE LESS THAN 55 or GREATER 77 YEARS  []      3. QUIT SMOKING 15 YEARS OR GREATER   []      4. RECENT CT WITH IN 11 MONTHS    []      5. LIFE EXPECTANCY < 5 YEARS   []      6.  SIGNS  AND SYMPTOMS OF LUNG CANCER   []         Immunization   Immunization History   Administered Date(s) Administered    COVID-19, Alexx Mejia, Primary or Immunocompromised, PF, 100mcg/0.5mL 02/26/2021, 03/27/2021, 11/04/2021    Influenza Vaccine, unspecified formulation 10/26/2016    Influenza, Quadv, IM, PF (6 mo and older Fluzone, Flulaval, Fluarix, and 3 yrs and older Afluria) 11/14/2017, 12/17/2019    Influenza, Doralee Age, Recombinant, IM PF (Flublok 18 yrs and older) 10/18/2020    Influenza, Quadv, adjuvanted, 65 yrs +, IM, PF (Fluad) 10/06/2021    Pneumococcal Polysaccharide ([id number]) 11/14/2017    Td (Adult), 5 Lf Tetanus Toxoid, Pf (Tenivac, Decavac) 02/01/2003    Td vaccine (adult) 04/10/1997, 02/21/2003    Td, unspecified formulation 02/01/2003    Tdap (Boostrix, Adacel) 11/21/2016        Pneumococcal Vaccine     [x] Up to date    [] Indicated   [] Refused  [] Contraindicated       Influenza Vaccine   [x] Up to date    [] Indicated   [] Refused  [] Contraindicated          PAST MEDICAL HISTORY:       Diagnosis Date    A-fib ([facility] 4/6/2015    Acute and chronic respiratory failure, unspecified whether with hypoxia or hypercapnia (HCC)     Allergic rhinitis 4/6/2015    Atrial fibrillation (HCC)     Back pain     Basal cell carcinoma of skin     Decreased libido     Diverticulitis of colon (without mention of hemorrhage)(562.11) 4/6/2015    ED (erectile dysfunction)     Elevated prostate specific antigen (PSA) 4/6/2015    Former smoker     HTN (hypertension) 4/6/2015    Lung nodule     Obesity 4/6/2015    Obstructive sleep apnea     on cpap    Prostate cancer ([facility] 09/2017    needs removed    Pure hypercholesterolemia 4/6/2015    Tinnitus     Tobacco use disorder 4/6/2015    Wears glasses          Family History:       Problem Relation Age of Onset    Coronary Art Dis Mother     High Blood Pressure Mother     High Cholesterol Father     Seizures Sister        SURGICAL HISTORY:   Past Surgical History:   Procedure Laterality Date    CARDIAC CATHETERIZATION  09/19/2017    LAD: mid 30% stenosis  /  LM NLM / EF 55%  /  LCX: Codominant Vessel, distal 30% stenosis / OM1 AND OM2 10%    COLONOSCOPY      COLONOSCOPY  04/26/2019    COLONOSCOPY  4/26/2019    COLONOSCOPY POLYPECTOMY COLD BIOPSY performed by Cecilia Stern MD at [place] [address]  10/17/2017    robotic with schuyler pelvic lymph node dissection    PROSTATECTOMY N/A 10/17/2017    XI ROBOTIC PROSTATECTOMY LAPAROSCOPIC WITH PELVIC LYMPH NODE DISSECTION performed by Treasure Schwartz MD at [facility]  09/09    L Upper Lip Basal CA    TONSILLECTOMY      TYMPANOSTOMY TUBE PLACEMENT      VASECTOMY SOCIAL AND OCCUPATIONAL HEALTH:      There  is no history of TB or TB exposure. There  is no asbestos or silica dust exposure. The patient reports  no coal, foundry, quarry or [employer] exposure. Travel history reveals negative. There  is no history of recreational or IV drug use. There  is no hot tube exposure. Pets  negative    Occupational history not significant    TOBACCO:   reports that he quit smoking about 6 years ago. His smoking use included cigarettes. He started smoking about 46 years ago. He has a 40.00 pack-year smoking history. He has never used smokeless tobacco.  ETOH:   reports current alcohol use of about 5.0 standard drinks of alcohol per week. ALLERGIES:      No Known Allergies      Home Meds:   Prior to Admission medications    Medication Sig Start Date End Date Taking?  Authorizing Provider   lisinopril (PRINIVIL;ZESTRIL) 5 MG tablet TAKE 1 TABLET BY MOUTH DAILY 3/28/22   Anamaria Marino MD   XARELTO 20 MG TABS tablet TAKE 1 TABLET BY MOUTH EVERY DAY 2/7/22   Historical Provider, MD   metoprolol tartrate (LOPRESSOR) 25 MG tablet TAKE 1 TABLET BY MOUTH TWICE DAILY 12/6/21   Historical Provider, MD   Umeclidinium Bromide (INCRUSE ELLIPTA) 62.5 MCG/INH AEPB Inhale 1 actuation into the lungs daily Maintenance 2/18/22   Stacie Ann MD   albuterol sulfate HFA (VENTOLIN HFA) 108 (90 Base) MCG/ACT inhaler Inhale 2 puffs into the lungs 4 times daily as needed for Wheezing or Shortness of Breath Rescue 2/18/22   Stacie Ann MD   furosemide (LASIX) 20 MG tablet Take 1 tablet by mouth every other day Morning 2/18/22   Stacie Ann MD   FLUoxetine (PROZAC) 10 MG capsule TAKE 1 CAPSULE BY MOUTH DAILY 1/12/22   Stacie Ann MD   zoster recombinant adjuvanted vaccine [facility]) 50 MCG/0.5ML SUSR injection Inject 0.5 mLs into the muscle See Admin Instructions 1 dose now and repeat in 2-6 months 10/6/21   Stacie Ann MD   rosuvastatin (CRESTOR) 40 MG tablet Take 1 tablet by mouth daily 6/11/21   Lucia Devi MD              REVIEW OF SYSTEMS:    CONSTITUTIONAL:  negative for  fevers, chills, sweats, fatigue, malaise, anorexia and weight loss. Overweight, no distress. Are using accessory muscles  EYES:  negative for  double vision, blurred vision, dry eyes, eye discharge and redness. No Giselle's syndrome or jaundice  HEENT:  negative for  hearing loss, tinnitus, ear drainage, earaches and nasal congestion. No nasal polyps. No sinus tenderness  RESPIRATORY:  See hpi  CARDIOVASCULAR:  negative for  chest pain,, palpitations, orthopnea, PND, known to have hypertension. No angina. No coronary artery disease  GASTROINTESTINAL:  negative for nausea, vomiting, change in bowel habits, diarrhea, constipation, abdominal pain, pruritus, abdominal mass and abdominal distention. No nausea, vomiting or diarrhea or abdominal pain  GENITOURINARY:  negative for frequency, dysuria, nocturia, urinary incontinence and hesitancy history of stress incontinence, history of prostatectomy. Her lung Due to prostate cancer. erectile dysfunction after the surgery  INTEGUMENT  negative for rash, skin lesion(s), dryness, skin color change, changes in lesion, pruritus and changes in hair  HEMATOLOGIC/LYMPHATIC:  negative for easy bruising, bleeding, lymphadenopathy, petechiae and swelling/edema. No anemia, no enlarged lymph nodes  ALLERGIC/IMMUNOLOGIC:  negative for recurrent infections, urticaria and drug reactions  ENDOCRINE:  negative for heat intolerance, cold intolerance, tremor, weight changes and change in bowel habits. No diabetes  MUSCULOSKELETAL:  negative for  myalgias, arthralgias, pain, joint swelling, stiff joints and decreased range of motion no acute arthritis  NEUROLOGICAL:  negative for headaches, dizziness, seizures, memory problems, speech problems, visual disturbance and coordination problems, no deficit.   Motor SENSORY  BEHAVIOR/PSYCH: negative for poor appetite, increased appetite, decreased sleep, increased sleep, decreased energy level, increased energy level and poor concentration no psychotic problem  Skin no rash no dermatitis, no skin  Vitals:  Ht 5' 10\" (1.778 m)   Wt (!) 309 lb (140.2 kg)   BMI 44.34 kg/m²     PHYSICAL EXAM: Cooperative no respiratory distress not using accessory muscles  General Appearance:    Alert, cooperative, no distress, appears stated age, obese, not in any respiratory distress, not using accessory muscles    Head:    Normocephalic, without obvious abnormality, atraumatic      Eyes:    PERRL, conjunctiva/corneas clear, EOM's intact, no jaundice. No Giselle's syndrome    Ears:    Normal  external ear canals, both ears no polyps or sinus tenderness    Nose:   Nares normal, septum midline, mucosa normal, no drainage        or sinus tenderness   Throat:   Lips, mucosa, and tongue normal; teeth and gums normal, oropharyngeal orifice not compromise    Neck:   Supple, symmetrical, trachea midline, no adenopathy;     thyroid:  no enlargement/tenderness/nodules; no carotid    bruit ,JVD not elevated    Back:     Symmetric, no curvature, ROM normal, no CVA tenderness   Lungs:     AP diameter is increased. Percussion note is hyperresonant expiration prolonged. No rales, rhonchi are audible.   No pleural friction rub is audible    Chest Wall:    No tenderness or deformity      Heart:    Regular rate and rhythm, S1 and S2 normal, no murmur, rub        or gallop no rvh                           Abdomen:                                                 Pulses:                              Skin:                  Lymph nodes:                    Neurologic:                  Soft, non-tender, bowel sounds active all four quadrants,     no masses, no organomegaly         2+ and symmetric all extremities     Skin color, texture, turgor normal, no rashes or lesions       Cervical, supraclavicular not enlarged or matted or tender CNII-XII intact, normal strength 5/5 . Sensation grossly normal  and reflexes normal 2+  throughout     Clubbing No  Lower ext edema . Negative  Upper ext edema . Absent         Musculoskeletal no synovitis. No joint swelling or tenderness          Thyroid:   Lab Results   Component Value Date    TSH 1.94 06/18/2021     Urinalysis: No results for input(s): BACTERIA, BLOODU, CLARITYU, COLORU, PHUR, PROTEINU, RBCUA, SPECGRAV, BILIRUBINUR, NITRU, WBCUA, LEUKOCYTESUR, GLUCOSEU in the last 72 hours. CXR  No recent chest x-ray  CT Scans  . CT scan reviewed going back to 2019 the nodule in the right upper lobe is unchanged and has been PET negative. Echo    No recent echo            IMPRESSION:    COPD no acute exacerbation  Hypertension  Possible cor pulmonale  Morbid obesity  Sleep apnea syndrome  History of carcinoma of the prostate treated with prostatectomy  Lung nodule PET negative  :                PLAN:   Pulmonary status very stable will continue present bronchodilator therapy  No need for home oxygen  Right lung nodule is stable and very likely benign granuloma  Continue use of CPAP as before he has no daytime symptoms of sleepiness  Encouraged to lose weight by exercising and water especially walking. There is no evidence of any recurrence of prostatic CA      He had a COVID-vaccine. Advised him to monitor his hypertension at home and keep a record of it.

## 2022-05-13 DIAGNOSIS — E78.5 HYPERLIPIDEMIA WITH TARGET LDL LESS THAN 100: ICD-10-CM

## 2022-05-13 RX ORDER — ROSUVASTATIN CALCIUM 40 MG/1
40 TABLET, COATED ORAL DAILY
Qty: 90 TABLET | Refills: 3 | Status: SHIPPED | OUTPATIENT
Start: 2022-05-13

## 2022-06-07 DIAGNOSIS — F32.0 CURRENT MILD EPISODE OF MAJOR DEPRESSIVE DISORDER WITHOUT PRIOR EPISODE (HCC): ICD-10-CM

## 2022-06-07 RX ORDER — FLUOXETINE 10 MG/1
10 CAPSULE ORAL DAILY
Qty: 90 CAPSULE | Refills: 3 | Status: SHIPPED | OUTPATIENT
Start: 2022-06-07 | End: 2022-07-27 | Stop reason: SDUPTHER

## 2022-07-08 ENCOUNTER — TELEPHONE (OUTPATIENT)
Dept: PULMONOLOGY | Age: 67
End: 2022-07-08

## 2022-07-08 ENCOUNTER — PATIENT MESSAGE (OUTPATIENT)
Dept: FAMILY MEDICINE CLINIC | Age: 67
End: 2022-07-08

## 2022-07-08 DIAGNOSIS — G47.33 OSA ON CPAP: Primary | ICD-10-CM

## 2022-07-08 DIAGNOSIS — Z99.89 OSA ON CPAP: Primary | ICD-10-CM

## 2022-07-08 DIAGNOSIS — Z13.6 SCREENING FOR AAA (ABDOMINAL AORTIC ANEURYSM): Primary | ICD-10-CM

## 2022-07-08 NOTE — TELEPHONE ENCOUNTER
Patients wife called stating patient is now with [facility] on [address] [facility]. They need updated notes and cpap supply order.

## 2022-07-08 NOTE — TELEPHONE ENCOUNTER
From: Shola Barkley Maze  To: Dr. Prakash Fraction: 7/8/2022 11:30 AM EDT  Subject: [facility] US order needed    [place],  I called to reschedule my AAA US because the first [address] had an issue. Scheduling said a new order was required to schedule this test.    Will you please enter a new AAA US order? Thank you!   Nery

## 2022-07-26 NOTE — PROGRESS NOTES
Visit Information    Have you changed or started any medications since your last visit including any over-the-counter medicines, vitamins, or herbal medicines? no   Have you stopped taking any of your medications? Is so, why? -  no  Are you having any side effects from any of your medications? - no    Have you seen any other physician or provider since your last visit?  no   Have you had any other diagnostic tests since your last visit?  no   Have you been seen in the emergency room and/or had an admission in a hospital since we last saw you?  no   Have you had your routine dental cleaning in the past 6 months?  yes -      Do you have an active MyChart account? If no, what is the barrier?   Yes    Patient Care Team:  Governor Ysabel MD as PCP - General (Family Medicine)  Governor Ysabel MD as PCP - [facility]  Deborah David MD as Resident (Cardiology)  Balaji Linn MD as Consulting Physician (Urology)  Sha Seay MD as Consulting Physician (Pulmonology)  Telma Garcia MD as Consulting Physician (Cardiology)  Benjamin Kolb MD as Consulting Physician (Dermatology)  Gerardo Galicia MD as Consulting Physician (Internal Medicine Cardiovascular Disease)    Medical History Review  Past Medical, Family, and Social History reviewed and does contribute to the patient presenting condition    Health Maintenance   Topic Date Due    Shingles vaccine (1 of 2) Never done    Pneumococcal 65+ years Vaccine (2 - PCV) 11/14/2018    Low dose CT lung screening  04/02/2020    COVID-19 Vaccine (4 - Booster for Waneta Dunnings series) 03/04/2022    Colorectal Cancer Screen  04/26/2022    Lipids  06/18/2022    Flu vaccine (1) 09/01/2022    Annual Wellness Visit (AWV)  10/07/2022    A1C test (Diabetic or Prediabetic)  12/22/2022    Depression Monitoring  02/18/2023    Prostate Specific Antigen (PSA) Screening or Monitoring  02/25/2023    DTaP/Tdap/Td vaccine (2 - Td or Tdap) 11/21/2026    AAA screen  Completed Hepatitis C screen  Completed    Hepatitis A vaccine  Aged Out    Hepatitis B vaccine  Aged Out    Hib vaccine  Aged Out    Meningococcal (ACWY) vaccine  Aged Out

## 2022-07-27 ENCOUNTER — OFFICE VISIT (OUTPATIENT)
Dept: FAMILY MEDICINE CLINIC | Age: 67
End: 2022-07-27
Payer: MEDICARE

## 2022-07-27 DIAGNOSIS — I10 ESSENTIAL HYPERTENSION: Primary | ICD-10-CM

## 2022-07-27 DIAGNOSIS — Z99.89 OSA ON CPAP: ICD-10-CM

## 2022-07-27 DIAGNOSIS — G47.33 OSA ON CPAP: ICD-10-CM

## 2022-07-27 DIAGNOSIS — E78.5 HYPERLIPIDEMIA WITH TARGET LDL LESS THAN 100: ICD-10-CM

## 2022-07-27 DIAGNOSIS — I25.10 CORONARY ARTERY DISEASE INVOLVING NATIVE CORONARY ARTERY OF NATIVE HEART WITHOUT ANGINA PECTORIS: ICD-10-CM

## 2022-07-27 DIAGNOSIS — I48.0 PAROXYSMAL ATRIAL FIBRILLATION (HCC): ICD-10-CM

## 2022-07-27 DIAGNOSIS — R73.9 HYPERGLYCEMIA: ICD-10-CM

## 2022-07-27 DIAGNOSIS — Z23 ENCOUNTER FOR IMMUNIZATION: ICD-10-CM

## 2022-07-27 DIAGNOSIS — F32.0 CURRENT MILD EPISODE OF MAJOR DEPRESSIVE DISORDER WITHOUT PRIOR EPISODE (HCC): ICD-10-CM

## 2022-07-27 DIAGNOSIS — J44.9 CHRONIC OBSTRUCTIVE PULMONARY DISEASE, UNSPECIFIED COPD TYPE (HCC): ICD-10-CM

## 2022-07-27 DIAGNOSIS — E66.01 OBESITY, CLASS III, BMI 40-49.9 (MORBID OBESITY) (HCC): ICD-10-CM

## 2022-07-27 PROCEDURE — 1036F TOBACCO NON-USER: CPT | Performed by: FAMILY MEDICINE

## 2022-07-27 PROCEDURE — 1123F ACP DISCUSS/DSCN MKR DOCD: CPT | Performed by: FAMILY MEDICINE

## 2022-07-27 PROCEDURE — 99214 OFFICE O/P EST MOD 30 MIN: CPT | Performed by: FAMILY MEDICINE

## 2022-07-27 PROCEDURE — G8417 CALC BMI ABV UP PARAM F/U: HCPCS | Performed by: FAMILY MEDICINE

## 2022-07-27 PROCEDURE — 3017F COLORECTAL CA SCREEN DOC REV: CPT | Performed by: FAMILY MEDICINE

## 2022-07-27 PROCEDURE — G8427 DOCREV CUR MEDS BY ELIG CLIN: HCPCS | Performed by: FAMILY MEDICINE

## 2022-07-27 PROCEDURE — 3023F SPIROM DOC REV: CPT | Performed by: FAMILY MEDICINE

## 2022-07-27 RX ORDER — METFORMIN HYDROCHLORIDE 500 MG/1
500 TABLET, EXTENDED RELEASE ORAL
Qty: 90 TABLET | Refills: 3 | Status: SHIPPED | OUTPATIENT
Start: 2022-07-27 | End: 2022-08-23 | Stop reason: SDUPTHER

## 2022-07-27 RX ORDER — FUROSEMIDE 20 MG/1
20 TABLET ORAL DAILY
Qty: 90 TABLET | Refills: 3 | Status: SHIPPED | OUTPATIENT
Start: 2022-07-27

## 2022-07-27 RX ORDER — FLUOXETINE HYDROCHLORIDE 20 MG/1
20 CAPSULE ORAL DAILY
Qty: 90 CAPSULE | Refills: 0 | Status: SHIPPED | OUTPATIENT
Start: 2022-07-27 | End: 2022-10-25

## 2022-07-27 ASSESSMENT — PATIENT HEALTH QUESTIONNAIRE - PHQ9
9. THOUGHTS THAT YOU WOULD BE BETTER OFF DEAD, OR OF HURTING YOURSELF: 0
SUM OF ALL RESPONSES TO PHQ QUESTIONS 1-9: 9
6. FEELING BAD ABOUT YOURSELF - OR THAT YOU ARE A FAILURE OR HAVE LET YOURSELF OR YOUR FAMILY DOWN: 2
4. FEELING TIRED OR HAVING LITTLE ENERGY: 1
8. MOVING OR SPEAKING SO SLOWLY THAT OTHER PEOPLE COULD HAVE NOTICED. OR THE OPPOSITE, BEING SO FIGETY OR RESTLESS THAT YOU HAVE BEEN MOVING AROUND A LOT MORE THAN USUAL: 0
2. FEELING DOWN, DEPRESSED OR HOPELESS: 3
SUM OF ALL RESPONSES TO PHQ9 QUESTIONS 1 & 2: 6
SUM OF ALL RESPONSES TO PHQ QUESTIONS 1-9: 9
1. LITTLE INTEREST OR PLEASURE IN DOING THINGS: 3
3. TROUBLE FALLING OR STAYING ASLEEP: 0
7. TROUBLE CONCENTRATING ON THINGS, SUCH AS READING THE NEWSPAPER OR WATCHING TELEVISION: 0
SUM OF ALL RESPONSES TO PHQ QUESTIONS 1-9: 9
5. POOR APPETITE OR OVEREATING: 0
SUM OF ALL RESPONSES TO PHQ QUESTIONS 1-9: 9
10. IF YOU CHECKED OFF ANY PROBLEMS, HOW DIFFICULT HAVE THESE PROBLEMS MADE IT FOR YOU TO DO YOUR WORK, TAKE CARE OF THINGS AT HOME, OR GET ALONG WITH OTHER PEOPLE: 1

## 2022-07-27 ASSESSMENT — ANXIETY QUESTIONNAIRES
IF YOU CHECKED OFF ANY PROBLEMS ON THIS QUESTIONNAIRE, HOW DIFFICULT HAVE THESE PROBLEMS MADE IT FOR YOU TO DO YOUR WORK, TAKE CARE OF THINGS AT HOME, OR GET ALONG WITH OTHER PEOPLE: SOMEWHAT DIFFICULT
7. FEELING AFRAID AS IF SOMETHING AWFUL MIGHT HAPPEN: 0
4. TROUBLE RELAXING: 0
6. BECOMING EASILY ANNOYED OR IRRITABLE: 2
2. NOT BEING ABLE TO STOP OR CONTROL WORRYING: 0
5. BEING SO RESTLESS THAT IT IS HARD TO SIT STILL: 0
1. FEELING NERVOUS, ANXIOUS, OR ON EDGE: 1
GAD7 TOTAL SCORE: 4
3. WORRYING TOO MUCH ABOUT DIFFERENT THINGS: 1

## 2022-07-27 ASSESSMENT — ENCOUNTER SYMPTOMS
ABDOMINAL PAIN: 0
NAUSEA: 0
APNEA: 1
CONSTIPATION: 0
WHEEZING: 0
DIARRHEA: 0
VOMITING: 0
ABDOMINAL DISTENTION: 0
CHEST TIGHTNESS: 0

## 2022-07-27 NOTE — PROGRESS NOTES
Blanche Galvez (:  1955) is a 77 y.o. male,Established patient, here for evaluation of the following chief complaint(s): Hypertension, Hyperlipidemia, COPD, and Depression (MEDICATION IS NOT HELPING)      ASSESSMENT/PLAN:    1. Essential hypertension  Inadequately controlled  Discussed low salt diet and BP and pulse monitoring. Recheck labs  To continue lisinopril 5 Mg daily, metoprolol 25 Mg twice a day, increase furosemide from 20 Mg every other day to every day  -     CBC; Future  -     Comprehensive Metabolic Panel; Future  -     TSH; Future  -     Magnesium; Future  -     Uric Acid; Future  -     Urinalysis with Reflex to Culture; Future  -     furosemide (LASIX) 20 MG tablet; Take 1 tablet by mouth in the morning. Frequency increased 2022., Disp-90 tablet, R-3Normal  2. Paroxysmal atrial fibrillation (HCC)  Stable  Recheck electrolytes  To continue with metoprolol 25 Mg twice a day, and chronic anticoagulation with Xarelto 20 Mg daily, advised to restart taking it    3. Hyperlipidemia with target LDL less than 100  Well-controlled  To continue with Crestor 40 Mg daily and recheck lipid panel    Lab Results   Component Value Date    LDLCALC 67 2016    LDLCHOLESTEROL 50 2021         -     Lipid Panel; Future  4. Chronic obstructive pulmonary disease, unspecified COPD type ([address]  Stable  To restart taking Incruse Ellipta once a day, albuterol as needed, discussed with patient    5. Coronary artery disease involving native coronary artery of native heart without angina pectoris  Stable  To continue metoprolol 25 Mg twice a day, lisinopril 5 Mg daily, Crestor 40 Mg daily  -     furosemide (LASIX) 20 MG tablet; Take 1 tablet by mouth in the morning. Frequency increased 2022., Disp-90 tablet, R-3Normal  6. ADRIAN on CPAP  Improved with CPAP, benefits from using the CPAP, compliance with CPAP discussed  7.  Current mild episode of major depressive disorder without prior episode ([facility] [address]  Worsening  -Dose increased   FLUoxetine (PROZAC) 20 MG capsule; Take 1 capsule by mouth in the morning. Dose increased 7/27/2022., Disp-90 capsule, R-0Normal  8. Obesity, Class III, BMI 40-49.9 (morbid obesity) ([facility])  Worsening  Low carb, low fat diet, increase fruits and vegetables, and exercise 4-5 times a week 30-40 minutes a day, or walk 1-2 hours per day, or wear a pedometer and get at least 10,000 steps per day. 9. Hyperglycemia  Worsening  Low-carb diet, attempt to exercise, recheck A1c  Lab Results   Component Value Date    LABA1C 5.9 12/22/2021    LABA1C 5.2 02/21/2019    LABA1C 5.7 02/19/2018       -     Hemoglobin A1C; Future  -    to start metFORMIN (GLUCOPHAGE-XR) 500 MG extended release tablet; Take 1 tablet by mouth daily (with breakfast), Disp-90 tablet, R-3Normal  10. Encounter for immunization  -     pneumococcal 20-valent conjugat (PREVNAR) 0.5 ML DEDE inj; Inject 0.5 mLs into the muscle once for 1 dose, Disp-0.5 mL, R-0Normal  -     COVID-19 mRNA Vacc, Moderna, (MODERNA COVID-19 VACCINE) 100 MCG/0.5ML SUSP injection; Inject 0.25 mLs into the muscle once for 1 dose Needs Moderna Booster, Disp-0.25 mL, R-0Normal      Alayna received counseling on the following healthy behaviors: nutrition, exercise, medication adherence, and weight loss    Reviewed prior labs and health maintenance  Discussed use, benefit, and side effects of prescribed medications. Barriers to medication compliance addressed. Patient given educational materials - see patient instructions  Was a self-tracking handout given in paper form or via eyeQhart? Yes  All patient questions answered. Patient voiced understanding. The patient's past medical,surgical, social, and family history as well as his current medications and allergies were reviewed as documented in today's encounter. Medications, labs, diagnostic studies, consultations and follow-up as documented in this encounter.     Return in about 3 months (around 10/27/2022) for Visit type MEDICARE, VISION, PHQ9, MINICOG, HRA QUESIONS.    please scan CT lung as  low dose CT lung (cannot get 2 in 12 months!)  Needs nurse visit appointment BP check in 2 week. Future Appointments   Date Time Provider Madeleine Damoni   8/10/2022 11:00 AM SCHEDULE, [facility]   8/18/2022 10:30 [facility]   10/6/2022  2:30 PM [facility]   10/25/2022  1:00 PM [facility]         SUBJECTIVE/OBJECTIVE:    Hypertension, Paroxismal AFib    Patient here for follow-up. He is not exercising, but staying active, and is adherent to low salt diet. Blood pressure is not well controlled at home. Cardiac symptoms dyspnea, fatigue, and lower extremity edema. Swelling of the ankles. Patient denies chest pain, chest pressure/discomfort, claudication, dyspnea, exertional chest pressure/discomfort, irregular heart beat, lower extremity edema, near-syncope, orthopnea, palpitations, paroxysmal nocturnal dyspnea, syncope, and tachypnea. Cardiovascular risk factors: advanced age (older than 54 for men, 72 for women), dyslipidemia, hypertension, male gender, and obesity (BMI >= 30 kg/m2). Use of agents associated with hypertension: none. History of target organ damage:  Coronary artery disease, no stents. .    He was referred to cardiologist.  Blood pressure is high. He reports compliance with his blood pressure medications. BP Readings from Last 3 Encounters:   07/27/22 (!) 148/98   04/19/22 (!) 154/94   02/18/22 134/76          Pulse is Normal.    Pulse Readings from Last 3 Encounters:   07/27/22 79   04/19/22 86   02/18/22 73       Hyperlipidemia:  No new myalgias or GI upset on rosuvastatin (Crestor). Medication compliance: compliant all of the time. Patient is  following a low fat, low cholesterol diet.     LDL is improved  Lab Results   Component Value Date    LDLCALC 67 08/02/2016 LDLCHOLESTEROL 50 06/18/2021     Lab Results   Component Value Date    TRIG 137 06/18/2021    TRIG 151 (H) 06/20/2019    TRIG 216 (H) 07/17/2017       COPD:  Current treatment includes short-acting beta agonist inhaler, which has been effective. Not using the incruse, patient says he does not need it. Residual symptoms: chronic dyspnea and non-productive cough. He denies wheezing, chest tightness, purulent nasal discharge. He requires his rescue inhaler 2 time(s) per week. Nicotine dependence. Has quit smoking many years ago. Counseling given: Yes    10/15/21 he did have CT chest.  Showed mild emphysema, and pulmonary nodules, the largest 1.2 cm right lung apex, stable    Stable exam notable for mild emphysematous changes and pulmonary nodules   notable for a lobulated 1.2 cm nodule in the right lung apex is unchanged   dating back to the earliest available exam in 2019. Sleep Apnea:  Current treatment: cPAP. Compliance: compliant all of the time. Residual symptoms include: daytime fatigue. Depression  On Prozac 10 mg, tolerated well, denies side effects. He agrees to increase the dosage. PHQ-2 Over the past 2 weeks, how often have you been bothered by any of the following problems? Little interest or pleasure in doing things: Nearly every day  Feeling down, depressed, or hopeless: Nearly every day (anxiety)  PHQ-2 Score: 6  PHQ-9 Over the past 2 weeks, how often have you been bothered by any of the following problems?   Trouble falling or staying asleep, or sleeping too much: Not at all (sleeping good)  Feeling tired or having little energy: Several days  Poor appetite or overeating: Not at all  Feeling bad about yourself - or that you are a failure or have let yourself or your family down: More than half the days (about weight)  Trouble concentrating on things, such as reading the newspaper or watching television: Not at all  Moving or speaking so slowly that other people could have noticed. Or the opposite - being so fidgety or restless that you have been moving around a lot more than usual: Not at all  Thoughts that you would be better off dead, or of hurting yourself in some way: Not at all  If you checked off any problems, how difficult have these problems made it for you to do your work, take care of things at home, or get along with other people?: Somewhat difficult  PHQ-9 Total Score: 9  PHQ-9 Total Score: 9     [x]5-9 = Mild depression    PHQ Scores 7/27/2022 2/18/2022 10/11/2021 9/29/2021 6/11/2021 6/30/2020 2/21/2019   PHQ2 Score 6 0 2 2 1 0 0   PHQ9 Score 9 0 2 2 1 0 0     Very mild anxiety  CLAUDETTE-7 SCREENING 7/27/2022   Feeling nervous, anxious, or on edge Several days   Not being able to stop or control worrying Not at all   Worrying too much about different things Several days   Trouble relaxing Not at all   Being so restless that it is hard to sit still Not at all   Becoming easily annoyed or irritable More than half the days   Feeling afraid as if something awful might happen Not at all   CLAUDETTE-7 Total Score 4   How difficult have these problems made it for you to do your work, take care of things at home, or get along with other people? Somewhat difficult     Mild hyperglycemia    Prior prediabetes. Patient says he has been eating better, staying more active. Lab Results   Component Value Date    LABA1C 5.9 12/22/2021    LABA1C 5.2 02/21/2019    LABA1C 5.7 02/19/2018   Has been gaining weight 5 pounds in 3 months    Wt Readings from Last 3 Encounters:   07/27/22 (!) 314 lb 12.8 oz (142.8 kg)   04/21/22 (!) 309 lb (140.2 kg)   04/19/22 (!) 309 lb (140.2 kg)     Morbid obesity per BMI      Prior to Visit Medications    Medication Sig Taking?  Authorizing Provider   FLUoxetine (PROZAC) 10 MG capsule TAKE 1 CAPSULE BY MOUTH DAILY Yes Nevin Lassiter MD   rosuvastatin (CRESTOR) 40 MG tablet TAKE 1 TABLET BY MOUTH DAILY Yes Nevin Lassiter MD   lisinopril (PRINIVIL;ZESTRIL) 5 MG tablet TAKE 1 TABLET BY MOUTH DAILY Yes Elian Currie MD   metoprolol tartrate (LOPRESSOR) 25 MG tablet TAKE 1 TABLET BY MOUTH TWICE DAILY Yes Historical Provider, MD   albuterol sulfate HFA (VENTOLIN HFA) 108 (90 Base) MCG/ACT inhaler Inhale 2 puffs into the lungs 4 times daily as needed for Wheezing or Shortness of Breath Rescue Yes Elian Currie MD   furosemide (LASIX) 20 MG tablet Take 1 tablet by mouth every other day Morning Yes Elian Currie MD   XARELTO 20 MG TABS tablet TAKE 1 TABLET BY MOUTH EVERY DAY  Patient not taking: Reported on 2022  Historical Provider, MD   Umeclidinium Bromide (INCRUSE ELLIPTA) 62.5 MCG/INH AEPB Inhale 1 actuation into the lungs daily Maintenance  Patient not taking: Reported on 2022  Elian Currie MD   zoster recombinant adjuvanted vaccine [facility]) 50 MCG/0.5ML SUSR injection Inject 0.5 mLs into the muscle See Admin Instructions 1 dose now and repeat in 2-6 months  Patient not taking: Reported on 2022  Elian Currie MD       Social History     Tobacco Use    Smoking status: Former     Packs/day: 1.00     Years: 40.00     Pack years: 40.00     Types: Cigarettes     Start date:      Quit date: 2016     Years since quittin.3    Smokeless tobacco: Never   Vaping Use    Vaping Use: Never used   Substance Use Topics    Alcohol use: Yes     Alcohol/week: 5.0 standard drinks     Types: 5 Cans of beer per week     Comment: per week    Drug use: No         Review of Systems   Constitutional:  Positive for fatigue and unexpected weight change. Negative for activity change, appetite change, chills, diaphoresis and fever. HENT:  Positive for hearing loss (chronic) and tinnitus (chronic). Eyes:  Positive for visual disturbance (stable , wears glasses). Respiratory:  Positive for apnea (on CPAP), cough (dry) and shortness of breath (FARFAN). Negative for chest tightness and wheezing.     Cardiovascular: Positive for leg swelling. Negative for chest pain and palpitations. Gastrointestinal:  Negative for abdominal distention, abdominal pain, constipation, diarrhea, nausea and vomiting. Endocrine: Negative for cold intolerance, heat intolerance, polydipsia, polyphagia and polyuria. Genitourinary:  Negative for decreased urine volume, difficulty urinating, frequency and hematuria. Neurological:  Negative for weakness. Hematological:  Does not bruise/bleed easily. Psychiatric/Behavioral:  Positive for dysphoric mood. Negative for self-injury, sleep disturbance and suicidal ideas. The patient is nervous/anxious.        -vital signs stable and within normal limits except high blood pressure, morbid obesity per BMI    BP (!) 148/98   Pulse 79   Temp 97 °F (36.1 °C)   Ht 5' 10\" (1.778 m)   Wt (!) 314 lb 12.8 oz (142.8 kg)   SpO2 100%   BMI 45.17 kg/m²        Physical Exam  Vitals and nursing note reviewed. Constitutional:       General: He is not in acute distress. Appearance: Normal appearance. He is well-developed. He is obese. He is not diaphoretic. HENT:      Head: Normocephalic and atraumatic. Right Ear: External ear normal.      Left Ear: External ear normal.      Mouth/Throat:      Comments: I did not examine the mouth due to coronavirus pandemic and wearing masks. Eyes:      General: Lids are normal. No scleral icterus. Right eye: No discharge. Left eye: No discharge. Extraocular Movements: Extraocular movements intact. Conjunctiva/sclera: Conjunctivae normal.   Neck:      Thyroid: No thyromegaly. Comments: Thick neck  Cardiovascular:      Rate and Rhythm: Normal rate and regular rhythm. Heart sounds: Murmur heard. Comments: Mild stasis dermatitis bilateral, seen by vascular, but no intervention recommended. Advised compression stockings which he has at home  Pulmonary:      Effort: Pulmonary effort is normal. No respiratory distress. Expiration Date:   7/27/2023     Order Specific Question:   Is Patient Fasting?/# of Hours     Answer:   8-10 Hours, water ok to drink    Hemoglobin A1C     Standing Status:   Future     Standing Expiration Date:   7/27/2023    TSH     Standing Status:   Future     Standing Expiration Date:   7/27/2023    Magnesium     Standing Status:   Future     Standing Expiration Date:   7/27/2023    Uric Acid     Standing Status:   Future     Standing Expiration Date:   7/27/2023    Urinalysis with Reflex to Culture     Standing Status:   Future     Standing Expiration Date:   7/27/2023     Order Specific Question:   SPECIFY(EX-CATH,MIDSTREAM,CYSTO,ETC)? Answer:   MIDSTREAM         Medications Discontinued During This Encounter   Medication Reason    zoster recombinant adjuvanted vaccine [facility]) 50 MCG/0.5ML SUSR injection Patient Choice    FLUoxetine (PROZAC) 10 MG capsule REORDER    furosemide (LASIX) 20 MG tablet REORDER         On this date 7/27/2022 I have spent 35 minutes reviewing previous notes, test results and face to face with the patient discussing the diagnosis and importance of compliance with the treatment plan as well as documenting on the day of the visit. This note was completed by using the assistance of a speech-recognition program. However, inadvertent computerized transcription errors may be present. Although every effort was made to ensure accuracy, no guarantees can be provided that every mistake has been identified and corrected by editing. An electronic signature was used to authenticate this note.   Electronically signed by Elian Currie MD on 8/1/2022 at 1:39 PM

## 2022-08-01 VITALS
BODY MASS INDEX: 45.07 KG/M2 | WEIGHT: 314.8 LBS | OXYGEN SATURATION: 100 % | HEART RATE: 79 BPM | DIASTOLIC BLOOD PRESSURE: 98 MMHG | HEIGHT: 70 IN | TEMPERATURE: 97 F | SYSTOLIC BLOOD PRESSURE: 148 MMHG

## 2022-08-01 ASSESSMENT — ENCOUNTER SYMPTOMS
COUGH: 1
SHORTNESS OF BREATH: 1

## 2022-08-08 ENCOUNTER — PATIENT MESSAGE (OUTPATIENT)
Dept: FAMILY MEDICINE CLINIC | Age: 67
End: 2022-08-08

## 2022-08-08 ENCOUNTER — OFFICE VISIT (OUTPATIENT)
Dept: FAMILY MEDICINE CLINIC | Age: 67
End: 2022-08-08
Payer: MEDICARE

## 2022-08-08 VITALS
TEMPERATURE: 97.6 F | DIASTOLIC BLOOD PRESSURE: 83 MMHG | SYSTOLIC BLOOD PRESSURE: 134 MMHG | HEART RATE: 88 BPM | OXYGEN SATURATION: 97 %

## 2022-08-08 DIAGNOSIS — S00.412A ABRASION OF LEFT EAR CANAL, INITIAL ENCOUNTER: Primary | ICD-10-CM

## 2022-08-08 DIAGNOSIS — H92.22 OTORRHAGIA OF LEFT EAR: Primary | ICD-10-CM

## 2022-08-08 PROCEDURE — G8427 DOCREV CUR MEDS BY ELIG CLIN: HCPCS | Performed by: FAMILY MEDICINE

## 2022-08-08 PROCEDURE — 1123F ACP DISCUSS/DSCN MKR DOCD: CPT | Performed by: FAMILY MEDICINE

## 2022-08-08 PROCEDURE — 3017F COLORECTAL CA SCREEN DOC REV: CPT | Performed by: FAMILY MEDICINE

## 2022-08-08 PROCEDURE — 1036F TOBACCO NON-USER: CPT | Performed by: FAMILY MEDICINE

## 2022-08-08 PROCEDURE — 99213 OFFICE O/P EST LOW 20 MIN: CPT | Performed by: FAMILY MEDICINE

## 2022-08-08 PROCEDURE — G8417 CALC BMI ABV UP PARAM F/U: HCPCS | Performed by: FAMILY MEDICINE

## 2022-08-08 SDOH — ECONOMIC STABILITY: FOOD INSECURITY: WITHIN THE PAST 12 MONTHS, THE FOOD YOU BOUGHT JUST DIDN'T LAST AND YOU DIDN'T HAVE MONEY TO GET MORE.: NEVER TRUE

## 2022-08-08 SDOH — ECONOMIC STABILITY: FOOD INSECURITY: WITHIN THE PAST 12 MONTHS, YOU WORRIED THAT YOUR FOOD WOULD RUN OUT BEFORE YOU GOT MONEY TO BUY MORE.: NEVER TRUE

## 2022-08-08 ASSESSMENT — ENCOUNTER SYMPTOMS
RHINORRHEA: 0
COUGH: 0
VOMITING: 0
DIARRHEA: 0
SORE THROAT: 0

## 2022-08-08 ASSESSMENT — SOCIAL DETERMINANTS OF HEALTH (SDOH): HOW HARD IS IT FOR YOU TO PAY FOR THE VERY BASICS LIKE FOOD, HOUSING, MEDICAL CARE, AND HEATING?: NOT HARD AT ALL

## 2022-08-08 NOTE — PROGRESS NOTES
[address] [facility] [address]  Dept: [phone]  Dept Fax: [phone]    Matilda Pan is a 77 y.o. male who presents today for his medical conditions/complaintsas noted below. Matilda Pan is c/o of Otalgia (Onset yesterday, left ear, stuck q-tip in too far)        HPI:     Otalgia   There is pain in the left ear. This is a new problem. The current episode started yesterday. The problem occurs constantly. The problem has been gradually improving. There has been no fever. The patient is experiencing no pain. Pertinent negatives include no coughing, diarrhea, ear discharge, hearing loss, rash, rhinorrhea, sore throat or vomiting. He has tried nothing for the symptoms. The treatment provided no relief.      Past Medical History:   Diagnosis Date    A-fib ([facility] [address] 4/6/2015    Acute and chronic respiratory failure, unspecified whether with hypoxia or hypercapnia (HCC)     Allergic rhinitis 4/6/2015    Atrial fibrillation (HCC)     Back pain     Basal cell carcinoma of skin     Decreased libido     Diverticulitis of colon (without mention of hemorrhage)(562.11) 4/6/2015    ED (erectile dysfunction)     Elevated prostate specific antigen (PSA) 4/6/2015    Former smoker     HTN (hypertension) 4/6/2015    Lung nodule     Obesity 4/6/2015    Obstructive sleep apnea     on cpap    Prostate cancer ([facility] [address] 09/2017    needs removed    Pure hypercholesterolemia 4/6/2015    Tinnitus     Tobacco use disorder 4/6/2015    Wears glasses     Past medical history reviewed and pertinent positives/negatives in the HPI    Past Surgical History:   Procedure Laterality Date    CARDIAC CATHETERIZATION  09/19/2017    LAD: mid 30% stenosis  /  LM NLM / EF 55%  /  LCX: Codominant Vessel, distal 30% stenosis / OM1 AND OM2 10%    COLONOSCOPY      COLONOSCOPY  04/26/2019    COLONOSCOPY  4/26/2019    COLONOSCOPY POLYPECTOMY COLD BIOPSY performed by Allison Lozada MD at [address] [facility]  10/17/2017    robotic with schuyler pelvic lymph node dissection    PROSTATECTOMY N/A 10/17/2017    XI ROBOTIC PROSTATECTOMY LAPAROSCOPIC WITH PELVIC LYMPH NODE DISSECTION performed by Sterling Garcia MD at [address] [facility]      L Upper Lip Basal CA    TONSILLECTOMY      TYMPANOSTOMY TUBE PLACEMENT      VASECTOMY         Family History   Problem Relation Age of Onset    Coronary Art Dis Mother     High Blood Pressure Mother     High Cholesterol Father     Seizures Sister        Social History     Tobacco Use    Smoking status: Former     Packs/day: 1.00     Years: 40.00     Pack years: 40.00     Types: Cigarettes     Start date: 12     Quit date: 2016     Years since quittin.3    Smokeless tobacco: Never   Substance Use Topics    Alcohol use: Yes     Alcohol/week: 5.0 standard drinks     Types: 5 Cans of beer per week     Comment: per week      Current Outpatient Medications   Medication Sig Dispense Refill    FLUoxetine (PROZAC) 20 MG capsule Take 1 capsule by mouth in the morning. Dose increased 2022. 90 capsule 0    metFORMIN (GLUCOPHAGE-XR) 500 MG extended release tablet Take 1 tablet by mouth daily (with breakfast) 90 tablet 3    furosemide (LASIX) 20 MG tablet Take 1 tablet by mouth in the morning.  Frequency increased 2022. 90 tablet 3    rosuvastatin (CRESTOR) 40 MG tablet TAKE 1 TABLET BY MOUTH DAILY 90 tablet 3    lisinopril (PRINIVIL;ZESTRIL) 5 MG tablet TAKE 1 TABLET BY MOUTH DAILY 90 tablet 3    metoprolol tartrate (LOPRESSOR) 25 MG tablet TAKE 1 TABLET BY MOUTH TWICE DAILY      albuterol sulfate HFA (VENTOLIN HFA) 108 (90 Base) MCG/ACT inhaler Inhale 2 puffs into the lungs 4 times daily as needed for Wheezing or Shortness of Breath Rescue 18 g 0    XARELTO 20 MG TABS tablet TAKE 1 TABLET BY MOUTH EVERY DAY (Patient not taking: No sig reported)      Umeclidinium Bromide (INCRUSE ELLIPTA) 62.5 MCG/INH AEPB Inhale 1 actuation into the lungs daily Maintenance (Patient not taking: No sig reported) 30 each 5     No current facility-administered medications for this visit. No Known Allergies    Health Maintenance   Topic Date Due    Shingles vaccine (1 of 2) Never done    Pneumococcal 65+ years Vaccine (2 - PCV) 11/14/2018    Low dose CT lung screening  04/02/2020    COVID-19 Vaccine (4 - Booster for Moderna series) 03/04/2022    Lipids  06/18/2022    Flu vaccine (1) 09/01/2022    Annual Wellness Visit (AWV)  10/07/2022    A1C test (Diabetic or Prediabetic)  12/22/2022    Prostate Specific Antigen (PSA) Screening or Monitoring  02/25/2023    Depression Monitoring  07/27/2023    Colorectal Cancer Screen  04/26/2024    DTaP/Tdap/Td vaccine (2 - Td or Tdap) 11/21/2026    AAA screen  Completed    Hepatitis C screen  Completed    Hepatitis A vaccine  Aged Out    Hepatitis B vaccine  Aged Out    Hib vaccine  Aged Out    Meningococcal (ACWY) vaccine  Aged Out       :      Review of Systems   HENT:  Positive for ear pain. Negative for ear discharge, hearing loss, rhinorrhea and sore throat. Respiratory:  Negative for cough. Gastrointestinal:  Negative for diarrhea and vomiting. Skin:  Negative for rash. Objective:     Physical Exam  Vitals and nursing note reviewed. Constitutional:       Appearance: Normal appearance. HENT:      Head: Normocephalic and atraumatic. Right Ear: Hearing, tympanic membrane, ear canal and external ear normal.      Left Ear: Hearing, tympanic membrane, ear canal and external ear normal. Tympanic membrane is not perforated. Ears:      Comments: Small amount of dried blood inferior left ear canal.      Mouth/Throat:      Lips: Pink. Eyes:      Extraocular Movements: Extraocular movements intact. Conjunctiva/sclera: Conjunctivae normal.   Cardiovascular:      Rate and Rhythm: Normal rate.    Pulmonary:      Effort: Pulmonary effort is normal. Musculoskeletal:      Cervical back: No muscular tenderness. Skin:     General: Skin is warm and dry. Neurological:      Mental Status: He is alert and oriented to person, place, and time. Mental status is at baseline. Psychiatric:         Mood and Affect: Mood normal.         Behavior: Behavior normal.         Thought Content: Thought content normal.         Judgment: Judgment normal.     /83 (Site: Left Upper Arm, Position: Sitting, Cuff Size: Large Adult)   Pulse 88   Temp 97.6 °F (36.4 °C) (Infrared)   SpO2 97%     Assessment:       Diagnosis Orders   1. Abrasion of left ear canal, initial encounter            Plan:    Do not use q-tips to clean er canal. May use to clean outside of ear canal. If you notice any sign of infection please follow up. If symptoms worsen or do not improve please follow-up with PCP or return to clinic    No orders of the defined types were placed in this encounter. No orders of the defined types were placed in this encounter. Patient given educational materials - see patient instructions. Discussed use, benefit, and side effects of prescribed medications. All patient questions answered. Pt voiced understanding. Patient agreed with treatment plan. Follow up as directed.      Electronicallysigned by Maria Elena Garcia MD on 8/8/2022 at 3:03 PM

## 2022-08-08 NOTE — TELEPHONE ENCOUNTER
Needs to be evaluated, ENT referral placed, I also sent him to an urgent care if unable to get to be seen right away     Diagnosis Orders   1.  Otorrhagia of left ear  AFL - Sreekanth Robbins CNP, Otolaryngology, [facility]           Future Appointments   Date Time Provider Madeleine William   8/10/2022 11:00 AM SCHEDULE, [facility]   8/18/2022 10:30 [facility]   10/6/2022  2:30 PM [facility]   10/25/2022  1:00 PM Tess Hebert MD Resp Spec [facility]

## 2022-08-08 NOTE — PATIENT INSTRUCTIONS
Do not use q-tips to clean er canal. May use to clean outside of ear canal. If you notice any sign of infection please follow up.  If symptoms worsen or do not improve please follow-up with PCP or return to clinic

## 2022-08-08 NOTE — TELEPHONE ENCOUNTER
From: Shayna Galvez  To: Dr. Karimi Carry: 8/8/2022 10:10 AM EDT  Subject: Issue with left ear bleeding and labs due    Good morning Dr. Cummings Sensor my left ear was bleeding. I used a q-tip after I showered to clean it. I had a quick, sharp pain. I noticed the ear was bleeding about 3 hours later. I cleaned up the blood. I have not had any ear pain or headache. I notice the ear had less blood about 3 hours later which I cleaned up. I have not had any ear pain, headache or additional bleeding since then. Should I set up an appointment for my ear? Also, I was out of town and was not able to start my new medications until 8/5. I will get my labs after I had been on the new medications for two weeks. Thank you for your assistance!     Xochitl Strauss

## 2022-08-17 NOTE — PROGRESS NOTES
morning. Dose increased 2022. 90 capsule 0    metFORMIN (GLUCOPHAGE-XR) 500 MG extended release tablet Take 1 tablet by mouth daily (with breakfast) 90 tablet 3    furosemide (LASIX) 20 MG tablet Take 1 tablet by mouth in the morning. Frequency increased 2022. 90 tablet 3    rosuvastatin (CRESTOR) 40 MG tablet TAKE 1 TABLET BY MOUTH DAILY 90 tablet 3    lisinopril (PRINIVIL;ZESTRIL) 5 MG tablet TAKE 1 TABLET BY MOUTH DAILY 90 tablet 3    metoprolol tartrate (LOPRESSOR) 25 MG tablet TAKE 1 TABLET BY MOUTH TWICE DAILY      albuterol sulfate HFA (VENTOLIN HFA) 108 (90 Base) MCG/ACT inhaler Inhale 2 puffs into the lungs 4 times daily as needed for Wheezing or Shortness of Breath Rescue 18 g 0    XARELTO 20 MG TABS tablet TAKE 1 TABLET BY MOUTH EVERY DAY (Patient not taking: No sig reported)      Umeclidinium Bromide (INCRUSE ELLIPTA) 62.5 MCG/INH AEPB Inhale 1 actuation into the lungs daily Maintenance (Patient not taking: No sig reported) 30 each 5     No current facility-administered medications for this visit. ALLERGIES:   No Known Allergies    SOCIAL HISTORY:  Social History     Tobacco Use    Smoking status: Former     Packs/day: 1.00     Years: 40.00     Pack years: 40.00     Types: Cigarettes     Start date:      Quit date: 2016     Years since quittin.3    Smokeless tobacco: Never   Substance Use Topics    Alcohol use: Yes     Alcohol/week: 5.0 standard drinks     Types: 5 Cans of beer per week     Comment: per week       Pertinent ROS:  Review of Systems  Skin: Denies any new changing, growing or bleeding lesions or rashes except as described in the HPI   Constitutional: Denies fevers, chills, and malaise.     PHYSICAL EXAM:   BP (!) 146/90   Pulse 82   Temp (!) 96.4 °F (35.8 °C) (Temporal)   Wt (!) 312 lb 6.4 oz (141.7 kg)   SpO2 98%   BMI 44.82 kg/m²     The patient is generally well appearing, well nourished, alert and conversational. Affect is normal.    Cutaneous Exam:  Physical Exam  Total body skin exam excluding external genitalia: head/face, neck, both arms, chest, back, abdomen, both legs, buttocks, digits and/or nails, was examined. Genital exam was deferred as patient denied having any lesions in this area. Complete visualization of scalp may be limited by hair density, length, and/or style    Facial covering was removed during examination. Diagnoses/exam findings/medical history pertinent to this visit are listed below:    Assessment:   Diagnosis Orders   1. History of basal cell carcinoma        2. Keratosis pilaris             Plan:  History of BCC  - well healed scar with no nodularity  - no evidence of recurrence     Hyperhidrosis, generalized (face?)  - patient not interested in treating    Multiple nevi  - Clinically and dermatoscopically benign on exam today. - Common nevi have a low individual risk of developing into melanoma. Patients with >50 nevi have a greater risk of developing melanoma in their lifetime and should undergo skin checks at least annually. - I recommended the patient apply broad spectrum spf 30+ sunscreen daily, reapplying every 2 hours  - In additional to regular use of sunscreen, I recommended broad-rimmed hats, long sleeves, and seeking the shade. Seborrheic keratoses of chest, scalp  - reassurance and education     Stasis dermatitis  - patient is using compression socks    Dermatofibroma(s) of posterior lower right leg  - reassurance and education     Solar lentigo of back  - patient was counseled that UV-damaged skin increases lifetime risk for skin cancer  - I recommended the patient apply broad spectrum spf 30+ sunscreen daily, reapplying every 2 hours.   - In additional to regular use of sunscreen, I recommended broad-rimmed hats, long sleeves, and seeking the shade.   - photo to monitor of lentigo on upper back        RTC 1 year    Future Appointments   Date Time Provider Madeleine William   8/22/2022  9:00 AM [facility]EDULE, [facility]   10/6/2022  2:30 PM Marybel Kenney MD [facility]   10/25/2022  1:00 PM [facility]         There are no Patient Instructions on file for this visit. Mingo Guevara, personally scribed the services dictated to me by Dr. Vlad Serna in this documentation. I, Dr. Vlad Serna, personally performed the services described in this documentation, as scribed by Molly Mas in my presence, and it is both accurate and complete.     Electronically signed by Mackenzie Ramey MD on 8/18/2022 at 12:34 PM

## 2022-08-18 ENCOUNTER — OFFICE VISIT (OUTPATIENT)
Dept: DERMATOLOGY | Age: 67
End: 2022-08-18
Payer: MEDICARE

## 2022-08-18 VITALS
WEIGHT: 312.4 LBS | SYSTOLIC BLOOD PRESSURE: 146 MMHG | HEART RATE: 82 BPM | OXYGEN SATURATION: 98 % | BODY MASS INDEX: 44.82 KG/M2 | TEMPERATURE: 96.4 F | DIASTOLIC BLOOD PRESSURE: 90 MMHG

## 2022-08-18 DIAGNOSIS — L82.1 SEBORRHEIC KERATOSIS: ICD-10-CM

## 2022-08-18 DIAGNOSIS — R61 HYPERHIDROSIS: ICD-10-CM

## 2022-08-18 DIAGNOSIS — D23.9 DERMATOFIBROMA: ICD-10-CM

## 2022-08-18 DIAGNOSIS — Z85.828 HISTORY OF BASAL CELL CARCINOMA: Primary | ICD-10-CM

## 2022-08-18 DIAGNOSIS — L81.4 LENTIGO: ICD-10-CM

## 2022-08-18 DIAGNOSIS — D22.9 MULTIPLE NEVI: ICD-10-CM

## 2022-08-18 PROCEDURE — 1036F TOBACCO NON-USER: CPT | Performed by: DERMATOLOGY

## 2022-08-18 PROCEDURE — G8417 CALC BMI ABV UP PARAM F/U: HCPCS | Performed by: DERMATOLOGY

## 2022-08-18 PROCEDURE — 99213 OFFICE O/P EST LOW 20 MIN: CPT | Performed by: DERMATOLOGY

## 2022-08-18 PROCEDURE — 3017F COLORECTAL CA SCREEN DOC REV: CPT | Performed by: DERMATOLOGY

## 2022-08-18 PROCEDURE — G8427 DOCREV CUR MEDS BY ELIG CLIN: HCPCS | Performed by: DERMATOLOGY

## 2022-08-18 PROCEDURE — 1123F ACP DISCUSS/DSCN MKR DOCD: CPT | Performed by: DERMATOLOGY

## 2022-08-18 NOTE — PATIENT INSTRUCTIONS
Let us know if the brown spot on your back changes. Sun Protection     There are two types of sun rays that are harmful to the skin. UVA rays cause skin aging and skin cancer, such as melanoma. UVB rays cause sunburns, cataracts, and also contribute to skin cancer. The American-Academy of Dermatology recommends that children and adults wear a broad spectrum, waterproof sunscreen with a Sun Protection Factor (SPF) of 30 or higher. It is important to check the ingredient label to be sure the sunscreen will protect the skin from both UVA and UVB sunrays. Your sunscreen should contain at least one of the following ingredients: titanium dioxide, zinc oxide, or avobenzone. Sunscreen will not be effective unless it is applied to all exposed skin. Sunscreens work best if they are applied 30 minutes before sun exposure. They should be reapplied every 2 hours and after any water exposure. Sunscreen is not perfect. It is important to use other methods to protect the skin from sun exposure also. Wear hats, sunglasses and other sun protective clothing when outdoors. Stay in the shade during the peak hours of sun exposure between 10 AM and 4 PM.    Moles    Moles, or nevi, are very common. Moles are areas of the skin where there are more cells called melanocytes. Melanocytes are the cells in the body that produce pigment, or color. Moles can be many colors including skin-tone, pink, tan, brown, and very dark brown to black. Moles can be raised or flat. Moles can have hair. Moles can grow on any skin surface, including the scalp, hands and feet. When someone is born with a mole, or develops one in the first months of life, the mole is called a congenital, or birthmark mole. About 1 in 100 people are born with one or more moles. Most people develop their moles later in childhood or adulthood. These are called acquired moles.  They are most common on sun exposed areas of skin such as the face, neck, upper body, water-resistant sun block lotion with an SPF of 30 or        greater. A broad spectrum lotion blocks both UVA and UVB rays from the sun. Re-apply sunscreen at least every 2 hours and after swimming or sweating. Take advantage of shade whenever possible. Wear a broad-brimmed hat,        sunglasses, and protective clothing when outdoors. Do not use tanning beds.

## 2022-08-22 ENCOUNTER — HOSPITAL ENCOUNTER (OUTPATIENT)
Age: 67
Setting detail: SPECIMEN
Discharge: HOME | End: 2022-08-22

## 2022-08-22 ENCOUNTER — NURSE ONLY (OUTPATIENT)
Dept: FAMILY MEDICINE CLINIC | Age: 67
End: 2022-08-22
Payer: MEDICARE

## 2022-08-22 VITALS — SYSTOLIC BLOOD PRESSURE: 144 MMHG | HEART RATE: 82 BPM | DIASTOLIC BLOOD PRESSURE: 96 MMHG | OXYGEN SATURATION: 98 %

## 2022-08-22 DIAGNOSIS — R73.9 HYPERGLYCEMIA: ICD-10-CM

## 2022-08-22 DIAGNOSIS — I10 ESSENTIAL HYPERTENSION: ICD-10-CM

## 2022-08-22 DIAGNOSIS — E78.5 HYPERLIPIDEMIA WITH TARGET LDL LESS THAN 100: ICD-10-CM

## 2022-08-22 LAB
ALBUMIN SERPL-MCNC: 4.1 G/DL (ref 3.5–5.2)
ALBUMIN/GLOBULIN RATIO: 1.4 (ref 1–2.5)
ALP BLD-CCNC: 84 U/L (ref 40–129)
ALT SERPL-CCNC: 21 U/L (ref 5–41)
ANION GAP SERPL CALCULATED.3IONS-SCNC: 11 MMOL/L (ref 9–17)
AST SERPL-CCNC: 25 U/L
BILIRUB SERPL-MCNC: 0.6 MG/DL (ref 0.3–1.2)
BILIRUBIN URINE: NEGATIVE
BUN BLDV-MCNC: 16 MG/DL (ref 8–23)
CALCIUM SERPL-MCNC: 9.3 MG/DL (ref 8.6–10.4)
CASTS UA: NORMAL /LPF (ref 0–8)
CHLORIDE BLD-SCNC: 105 MMOL/L (ref 98–107)
CHOLESTEROL/HDL RATIO: 4.6
CHOLESTEROL: 106 MG/DL
CO2: 24 MMOL/L (ref 20–31)
COLOR: YELLOW
CREAT SERPL-MCNC: 0.89 MG/DL (ref 0.7–1.2)
EPITHELIAL CELLS UA: NORMAL /HPF (ref 0–5)
ESTIMATED AVERAGE GLUCOSE: 131 MG/DL
GFR AFRICAN AMERICAN: >60 ML/MIN
GFR NON-AFRICAN AMERICAN: >60 ML/MIN
GFR SERPL CREATININE-BSD FRML MDRD: ABNORMAL ML/MIN/{1.73_M2}
GLUCOSE BLD-MCNC: 133 MG/DL (ref 70–99)
GLUCOSE URINE: NEGATIVE
HBA1C MFR BLD: 6.2 % (ref 4–6)
HCT VFR BLD CALC: 47.3 % (ref 40.7–50.3)
HDLC SERPL-MCNC: 23 MG/DL
HEMOGLOBIN: 15 G/DL (ref 13–17)
KETONES, URINE: NEGATIVE
LDL CHOLESTEROL: 51 MG/DL (ref 0–130)
LEUKOCYTE ESTERASE, URINE: NEGATIVE
MAGNESIUM: 1.8 MG/DL (ref 1.6–2.6)
MCH RBC QN AUTO: 30 PG (ref 25.2–33.5)
MCHC RBC AUTO-ENTMCNC: 31.7 G/DL (ref 28.4–34.8)
MCV RBC AUTO: 94.6 FL (ref 82.6–102.9)
NITRITE, URINE: NEGATIVE
NRBC AUTOMATED: 0 PER 100 WBC
PDW BLD-RTO: 13.5 % (ref 11.8–14.4)
PH UA: 6 (ref 5–8)
PLATELET # BLD: 208 K/UL (ref 138–453)
PMV BLD AUTO: 9.6 FL (ref 8.1–13.5)
POTASSIUM SERPL-SCNC: 4.7 MMOL/L (ref 3.7–5.3)
PROTEIN UA: ABNORMAL
RBC # BLD: 5 M/UL (ref 4.21–5.77)
RBC UA: NORMAL /HPF (ref 0–4)
SODIUM BLD-SCNC: 140 MMOL/L (ref 135–144)
SPECIFIC GRAVITY UA: 1.02 (ref 1–1.03)
TOTAL PROTEIN: 7 G/DL (ref 6.4–8.3)
TRIGL SERPL-MCNC: 162 MG/DL
TSH SERPL DL<=0.05 MIU/L-ACNC: 1.46 UIU/ML (ref 0.3–5)
TURBIDITY: CLEAR
URIC ACID: 6.3 MG/DL (ref 3.4–7)
URINE HGB: NEGATIVE
UROBILINOGEN, URINE: NORMAL
WBC # BLD: 7.5 K/UL (ref 3.5–11.3)
WBC UA: NORMAL /HPF (ref 0–5)

## 2022-08-22 PROCEDURE — 99211 OFF/OP EST MAY X REQ PHY/QHP: CPT | Performed by: FAMILY MEDICINE

## 2022-08-22 RX ORDER — LISINOPRIL 10 MG/1
10 TABLET ORAL DAILY
Qty: 90 TABLET | Refills: 3 | Status: SHIPPED | OUTPATIENT
Start: 2022-08-22 | End: 2022-10-06 | Stop reason: SDUPTHER

## 2022-08-22 NOTE — PROGRESS NOTES
Rima Coronel is being seen today for a Blood Pressure Recheck.      Rima Coronel was seen 7/27/2022 and his Blood Pressure was:  142/96  BP Readings from Last 1 Encounters:   08/22/22 (!) 144/96     I DID ADVISE PATIENT HE MIGHT NEED TO MAKE ANOTHER NURSE VISIT FOR RECHECK HE STATES HE WILL BE GOING OUT OF TOWN FOR THE NEXT 3 WEEKS DUE TO SON HAVING GRANDCHILD      Jimi Zhu, [address]

## 2022-08-23 DIAGNOSIS — R73.9 HYPERGLYCEMIA: ICD-10-CM

## 2022-08-23 RX ORDER — METFORMIN HYDROCHLORIDE 500 MG/1
500 TABLET, EXTENDED RELEASE ORAL 2 TIMES DAILY WITH MEALS
Qty: 180 TABLET | Refills: 3 | Status: SHIPPED | OUTPATIENT
Start: 2022-08-23 | End: 2022-09-23

## 2022-08-23 NOTE — PROGRESS NOTES
Chief Complaint   Patient presents with    Blood Pressure Check        Patient is here for blood pressure check. 1. Essential hypertension  Uncontrolled  Continue furosemide 20 Mg daily, metoprolol 25 Mg twice a day  -Dose increased lisinopril (PRINIVIL;ZESTRIL) 10 MG tablet; Take 1 tablet by mouth daily Dose increased 8/22/2022  Dispense: 90 tablet; Refill: 3    Inadequately controlled blood pressure  Please advise patient of instructions above.     Needs nurse visit appointment for BP check in 1 week      BP Readings from Last 3 Encounters:   08/22/22 (!) 144/96   08/18/22 (!) 146/90   08/08/22 134/83       Pulse Readings from Last 3 Encounters:   08/22/22 82   08/18/22 82   08/08/22 88       Future Appointments   Date Time Provider Madeleine Nataliia   9/23/2022 10:30 AM SCHEDULE, [facility]   10/6/2022  2:30 PM Elian Currie MD [facility]   10/25/2022  1:00 PM Shannon Nguyen MD Resp Spec [address] [place] [address]   8/21/2023 11:00 AM Margie Vazquez MD [facility] #[id number] [address] [place] [phone] Vicente Bear [phone] [address] [facility] [address]  Phone: [phone] Fax: [phone]

## 2022-08-23 NOTE — RESULT ENCOUNTER NOTE
Please notify patient: Also please notify patient regarding the nurse visit from yesterday: I sent a new prescription to the pharmacy for lisinopril  Continue furosemide 20 Mg daily, metoprolol 25 Mg twice a day  -Dose increased lisinopril (PRINIVIL;ZESTRIL) 10 MG tablet; Take 1 tablet by mouth daily Dose increased 8/22/2022  Dispense: 90 tablet; Refill: 3    Regarding the labs, there is a little bit of proteins in the urine, lisinopril will help with this  Worsening prediabetes,hemoglobin A1c 6.2, avoid pop, sweets, or any alcohol beverages if he drinks any, and try to be more active. Blood glucose 133. Due to worsening prediabetes, we can increase the metformin from 500 once a day to metformin 500 twice a day, I will send a new prescription to the pharmacy.   Mild increased triglycerides  Otherwise labs within normal limits  continue current treatment    Future Appointments  9/23/2022  10:30 AM   [facility]  10/6/2022  2:30 PM    Flavia Luna MD     [facility]  10/25/2022 1:00 PM    Tania Dent MD        Resp Spec [facility]  8/21/2023  11:00 AM   Krystal Ramirez MD         [facility]

## 2022-08-23 NOTE — PROGRESS NOTES
Noted.  Thank you!   Governor Grady      Future Appointments   Date Time Provider Madeleine Damoni   9/23/2022 10:30 AM SCHEDULE, [facility]   10/6/2022  2:30 PM [facility]   10/25/2022  1:00 PM Sha Seay MD Resp Spec [facility]   8/21/2023 11:00 AM Benjamin Kolb MD [facility]

## 2022-08-23 NOTE — PROGRESS NOTES
SPOKE WITH PATIENT REGARDING MEDICATIN CHANGE/DIRECTIONS/NURSE APPOINTMENT. I DID AGAIN EXPRESS THE NEED FOR NURSE VISIT IN 1 WEEK AND THE REASON AND CONCERN FOR THIS HE IS GOING OUT OF TOWN FOR 3 WEEKS. I DID ASK IF HE HAD A BP CUFF AT LEAST FOR HOME READINGS HE SAID NO. I OFFERED TO ASK FOR A BP CUFF HE SAID NO HE WOULD LOOK AROUND FOR ONE. [FreeTextEntry1] : 66 year old man hx DM, Htn, nosebleeds, nephrolithiasis, CAD, stent referred for CKD and albuminuria.\par \par Pt has had DM for many years with geenrally good control. No use of NSAIDS, no heavy metals, toxins, no skin rash, no joint pains.\par \par Probable diabetic nephropathy. \par \par Had CVA now MI had cath with stent 5 d ago\par Now hd SBO- medically managed\par Came in with dizziness / low BP\par IN ER yesterday- new left 2x6 mm kidney stone\par took oxycodone and flomax\par BP low

## 2022-09-23 ENCOUNTER — NURSE ONLY (OUTPATIENT)
Dept: FAMILY MEDICINE CLINIC | Age: 67
End: 2022-09-23
Payer: MEDICARE

## 2022-09-23 VITALS — OXYGEN SATURATION: 98 % | SYSTOLIC BLOOD PRESSURE: 158 MMHG | DIASTOLIC BLOOD PRESSURE: 98 MMHG | HEART RATE: 77 BPM

## 2022-09-23 DIAGNOSIS — I10 ESSENTIAL HYPERTENSION: Primary | ICD-10-CM

## 2022-09-23 PROCEDURE — 99211 OFF/OP EST MAY X REQ PHY/QHP: CPT | Performed by: FAMILY MEDICINE

## 2022-09-23 RX ORDER — METOPROLOL TARTRATE 50 MG/1
50 TABLET, FILM COATED ORAL 2 TIMES DAILY
Qty: 180 TABLET | Refills: 0 | Status: SHIPPED | OUTPATIENT
Start: 2022-09-23 | End: 2022-10-31 | Stop reason: SDUPTHER

## 2022-09-23 NOTE — PROGRESS NOTES
Chaparro Lea is being seen today for a Blood Pressure Recheck.      Chaparro Lea was seen 7/27/2022 and his Blood Pressure was: 144/92 / 2ND: 162/102  BP Readings from Last 1 Encounters:   09/23/22 (!) 158/98         Jazmyn Núñez MA

## 2022-09-23 NOTE — PROGRESS NOTES
Chief Complaint   Patient presents with    Blood Pressure Check    Shortness of Breath     STATES NOTICED THIS AS A SIDE AFFECT OF METFORMIN/ONCE MEDICATION DOSAGE CHANGED BECAME WORSE. STOPPED TAKING THE MEDICATION Sunday GETTING BETTER        Patient is here for blood pressure check. 1. Essential hypertension  Uncontrolled  Continue lisinopril, increase dosage of Lopressor to 50 Mg twice a day  - metoprolol tartrate (LOPRESSOR) 50 MG tablet; Take 1 tablet by mouth 2 times daily Dose increased 9/23/2022  Dispense: 180 tablet; Refill: 0    Inadequately controlled blood pressure  Please advise patient of instructions above.     Needs nurse visit appointment for BP check in 1 week      BP Readings from Last 3 Encounters:   09/23/22 (!) 158/98   08/22/22 (!) 144/96   08/18/22 (!) 146/90       Pulse Readings from Last 3 Encounters:   09/23/22 77   08/22/22 82   08/18/22 82       Future Appointments   Date Time Provider [facility]   10/6/2022  2:30 PM Ben Rodríguez [facility]   10/25/2022  1:00 PM Margie Sen MD Resp Spec [address] [place] [address]   8/21/2023 11:00 AM Archie Powell MD [address] [place] [facility] [phone] [facility] [phone]  [address] [place] [address] [address] [address]  Phone: [phone] Fax: [phone]

## 2022-10-06 ENCOUNTER — OFFICE VISIT (OUTPATIENT)
Dept: FAMILY MEDICINE CLINIC | Age: 67
End: 2022-10-06
Payer: MEDICARE

## 2022-10-06 VITALS
DIASTOLIC BLOOD PRESSURE: 80 MMHG | HEART RATE: 85 BPM | HEIGHT: 70 IN | WEIGHT: 307 LBS | BODY MASS INDEX: 43.95 KG/M2 | SYSTOLIC BLOOD PRESSURE: 140 MMHG | TEMPERATURE: 98 F | OXYGEN SATURATION: 98 %

## 2022-10-06 DIAGNOSIS — Z23 ENCOUNTER FOR IMMUNIZATION: ICD-10-CM

## 2022-10-06 DIAGNOSIS — H91.93 BILATERAL HEARING LOSS, UNSPECIFIED HEARING LOSS TYPE: ICD-10-CM

## 2022-10-06 DIAGNOSIS — I10 ESSENTIAL HYPERTENSION: ICD-10-CM

## 2022-10-06 DIAGNOSIS — H53.9 ABNORMAL VISION: ICD-10-CM

## 2022-10-06 DIAGNOSIS — Z00.00 INITIAL MEDICARE ANNUAL WELLNESS VISIT: Primary | ICD-10-CM

## 2022-10-06 DIAGNOSIS — H40.003 BORDERLINE GLAUCOMA OF BOTH EYES: ICD-10-CM

## 2022-10-06 PROCEDURE — G0008 ADMIN INFLUENZA VIRUS VAC: HCPCS | Performed by: FAMILY MEDICINE

## 2022-10-06 PROCEDURE — 3017F COLORECTAL CA SCREEN DOC REV: CPT | Performed by: FAMILY MEDICINE

## 2022-10-06 PROCEDURE — 90694 VACC AIIV4 NO PRSRV 0.5ML IM: CPT | Performed by: FAMILY MEDICINE

## 2022-10-06 PROCEDURE — G8484 FLU IMMUNIZE NO ADMIN: HCPCS | Performed by: FAMILY MEDICINE

## 2022-10-06 PROCEDURE — 1123F ACP DISCUSS/DSCN MKR DOCD: CPT | Performed by: FAMILY MEDICINE

## 2022-10-06 PROCEDURE — G0438 PPPS, INITIAL VISIT: HCPCS | Performed by: FAMILY MEDICINE

## 2022-10-06 RX ORDER — LISINOPRIL 20 MG/1
20 TABLET ORAL DAILY
Qty: 90 TABLET | Refills: 0
Start: 2022-10-06

## 2022-10-06 ASSESSMENT — PATIENT HEALTH QUESTIONNAIRE - PHQ9
SUM OF ALL RESPONSES TO PHQ QUESTIONS 1-9: 0
SUM OF ALL RESPONSES TO PHQ QUESTIONS 1-9: 0
7. TROUBLE CONCENTRATING ON THINGS, SUCH AS READING THE NEWSPAPER OR WATCHING TELEVISION: 0
1. LITTLE INTEREST OR PLEASURE IN DOING THINGS: 0
SUM OF ALL RESPONSES TO PHQ QUESTIONS 1-9: 0
9. THOUGHTS THAT YOU WOULD BE BETTER OFF DEAD, OR OF HURTING YOURSELF: 0
SUM OF ALL RESPONSES TO PHQ QUESTIONS 1-9: 0
SUM OF ALL RESPONSES TO PHQ9 QUESTIONS 1 & 2: 0
3. TROUBLE FALLING OR STAYING ASLEEP: 0
6. FEELING BAD ABOUT YOURSELF - OR THAT YOU ARE A FAILURE OR HAVE LET YOURSELF OR YOUR FAMILY DOWN: 0
2. FEELING DOWN, DEPRESSED OR HOPELESS: 0
4. FEELING TIRED OR HAVING LITTLE ENERGY: 0
8. MOVING OR SPEAKING SO SLOWLY THAT OTHER PEOPLE COULD HAVE NOTICED. OR THE OPPOSITE, BEING SO FIGETY OR RESTLESS THAT YOU HAVE BEEN MOVING AROUND A LOT MORE THAN USUAL: 0
5. POOR APPETITE OR OVEREATING: 0
10. IF YOU CHECKED OFF ANY PROBLEMS, HOW DIFFICULT HAVE THESE PROBLEMS MADE IT FOR YOU TO DO YOUR WORK, TAKE CARE OF THINGS AT HOME, OR GET ALONG WITH OTHER PEOPLE: 0

## 2022-10-06 ASSESSMENT — LIFESTYLE VARIABLES
HOW OFTEN DO YOU HAVE A DRINK CONTAINING ALCOHOL: 2-3 TIMES A WEEK
HOW MANY STANDARD DRINKS CONTAINING ALCOHOL DO YOU HAVE ON A TYPICAL DAY: 1 OR 2

## 2022-10-06 ASSESSMENT — VISUAL ACUITY
OD_CC: 20/20
OS_CC: 20/20

## 2022-10-06 NOTE — PROGRESS NOTES
Visit Information    Have you changed or started any medications since your last visit including any over-the-counter medicines, vitamins, or herbal medicines? no   Are you having any side effects from any of your medications? -  no  Have you stopped taking any of your medications? Is so, why? -  no    Have you seen any other physician or provider since your last visit? No  Have you had any other diagnostic tests since your last visit? No  Have you been seen in the emergency room and/or had an admission to a hospital since we last saw you? No  Have you had your routine dental cleaning in the past 6 months? yes     Have you activated your [employer] account? If not, what are your barriers?  Yes     Patient Care Team:  Beck Watt MD as PCP - General (Family Medicine)  Beck Watt MD as PCP - [facility]  Teressa Sandhoff, MD as Resident (Cardiology)  Kitty Mcmanus MD as Consulting Physician (Urology)  Sarina Cordero MD as Consulting Physician (Pulmonology)  Aguila Dunham MD as Consulting Physician (Cardiology)  Lucio Nettles MD as Consulting Physician (Dermatology)  Zuhair Bosch MD as Consulting Physician (Internal Medicine Cardiovascular Disease)    Medical History Review  Past Medical, Family, and Social History reviewed and does contribute to the patient presenting condition    Health Maintenance   Topic Date Due    Shingles vaccine (1 of 2) Never done    Low dose CT lung screening  04/02/2020    COVID-19 Vaccine (4 - Booster for Cody Eagles series) 03/04/2022    Flu vaccine (1) 08/01/2022    Annual Wellness Visit (AWV)  10/07/2022    Prostate Specific Antigen (PSA) Screening or Monitoring  02/25/2023    Depression Monitoring  07/27/2023    A1C test (Diabetic or Prediabetic)  08/22/2023    Lipids  08/22/2023    Colorectal Cancer Screen  04/26/2024    DTaP/Tdap/Td vaccine (2 - Td or Tdap) 11/21/2026    Pneumococcal 65+ years Vaccine  Completed    AAA screen  Completed    Hepatitis C screen  Completed    Hepatitis A vaccine  Aged Out    Hepatitis B vaccine  Aged Out    Hib vaccine  Aged Out    Meningococcal (ACWY) vaccine  Aged Out

## 2022-10-06 NOTE — PATIENT INSTRUCTIONS
Personalized Preventive Plan for Italia Cortez - 10/6/2022  Medicare offers a range of preventive health benefits. Some of the tests and screenings are paid in full while other may be subject to a deductible, co-insurance, and/or copay. Some of these benefits include a comprehensive review of your medical history including lifestyle, illnesses that may run in your family, and various assessments and screenings as appropriate. After reviewing your medical record and screening and assessments performed today your provider may have ordered immunizations, labs, imaging, and/or referrals for you. A list of these orders (if applicable) as well as your Preventive Care list are included within your After Visit Summary for your review. Other Preventive Recommendations:    A preventive eye exam performed by an eye specialist is recommended every 1-2 years to screen for glaucoma; cataracts, macular degeneration, and other eye disorders. A preventive dental visit is recommended every 6 months. Try to get at least 150 minutes of exercise per week or 10,000 steps per day on a pedometer . Order or download the FREE \"Exercise & Physical Activity: Your Everyday Guide\" from [employer]. Call 7-480.400.8597 or search [employer] online. You need 3647-0759 mg of calcium and 0538-8443 IU of vitamin D per day. It is possible to meet your calcium requirement with diet alone, but a vitamin D supplement is usually necessary to meet this goal.  When exposed to the sun, use a sunscreen that protects against both UVA and UVB radiation with an SPF of 30 or greater. Reapply every 2 to 3 hours or after sweating, drying off with a towel, or swimming. Always wear a seat belt when traveling in a car. Always wear a helmet when riding a bicycle or motorcycle.

## 2022-10-06 NOTE — PROGRESS NOTES
Medicare Annual Wellness Visit    Gurinder Guajardo is here for Medicare AWV    Assessment & Plan   Initial Medicare annual wellness visit  Updated immunizations  Updated labs  Referral to ophthalmologist given  Referral to ENT given  Encounter for immunization  -     Influenza, FLUAD, (age 72 y+), IM, PF, 0.5 mL  Bilateral hearing loss, unspecified hearing loss type  -     AFL - Emil Andrew MD, Otolaryngology, [place]  Essential hypertension  -     lisinopril (PRINIVIL;ZESTRIL) 20 MG tablet; Take 1 tablet by mouth daily Dose increased 10/6/2022, Disp-90 tablet, [id number] PRINT  -     Basic Metabolic Panel; Future  -     Magnesium; Future  Borderline glaucoma of both eyes  -     AFL - Will Babinski, MD, Ophthalmology, [place]  Abnormal vision  -     Manuel Adams MD, Ophthalmology, [place]    Recommendations for Preventive Services Due: see orders and patient instructions/AVS.  Recommended screening schedule for the next 5-10 years is provided to the patient in written form: see Patient Instructions/AVS.    Covid vaccine at the pharmacy       Return in 1 year (on 10/7/2023) for Medicare Annual Wellness Visit in 1 year, Visit type MEDICARE, [address] [place] [address], [address] [place] [address]. Subjective       Patient reports he developed dyspnea on exertion when was taking Metformin, since he has stopped metformin, he has had no shortness of breath. Patient also self discontinued Incruse Ellipta and albuterol, he says he does not need them  He denies any shortness of breath with activities. Patient's complete Health Risk Assessment and screening values have been reviewed and are found in Flowsheets. The following problems were reviewed today and where indicated follow up appointments were made and/or referrals ordered.     Positive Risk Factor Screenings with Interventions:             General Health and ACP:  General  In general, how would you say your health is?: Good  In the past 7 days, have you experienced any of the following: New or Increased Pain, New or Increased Fatigue, Loneliness, Social Isolation, Stress or Anger?: No  Do you get the social and emotional support that you need?: Yes  Do you have a Living Will?: Yes    Advance Directives       Power of [address] Will ACP-Advance Directive ACP-Power of     Not on File Not on File Not on File Not on File          General Health Risk Interventions:  No Living Will: ACP documents already completed- patient asked to provide copy to the office    Health Habits/Nutrition:  Physical Activity: Insufficiently Active    Days of Exercise per Week: 3 days    Minutes of Exercise per Session: 30 min     Have you lost any weight without trying in the past 3 months?: No  Body mass index: (!) 44.05  Have you seen the dentist within the past year?: Yes  Health Habits/Nutrition Interventions:  Nutritional issues:  educational materials for healthy, well-balanced diet provided,    Morbid obesity per BMI improved  Improved weight, has lost 7 lbs in 3 months with lifestyle changes, praise given, continue low-carb diet, low-fat diet, and walk more      Wt Readings from Last 3 Encounters:   10/06/22 (!) 307 lb (139.3 kg)   08/18/22 (!) 312 lb 6.4 oz (141.7 kg)   07/27/22 (!) 314 lb 12.8 oz (142.8 kg)         Hearing/Vision:  Do you or your family notice any trouble with your hearing that hasn't been managed with hearing aids?: (!) Yes  Do you have difficulty driving, watching TV, or doing any of your daily activities because of your eyesight?: No  Have you had an eye exam within the past year?: (!) No  Vision Screening    Right eye Left eye Both eyes   Without correction      With correction 20/20 20/20 20/20     Hearing/Vision Interventions:  Hearing concerns:  ENT referral provided  Vision concerns:  patient encouraged to make appointment with his/her eye specialist, patient actually says he needs a new referral, he was previously diagnosed with glaucoma borderline Objective   Vitals:    10/06/22 1416 10/06/22 1429   BP: (!) 140/80 (!) 140/80   Pulse: 85    Temp: 98 °F (36.7 °C)    SpO2: 98%    Weight: (!) 307 lb (139.3 kg)    Height: 5' 10\" (1.778 m)       Body mass index is 44.05 kg/m². Morbid obesity improving  Wt Readings from Last 3 Encounters:   10/06/22 (!) 307 lb (139.3 kg)   08/18/22 (!) 312 lb 6.4 oz (141.7 kg)   07/27/22 (!) 314 lb 12.8 oz (142.8 kg)     Blood pressure improving  BP Readings from Last 3 Encounters:   10/06/22 (!) 140/80   09/23/22 (!) 158/98   08/22/22 (!) 144/96         General Appearance: alert and oriented to person, place and time, well-developed and well-nourished, in no acute distress and obese  ENT: bilateral tympanic membrane normal and hearing very mildly decreased bilaterally  Pulmonary/Chest: clear to auscultation bilaterally- no wheezes, rales or rhonchi, normal air movement, no respiratory distress  Cardiovascular: normal rate, regular rhythm, and normal S1 and S2  Abdomen: soft, non-tender, non-distended, normal bowel sounds, no masses or organomegaly and obese abdomen    Extremities: 1 + edema- pitting bilateral legs       Patient had CT lung 12/22/2021 with stable lung nodule         Allergies   Allergen Reactions    Metformin And Related      SOB     Prior to Visit Medications    Medication Sig Taking? Authorizing Provider   metoprolol tartrate (LOPRESSOR) 50 MG tablet Take 1 tablet by mouth 2 times daily Dose increased 9/23/2022 Yes Braeden Gonzalez MD   lisinopril (PRINIVIL;ZESTRIL) 10 MG tablet Take 1 tablet by mouth daily Dose increased 8/22/2022 Yes Braeden Gonzalez MD   FLUoxetine (PROZAC) 20 MG capsule Take 1 capsule by mouth in the morning. Dose increased 7/27/2022. Yes Braeden Gonzalez MD   furosemide (LASIX) 20 MG tablet Take 1 tablet by mouth in the morning. Frequency increased 7/27/2022.  Yes Braeden Gonzalez MD   rosuvastatin (CRESTOR) 40 MG tablet TAKE 1 TABLET BY MOUTH DAILY Yes Braeden Gonzalez, MD   XARELTO 20 MG TABS tablet TAKE 1 TABLET BY MOUTH EVERY DAY Yes Historical Provider, MD   Umeclidinium Bromide (INCRUSE ELLIPTA) 62.5 MCG/INH AEPB Inhale 1 actuation into the lungs daily Maintenance  Patient not taking: No sig reported  Chelle Evans MD   albuterol sulfate HFA (VENTOLIN HFA) 108 (90 Base) MCG/ACT inhaler Inhale 2 puffs into the lungs 4 times daily as needed for Wheezing or Shortness of Breath Rescue  Patient not taking: Reported on 10/6/2022  Chelle Evans MD       [facility] (Including outside providers/suppliers regularly involved in providing care):   Patient Care Team:  Chelle Evans MD as PCP - General (Family Medicine)  Chelle Evans MD as PCP - [address] [place] [address] Provider  Denys Guzman MD as Resident (Cardiology)  Ming Magana MD as Consulting Physician (Urology)  Aura Elise MD as Consulting Physician (Pulmonology)  David Damon MD as Consulting Physician (Cardiology)  Esther Lorenz MD as Consulting Physician (Dermatology)  Jorge Luis Hamlin MD as Consulting Physician (Internal Medicine Cardiovascular Disease)     Reviewed and updated this visit:  Tobacco  Allergies  Meds  Problems  Med Hx  Surg Hx  Soc Hx  Fam Hx            Electronically signed by Chelle Evans MD on 10/10/22 at 5:18 PM EDT

## 2022-10-10 PROBLEM — H40.003 BORDERLINE GLAUCOMA OF BOTH EYES: Status: ACTIVE | Noted: 2022-10-10

## 2022-10-10 PROBLEM — H91.93 BILATERAL HEARING LOSS: Status: ACTIVE | Noted: 2022-10-10

## 2022-10-20 ENCOUNTER — HOSPITAL ENCOUNTER (OUTPATIENT)
Age: 67
Setting detail: SPECIMEN
Discharge: HOME | End: 2022-10-20

## 2022-10-20 DIAGNOSIS — I10 ESSENTIAL HYPERTENSION: ICD-10-CM

## 2022-10-20 LAB
ANION GAP SERPL CALCULATED.3IONS-SCNC: 13 MMOL/L (ref 9–17)
BUN BLDV-MCNC: 14 MG/DL (ref 8–23)
CALCIUM SERPL-MCNC: 9.3 MG/DL (ref 8.6–10.4)
CHLORIDE BLD-SCNC: 104 MMOL/L (ref 98–107)
CO2: 23 MMOL/L (ref 20–31)
CREAT SERPL-MCNC: 0.74 MG/DL (ref 0.7–1.2)
GFR SERPL CREATININE-BSD FRML MDRD: >60 ML/MIN/1.73M2
GLUCOSE BLD-MCNC: 118 MG/DL (ref 70–99)
MAGNESIUM: 1.9 MG/DL (ref 1.6–2.6)
POTASSIUM SERPL-SCNC: 4 MMOL/L (ref 3.7–5.3)
SODIUM BLD-SCNC: 140 MMOL/L (ref 135–144)

## 2022-10-21 NOTE — RESULT ENCOUNTER NOTE
Please notify patient: Blood glucose 118, well-controlled  Otherwise labs within normal limits  continue current treatment    Future Appointments  3/8/2023   1:00 PM    Jet Ferrara MD     [facility]  8/21/2023  11:00 AM   Aida Hassan MD         [facility]  10/11/2023 1:00 PM    Jet Ferrara MD     [facility]               [address] [facility]

## 2022-10-25 DIAGNOSIS — F32.0 CURRENT MILD EPISODE OF MAJOR DEPRESSIVE DISORDER WITHOUT PRIOR EPISODE (HCC): ICD-10-CM

## 2022-10-25 RX ORDER — FLUOXETINE HYDROCHLORIDE 20 MG/1
CAPSULE ORAL
Qty: 90 CAPSULE | Refills: 3 | Status: SHIPPED | OUTPATIENT
Start: 2022-10-25

## 2022-10-31 DIAGNOSIS — I10 ESSENTIAL HYPERTENSION: ICD-10-CM

## 2022-10-31 RX ORDER — METOPROLOL TARTRATE 50 MG/1
50 TABLET, FILM COATED ORAL 2 TIMES DAILY
Qty: 180 TABLET | Refills: 4 | Status: SHIPPED | OUTPATIENT
Start: 2022-10-31

## 2022-10-31 NOTE — TELEPHONE ENCOUNTER
Please Approve or Refuse.   Send to Pharmacy per Pt's Request:      Next Visit Date:  3/8/2023   Last Visit Date: 10/6/2022    Hemoglobin A1C (%)   Date Value   08/22/2022 6.2 (H)   12/22/2021 5.9   02/21/2019 5.2             ( goal A1C is < 7)   BP Readings from Last 3 Encounters:   10/06/22 (!) 140/80   09/23/22 (!) 158/98   08/22/22 (!) 144/96          (goal 120/80)  BUN   Date Value Ref Range Status   10/20/2022 14 8 - 23 mg/dL Final     Creatinine   Date Value Ref Range Status   10/20/2022 0.74 0.70 - 1.20 mg/dL Final     Potassium   Date Value Ref Range Status   10/20/2022 4.0 3.7 - 5.3 mmol/L Final

## 2022-11-23 ENCOUNTER — TELEPHONE (OUTPATIENT)
Dept: FAMILY MEDICINE CLINIC | Age: 67
End: 2022-11-23

## 2022-11-23 ENCOUNTER — OFFICE VISIT (OUTPATIENT)
Dept: PULMONOLOGY | Age: 67
End: 2022-11-23
Payer: MEDICARE

## 2022-11-23 VITALS
HEART RATE: 81 BPM | OXYGEN SATURATION: 98 % | SYSTOLIC BLOOD PRESSURE: 135 MMHG | HEIGHT: 70 IN | WEIGHT: 307 LBS | BODY MASS INDEX: 43.95 KG/M2 | RESPIRATION RATE: 16 BRPM | DIASTOLIC BLOOD PRESSURE: 82 MMHG

## 2022-11-23 DIAGNOSIS — G47.33 OSA ON CPAP: Primary | ICD-10-CM

## 2022-11-23 DIAGNOSIS — Z99.89 OSA ON CPAP: Primary | ICD-10-CM

## 2022-11-23 PROCEDURE — 3017F COLORECTAL CA SCREEN DOC REV: CPT | Performed by: INTERNAL MEDICINE

## 2022-11-23 PROCEDURE — G8417 CALC BMI ABV UP PARAM F/U: HCPCS | Performed by: INTERNAL MEDICINE

## 2022-11-23 PROCEDURE — G8484 FLU IMMUNIZE NO ADMIN: HCPCS | Performed by: INTERNAL MEDICINE

## 2022-11-23 PROCEDURE — G8427 DOCREV CUR MEDS BY ELIG CLIN: HCPCS | Performed by: INTERNAL MEDICINE

## 2022-11-23 PROCEDURE — 1036F TOBACCO NON-USER: CPT | Performed by: INTERNAL MEDICINE

## 2022-11-23 PROCEDURE — 3078F DIAST BP <80 MM HG: CPT | Performed by: INTERNAL MEDICINE

## 2022-11-23 PROCEDURE — 3074F SYST BP LT 130 MM HG: CPT | Performed by: INTERNAL MEDICINE

## 2022-11-23 PROCEDURE — 1123F ACP DISCUSS/DSCN MKR DOCD: CPT | Performed by: INTERNAL MEDICINE

## 2022-11-23 PROCEDURE — 99214 OFFICE O/P EST MOD 30 MIN: CPT | Performed by: INTERNAL MEDICINE

## 2022-11-23 NOTE — TELEPHONE ENCOUNTER
Patient has history of paroxysmal atrial fibrillation, that is why he is on blood thinner    Needs clearance from the cardiologist if he could stop his Xarelto for a few days for dental procedure including extractions    Please also fax this request to the cardiologist, Dr. Mary Grace Lambert and also inform the patient,, he needs to follow-up with [place] cardiologist and if okay to hold off Xarelto for 2 days to let us know. Stopping his Xarelto increases the risk of strokes due to pre-existing paroxysmal atrial fibrillation        FYI per up to date  Transitioning between anticoagulants in the perioperative setting: Discontinue rivaroxaban ? 24 hours before procedure. Decision to delay surgery or procedure until 24 hours after last dose of rivaroxaban should be based upon increased risk of bleeding and urgency of surgery/procedure. Rivaroxaban should be restarted as soon as adequate hemostasis is established following surgery/procedure. Consider a parenteral anticoagulant if oral administration is not possible.       Future Appointments   Date Time Provider Madeleine William   3/8/2023  1:00 PM Jelena Celaya MD [facility]   5/24/2023  1:00 PM [facility]   8/21/2023 11:00 AM [email or web address]   10/11/2023  1:00 PM Jelena Celaya MD [facility]

## 2022-11-23 NOTE — PROGRESS NOTES
REASON FOR THE CONSULTATION:  COPD  Right lung nodule  Obesity  Hypertension   obstructive sleep apnea syndrome        HISTORY OF PRESENT ILLNESS: COPD is under good control no evidence of acute exacerbation. Is using his bronchodilators regularly. No fevers no chills no yellow sputum no hemoptysis no chest pain. He generally gets some shortness of breath when he goes in the cold weather. I advised him to cover his nose or mouth with the mask to avoid inhalation of cold air which will cause shortness of breath or wheezing. He has not been able to lose much weight    He has sleep apnea which responding well to the use of CPAP he uses the machine very regularly does not feel sleepy tired fatigue during the daytime. He was given a prescription for CPAP supplies. Blood pressure is under good control. He does have a right lung nodule which has been stable and is PET negative and most likely a granuloma. He already taken his COVID-vaccine and flu vaccines.           Cancer screening    CRITERIA MET    [x]     CT ORDERED  []      CRITERIA NOT MET   []      REFUSED                    []        REASON CRITERIA NOT MET     SMOKING LESS THAN 30 PY  []      AGE LESS THAN 55 or GREATER 77 YEARS  []      QUIT SMOKING 15 YEARS OR GREATER   []      RECENT CT WITH IN 11 MONTHS    []      LIFE EXPECTANCY < 5 YEARS   []      SIGNS  AND SYMPTOMS OF LUNG CANCER   []         Immunization   Immunization History   Administered Date(s) Administered    COVID-19, MODERNA BLUE border, Primary or Immunocompromised, (age 12y+), IM, 100 mcg/0.5mL 02/26/2021, 03/27/2021, 11/04/2021    COVID-19, MODERNA Bivalent BOOSTER, (age 12y+), IM, 48 mcg/0.5 mL 10/07/2022    Influenza Vaccine, unspecified formulation 10/26/2016    Influenza, FLUAD, (age 72 y+), Adjuvanted, 0.5mL 10/06/2021, 10/06/2022    Influenza, FLUARIX, FLULAVAL, FLUZONE (age 10 mo+) AND AFLURIA, (age 1 y+), PF, 0.5mL 11/14/2017, 12/17/2019    Influenza, FLUBLOK, (age 25 y+), PF, 0.5mL 10/18/2020    Pneumococcal Polysaccharide ([id number]) 11/14/2017    Td (Adult), 5 Lf Tetanus Toxoid, Pf (Tenivac, Decavac) 02/01/2003    Td vaccine (adult) 04/10/1997, 02/21/2003    Td, unspecified formulation 02/01/2003    Tdap (Boostrix, Adacel) 11/21/2016        Pneumococcal Vaccine     [x] Up to date    [] Indicated   [] Refused  [] Contraindicated       Influenza Vaccine   [x] Up to date    [] Indicated   [] Refused  [] Contraindicated          PAST MEDICAL HISTORY:       Diagnosis Date    A-fib ([facility] [address] 4/6/2015    Acute and chronic respiratory failure, unspecified whether with hypoxia or hypercapnia (HCC)     Allergic rhinitis 4/6/2015    Atrial fibrillation (HCC)     Back pain     Basal cell carcinoma of skin     Decreased libido     Diverticulitis of colon (without mention of hemorrhage)(562.11) 4/6/2015    ED (erectile dysfunction)     Elevated prostate specific antigen (PSA) 4/6/2015    Former smoker     HTN (hypertension) 4/6/2015    Lung nodule     Obesity 4/6/2015    Obstructive sleep apnea     on cpap    Prostate cancer ([facility] [address] 09/2017    needs removed    Pure hypercholesterolemia 4/6/2015    Tinnitus     Tobacco use disorder 4/6/2015    Wears glasses          Family History:       Problem Relation Age of Onset    Coronary Art Dis Mother     High Blood Pressure Mother     High Cholesterol Father     Seizures Sister        SURGICAL HISTORY:   Past Surgical History:   Procedure Laterality Date    CARDIAC CATHETERIZATION  09/19/2017    LAD: mid 30% stenosis  /  LM NLM / EF 55%  /  LCX: Codominant Vessel, distal 30% stenosis / OM1 AND OM2 10%    COLONOSCOPY      COLONOSCOPY  04/26/2019    COLONOSCOPY  4/26/2019    COLONOSCOPY POLYPECTOMY COLD BIOPSY performed by Pati Stark MD at [address]  10/17/2017    robotic with schuyler pelvic lymph node dissection    PROSTATECTOMY N/A 10/17/2017    XI ROBOTIC PROSTATECTOMY LAPAROSCOPIC WITH PELVIC LYMPH NODE DISSECTION performed by Brinda Morris MD at [address] [place] [address].  09/09    L Upper Lip Basal CA    TONSILLECTOMY      TYMPANOSTOMY TUBE PLACEMENT      VASECTOMY             SOCIAL AND OCCUPATIONAL HEALTH:      There  is no history of TB or TB exposure. There  is no asbestos or silica dust exposure. The patient reports  no coal, foundry, quarry or [employer] exposure. Travel history reveals negative. There  is no history of recreational or IV drug use. There  is no hot tube exposure. Pets  negative    Occupational history not significant    TOBACCO:   reports that he quit smoking about 6 years ago. His smoking use included cigarettes. He started smoking about 46 years ago. He has a 40.00 pack-year smoking history. He has never used smokeless tobacco.  ETOH:   reports current alcohol use of about 5.0 standard drinks per week. ALLERGIES:      Allergies   Allergen Reactions    Metformin And Related      SOB         Home Meds:   Prior to Admission medications    Medication Sig Start Date End Date Taking? Authorizing Provider   metoprolol tartrate (LOPRESSOR) 50 MG tablet Take 1 tablet by mouth 2 times daily Dose increased 9/23/2022 10/31/22  Yes Moses Melendez MD   FLUoxetine (PROZAC) 20 MG capsule TAKE 1 CAPSULE BY MOUTH IN THE MORNING 10/25/22  Yes Moses Melendez MD   lisinopril (PRINIVIL;ZESTRIL) 20 MG tablet Take 1 tablet by mouth daily Dose increased 10/6/2022 10/6/22  Yes Moses Melendez MD   furosemide (LASIX) 20 MG tablet Take 1 tablet by mouth in the morning.  Frequency increased 7/27/2022. 7/27/22  Yes Moses Melendez MD   rosuvastatin (CRESTOR) 40 MG tablet TAKE 1 TABLET BY MOUTH DAILY 5/13/22  Yes Moses Melendez MD   XARELTO 20 MG TABS tablet TAKE 1 TABLET BY MOUTH EVERY DAY 2/7/22  Yes Historical Provider, MD   Umeclidinium Bromide (INCRUSE ELLIPTA) 62.5 MCG/INH AEPB Inhale 1 actuation into the lungs daily Maintenance 2/18/22  Yes Moses Melendez MD albuterol sulfate HFA (VENTOLIN HFA) 108 (90 Base) MCG/ACT inhaler Inhale 2 puffs into the lungs 4 times daily as needed for Wheezing or Shortness of Breath Rescue 2/18/22  Yes Vaughn Rebollar MD              REVIEW OF SYSTEMS:    CONSTITUTIONAL:  negative for  fevers, chills, sweats, fatigue, malaise, anorexia and weight loss. Overweight, no distress. Are using accessory muscles  EYES:  negative for  double vision, blurred vision, dry eyes, eye discharge and redness. No Giselle's syndrome or jaundice  HEENT:  negative for  hearing loss, tinnitus, ear drainage, earaches and nasal congestion. No nasal polyps. No sinus tenderness  RESPIRATORY:  See hpi  CARDIOVASCULAR:  negative for  chest pain,, palpitations, orthopnea, PND, known to have hypertension. No angina. No coronary artery disease  GASTROINTESTINAL:  negative for nausea, vomiting, change in bowel habits, diarrhea, constipation, abdominal pain, pruritus, abdominal mass and abdominal distention. No nausea, vomiting or diarrhea or abdominal pain  GENITOURINARY:  negative for frequency, dysuria, nocturia, urinary incontinence and hesitancy history of stress incontinence, history of prostatectomy. Her lung Due to prostate cancer. erectile dysfunction after the surgery  INTEGUMENT  negative for rash, skin lesion(s), dryness, skin color change, changes in lesion, pruritus and changes in hair  HEMATOLOGIC/LYMPHATIC:  negative for easy bruising, bleeding, lymphadenopathy, petechiae and swelling/edema. No anemia, no enlarged lymph nodes  ALLERGIC/IMMUNOLOGIC:  negative for recurrent infections, urticaria and drug reactions  ENDOCRINE:  negative for heat intolerance, cold intolerance, tremor, weight changes and change in bowel habits.   No diabetes  MUSCULOSKELETAL:  negative for  myalgias, arthralgias, pain, joint swelling, stiff joints and decreased range of motion no acute arthritis  NEUROLOGICAL:  negative for headaches, dizziness, seizures, memory problems, speech problems, visual disturbance and coordination problems, no deficit. Motor SENSORY  BEHAVIOR/PSYCH:  negative for poor appetite, increased appetite, decreased sleep, increased sleep, decreased energy level, increased energy level and poor concentration no psychotic problem  Skin no rash no dermatitis, no skin  Vitals:  /82   Pulse 81   Resp 16   Ht 5' 10\" (1.778 m)   Wt (!) 307 lb (139.3 kg)   SpO2 98% Comment: room air  BMI 44.05 kg/m²     PHYSICAL EXAM: Cooperative no respiratory distress not using accessory muscles  General Appearance:    Alert, cooperative, no distress, appears stated age, obese, not in any respiratory distress, not using accessory muscles    Head:    Normocephalic, without obvious abnormality, atraumatic      Eyes:    PERRL, conjunctiva/corneas clear, EOM's intact, no jaundice. No Giselle's syndrome    Ears:    Normal  external ear canals, both ears no polyps or sinus tenderness    Nose:   Nares normal, septum midline, mucosa normal, no drainage        or sinus tenderness   Throat:   Lips, mucosa, and tongue normal; teeth and gums normal, oropharyngeal orifice not compromise    Neck:   Supple, symmetrical, trachea midline, no adenopathy;     thyroid:  no enlargement/tenderness/nodules; no carotid    bruit ,JVD not elevated    Back:     Symmetric, no curvature, ROM normal, no CVA tenderness   Lungs:     AP diameter is increased. Percussion note is hyperresonant expiration prolonged. No rales, rhonchi are audible.   No pleural friction rub is audible    Chest Wall:    No tenderness or deformity      Heart:    Regular rate and rhythm, S1 and S2 normal, no murmur, rub        or gallop no rvh                           Abdomen:                                                 Pulses:                              Skin:                  Lymph nodes:                    Neurologic:                  Soft, non-tender, bowel sounds active all four quadrants,     no masses, no organomegaly         2+ and symmetric all extremities     Skin color, texture, turgor normal, no rashes or lesions       Cervical, supraclavicular not enlarged or matted or tender      CNII-XII intact, normal strength 5/5 . Sensation grossly normal  and reflexes normal 2+  throughout     Clubbing No  Lower ext edema . Negative  Upper ext edema . Absent         Musculoskeletal no synovitis. No joint swelling or tenderness          Thyroid:   Lab Results   Component Value Date/Time    TSH 1.46 08/22/2022 09:14 AM     Urinalysis: No results for input(s): BACTERIA, BLOODU, CLARITYU, COLORU, PHUR, PROTEINU, RBCUA, SPECGRAV, BILIRUBINUR, NITRU, WBCUA, LEUKOCYTESUR, GLUCOSEU in the last 72 hours. CXR  No recent chest x-ray  CT Scans  . CT scan reviewed going back to 2019 the nodule in the right upper lobe is unchanged and has been PET negative. Echo    No recent echo            IMPRESSION:    COPD no acute exacerbation  Hypertension  Possible cor pulmonale  Morbid obesity  Sleep apnea syndrome  History of carcinoma of the prostate treated with prostatectomy  Lung nodule PET negative  :                PLAN:    Pulmonary status is very stable. COPD is under good control    We will continue present bronchodilator therapy as before    Advised to call the office in case he gets chest infection or acute exacerbation. No need of antibiotics or steroids at this time. Gone to the use of CPAP as before. He is benefiting from it. I gave him prescription for supplies for CPAP    Lung nodule is very likely benign and a granuloma. 27year-old to lose weight    Blood pressure is under good control    I plan to see in follow-up in few months    Advised to take adequate precaution against exposure to the cold weather.     He already had COVID-vaccine and flu vaccine and Pneumovax    Duration of                        REASON FOR THE CONSULTATION:  COPD  Right lung nodule  Obesity  Hypertension   obstructive sleep apnea syndrome        HISTORY OF PRESENT ILLNESS: I  Diabetes under good control with no evidence for acute exacerbation of COPD yellow sputum, mopped assist.    I compared the recent scan with the scans done in 2019 the right upper lobe nodule is stable but is likely benign PET was negative    Blood pressure was elevated today in the office however he is I advised him to check his blood pressure at home twice a day and keep a note of it. He is using the CPAP regularly does not feel sleepy tired fatigue during the daytime did not drink make a [place] Sleepiness Scale    No evidence of recurrence of the prostatic CA    He continues to be morbidly obese advised to lose weight.     Ready had COVID-vaccine        Cancer screening    CRITERIA MET    [x]     CT ORDERED  []      CRITERIA NOT MET   []      REFUSED                    []        REASON CRITERIA NOT MET     SMOKING LESS THAN 30 PY  []      AGE LESS THAN 55 or GREATER 77 YEARS  []      QUIT SMOKING 15 YEARS OR GREATER   []      RECENT CT WITH IN 11 MONTHS    []      LIFE EXPECTANCY < 5 YEARS   []      SIGNS  AND SYMPTOMS OF LUNG CANCER   []         Immunization   Immunization History   Administered Date(s) Administered    COVID-19, MODERNA BLUE border, Primary or Immunocompromised, (age 12y+), IM, 100 mcg/0.5mL 02/26/2021, 03/27/2021, 11/04/2021    COVID-19, MODERNA Bivalent BOOSTER, (age 12y+), IM, 48 mcg/0.5 mL 10/07/2022    Influenza Vaccine, unspecified formulation 10/26/2016    Influenza, FLUAD, (age 72 y+), Adjuvanted, 0.5mL 10/06/2021, 10/06/2022    Influenza, FLUARIX, FLULAVAL, FLUZONE (age 10 mo+) AND AFLURIA, (age 1 y+), PF, 0.5mL 11/14/2017, 12/17/2019    Influenza, FLUBLOK, (age 25 y+), PF, 0.5mL 10/18/2020    Pneumococcal Polysaccharide ([id number]) 11/14/2017    Td (Adult), 5 Lf Tetanus Toxoid, Pf (Tenivac, Decavac) 02/01/2003    Td vaccine (adult) 04/10/1997, 02/21/2003    Td, unspecified formulation 02/01/2003    Tdap (Boostrix, Adacel) 11/21/2016        Pneumococcal Vaccine     [x] Up to date    [] Indicated   [] Refused  [] Contraindicated       Influenza Vaccine   [x] Up to date    [] Indicated   [] Refused  [] Contraindicated          PAST MEDICAL HISTORY:       Diagnosis Date    A-fib ([facility] 4/6/2015    Acute and chronic respiratory failure, unspecified whether with hypoxia or hypercapnia (HCC)     Allergic rhinitis 4/6/2015    Atrial fibrillation (HCC)     Back pain     Basal cell carcinoma of skin     Decreased libido     Diverticulitis of colon (without mention of hemorrhage)(562.11) 4/6/2015    ED (erectile dysfunction)     Elevated prostate specific antigen (PSA) 4/6/2015    Former smoker     HTN (hypertension) 4/6/2015    Lung nodule     Obesity 4/6/2015    Obstructive sleep apnea     on cpap    Prostate cancer ([facility] 09/2017    needs removed    Pure hypercholesterolemia 4/6/2015    Tinnitus     Tobacco use disorder 4/6/2015    Wears glasses          Family History:       Problem Relation Age of Onset    Coronary Art Dis Mother     High Blood Pressure Mother     High Cholesterol Father     Seizures Sister        SURGICAL HISTORY:   Past Surgical History:   Procedure Laterality Date    CARDIAC CATHETERIZATION  09/19/2017    LAD: mid 30% stenosis  /  LM NLM / EF 55%  /  LCX: Codominant Vessel, distal 30% stenosis / OM1 AND OM2 10%    COLONOSCOPY      COLONOSCOPY  04/26/2019    COLONOSCOPY  4/26/2019    COLONOSCOPY POLYPECTOMY COLD BIOPSY performed by uSkhdeep Richard MD at [facility]  10/17/2017    robotic with schuyler pelvic lymph node dissection    PROSTATECTOMY N/A 10/17/2017    XI ROBOTIC PROSTATECTOMY LAPAROSCOPIC WITH PELVIC LYMPH NODE DISSECTION performed by Luis Colon MD at [address] [place] [address]  09/09    L Upper Lip Basal CA    TONSILLECTOMY      TYMPANOSTOMY TUBE PLACEMENT      VASECTOMY             SOCIAL AND OCCUPATIONAL HEALTH:      There  is no history of TB or TB exposure. There  is no asbestos or silica dust exposure. The patient reports  no coal, foundry, quarry or [employer] exposure. Travel history reveals negative. There  is no history of recreational or IV drug use. There  is no hot tube exposure. Pets  negative    Occupational history not significant    TOBACCO:   reports that he quit smoking about 6 years ago. His smoking use included cigarettes. He started smoking about 46 years ago. He has a 40.00 pack-year smoking history. He has never used smokeless tobacco.  ETOH:   reports current alcohol use of about 5.0 standard drinks per week. ALLERGIES:      Allergies   Allergen Reactions    Metformin And Related      SOB         Home Meds:   Prior to Admission medications    Medication Sig Start Date End Date Taking? Authorizing Provider   metoprolol tartrate (LOPRESSOR) 50 MG tablet Take 1 tablet by mouth 2 times daily Dose increased 9/23/2022 10/31/22  Yes James Savage MD   FLUoxetine (PROZAC) 20 MG capsule TAKE 1 CAPSULE BY MOUTH IN THE MORNING 10/25/22  Yes James Savage MD   lisinopril (PRINIVIL;ZESTRIL) 20 MG tablet Take 1 tablet by mouth daily Dose increased 10/6/2022 10/6/22  Yes James Savage MD   furosemide (LASIX) 20 MG tablet Take 1 tablet by mouth in the morning.  Frequency increased 7/27/2022. 7/27/22  Yes James Savage MD   rosuvastatin (CRESTOR) 40 MG tablet TAKE 1 TABLET BY MOUTH DAILY 5/13/22  Yes James Savage MD   XARELTO 20 MG TABS tablet TAKE 1 TABLET BY MOUTH EVERY DAY 2/7/22  Yes Historical Provider, MD   Umeclidinium Bromide (INCRUSE ELLIPTA) 62.5 MCG/INH AEPB Inhale 1 actuation into the lungs daily Maintenance 2/18/22  Yes James Savage MD   albuterol sulfate HFA (VENTOLIN HFA) 108 (90 Base) MCG/ACT inhaler Inhale 2 puffs into the lungs 4 times daily as needed for Wheezing or Shortness of Breath Rescue 2/18/22  Yes James Savage MD              REVIEW OF SYSTEMS:    CONSTITUTIONAL:  negative for  fevers, chills, sweats, fatigue, malaise, anorexia and weight loss. Overweight, no distress. Are using accessory muscles  EYES:  negative for  double vision, blurred vision, dry eyes, eye discharge and redness. No Giselle's syndrome or jaundice  HEENT:  negative for  hearing loss, tinnitus, ear drainage, earaches and nasal congestion. No nasal polyps. No sinus tenderness  RESPIRATORY:  See hpi  CARDIOVASCULAR:  negative for  chest pain,, palpitations, orthopnea, PND, known to have hypertension. No angina. No coronary artery disease  GASTROINTESTINAL:  negative for nausea, vomiting, change in bowel habits, diarrhea, constipation, abdominal pain, pruritus, abdominal mass and abdominal distention. No nausea, vomiting or diarrhea or abdominal pain  GENITOURINARY:  negative for frequency, dysuria, nocturia, urinary incontinence and hesitancy history of stress incontinence, history of prostatectomy. Her lung Due to prostate cancer. erectile dysfunction after the surgery  INTEGUMENT  negative for rash, skin lesion(s), dryness, skin color change, changes in lesion, pruritus and changes in hair  HEMATOLOGIC/LYMPHATIC:  negative for easy bruising, bleeding, lymphadenopathy, petechiae and swelling/edema. No anemia, no enlarged lymph nodes  ALLERGIC/IMMUNOLOGIC:  negative for recurrent infections, urticaria and drug reactions  ENDOCRINE:  negative for heat intolerance, cold intolerance, tremor, weight changes and change in bowel habits. No diabetes  MUSCULOSKELETAL:  negative for  myalgias, arthralgias, pain, joint swelling, stiff joints and decreased range of motion no acute arthritis  NEUROLOGICAL:  negative for headaches, dizziness, seizures, memory problems, speech problems, visual disturbance and coordination problems, no deficit.   Motor SENSORY  BEHAVIOR/PSYCH:  negative for poor appetite, increased appetite, decreased sleep, increased sleep, decreased energy level, increased energy level and poor concentration no psychotic problem  Skin no rash no dermatitis, no skin  Vitals:  /82   Pulse 81   Resp 16   Ht 5' 10\" (1.778 m)   Wt (!) 307 lb (139.3 kg)   SpO2 98% Comment: room air  BMI 44.05 kg/m²     PHYSICAL EXAM: Cooperative no respiratory distress not using accessory muscles  General Appearance:    Alert, cooperative, no distress, appears stated age, obese, not in any respiratory distress, not using accessory muscles    Head:    Normocephalic, without obvious abnormality, atraumatic      Eyes:    PERRL, conjunctiva/corneas clear, EOM's intact, no jaundice. No Giselle's syndrome    Ears:    Normal  external ear canals, both ears no polyps or sinus tenderness    Nose:   Nares normal, septum midline, mucosa normal, no drainage        or sinus tenderness   Throat:   Lips, mucosa, and tongue normal; teeth and gums normal, oropharyngeal orifice not compromise    Neck:   Supple, symmetrical, trachea midline, no adenopathy;     thyroid:  no enlargement/tenderness/nodules; no carotid    bruit ,JVD not elevated    Back:     Symmetric, no curvature, ROM normal, no CVA tenderness   Lungs:     AP diameter is increased. Percussion note is hyperresonant expiration prolonged. No rales, rhonchi are audible. No pleural friction rub is audible    Chest Wall:    No tenderness or deformity      Heart:    Regular rate and rhythm, S1 and S2 normal, no murmur, rub        or gallop no rvh                           Abdomen:                                                 Pulses:                              Skin:                  Lymph nodes:                    Neurologic:                  Soft, non-tender, bowel sounds active all four quadrants,     no masses, no organomegaly         2+ and symmetric all extremities     Skin color, texture, turgor normal, no rashes or lesions       Cervical, supraclavicular not enlarged or matted or tender      CNII-XII intact, normal strength 5/5 .   Sensation grossly normal  and reflexes normal 2+  throughout     Clubbing No  Lower ext edema . Negative  Upper ext edema . Absent         Musculoskeletal no synovitis. No joint swelling or tenderness          Thyroid:   Lab Results   Component Value Date/Time    TSH 1.46 08/22/2022 09:14 AM     Urinalysis: No results for input(s): BACTERIA, BLOODU, CLARITYU, COLORU, PHUR, PROTEINU, RBCUA, SPECGRAV, BILIRUBINUR, NITRU, WBCUA, LEUKOCYTESUR, GLUCOSEU in the last 72 hours. CXR  No recent chest x-ray  CT Scans  . CT scan reviewed going back to 2019 the nodule in the right upper lobe is unchanged and has been PET negative. Echo    No recent echo            IMPRESSION:    COPD no acute exacerbation  Hypertension  Possible cor pulmonale  Morbid obesity  Sleep apnea syndrome  History of carcinoma of the prostate treated with prostatectomy  Lung nodule PET negative  :                PLAN:   Pulmonary status very stable will continue present bronchodilator therapy  No need for home oxygen  Right lung nodule is stable and very likely benign granuloma  Continue use of CPAP as before he has no daytime symptoms of sleepiness  Encouraged to lose weight by exercising in water especially walking. There is no evidence of any recurrence of prostatic CA      He had a COVID-vaccine. Advised him to monitor his hypertension at home and keep a record of it.

## 2022-11-23 NOTE — TELEPHONE ENCOUNTER
Fax received from Dr. Artemio Rush office for medical clearance for pts dental procedure, no date for the procedure listed. Last OV 10/6/22, next OV 3/8/23 please adv if pt needs OV for clearance.

## 2022-11-30 NOTE — TELEPHONE ENCOUNTER
Called and spoke with patient and he is going to call cardiology office to get the clearance and have them send it to us. I also faxed the request to cardiology office as well.

## 2022-12-14 DIAGNOSIS — I10 ESSENTIAL HYPERTENSION: ICD-10-CM

## 2022-12-14 DIAGNOSIS — I25.10 CORONARY ARTERY DISEASE INVOLVING NATIVE CORONARY ARTERY OF NATIVE HEART WITHOUT ANGINA PECTORIS: ICD-10-CM

## 2022-12-14 RX ORDER — METOPROLOL TARTRATE 50 MG/1
100 TABLET, FILM COATED ORAL 2 TIMES DAILY
Qty: 180 TABLET | Refills: 3 | Status: SHIPPED | OUTPATIENT
Start: 2022-12-14 | End: 2022-12-14

## 2022-12-14 RX ORDER — LISINOPRIL 20 MG/1
20 TABLET ORAL DAILY
Qty: 90 TABLET | Refills: 3 | Status: SHIPPED | OUTPATIENT
Start: 2022-12-14

## 2022-12-14 RX ORDER — FUROSEMIDE 20 MG/1
20 TABLET ORAL DAILY
Qty: 90 TABLET | Refills: 3 | Status: SHIPPED | OUTPATIENT
Start: 2022-12-14

## 2022-12-14 RX ORDER — METOPROLOL TARTRATE 100 MG/1
100 TABLET ORAL 2 TIMES DAILY
Qty: 180 TABLET | Refills: 3 | Status: SHIPPED | OUTPATIENT
Start: 2022-12-14

## 2022-12-14 NOTE — TELEPHONE ENCOUNTER
Please Approve or Refuse.   Send to Pharmacy per Pt's Request: patient takes 100mg BID of the metoprolol      Next Visit Date:  3/8/2023   Last Visit Date: 10/6/2022    Hemoglobin A1C (%)   Date Value   08/22/2022 6.2 (H)   12/22/2021 5.9   02/21/2019 5.2             ( goal A1C is < 7)   BP Readings from Last 3 Encounters:   11/23/22 135/82   10/06/22 (!) 140/80   09/23/22 (!) 158/98          (goal 120/80)  BUN   Date Value Ref Range Status   10/20/2022 14 8 - 23 mg/dL Final     Creatinine   Date Value Ref Range Status   10/20/2022 0.74 0.70 - 1.20 mg/dL Final     Potassium   Date Value Ref Range Status   10/20/2022 4.0 3.7 - 5.3 mmol/L Final

## 2022-12-14 NOTE — TELEPHONE ENCOUNTER
Please Approve or Refuse.   Send to Pharmacy per Pt's Request[facility]     Next Visit Date:  3/8/2023   Last Visit Date: 10/6/2022    Hemoglobin A1C (%)   Date Value   08/22/2022 6.2 (H)   12/22/2021 5.9   02/21/2019 5.2             ( goal A1C is < 7)   BP Readings from Last 3 Encounters:   11/23/22 135/82   10/06/22 (!) 140/80   09/23/22 (!) 158/98          (goal 120/80)  BUN   Date Value Ref Range Status   10/20/2022 14 8 - 23 mg/dL Final     Creatinine   Date Value Ref Range Status   10/20/2022 0.74 0.70 - 1.20 mg/dL Final     Potassium   Date Value Ref Range Status   10/20/2022 4.0 3.7 - 5.3 mmol/L Final

## 2022-12-15 NOTE — TELEPHONE ENCOUNTER
I still have his form to complete, we did not see any answer from cardiologist regarding Xarelto, he has atrial fibrillation so stopping Xarelto increases the risk for him to get the stroke, did his cardiologist agree with that?     [place] cardiologist was supposed to send us a clearance form  From in my office

## 2023-01-23 DIAGNOSIS — I10 ESSENTIAL HYPERTENSION: ICD-10-CM

## 2023-01-23 DIAGNOSIS — I48.0 PAROXYSMAL ATRIAL FIBRILLATION (HCC): Primary | ICD-10-CM

## 2023-01-23 RX ORDER — METOPROLOL TARTRATE 100 MG/1
100 TABLET ORAL 2 TIMES DAILY
Qty: 180 TABLET | Refills: 3 | Status: SHIPPED | OUTPATIENT
Start: 2023-01-23

## 2023-01-23 NOTE — TELEPHONE ENCOUNTER
Please Approve or Refuse.   Send to Pharmacy per Pt's Request:      Next Visit Date:  3/8/2023   Last Visit Date: 10/6/2022    Hemoglobin A1C (%)   Date Value   08/22/2022 6.2 (H)   12/22/2021 5.9   02/21/2019 5.2             ( goal A1C is < 7)   BP Readings from Last 3 Encounters:   11/23/22 135/82   10/06/22 (!) 140/80   09/23/22 (!) 158/98          (goal 120/80)  BUN   Date Value Ref Range Status   10/20/2022 14 8 - 23 mg/dL Final     Creatinine   Date Value Ref Range Status   10/20/2022 0.74 0.70 - 1.20 mg/dL Final     Potassium   Date Value Ref Range Status   10/20/2022 4.0 3.7 - 5.3 mmol/L Final

## 2023-01-24 ENCOUNTER — HOSPITAL ENCOUNTER (OUTPATIENT)
Age: 68
Setting detail: SPECIMEN
Discharge: HOME | End: 2023-01-24

## 2023-01-24 LAB
HCT VFR BLD CALC: 43.3 % (ref 40.7–50.3)
HEMOGLOBIN: 12.6 G/DL (ref 13–17)
MCH RBC QN AUTO: 24.4 PG (ref 25.2–33.5)
MCHC RBC AUTO-ENTMCNC: 29.1 G/DL (ref 28.4–34.8)
MCV RBC AUTO: 83.8 FL (ref 82.6–102.9)
NRBC AUTOMATED: 0 PER 100 WBC
PDW BLD-RTO: 15 % (ref 11.8–14.4)
PLATELET # BLD: 259 K/UL (ref 138–453)
PMV BLD AUTO: 9.4 FL (ref 8.1–13.5)
RBC # BLD: 5.17 M/UL (ref 4.21–5.77)
WBC # BLD: 8.5 K/UL (ref 3.5–11.3)

## 2023-01-25 LAB
ANION GAP SERPL CALCULATED.3IONS-SCNC: 12 MMOL/L (ref 9–17)
BUN BLDV-MCNC: 16 MG/DL (ref 8–23)
CALCIUM SERPL-MCNC: 9.6 MG/DL (ref 8.6–10.4)
CHLORIDE BLD-SCNC: 101 MMOL/L (ref 98–107)
CO2: 25 MMOL/L (ref 20–31)
CREAT SERPL-MCNC: 0.78 MG/DL (ref 0.7–1.2)
GFR SERPL CREATININE-BSD FRML MDRD: >60 ML/MIN/1.73M2
GLUCOSE BLD-MCNC: 96 MG/DL (ref 70–99)
POTASSIUM SERPL-SCNC: 4.8 MMOL/L (ref 3.7–5.3)
PRO-BNP: 438 PG/ML
SODIUM BLD-SCNC: 138 MMOL/L (ref 135–144)

## 2023-01-26 NOTE — RESULT ENCOUNTER NOTE
The labs done by cardiologist shows mild anemia which is new, any blood in the stool, or any black stool, stomach pain, or blood in the urine?  has he had any recent surgery? I suggest a referral back to the GI specialist, does he have any preference for GI?   He already follows with urologist    Mild anemia which is new  Future Appointments  3/8/2023   1:00 PM    Elian Currie MD     [facility]  5/24/2023  1:00 PM    Shannon Nguyen MD        [facility]  8/21/2023  11:00 AM   [facility] Vazquez [facility]         [facility]  10/11/2023 1:00 PM    Elian Currie MD     [facility]

## 2023-01-30 DIAGNOSIS — D50.0 IRON DEFICIENCY ANEMIA DUE TO CHRONIC BLOOD LOSS: ICD-10-CM

## 2023-01-30 DIAGNOSIS — K64.9 BLEEDING HEMORRHOIDS: Primary | ICD-10-CM

## 2023-02-13 ENCOUNTER — OFFICE VISIT (OUTPATIENT)
Dept: SURGERY | Age: 68
End: 2023-02-13

## 2023-02-13 VITALS
BODY MASS INDEX: 44.8 KG/M2 | WEIGHT: 312.2 LBS | DIASTOLIC BLOOD PRESSURE: 84 MMHG | HEART RATE: 87 BPM | SYSTOLIC BLOOD PRESSURE: 143 MMHG

## 2023-02-13 DIAGNOSIS — K62.5 RECTAL BLEEDING: Primary | ICD-10-CM

## 2023-02-15 RX ORDER — SODIUM CHLORIDE 9 MG/ML
INJECTION, SOLUTION INTRAVENOUS PRN
OUTPATIENT
Start: 2023-02-15

## 2023-02-15 RX ORDER — SODIUM CHLORIDE 0.9 % (FLUSH) 0.9 %
5-40 SYRINGE (ML) INJECTION EVERY 12 HOURS SCHEDULED
OUTPATIENT
Start: 2023-02-15

## 2023-02-15 RX ORDER — SODIUM CHLORIDE 0.9 % (FLUSH) 0.9 %
5-40 SYRINGE (ML) INJECTION PRN
OUTPATIENT
Start: 2023-02-15

## 2023-02-15 RX ORDER — POLYETHYLENE GLYCOL 3350 17 G/17G
238 POWDER, FOR SOLUTION ORAL ONCE
Qty: 238 G | Refills: 0 | Status: SHIPPED | OUTPATIENT
Start: 2023-02-15 | End: 2023-02-15

## 2023-02-15 RX ORDER — DOCUSATE SODIUM 100 MG/1
100 CAPSULE, LIQUID FILLED ORAL 2 TIMES DAILY
Qty: 5 CAPSULE | Refills: 0 | Status: SHIPPED | OUTPATIENT
Start: 2023-02-15 | End: 2023-02-18

## 2023-02-15 RX ORDER — SODIUM CHLORIDE, SODIUM LACTATE, POTASSIUM CHLORIDE, CALCIUM CHLORIDE 600; 310; 30; 20 MG/100ML; MG/100ML; MG/100ML; MG/100ML
INJECTION, SOLUTION INTRAVENOUS CONTINUOUS
OUTPATIENT
Start: 2023-02-15

## 2023-02-15 NOTE — PROGRESS NOTES
History and Physical -- Colonoscopy    Patient's Name: Chaparro Lea   MRN: [id number]  YOB: 1955 (79 y.o.)    CC: Rectal bleeding    HPI: Chaparro Lea is a/an 79 y.o. male who presents to surgery clinic as a consult for a diagnostic colonoscopy. Patient complains of rectal bleeding that is happened over the last 1 to 2 months several times. He has intermittent pain when having bowel movements. He denies weight loss or weight gain. He had a colonoscopy in 2019, but was not bleeding at that time. There is concern for internal hemorrhoids. He is recently been anemic as well. His mother had colorectal cancer.     History of previous colonoscopy (Y/N): Yes  Family history of colon/rectal cancer (Y/N): Yes, mother  Personal history of colon/rectal cancer (Y/N): No  Family/personal history of Inflammatory Bowel Disease (Y/N): No    Currently on anticoagulant therapy (Y/N): Yes, Xarelto    Allergies   Allergen Reactions    Metformin And Related      SOB       Current Outpatient Medications on File Prior to Visit   Medication Sig Dispense Refill    metoprolol tartrate (LOPRESSOR) 100 MG tablet Take 1 tablet by mouth 2 times daily 180 tablet 3    furosemide (LASIX) 20 MG tablet Take 1 tablet by mouth daily Frequency increased 7/27/2022 90 tablet 3    lisinopril (PRINIVIL;ZESTRIL) 20 MG tablet Take 1 tablet by mouth daily Dose increased 10/6/2022 90 tablet 3    FLUoxetine (PROZAC) 20 MG capsule TAKE 1 CAPSULE BY MOUTH IN THE MORNING 90 capsule 3    rosuvastatin (CRESTOR) 40 MG tablet TAKE 1 TABLET BY MOUTH DAILY 90 tablet 3    XARELTO 20 MG TABS tablet TAKE 1 TABLET BY MOUTH EVERY DAY      Umeclidinium Bromide (INCRUSE ELLIPTA) 62.5 MCG/INH AEPB Inhale 1 actuation into the lungs daily Maintenance 30 each 5    albuterol sulfate HFA (VENTOLIN HFA) 108 (90 Base) MCG/ACT inhaler Inhale 2 puffs into the lungs 4 times daily as needed for Wheezing or Shortness of Breath Rescue 18 g 0     No current facility-administered medications on file prior to visit.        Past Medical History:   Diagnosis Date    A-fib ([facility] 4/6/2015    Acute and chronic respiratory failure, unspecified whether with hypoxia or hypercapnia (HCC)     Allergic rhinitis 4/6/2015    Atrial fibrillation (HCC)     Back pain     Basal cell carcinoma of skin     Decreased libido     Diverticulitis of colon (without mention of hemorrhage)(562.11) 4/6/2015    ED (erectile dysfunction)     Elevated prostate specific antigen (PSA) 4/6/2015    Former smoker     HTN (hypertension) 4/6/2015    Lung nodule     Obesity 4/6/2015    Obstructive sleep apnea     on cpap    Prostate cancer ([facility] 09/2017    needs removed    Pure hypercholesterolemia 4/6/2015    Tinnitus     Tobacco use disorder 4/6/2015    Wears glasses        Past Surgical History:   Procedure Laterality Date    CARDIAC CATHETERIZATION  09/19/2017    LAD: mid 30% stenosis  /  LM NLM / EF 55%  /  LCX: Codominant Vessel, distal 30% stenosis / OM1 AND OM2 10%    COLONOSCOPY      COLONOSCOPY  04/26/2019    COLONOSCOPY  4/26/2019    COLONOSCOPY POLYPECTOMY COLD BIOPSY performed by Padmini Baker MD at [address] [place] [address]  10/17/2017    robotic with schuyler pelvic lymph node dissection    PROSTATECTOMY N/A 10/17/2017    XI ROBOTIC PROSTATECTOMY LAPAROSCOPIC WITH PELVIC LYMPH NODE DISSECTION performed by Roland Durant MD at [address] [place] [address]  09/09    L Upper Lip Basal CA    TONSILLECTOMY      TYMPANOSTOMY TUBE PLACEMENT      VASECTOMY         Social History     Socioeconomic History    Marital status:      Spouse name: None    Number of children: 2    Years of education: None    Highest education level: None   Occupational History    Occupation: [employer]     Employer: [employer]     Comment: retired    Occupation: Retired   Tobacco Use    Smoking status: Former     Packs/day: 1.00     Years: 40.00     Pack years: 40.00     Types: Cigarettes Start date: 12     Quit date: 2016     Years since quittin.8    Smokeless tobacco: Never   Vaping Use    Vaping Use: Never used   Substance and Sexual Activity    Alcohol use: Yes     Alcohol/week: 5.0 standard drinks     Types: 5 Cans of beer per week     Comment: per week    Drug use: No    Sexual activity: Not Currently     Partners: Female     Social Determinants of Health     Financial Resource Strain: Low Risk     Difficulty of Paying Living Expenses: Not hard at all   Food Insecurity: No Food Insecurity    Worried About Running Out of Food in the Last Year: Never true    Ran Out of Food in the Last Year: Never true   Physical Activity: Insufficiently Active    Days of Exercise per Week: 3 days    Minutes of Exercise per Session: 30 min       Family History   Problem Relation Age of Onset    Coronary Art Dis Mother     High Blood Pressure Mother     High Cholesterol Father     Seizures Sister        PHYSICAL EXAM:    Vitals:    23 1106   BP: (!) 143/84   Pulse: 87       GEN: Alert and oriented x 3, in no apparent distress. Nontoxic in appearance. HEENT: Non traumatic, EOMI bilaterally. NECK: trachea midline  HEART: Regular rate and rhythm   LUNGS: No respiratory distress, Clear to auscultation bilaterally. ABDOMEN: Soft, nondistended, nontender to palpation  RECTAL: Deferred  EXT: No obvious deformities in all four extremities. NEURO: No gross motor or sensory deficit  SKIN: No skin lesions or changes noted. ASSESSMENT/ PLAN:  Luiz Villatoro is a 79 y.o. male that presents to the Surgery Clinic for diagnostic colonoscopy secondary to rectal bleeding. I recommend Blanche Galvez undergo a diagnostic colonoscopy at the earliest convenient date. The colon preparation was discussed in detail with patient in the office, allowing for the patient to ask questions. All questions were answered in the office. I discussed with Blanche Aziza Lenny the procedure and how it is performed.  We discussed the possible need for biopsy, polypectomy, injection of the colon with dye if a mass is noted. I discussed the risks and benefits of a colonoscopy, along with the alternatives available. The risks discussed included infection, bleeding, respiratory distress, hypotension, colonic perforation, missed polyp or mass, complications from anesthesia. I explained the risks are not limited to the ones discussed. We discussed monitored anesthesia care (MAC) will be provided during the procedure by the anesthesia team.    After full discussion of what the colonoscopy will entail, along with the risks of the procedure, Debora Benitez was given time to ask questions. All questions were answered to the best of my ability. Bean Galvez verbalized understanding and agreed to proceed with the colonoscopy as recommended. Debora Benitez will be scheduled for  a diagnostic colonoscopy, possible polypectomy, possible biopsy, and all indicated procedures. Follow up will be determined on findings of the colonoscopy. We will need cardiac clearance. We will need to hold anticoagulation 2 to 3 days prior to procedure. Thank you for the consult. We appreciate being involved in the care of your patient.     Tena Coelho DO  2/15/2023

## 2023-02-16 ENCOUNTER — HOSPITAL ENCOUNTER (OUTPATIENT)
Dept: PREADMISSION TESTING | Age: 68
Discharge: HOME | End: 2023-02-20

## 2023-02-16 VITALS — BODY MASS INDEX: 44.38 KG/M2 | WEIGHT: 310 LBS | HEIGHT: 70 IN

## 2023-02-16 NOTE — PROGRESS NOTES

## 2023-02-23 NOTE — PRE-PROCEDURE INSTRUCTIONS
No answer, left message ? Unable to leave message ? When were you told to arrive at hospital ? -1030     Do you have a  ?-YES    Are you on any blood thinners ? Jono Greenberg                   If yes when did you stop taking -2/24/23    Do you have your prep Rx filled and instruction ? -YES     Nothing to eat the day before , only clear liquids. -INSTRUCTED    Are you experiencing any covid symptoms ? -NONE    Do you have any infections or rash we should be aware of ?-NONE      Do you have the Hibiclens soap to use the night before and the morning of surgery ?-N/A    Nothing to eat or drink after midnight, only a sip of water to take any medication instructed to take the night before. -INSTRUCTED    Wear comfortable clothing, leave any valuables at home, remove any jewelry and body piercing . -INSTRUCTED

## 2023-02-24 ENCOUNTER — ANESTHESIA EVENT (OUTPATIENT)
Dept: ENDOSCOPY | Age: 68
End: 2023-02-24
Payer: MEDICARE

## 2023-02-27 ENCOUNTER — HOSPITAL ENCOUNTER (OUTPATIENT)
Age: 68
Setting detail: OUTPATIENT SURGERY
Discharge: HOME | End: 2023-02-27
Attending: STUDENT IN AN ORGANIZED HEALTH CARE EDUCATION/TRAINING PROGRAM | Admitting: STUDENT IN AN ORGANIZED HEALTH CARE EDUCATION/TRAINING PROGRAM
Payer: MEDICARE

## 2023-02-27 ENCOUNTER — ANESTHESIA (OUTPATIENT)
Dept: ENDOSCOPY | Age: 68
End: 2023-02-27
Payer: MEDICARE

## 2023-02-27 VITALS
TEMPERATURE: 98.6 F | DIASTOLIC BLOOD PRESSURE: 54 MMHG | HEIGHT: 70 IN | RESPIRATION RATE: 16 BRPM | OXYGEN SATURATION: 94 % | SYSTOLIC BLOOD PRESSURE: 94 MMHG | WEIGHT: 310 LBS | HEART RATE: 88 BPM | BODY MASS INDEX: 44.38 KG/M2

## 2023-02-27 DIAGNOSIS — K62.5 RECTAL BLEED: ICD-10-CM

## 2023-02-27 LAB — GLUCOSE BLD-MCNC: 120 MG/DL (ref 75–110)

## 2023-02-27 PROCEDURE — 2500000003 HC RX 250 WO HCPCS: Performed by: ANESTHESIOLOGY

## 2023-02-27 PROCEDURE — 88305 TISSUE EXAM BY PATHOLOGIST: CPT

## 2023-02-27 PROCEDURE — 2500000003 HC RX 250 WO HCPCS: Performed by: NURSE ANESTHETIST, CERTIFIED REGISTERED

## 2023-02-27 PROCEDURE — 3700000000 HC ANESTHESIA ATTENDED CARE: Performed by: STUDENT IN AN ORGANIZED HEALTH CARE EDUCATION/TRAINING PROGRAM

## 2023-02-27 PROCEDURE — 3700000001 HC ADD 15 MINUTES (ANESTHESIA): Performed by: STUDENT IN AN ORGANIZED HEALTH CARE EDUCATION/TRAINING PROGRAM

## 2023-02-27 PROCEDURE — 7100000011 HC PHASE II RECOVERY - ADDTL 15 MIN: Performed by: STUDENT IN AN ORGANIZED HEALTH CARE EDUCATION/TRAINING PROGRAM

## 2023-02-27 PROCEDURE — 3609010600 HC COLONOSCOPY POLYPECTOMY SNARE/COLD BIOPSY: Performed by: STUDENT IN AN ORGANIZED HEALTH CARE EDUCATION/TRAINING PROGRAM

## 2023-02-27 PROCEDURE — C1889 IMPLANT/INSERT DEVICE, NOC: HCPCS | Performed by: STUDENT IN AN ORGANIZED HEALTH CARE EDUCATION/TRAINING PROGRAM

## 2023-02-27 PROCEDURE — 6360000002 HC RX W HCPCS: Performed by: NURSE ANESTHETIST, CERTIFIED REGISTERED

## 2023-02-27 PROCEDURE — 45385 COLONOSCOPY W/LESION REMOVAL: CPT | Performed by: STUDENT IN AN ORGANIZED HEALTH CARE EDUCATION/TRAINING PROGRAM

## 2023-02-27 PROCEDURE — 2580000003 HC RX 258: Performed by: STUDENT IN AN ORGANIZED HEALTH CARE EDUCATION/TRAINING PROGRAM

## 2023-02-27 PROCEDURE — 2709999900 HC NON-CHARGEABLE SUPPLY: Performed by: STUDENT IN AN ORGANIZED HEALTH CARE EDUCATION/TRAINING PROGRAM

## 2023-02-27 PROCEDURE — 45384 COLONOSCOPY W/LESION REMOVAL: CPT | Performed by: STUDENT IN AN ORGANIZED HEALTH CARE EDUCATION/TRAINING PROGRAM

## 2023-02-27 PROCEDURE — 82947 ASSAY GLUCOSE BLOOD QUANT: CPT

## 2023-02-27 PROCEDURE — 7100000010 HC PHASE II RECOVERY - FIRST 15 MIN: Performed by: STUDENT IN AN ORGANIZED HEALTH CARE EDUCATION/TRAINING PROGRAM

## 2023-02-27 RX ORDER — LABETALOL HYDROCHLORIDE 5 MG/ML
10 INJECTION, SOLUTION INTRAVENOUS
Status: CANCELLED | OUTPATIENT
Start: 2023-02-27

## 2023-02-27 RX ORDER — SODIUM CHLORIDE, SODIUM LACTATE, POTASSIUM CHLORIDE, CALCIUM CHLORIDE 600; 310; 30; 20 MG/100ML; MG/100ML; MG/100ML; MG/100ML
INJECTION, SOLUTION INTRAVENOUS CONTINUOUS
Status: DISCONTINUED | OUTPATIENT
Start: 2023-02-27 | End: 2023-02-27 | Stop reason: HOSPADM

## 2023-02-27 RX ORDER — SODIUM CHLORIDE 0.9 % (FLUSH) 0.9 %
5-40 SYRINGE (ML) INJECTION EVERY 12 HOURS SCHEDULED
Status: CANCELLED | OUTPATIENT
Start: 2023-02-27

## 2023-02-27 RX ORDER — ONDANSETRON 2 MG/ML
4 INJECTION INTRAMUSCULAR; INTRAVENOUS
Status: CANCELLED | OUTPATIENT
Start: 2023-02-27 | End: 2023-02-28

## 2023-02-27 RX ORDER — SODIUM CHLORIDE 9 MG/ML
INJECTION, SOLUTION INTRAVENOUS PRN
Status: CANCELLED | OUTPATIENT
Start: 2023-02-27

## 2023-02-27 RX ORDER — MEPERIDINE HYDROCHLORIDE 25 MG/ML
12.5 INJECTION INTRAMUSCULAR; INTRAVENOUS; SUBCUTANEOUS EVERY 5 MIN PRN
Status: CANCELLED | OUTPATIENT
Start: 2023-02-27

## 2023-02-27 RX ORDER — SODIUM CHLORIDE 9 MG/ML
INJECTION, SOLUTION INTRAVENOUS PRN
Status: DISCONTINUED | OUTPATIENT
Start: 2023-02-27 | End: 2023-02-27 | Stop reason: HOSPADM

## 2023-02-27 RX ORDER — SODIUM CHLORIDE 0.9 % (FLUSH) 0.9 %
5-40 SYRINGE (ML) INJECTION PRN
Status: DISCONTINUED | OUTPATIENT
Start: 2023-02-27 | End: 2023-02-27 | Stop reason: HOSPADM

## 2023-02-27 RX ORDER — HYDRALAZINE HYDROCHLORIDE 20 MG/ML
10 INJECTION INTRAMUSCULAR; INTRAVENOUS
Status: CANCELLED | OUTPATIENT
Start: 2023-02-27

## 2023-02-27 RX ORDER — SODIUM CHLORIDE 0.9 % (FLUSH) 0.9 %
5-40 SYRINGE (ML) INJECTION PRN
Status: CANCELLED | OUTPATIENT
Start: 2023-02-27

## 2023-02-27 RX ORDER — FENTANYL CITRATE 0.05 MG/ML
25 INJECTION, SOLUTION INTRAMUSCULAR; INTRAVENOUS EVERY 5 MIN PRN
Status: CANCELLED | OUTPATIENT
Start: 2023-02-27

## 2023-02-27 RX ORDER — DIPHENHYDRAMINE HYDROCHLORIDE 50 MG/ML
12.5 INJECTION INTRAMUSCULAR; INTRAVENOUS
Status: CANCELLED | OUTPATIENT
Start: 2023-02-27 | End: 2023-02-28

## 2023-02-27 RX ORDER — METOCLOPRAMIDE HYDROCHLORIDE 5 MG/ML
10 INJECTION INTRAMUSCULAR; INTRAVENOUS
Status: CANCELLED | OUTPATIENT
Start: 2023-02-27 | End: 2023-02-28

## 2023-02-27 RX ORDER — PROPOFOL 10 MG/ML
INJECTION, EMULSION INTRAVENOUS PRN
Status: DISCONTINUED | OUTPATIENT
Start: 2023-02-27 | End: 2023-02-27 | Stop reason: SDUPTHER

## 2023-02-27 RX ORDER — ACETAMINOPHEN 325 MG/1
650 TABLET ORAL
Status: CANCELLED | OUTPATIENT
Start: 2023-02-27 | End: 2023-02-28

## 2023-02-27 RX ORDER — LIDOCAINE HYDROCHLORIDE 20 MG/ML
INJECTION, SOLUTION INFILTRATION; PERINEURAL PRN
Status: DISCONTINUED | OUTPATIENT
Start: 2023-02-27 | End: 2023-02-27 | Stop reason: SDUPTHER

## 2023-02-27 RX ORDER — SODIUM CHLORIDE 0.9 % (FLUSH) 0.9 %
5-40 SYRINGE (ML) INJECTION EVERY 12 HOURS SCHEDULED
Status: DISCONTINUED | OUTPATIENT
Start: 2023-02-27 | End: 2023-02-27 | Stop reason: HOSPADM

## 2023-02-27 RX ORDER — LIDOCAINE HYDROCHLORIDE 10 MG/ML
1 INJECTION, SOLUTION EPIDURAL; INFILTRATION; INTRACAUDAL; PERINEURAL
Status: COMPLETED | OUTPATIENT
Start: 2023-02-27 | End: 2023-02-27

## 2023-02-27 RX ADMIN — LIDOCAINE HYDROCHLORIDE 100 MG: 20 INJECTION, SOLUTION INFILTRATION; PERINEURAL at 12:36

## 2023-02-27 RX ADMIN — LIDOCAINE HYDROCHLORIDE 1 ML: 10 INJECTION, SOLUTION EPIDURAL; INFILTRATION; INTRACAUDAL; PERINEURAL at 11:29

## 2023-02-27 RX ADMIN — PROPOFOL 100 MG: 10 INJECTION, EMULSION INTRAVENOUS at 12:36

## 2023-02-27 RX ADMIN — SODIUM CHLORIDE, POTASSIUM CHLORIDE, SODIUM LACTATE AND CALCIUM CHLORIDE: 600; 310; 30; 20 INJECTION, SOLUTION INTRAVENOUS at 11:29

## 2023-02-27 RX ADMIN — PROPOFOL 100 MCG/KG/MIN: 10 INJECTION, EMULSION INTRAVENOUS at 12:37

## 2023-02-27 ASSESSMENT — PAIN DESCRIPTION - DESCRIPTORS: DESCRIPTORS: ACHING

## 2023-02-27 ASSESSMENT — PAIN SCALES - GENERAL: PAINLEVEL_OUTOF10: 0

## 2023-02-27 ASSESSMENT — ENCOUNTER SYMPTOMS
STRIDOR: 0
ANAL BLEEDING: 1
COUGH: 0
SHORTNESS OF BREATH: 0
DYSPNEA ACTIVITY LEVEL: NO INTERVAL CHANGE
WHEEZING: 0
RESPIRATORY NEGATIVE: 1
BACK PAIN: 0
SHORTNESS OF BREATH: 1

## 2023-02-27 ASSESSMENT — PAIN - FUNCTIONAL ASSESSMENT: PAIN_FUNCTIONAL_ASSESSMENT: 0-10

## 2023-02-27 ASSESSMENT — COPD QUESTIONNAIRES: CAT_SEVERITY: NO INTERVAL CHANGE

## 2023-02-27 ASSESSMENT — LIFESTYLE VARIABLES: SMOKING_STATUS: 0

## 2023-02-27 NOTE — OP NOTE
PROCEDURE NOTE    Patient: Emir Hawthorne  MRN: [id number]  Date of Procedure: 2/27/2023    Preoperative Diagnosis: Rectal bleeding    Post Operative Diagnosis: Grade III external and internal hemorrhoids, Descending colon polyp, sigmoid polyp, rectal polyp, moderate sigmoid diverticulosis    Procedure: Diagnostic Colonoscopy, polypectomy with hot snare of descending colon, polypectomy with forceps and placement of hemoclip for sigmoid polyp, polypectomy with forceps of rectum    Surgeon: Jose Elias Coelho DO    Assistant: None    Anesthesia: MAC     Estimated Blood Loss: 2 cc    Complications: None     Specimens: were obtained    ID Type Source Tests Collected by Time Destination   A : DESCENDING COLON POLYP Tissue Colon-Descending SURGICAL PATHOLOGY Awa Coelho, DO 2/27/2023 1311    B : SIGMOID COLON BIOPSY Tissue Sigmoid Colon SURGICAL PATHOLOGY Awa Coelho, DO 2/27/2023 1312    C : RECTAL  BIOPSY Tissue Rectum SURGICAL PATHOLOGY Jose Elias Kingsley Laquita, DO 2/27/2023 1321        Retraction Time: 20 minutes    Prep: Fair    History: Emir Hawthorne is a/an 79y.o. years old male who presented for a diagnostic colonoscopy due to history of rectal bleeding. A colonoscopy with possible biopsy or polypectomy has been recommended. Pre-operative discussion of risks, benefits, expected outcome, and alternatives to the procedure was performed. Risks include but are not limited to bleeding, infection, respiratory distress, hypotension, missed polyps/masses, and perforation of the colon. Kendall Galvez verbalized understanding of the risks and agreed to proceed with the procedure. Procedure: The patient was brought to procedure room and placed in the left lateral decubitus position. The patient was placed on a pulse oximetry, telemetry, supplemental oxygen, and monitor anesthesia care was provided by the anesthesia team. A formal timeout identifying the patient, procedure, and allergies was performed.      Once the patient was sedated and comfortable, a digital rectal exam was performed. A colonoscope was inserted per rectum and advanced under direct vision to the cecum with mild difficulty due to body habitus and need for counter-pressure, along with mild hypoxia. The colonoscope was slowly retracted for a total of 20 minutes, careful to examine the colon/rectum circumferentially. The rectum and anus were examined in normal and retroflexed position. The colon was decompressed prior to removal of the colonoscope. This completed the procedure. The patient tolerated well without immediate complications and taken to PACU in stable condition. Findings:    Digital Rectal Exam: External and internal hemorrhoids -- large right posterior colon with evidence of bleeding. Cecum/Ascending colon: normal    Transverse colon: normal    Descending/Sigmoid colon: abnormal: Polyp x 2 -- descending polyp removed via hot snare, sigmoid polyp removed via forceps. Hemoclip placed across mucosa of of polypectomy site for sigmoid due to mild oozing. Rectum/Anus: abnormal: Polyp removed via forceps at 15 cm. Grade III internal and external hemorrhoids with evidence of bleeding from the right posterior column.        Recommendations:  High fiber diet -- including starting metamucil once / day  Follow up pathology  Work on healthy bowel habits  See patient in two weeks to discuss conservative versus surgical options for hemorrhoidectomy      Electronically signed by Clif Bartlett DO  on 2/27/2023 at 1:46 PM

## 2023-02-27 NOTE — ANESTHESIA PRE PROCEDURE
Department of Anesthesiology  Preprocedure Note       Name:  Nancy Bess   Age:  79 y.o.  :  1955                                          MRN:  [id number]         Date:  2023      Surgeon: Ivania Bland):  Trudy Coelho DO    Procedure: Procedure(s):  COLONOSCOPY DIAGNOSTIC    Medications prior to admission:   Prior to Admission medications    Medication Sig Start Date End Date Taking? Authorizing Provider   metoprolol tartrate (LOPRESSOR) 100 MG tablet Take 1 tablet by mouth 2 times daily  Patient taking differently: Take by mouth 2 times daily \"Cut back to 75 mg 2x daily. \" 23   Riya Harding MD   furosemide (LASIX) 20 MG tablet Take 1 tablet by mouth daily Frequency increased 22   Riya Harding MD   lisinopril (PRINIVIL;ZESTRIL) 20 MG tablet Take 1 tablet by mouth daily Dose increased 10/6/2022 12/14/22   Riya Harding MD   FLUoxetine (PROZAC) 20 MG capsule TAKE 1 CAPSULE BY MOUTH IN THE MORNING 10/25/22   Riya Harding MD   rosuvastatin (CRESTOR) 40 MG tablet TAKE 1 TABLET BY MOUTH DAILY 22   Anamaria Marino MD   XARELTO 20 MG TABS tablet TAKE 1 TABLET BY MOUTH EVERY DAY 22   Historical Provider, MD   Umeclidinium Bromide (INCRUSE ELLIPTA) 62.5 MCG/INH AEPB Inhale 1 actuation into the lungs daily Maintenance 22   Riya Harding MD   albuterol sulfate HFA (VENTOLIN HFA) 108 (90 Base) MCG/ACT inhaler Inhale 2 puffs into the lungs 4 times daily as needed for Wheezing or Shortness of Breath Rescue 22   Riya Harding MD       Current medications:    Current Facility-Administered Medications   Medication Dose Route Frequency Provider Last Rate Last Admin    sodium chloride flush 0.9 % injection 5-40 mL  5-40 mL IntraVENous 2 times per day Verónica Blank MD        sodium chloride flush 0.9 % injection 5-40 mL  5-40 mL IntraVENous PRN Bob Estrada MD        0.9 % sodium chloride infusion   IntraVENous PRN Verónica Blank MD        lactated ringers IV soln infusion   IntraVENous Continuous Bob Antunez MD        lidocaine PF 1 % injection 1 mL  1 mL IntraDERmal Once PRN Kain Gilbert MD        sodium chloride flush 0.9 % injection 5-40 mL  5-40 mL IntraVENous 2 times per day Awa J Imel, DO        sodium chloride flush 0.9 % injection 5-40 mL  5-40 mL IntraVENous PRN Awa J Imel, DO        0.9 % sodium chloride infusion   IntraVENous PRN Awa J Imel, DO        lactated ringers IV soln infusion   IntraVENous Continuous Awa J Imel, DO           Allergies:     Allergies   Allergen Reactions    Metformin And Related      SOB       Problem List:    Patient Active Problem List   Diagnosis Code    Pure hypercholesterolemia E78.00    Diverticulitis of colon K57.32    Obesity, Class III, BMI 40-49.9 (morbid obesity) ([facility] E66.01    Personal history of smoking Z87.891    Hx of prostatectomy Z90.79    Male stress incontinence N39.3    Erectile dysfunction after radical prostatectomy N52.31    Personal history of prostate cancer Z85.46    Paroxysmal atrial fibrillation ([facility] I48.0    Essential hypertension I10    Family history of colon cancer in mother Z80.0    ADRIAN on CPAP G47.33, Z99.89    Tinnitus aurium, bilateral H93.13    Current mild episode of major depressive disorder without prior episode [facility] F32.0    Hyperlipidemia with target LDL less than 100 E78.5    History of basal cell carcinoma Z85.828    Polycythemia D75.1    FARFAN (dyspnea on exertion) R06.09    Hyperglycemia R73.9    Symptom of leg swelling M79.89    Chronic obstructive pulmonary disease, unspecified COPD type (HCC) J44.9    Coronary artery disease involving native coronary artery of native heart without angina pectoris I25.10    Morbid obesity ([facility] E66.01    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    Bilateral hearing loss H91.93    Iron deficiency anemia due to chronic blood loss D50.0    Bleeding hemorrhoids K64.9       Past Medical History: Diagnosis Date    A-fib [facility]) 2015    Acute and chronic respiratory failure, unspecified whether with hypoxia or hypercapnia (HCC)     Allergic rhinitis 2015    Atrial fibrillation (HCC)     Back pain     Basal cell carcinoma of skin     Decreased libido     Diverticulitis of colon (without mention of hemorrhage)(562.11) 2015    ED (erectile dysfunction)     Elevated prostate specific antigen (PSA) 2015    Former smoker     HTN (hypertension) 2015    Lung nodule     Obesity 2015    Obstructive sleep apnea     on cpap    Prostate cancer ([address] 2017    needs removed    Pure hypercholesterolemia 2015    Rectal bleeding     Tinnitus     Tobacco use disorder 2015    Wears glasses        Past Surgical History:        Procedure Laterality Date    CARDIAC CATHETERIZATION  2017    LAD: mid 30% stenosis  /  LM NLM / EF 55%  /  LCX: Codominant Vessel, distal 30% stenosis / OM1 AND OM2 10%    COLONOSCOPY      COLONOSCOPY  2019    COLONOSCOPY  2019    COLONOSCOPY POLYPECTOMY COLD BIOPSY performed by Bandar Gonzalez MD at [place] [address]  10/17/2017    robotic with schuyler pelvic lymph node dissection    PROSTATECTOMY N/A 10/17/2017    XI ROBOTIC PROSTATECTOMY LAPAROSCOPIC WITH PELVIC LYMPH NODE DISSECTION performed by Kin Orellana MD at [address] [place], [place] [address] SKIN CANCER EXCISION      L Upper Lip Basal CA    TONSILLECTOMY      TYMPANOSTOMY TUBE PLACEMENT      VASECTOMY         Social History:    Social History     Tobacco Use    Smoking status: Former     Packs/day: 1.00     Years: 40.00     Pack years: 40.00     Types: Cigarettes     Start date: 12     Quit date: 2016     Years since quittin.9    Smokeless tobacco: Never   Substance Use Topics    Alcohol use:  Yes     Alcohol/week: 5.0 standard drinks     Types: 5 Cans of beer per week     Comment: per week Counseling given: Not Answered      Vital Signs (Current): There were no vitals filed for this visit. BP Readings from Last 3 Encounters:   02/13/23 (!) 143/84   11/23/22 135/82   10/06/22 (!) 140/80       NPO Status:                                                                                 BMI:   Wt Readings from Last 3 Encounters:   02/16/23 (!) 310 lb (140.6 kg)   02/13/23 (!) 312 lb 3.2 oz (141.6 kg)   11/23/22 (!) 307 lb (139.3 kg)     There is no height or weight on file to calculate BMI.    CBC:   Lab Results   Component Value Date/Time    WBC 8.5 01/24/2023 01:35 PM    RBC 5.17 01/24/2023 01:35 PM    HGB 12.6 01/24/2023 01:35 PM    HCT 43.3 01/24/2023 01:35 PM    MCV 83.8 01/24/2023 01:35 PM    RDW 15.0 01/24/2023 01:35 PM     01/24/2023 01:35 PM       CMP:   Lab Results   Component Value Date/Time     01/24/2023 01:35 PM    K 4.8 01/24/2023 01:35 PM     01/24/2023 01:35 PM    CO2 25 01/24/2023 01:35 PM    BUN 16 01/24/2023 01:35 PM    CREATININE 0.78 01/24/2023 01:35 PM    GFRAA >60 08/22/2022 09:14 AM    LABGLOM >60 01/24/2023 01:35 PM    GLUCOSE 96 01/24/2023 01:35 PM    PROT 7.0 08/22/2022 09:14 AM    CALCIUM 9.6 01/24/2023 01:35 PM    BILITOT 0.60 08/22/2022 09:14 AM    ALKPHOS 84 08/22/2022 09:14 AM    AST 25 08/22/2022 09:14 AM    ALT 21 08/22/2022 09:14 AM       POC Tests: No results for input(s): POCGLU, POCNA, POCK, POCCL, POCBUN, POCHEMO, POCHCT in the last 72 hours.     Coags: No results found for: PROTIME, INR, APTT    HCG (If Applicable): No results found for: PREGTESTUR, PREGSERUM, HCG, HCGQUANT     ABGs: No results found for: PHART, PO2ART, EDW1FIX, IQB9IVX, BEART, [id number]     Type & Screen (If Applicable):  No results found for: LABABO, LABRH    Drug/Infectious Status (If Applicable):  No results found for: HIV, HEPCAB    COVID-19 Screening (If Applicable): No results found for: COVID19        Anesthesia Evaluation  Patient summary reviewed and Nursing notes reviewed no history of anesthetic complications:   Airway: Mallampati: III  TM distance: >3 FB   Neck ROM: full  Mouth opening: > = 3 FB   Dental:          Pulmonary:normal exam  breath sounds clear to auscultation  (+) COPD: no interval change,  shortness of breath: no interval change,  sleep apnea:      (-) pneumonia, asthma, recent URI, rhonchi, wheezes, rales, stridor, not a current smoker and no decreased breath sounds                           Cardiovascular:  Exercise tolerance: good (>4 METS),   (+) hypertension: no interval change, CAD: no interval change, FARFAN: no interval change,     (-) pacemaker, valvular problems/murmurs, past MI, CABG/stent, dysrhythmias,  angina,  CHF, orthopnea, PND, murmur, weak pulses,  friction rub, systolic click, carotid bruit,  JVD, peripheral edema, no pulmonary hypertension and no hyperlipidemia    ECG reviewed  Rhythm: regular  Rate: normal           Beta Blocker:  Dose within 24 Hrs         Neuro/Psych:   (+) psychiatric history: stable with treatmentdepression/anxiety    (-) seizures, neuromuscular disease, TIA, CVA and headaches           GI/Hepatic/Renal: Neg GI/Hepatic/Renal ROS       (-) hiatal hernia, GERD, PUD, hepatitis, liver disease, no renal disease, bowel prep and no morbid obesity       Endo/Other: Negative Endo/Other ROS   (+) no malignancy/cancer. (-) diabetes mellitus, hypothyroidism, hyperthyroidism, blood dyscrasia, arthritis, no electrolyte abnormalities, no malignancy/cancer               Abdominal:             Vascular: negative vascular ROS. - PVD, DVT and PE. Other Findings: M vanessa,  C Crown          Anesthesia Plan      general     ASA 3       Induction: intravenous. MIPS: Postoperative opioids intended and Prophylactic antiemetics administered. Anesthetic plan and risks discussed with patient. Plan discussed with CRNA.                     Fam Deluna MD   2/27/2023

## 2023-02-27 NOTE — ANESTHESIA POSTPROCEDURE EVALUATION
POST- ANESTHESIA EVALUATION       Pt Name: Dudley Blankenship  MRN: [id number]  YOB: 1955  Date of evaluation: 2/27/2023  Time:  3:30 PM      BP (!) 94/54   Pulse 88   Temp 98.6 °F (37 °C)   Resp 16   Ht 5' 10\" (1.778 m)   Wt (!) 310 lb (140.6 kg)   SpO2 94%   BMI 44.48 kg/m²      Consciousness Level  Awake  Cardiopulmonary Status  Stable  Pain Adequately Treated YES  Nausea / Vomiting  NO  Adequate Hydration  YES  Anesthesia Related Complications NONE      Electronically signed by Aaron Correa MD on 2/27/2023 at 3:30 PM       Department of Anesthesiology  Postprocedure Note    Patient: Dudley Blankenship  MRN: [id number]  YOB: 1955  Date of evaluation: 2/27/2023      Procedure Summary     Date: 02/27/23 Room / Location: [place] [address] 02 / 250 [facility]    Anesthesia Start: 8428 Anesthesia Stop: 6103    Procedure: COLONOSCOPY POLYPECTOMY HOT BIOPSY WITH CLIP PLACEMENT (Rectum) Diagnosis:       Rectal bleed      (RECTAL BLEED)    Surgeons: Dinah Cordova DO Responsible Provider: Aaron Correa MD    Anesthesia Type: general ASA Status: 3          Anesthesia Type: No value filed.     Jimena Phase I:      Jimena Phase II: Jimena Score: 9      Anesthesia Post Evaluation

## 2023-02-27 NOTE — DISCHARGE INSTRUCTIONS
Focus on high fiber diet. Start taking metamucil once per day with high water intake. Take one 100 mg colace pill once per day. Focus on not sitting on the toilet for longer than 5 minutes to have a bowel movement. If you sit and have no bowel function within the first 5 minutes, get up and come back to toilet when have a greater urge to have a bowel movement. Follow up with Dr. Maeve Khan in 2 weeks. Sedation or General Anesthesia, Adult  Care After  Refer to this sheet in the next 24 hours. These instructions provide you with information on caring for yourself after your procedure. Your caregiver may also give you more specific instructions. Your treatment has been planned according to current medical practices, but problems sometimes occur. Call your caregiver if you have any problems or questions after your procedure. HOME CARE INSTRUCTIONS   Do not participate in any activities that require you to be alert or coordinated. Do not:  Drive. Swim. Ride a bicycle. Operate heavy machinery. [place]. Use power tools. Climb ladders. Work at [employer]. Take a bath. Do not drink alcohol. Do not make any important decisions or sign legal documents. Stay with an adult. The first meal following your procedure should be light and small. Avoid solid foods if you feel sick to your stomach (nauseous) or if you throw up (vomit). Drink enough fluids to keep your urine clear or pale yellow. Only take your usual medicines or new medicines if your caregiver approves them. Only take over-the-counter or prescription medicines for pain, discomfort, or fever as directed by your caregiver. Keep all follow-up appointments as directed by your caregiver. SEEK IMMEDIATE MEDICAL CARE IF:   You are not feeling normal or behaving normally after 24 hours. You have persistent nausea and vomiting. You are unable to drink fluids or eat food. You have difficulty urinating. You have difficulty breathing or speaking.   You have blue or gray skin. There is difficulty waking or you cannot be woken up. You have heavy bleeding, redness, or a lot of swelling where the sedative or anesthesia entered your skin (intravenous site). You have a rash. MAKE SURE YOU:  Understand these instructions. Will watch your condition. Will get help right away if you are not doing well or get worse. Document Released: 12/18/2006 Document Revised: 06/18/2013 Document Reviewed: 04/17/2013  [facility] Patient Information ©2013 [employer]. Colonoscopy: What to Expect at [address] [place] [address]  After you have a colonoscopy, you will stay at the clinic for 1 to 2 hours until the medicines wear off. Then you can go home, but you will need to arrange for a ride. Your doctor will tell you when you can eat and do your other usual activities. Your doctor will talk to you about when you will need your next colonoscopy. The results of your test and your risk for colorectal cancer will help your doctor decide how often you need to be checked. After the test, you may be bloated or have gas pains. You may need to pass gas. If a biopsy was done or a polyp was removed, you may have streaks of blood in your stool (feces) for a few days. This care sheet gives you a general idea about how long it will take for you to recover. But each person recovers at a different pace. Follow the steps below to get better as quickly as possible. How can you care for yourself at home? Activity  Rest as much as you need to after you go home. You should be able to go back to your usual activities the day after the test.  Diet  Follow your doctors directions for eating. Drink plenty of fluids (unless your doctor has told you not to) to replace the fluids that were lost during the colon prep. Do not drink alcohol.   Medicines  If polyps were removed or a biopsy was done during the test, your doctor may tell you not to take aspirin or other anti-inflammatory medicines, such as ibuprofen (Advil, Motrin) and naproxen (Aleve), for a few days. Other instructions  For your safety, you should not drive or operate machinery until the medicine effects are gone and you can think clearly. Your doctor may tell you not to drive or operate machinery until the day after your test.  Do not sign legal documents or make major decisions until the medicine effects are gone and you can think clearly. The anesthesia medicine can make it hard for you to fully understand what you are agreeing to. Follow-up care is a key part of your treatment and safety. Be sure to make and go to all appointments, and call your doctor if you are having problems. It's also a good idea to know your test results and keep a list of the medicines you take. Call your Doctor if you have any of the following:             Passing blood rectally or vomiting blood (it may be red or black). Persistent nausea or vomiting. Severe abdominal or chest pain, not relieved by passing gas. Fever of 100 or more, chills or excessive sweating. Redness or swelling at the IV site. If you experience shortness of breath or severe chest pain, call 911. Where can you learn more? Go to https://[employer]. org and sign in to your MTailor account. Enter E264 in the Solio box to learn more about Colonoscopy: What to Expect at Home.     If you do not have an account, please click on the Sign Up Now link. © 8595-5853 Healthwise, Incorporated. Care instructions adapted under license by [facility]. This care instruction is for use with your licensed healthcare professional. If you have questions about a medical condition or this instruction, always ask your healthcare professional. [address] any warranty or liability for your use of this information.   Content Version: 9.5.950461; Last Revised: February 20, 2013   Colon Polypectomy   (Colon Polyp Removal) Definition   A colon polypectomy is the removal of polyps from the inside lining of the colon (large intestine). A polyp is a mass of tissue. Some types of polyps have the potential to develop into cancer. Most polyps can be removed during a colonoscopy or sigmoidoscopy . A Colon Polyp        2011 [facility].   Reasons for Procedure   The purpose of the surgery is to remove a polyp. It is done for cancer prevention. In rare cases, larger polyps can cause troublesome symptoms, such as rectal bleeding, abdominal pain, and bowel irregularities. A polyp removal will relieve these symptoms. Possible Complications   Complications are rare, but no procedure is completely free of risk. If you are planning to have a polypectomy, your doctor will review a list of possible complications, which may include:   Damage to the colon wall   Bleeding   Infection   Adverse reaction to the sedative   Factors that may increase the risk of complications include:   Type, size, and location of the polyp   Patient factors, such as blood-clotting disorders, substance abuse, or other diseases (eg, obesity , diabetes )   What to Expect   Prior to Procedure    Your doctor will likely do the following:   Physical exam and health history   Review of medicines   Test your stool for hidden blood (called \"occult blood\")   X-rays an exam that uses small amounts of radiation to make a picture of the inside of the body   Barium enema x-ray exam that uses contrast to help better see the colon   Diagnostic colonoscopy or sigmoidoscopyexamination of the inside of the intestine with an endoscope   Your colon must be completely cleaned before the procedure. Any stool left in the intestine will block the view. This preparation may start several days before the procedure.  Follow your doctor's instructions, which may include any of the following cleansing methods:   Enemas fluid introduced into the rectum to stimulate a bowel movement   Laxativesmedicines that cause you to have soft bowel movements   A clear-liquid diet   Oral cathartic medicinesa large container of fluid to drink, which stimulates a bowel movement   Leading up to your procedure:   Talk to your doctor about your medicines. You may be asked to stop taking some medicines up to one week before the procedure, like:   Anti-inflammatory drugs (eg, aspirin)   Blood thinners, like clopidogrel (Plavix) or warfarin (Coumadin)   Iron supplements or vitamins containing iron. The night before, eat a light meal. Do not eat or drink anything after midnight. Wear comfortable clothing. If you have diabetes, ask your doctor if you need to adjust your insulin dose. Arrange for a ride home after the procedure. Anesthesia    You will receive a sedative. This will help you relax. You will be drowsy but awake. Description of the Procedure    You will be asked to lie on your side or on your back. A scope, a long flexible tube with a camera on the end, will be inserted through the anus. It will be slowly pushed through the rectum to the colon. The scope will also add air to open the colon. Using the scope, the doctor will locate the polyp. The polyp will be snipped off with a wire snare from the scope. In some cases, the polyp may be destroyed with an electric current. The electric current is also used to close the wound and stop bleeding. The polyps will then be removed for lab testing. When the doctor is finished, the scope will be slowly removed. For larger polyps, a laparoscopic surgical procedure may be needed. Special surgical tools will be inserted through small incisions in the abdomen. The tools will be used to locate and remove the polyp. How Long Will It Take? 30-60 minutes   Will It Hurt? The special cleaning solution, laxatives, and/or enemas often cause discomfort. During and following the procedure, there is little or no pain.  You may feel pressure, bloating, and/or cramping because of the air passed into the colon. This discomfort will go away with the passing of gas. Your doctor may prescribe pain medicine. If not, you can take non-prescription pain relievers for discomfort. Post-procedure Care   At the [facility]    The polyps will be sent to a lab for testing. At Home    Expect a complete recovery within two weeks. To ensure a smooth recovery, be sure to follow your doctor's instructions , which may include: The sedative will make you drowsy. Do not drive, operate machinery, or make important decisions the day of the procedure. Return to your normal diet the same or next day. Avoid tea, coffee, cola drinks, alcohol, and spicy foods for at least 2-3 days following surgery. These can irritate the digestive system. To speed healing, resume normal activities as soon as you feel able. Most people feel well enough by the next day. Ask your doctor when you can participate in any rigorous exercise. Ask your doctor about when it is safe to shower, bathe, or soak in water. You will be scheduled for a follow-up colonoscopy in the future. It will be important to check for recurrence of polyps. Your doctor will discuss the results with you either the day of surgery or the following day. Call Your Doctor   After arriving home, contact your doctor if any of the following occurs:   Signs of infection, including fever and chills   Redness, swelling, increasing pain, excessive bleeding, or discharge from the rectum (Up to cup of blood per day can be expected for up to 3-4 days following your polypectomy.)   Black, tarry stools   Severe abdominal pain   Hard, swollen abdomen   Inability to pass gas or stool   Cough , shortness of breath, chest pain, or severe nausea or vomiting   New, unexplained symptoms   In case of emergency,  CALL 911  .      Last Reviewed: December 2010 Maryln Gilford, MD   Updated: 4/7/2011  PATIENT INSTRUCTIONS  DIVERTICULOSIS    FOLLOW-UP:  Please make an appointment with your physician as directed. Call your physician immediately if you have any fevers greater than 102.5,  increasing abdominal pain, GI bleeding (from the colon or rectum),or nausea/vomiting. CAUSE:  Some people may have congenital diverticulosis, but most people develop diverticulosis around or after age 36 due to a low-fiber, high-fat diet, along with inadequate fluid intake. This is very prevalent in the industrialized world where we eat a lot of processed, low fiber foods. Diverticuli develop due to firm stool and high pressures in the colon, along with secondary spasm, which causes outpouchings to occus where the small arteries penetrate the wall of the colon to feed the internal lining. These occur most commonly on the left side and lower portions of the colon. Diverticuli can then cause bleeding from the arteries at these sites of weakness when they rupture or the diveritculi can get blocked with stool and debris and become obstructed, causing diverticulosis, which is due to an infection. When these are infected, diverticulitis, it is often treated with antibiotics and bowel rest, but when severe, recurrent, or if rupture of the colon occurs, it may require surgery. Following appropriate dietary changes and taking the proper precautions is therefore very important. DIET:  You should increase your dietary fiber intake and take a fiber supplement twice a day. Make sure that you are taking a supplement that is just fiber and is not a laxative, which should be noted on the package. Starting a fiber supplement may cause increased gas, more frequent bowel movements, and distension at first but this should improve after a couple of weeks. Try to eat whole wheat breads and pasta, more fruits and vegetables, along with brown rice and plenty of fluids.   Avoid small undigestible food items that could get stuck in these outpouchings, such as unpopped popcorn kernals, whole corn, small undigestible seeds, etc..    ACTIVITY:  Exercise is also a great way to prevent constipation and is encouraged. It may also help prevent progression of your diverticulosis. Always make sure you take in plenty of fluids when exercising. MEDICATIONS:  Take an over-the-counter fiber supplement as noted above twice daily. If your symptoms don't improve with fiber and dietary changes alone your physician may also recommend psyllium or methylcellulose as well. If your physician has placed you on an antibiotic it is critical that you take the full course of these, even if your symptoms have improved, and that you not miss any doses. QUESTIONS:  Please feel free to call your physician or the hospital  if you have any questions, and they will be glad to assist you. If you have further questions it may also be helpful to meet with a dietitician. High-Fiber Diet     What Is Fiber? Dietary fiber is a form of carbohydrate found in plants that cannot be digested by humans. All plants contain fiber, including fruits, vegetables, grains, and legumes. Fiber is often classified into two categories: soluble and insoluble. Soluble fiber draws water into the bowel and can help slow digestion. Examples of foods that are high in soluble fiber include oatmeal, oat bran, barley, legumes (eg, beans and peas), apples, and strawberries. Insoluble fiber speeds digestion and can add bulk to the stool. Examples of foods that are high in insoluble fiber include whole-wheat products, wheat bran, cauliflower, green beans, and potatoes. Why Follow a High-Fiber Diet? A high-fiber diet is often recommended to prevent and treat constipation , hemorrhoids , diverticulitis , and irritable bowel syndrome .  Eating a high-fiber diet can also help improve your cholesterol levels, lower your risk of coronary heart disease , reduce your risk of type 2 diabetes , and lower your weight. For people with type 1 or 2 diabetes, a high-fiber diet can also help stabilize blood sugar levels. How Much Fiber Should I Eat? A high-fiber diet should contain  20-35 grams  of fiber a day. This is actually the amount recommended for the general adult population; however, most Americans eat only 15 grams of fiber per day. Digestion of Fiber   Eating a higher fiber diet than usual can take some getting used to by your body's digestive system. To avoid the side effects of sudden increases in dietary fiber (eg, gas, cramping, bloating, and diarrhea), increase fiber gradually and be sure to drink plenty of fluids every day. Tips for Increasing Fiber Intake   Whenever possible, choose whole grains over refined grains (eg, brown rice instead of white rice, whole-wheat bread instead of white bread). Include a variety of grains in your diet, such as wheat, rye, barley, oats, quinoa, and bulgur. Eat more vegetarian-based meals. Here are some ideas: black bean burgers, eggplant lasagna, and veggie tofu stir-giraldo. Choose high-fiber snacks, such as fruits, popcorn, whole-grain crackers, and nuts. Make whole-grain cereal or whole-grain toast part of your daily breakfast regime. When eating out, whether ordering a sandwich or dinner, ask for extra vegetables. When baking, replace part of the white flour with whole-wheat flour. Whole-wheat flour is particularly easy to incorporate into a recipe. High-Fiber Diet Eating Guide   Food Category   Foods Recommended   Notes   Grains   Whole-grain breads, muffins, bagels, or valentin bread Rye bread Whole-wheat crackers or crisp breads Whole-grain or bran cereals Oatmeal, oat bran, or grits Wheat germ Whole-wheat pasta and brown rice   Read the ingredients list on food labels. Look for products that list \"whole\" as the first ingredient (eg, whole-wheat, whole oats). Choose cereals with at least 2 grams of fiber per serving.    Vegetables   All vegetables, especially asparagus, bean sprouts, broccoli, [place] sprouts, cabbage, carrots, cauliflower, celery, corn, greens, green beans, green pepper, onions, peas, potatoes (with skin), snow peas, spinach, squash, sweet potatoes, tomatoes, zucchini   For maximum fiber intake, eat the peels of fruits and vegetablesjust be sure to wash them well first.   Fruits   All fruits, especially apples, berries, grapefruits, mangoes, nectarines, oranges, peaches, pears, dried fruits (figs, dates, prunes, raisins)   Choose raw fruits and vegetables over juice, cooked, or cannedraw fruit has more fiber. Dried fruit is also a good source of fiber. Milk   With the exception of yogurt containing inulin (a type of fiber), dairy foods provide little fiber. Add more fiber by topping your yogurt or cottage cheese with fresh fruit, whole grain or bran cereals, nuts, or seeds. Meats and Beans   All beans and peas, especially Garbanzo beans, kidney beans, lentils, lima beans, split peas, and garrido beans All nuts and seeds, especially almonds, peanuts, [place] nuts, cashews, peanut butter, walnuts, sesame and sunflower seeds All meat, poultry, fish, and eggs   Increase fiber in meat dishes by adding garrido beans, kidney beans, black-eyed peas, bran, or oatmeal. If you are following a low-fat diet, use nuts and seeds only in moderation. Fats and Oils   All in moderation   Fats and oils do not provide fiber   Snacks, Sweets, and Condiments   Fruit Nuts Popcorn, whole-wheat pretzels, or trail mix made with dried fruits, nuts, and seeds Cakes, breads, and cookies made with oatmeal or whole-wheat flour   Most snack foods do not provide much fiber. Choose snacks with at least 2 grams of fiber per serving.      Last Reviewed: March 2011 Hafsa Sin MS, MPH, RD   Updated: 3/29/2011           Hemorrhoids: Care Instructions  Overview     Hemorrhoids are swollen veins that develop in the anal canal. Bleeding during bowel movements, itching, and rectal pain are the most common symptoms. Hemorrhoids can be uncomfortable at times, but rarely are they a serious problem. Most of the time, you can treat them with simple changes to your diet and bowel habits. These changes include eating more fiber and not straining to pass stools. Most hemorrhoids don't need surgery or other treatment unless they are very large and painful or bleed a lot. Follow-up care is a key part of your treatment and safety. Be sure to make and go to all appointments, and call your doctor if you are having problems. It's also a good idea to know your test results and keep a list of the medicines you take. How can you care for yourself at home? Sit in a few inches of warm water (sitz bath) 3 times a day and after bowel movements. The warm water helps with pain and itching. Put ice on your anal area several times a day for 10 minutes at a time. Put a thin cloth between the ice and your skin. Follow this by placing a warm, wet towel on the area for another 10 to 20 minutes. Take pain medicines exactly as directed. If the doctor gave you a prescription medicine for pain, take it as prescribed. If you are not taking a prescription pain medicine, ask your doctor if you can take an over-the-counter medicine. Keep the anal area clean, but be gentle. Use water and a fragrance-free soap, or use baby wipes or medicated pads such as Tucks. Wear cotton underwear and loose clothing to decrease moisture in the anal area. Eat more fiber. Include foods such as whole-grain breads and cereals, raw vegetables, raw and dried fruits, and beans. Drink plenty of fluids. If you have kidney, heart, or liver disease and have to limit fluids, talk with your doctor before you increase the amount of fluids you drink. Use a stool softener that contains bran or psyllium.  You can save money by buying bran or psyllium (available in bulk at most health food stores) and sprinkling it on foods or stirring it into fruit juice. Or you can use a product such as Metamucil or Hydrocil. Practice healthy bowel habits. Go to the bathroom as soon as you have the urge. Avoid straining to pass stools. Relax and give yourself time to let things happen naturally. Do not hold your breath while passing stools. Do not read while sitting on the toilet. Get off the toilet as soon as you have finished. Take your medicines exactly as prescribed. Call your doctor if you think you are having a problem with your medicine. When should you call for help? Call 911 anytime you think you may need emergency care. For example, call if:    You pass maroon or very bloody stools. Call your doctor now or seek immediate medical care if:    You have increased pain. You have increased bleeding. Watch closely for changes in your health, and be sure to contact your doctor if:    Your symptoms have not improved after 3 or 4 days. Where can you learn more? Go to http://www.woods.com/ and enter F228 to learn more about \"Hemorrhoids: Care Instructions. \"  Current as of: June 6, 2022               Content Version: 13.5  © 2006-2022 [employer]. Care instructions adapted under license by [facility] ([facility]). If you have questions about a medical condition or this instruction, always ask your healthcare professional. [address] any warranty or liability for your use of this information. Constipation: Care Instructions  Overview     Constipation means that you have a hard time passing stools (bowel movements). People pass stools from 3 times a day to once every 3 days. What is normal for you may be different. Constipation may occur with pain in the rectum and cramping. The pain may get worse when you try to pass stools. Sometimes there are small amounts of bright red blood on toilet paper or the surface of stools. This is because of enlarged veins near the rectum (hemorrhoids).   A few changes in your diet and lifestyle may help you avoid ongoing constipation. Your doctor may also prescribe medicine to help loosen your stool. Some medicines can cause constipation. These include pain medicines and antidepressants. Tell your doctor about all the medicines you take. Your doctor may want to make a medicine change to ease your symptoms. Follow-up care is a key part of your treatment and safety. Be sure to make and go to all appointments, and call your doctor if you are having problems. It's also a good idea to know your test results and keep a list of the medicines you take. How can you care for yourself at home? Drink plenty of fluids. If you have kidney, heart, or liver disease and have to limit fluids, talk with your doctor before you increase the amount of fluids you drink. Include high-fiber foods in your diet each day. These include fruits, vegetables, beans, and whole grains. Get at least 30 minutes of exercise on most days of the week. Walking is a good choice. You also may want to do other activities, such as running, swimming, cycling, or playing tennis or team sports. Take a fiber supplement, such as Citrucel or Metamucil, every day. Read and follow all instructions on the label. Schedule time each day for a bowel movement. A daily routine may help. Take your time having a bowel movement, but don't sit for more than 10 minutes at a time. And don't strain too much. Support your feet with a small step stool when you sit on the toilet. This helps flex your hips and places your pelvis in a squatting position. Your doctor may recommend an over-the-counter laxative to relieve your constipation. Examples are Milk of Magnesia and MiraLax. Read and follow all instructions on the label. Do not use laxatives on a long-term basis. When should you call for help? Call your doctor now or seek immediate medical care if:    You have new or worse belly pain.      You have new or worse nausea or vomiting. You have blood in your stools. Watch closely for changes in your health, and be sure to contact your doctor if:    Your constipation is getting worse. You do not get better as expected. Where can you learn more? Go to http://www.woods.com/ and enter P343 to learn more about \"Constipation: Care Instructions. \"  Current as of: June 6, 2022               Content Version: 13.5  © 2006-2022 [employer]wise, [employer]. Care instructions adapted under license by [facility] ([facility]). If you have questions about a medical condition or this instruction, always ask your healthcare professional. [place] [address] any warranty or liability for your use of this information.

## 2023-02-27 NOTE — H&P
HISTORY and [address]       NAME:  Gurinder Guajardo  MRN: [id number]   YOB: 1955   Date: 2/27/2023   Age: 79 y.o. Gender: male       COMPLAINT AND PRESENT HISTORY:     Gurinder Guajardo is 79 y.o.,  male, presents for COLONOSCOPY DIAGNOSTIC   Primary dx: RECTAL BLEED. HPI:  Gurinder Guajardo is 79 y.o.,   male, having a Screening Colonoscopy. Pt had colonoscopy done 2019 with POLYPECTOMY COLD BIOPSY performed by Julius Ramirez MD   Patient has hx of Colon Polyps and Diverticulosis. .   Patient has positive FH of Colon Cancer in Mother  Patient reports no changes in bowel habits. No constipation or diarrhea, pt complain of intermittent GI /Rectal bleeding due to hemorrhoid started two years ago  pt state there is between small  amount of bright red blood on the toilet paper after wiping,  no experiencing red/ black/ BRBPR stools. Patient has no history of abd. pain , no nausea or vomiting , no abdominal bloating or weight loss. Patient denies any Dysphagia or GERD. Pt denies fever/chills, chest pain or SOB. Review of additional significant medical hx:  (See chart for additional detail, including current medications /see ROS for current S/S):   HTN, A-fib,   Medication r/t condition : Lopressor, lasix, lisinopril, Crestor, Xarelto     BP Readings from Last 3 Encounters:   02/13/23 (!) 143/84   11/23/22 135/82   10/06/22 (!) 140/80     ECG 10/7/21  Atrial flutter with variable A-V block  Nonspecific ST and T wave abnormality  Abnormal ECG  No previous ECGs available       NPO status: pt NPO since the past midnight   Medications taken TODAY (with sip of water): pt took all his BP medication today with sip of water   Anticoagulation status: Xarelto , last dose last Friday   Prep fully completed: YES. Pt reports his last BM is liquid   Denies personal hx of blood clots. Denies personal hx of MRSA infection.   Denies any personal or family hx of previous complications w/anesthesia.     PAST MEDICAL HISTORY     Past Medical History:   Diagnosis Date    A-fib ([facility] 04/06/2015    Acute and chronic respiratory failure, unspecified whether with hypoxia or hypercapnia (HCC)     Allergic rhinitis 04/06/2015    Atrial fibrillation (HCC)     Back pain     Basal cell carcinoma of skin     Decreased libido     Diverticulitis of colon (without mention of hemorrhage)(562.11) 04/06/2015    ED (erectile dysfunction)     Elevated prostate specific antigen (PSA) 04/06/2015    Former smoker     HTN (hypertension) 04/06/2015    Lung nodule     Obesity 04/06/2015    Obstructive sleep apnea     on cpap    Prostate cancer ([facility] [address] 09/2017    needs removed    Pure hypercholesterolemia 04/06/2015    Rectal bleeding     Tinnitus     Tobacco use disorder 04/06/2015    Wears glasses        SURGICAL HISTORY       Past Surgical History:   Procedure Laterality Date    CARDIAC CATHETERIZATION  09/19/2017    LAD: mid 30% stenosis  /  LM NLM / EF 55%  /  LCX: Codominant Vessel, distal 30% stenosis / OM1 AND OM2 10%    COLONOSCOPY      COLONOSCOPY  04/26/2019    COLONOSCOPY  4/26/2019    COLONOSCOPY POLYPECTOMY COLD BIOPSY performed by Arron Kerr MD at [address] [place] [address]  10/17/2017    robotic with schuyler pelvic lymph node dissection    PROSTATECTOMY N/A 10/17/2017    XI ROBOTIC PROSTATECTOMY LAPAROSCOPIC WITH PELVIC LYMPH NODE DISSECTION performed by Johanna Liang MD at [address] [place] [address]  09/09    L Upper Lip Basal CA    TONSILLECTOMY      TYMPANOSTOMY TUBE PLACEMENT      VASECTOMY         FAMILY HISTORY       Family History   Problem Relation Age of Onset    Coronary Art Dis Mother     High Blood Pressure Mother     High Cholesterol Father     Seizures Sister        SOCIAL HISTORY       Social History     Socioeconomic History    Marital status:     Number of children: 2   Occupational History    Occupation: [employer]     Employer: [address] [place] [address] Comment: retired    Occupation: Retired   Tobacco Use    Smoking status: Former     Packs/day: 1.00     Years: 40.00     Pack years: 40.00     Types: Cigarettes     Start date:      Quit date: 2016     Years since quittin.9    Smokeless tobacco: Never   Vaping Use    Vaping Use: Never used   Substance and Sexual Activity    Alcohol use: Yes     Alcohol/week: 5.0 standard drinks     Types: 5 Cans of beer per week     Comment: per week    Drug use: No    Sexual activity: Not Currently     Partners: Female     Social Determinants of Health     Financial Resource Strain: Low Risk     Difficulty of Paying Living Expenses: Not hard at all   Food Insecurity: No Food Insecurity    Worried About Running Out of Food in the Last Year: Never true    Ran Out of Food in the Last Year: Never true   Physical Activity: Insufficiently Active    Days of Exercise per Week: 3 days    Minutes of Exercise per Session: 30 min           REVIEW OF SYSTEMS      Allergies   Allergen Reactions    Metformin And Related      SOB       No current facility-administered medications on file prior to encounter. Current Outpatient Medications on File Prior to Encounter   Medication Sig Dispense Refill    metoprolol tartrate (LOPRESSOR) 100 MG tablet Take 1 tablet by mouth 2 times daily (Patient taking differently: Take by mouth 2 times daily \"Cut back to 75 mg 2x daily. \") 180 tablet 3    furosemide (LASIX) 20 MG tablet Take 1 tablet by mouth daily Frequency increased 2022 90 tablet 3    lisinopril (PRINIVIL;ZESTRIL) 20 MG tablet Take 1 tablet by mouth daily Dose increased 10/6/2022 90 tablet 3    FLUoxetine (PROZAC) 20 MG capsule TAKE 1 CAPSULE BY MOUTH IN THE MORNING 90 capsule 3    rosuvastatin (CRESTOR) 40 MG tablet TAKE 1 TABLET BY MOUTH DAILY 90 tablet 3    XARELTO 20 MG TABS tablet TAKE 1 TABLET BY MOUTH EVERY DAY      Umeclidinium Bromide (INCRUSE ELLIPTA) 62.5 MCG/INH AEPB Inhale 1 actuation into the lungs daily Maintenance 30 each 5    albuterol sulfate HFA (VENTOLIN HFA) 108 (90 Base) MCG/ACT inhaler Inhale 2 puffs into the lungs 4 times daily as needed for Wheezing or Shortness of Breath Rescue 18 g 0       Review of Systems   Constitutional: Negative. Negative for appetite change and unexpected weight change. HENT: Negative. Eyes:  Positive for visual disturbance. Eye glasses    Respiratory: Negative. Negative for cough, shortness of breath and wheezing. Cardiovascular: Negative. Gastrointestinal:  Positive for anal bleeding. Genitourinary: Negative. Musculoskeletal: Negative. Negative for back pain. Skin: Negative. Neurological: Negative. Hematological: Negative. Psychiatric/Behavioral: Negative. GENERAL PHYSICAL EXAM     Vitals: see nursing flow sheet for vital signs     GENERAL APPEARANCE:   Emir Hawthorne is 79 y.o.,  male, moderately obese, nourished, conscious, alert. Does not appear to be distress or pain at this time. Physical Exam  Constitutional:       General: He is not in acute distress. Appearance: Normal appearance. He is obese. He is not ill-appearing. HENT:      Head: Normocephalic. Right Ear: External ear normal.      Left Ear: External ear normal.      Nose: Nose normal.      Mouth/Throat:      Mouth: Mucous membranes are dry. Eyes:      General:         Right eye: No discharge. Left eye: No discharge. Cardiovascular:      Rate and Rhythm: Normal rate and regular rhythm. Pulses: Normal pulses. Heart sounds: Normal heart sounds. No murmur heard. No gallop. Pulmonary:      Effort: Pulmonary effort is normal.      Breath sounds: Normal breath sounds. No wheezing or rhonchi. Abdominal:      General: Bowel sounds are normal.      Tenderness: There is no abdominal tenderness. Musculoskeletal:         General: Normal range of motion. Cervical back: Normal range of motion and neck supple. Right lower leg: No edema. Left lower leg: No edema. Skin:     General: Skin is warm and dry. Findings: No bruising or erythema. Neurological:      General: No focal deficit present. Mental Status: He is alert and oriented to person, place, and time. Motor: No weakness.       Gait: Gait normal.   Psychiatric:         Mood and Affect: Mood normal.         Behavior: Behavior normal.                 PROVISIONAL DIAGNOSES / SURGERY:      RECTAL BLEED    COLONOSCOPY DIAGNOSTIC     Patient Active Problem List    Diagnosis Date Noted    Iron deficiency anemia due to chronic blood loss 01/30/2023    Bleeding hemorrhoids 01/30/2023    Borderline glaucoma of both eyes 10/10/2022    Bilateral hearing loss 10/10/2022    Morbid obesity ([address] 07/27/2022    Coronary artery disease involving native coronary artery of native heart without angina pectoris 02/20/2022    Chronic obstructive pulmonary disease, unspecified COPD type ([address] 02/18/2022    Polycythemia 10/06/2021    FARFAN (dyspnea on exertion) 10/06/2021    Hyperglycemia 10/06/2021    Symptom of leg swelling 10/06/2021    History of basal cell carcinoma 08/04/2021    ADRIAN on CPAP 06/12/2021    Tinnitus aurium, bilateral 06/12/2021    Current mild episode of major depressive disorder without prior episode ([address] 06/12/2021    Hyperlipidemia with target LDL less than 100 06/12/2021    Family history of colon cancer in mother 06/11/2021    Paroxysmal atrial fibrillation ([address] 02/21/2019    Essential hypertension 02/21/2019    Erectile dysfunction after radical prostatectomy 08/27/2018    Personal history of prostate cancer 08/27/2018    Male stress incontinence 11/20/2017    Hx of prostatectomy 11/14/2017    Personal history of smoking 11/21/2016    Pure hypercholesterolemia 04/06/2015    Diverticulitis of colon 04/06/2015    Obesity, Class III, BMI 40-49.9 (morbid obesity) ([address] 04/06/2015           ANIYAH Gloria - CNP on 2/27/2023 at 10:41 AM

## 2023-03-01 LAB — SURGICAL PATHOLOGY REPORT: NORMAL

## 2023-03-02 NOTE — RESULT ENCOUNTER NOTE
Management per surgeon, adenomatous polyps x2, recall in 5 years    Future Appointments  3/8/2023   1:00 PM    [facility]  3/15/2023  2:00 PM    Awa Coelho DO           [facility]  4/19/2023  1:30 PM    Kirsten Rendon MD    [facility]         [facility]  5/24/2023  1:00 PM    [facility] Card[facility]za [facility]        [facility]  8/21/2023  11:00 AM   [facility]         [facility]  10/11/2023 1:00 PM    Abdelrahman Paul MD     [facility]

## 2023-03-08 ENCOUNTER — OFFICE VISIT (OUTPATIENT)
Dept: FAMILY MEDICINE CLINIC | Age: 68
End: 2023-03-08

## 2023-03-08 VITALS
WEIGHT: 313.2 LBS | BODY MASS INDEX: 44.84 KG/M2 | HEIGHT: 70 IN | DIASTOLIC BLOOD PRESSURE: 76 MMHG | SYSTOLIC BLOOD PRESSURE: 130 MMHG | OXYGEN SATURATION: 99 % | HEART RATE: 79 BPM | TEMPERATURE: 98 F

## 2023-03-08 DIAGNOSIS — F33.41 RECURRENT MAJOR DEPRESSIVE DISORDER, IN PARTIAL REMISSION (HCC): ICD-10-CM

## 2023-03-08 DIAGNOSIS — I10 ESSENTIAL HYPERTENSION: Primary | ICD-10-CM

## 2023-03-08 DIAGNOSIS — E78.5 HYPERLIPIDEMIA WITH TARGET LDL LESS THAN 100: ICD-10-CM

## 2023-03-08 DIAGNOSIS — Z99.89 OSA ON CPAP: ICD-10-CM

## 2023-03-08 DIAGNOSIS — Z23 ENCOUNTER FOR IMMUNIZATION: ICD-10-CM

## 2023-03-08 DIAGNOSIS — Z87.891 PERSONAL HISTORY OF TOBACCO USE: ICD-10-CM

## 2023-03-08 DIAGNOSIS — E66.01 OBESITY, CLASS III, BMI 40-49.9 (MORBID OBESITY) (HCC): ICD-10-CM

## 2023-03-08 DIAGNOSIS — Z85.46 PERSONAL HISTORY OF PROSTATE CANCER: ICD-10-CM

## 2023-03-08 DIAGNOSIS — G47.33 OSA ON CPAP: ICD-10-CM

## 2023-03-08 DIAGNOSIS — I48.0 PAROXYSMAL ATRIAL FIBRILLATION (HCC): ICD-10-CM

## 2023-03-08 DIAGNOSIS — J44.9 CHRONIC OBSTRUCTIVE PULMONARY DISEASE, UNSPECIFIED COPD TYPE (HCC): ICD-10-CM

## 2023-03-08 DIAGNOSIS — R73.9 HYPERGLYCEMIA: ICD-10-CM

## 2023-03-08 LAB — HBA1C MFR BLD: 6 %

## 2023-03-08 RX ORDER — ZOSTER VACCINE RECOMBINANT, ADJUVANTED 50 MCG/0.5
0.5 KIT INTRAMUSCULAR SEE ADMIN INSTRUCTIONS
Qty: 0.5 ML | Refills: 0 | Status: SHIPPED | OUTPATIENT
Start: 2023-03-08 | End: 2023-09-04

## 2023-03-08 RX ORDER — METOPROLOL TARTRATE 75 MG/1
75 TABLET, FILM COATED ORAL 2 TIMES DAILY
Qty: 60 TABLET | Refills: 4
Start: 2023-03-08

## 2023-03-08 RX ORDER — AMOXICILLIN 500 MG/1
CAPSULE ORAL
COMMUNITY
Start: 2023-02-13

## 2023-03-08 RX ORDER — CHLORHEXIDINE GLUCONATE 0.12 MG/ML
RINSE ORAL
COMMUNITY
Start: 2023-01-03

## 2023-03-08 SDOH — ECONOMIC STABILITY: INCOME INSECURITY: HOW HARD IS IT FOR YOU TO PAY FOR THE VERY BASICS LIKE FOOD, HOUSING, MEDICAL CARE, AND HEATING?: NOT HARD AT ALL

## 2023-03-08 SDOH — ECONOMIC STABILITY: HOUSING INSECURITY
IN THE LAST 12 MONTHS, WAS THERE A TIME WHEN YOU DID NOT HAVE A STEADY PLACE TO SLEEP OR SLEPT IN A SHELTER (INCLUDING NOW)?: NO

## 2023-03-08 SDOH — ECONOMIC STABILITY: FOOD INSECURITY: WITHIN THE PAST 12 MONTHS, YOU WORRIED THAT YOUR FOOD WOULD RUN OUT BEFORE YOU GOT MONEY TO BUY MORE.: NEVER TRUE

## 2023-03-08 SDOH — ECONOMIC STABILITY: FOOD INSECURITY: WITHIN THE PAST 12 MONTHS, THE FOOD YOU BOUGHT JUST DIDN'T LAST AND YOU DIDN'T HAVE MONEY TO GET MORE.: NEVER TRUE

## 2023-03-08 ASSESSMENT — PATIENT HEALTH QUESTIONNAIRE - PHQ9
7. TROUBLE CONCENTRATING ON THINGS, SUCH AS READING THE NEWSPAPER OR WATCHING TELEVISION: 0
SUM OF ALL RESPONSES TO PHQ QUESTIONS 1-9: 2
6. FEELING BAD ABOUT YOURSELF - OR THAT YOU ARE A FAILURE OR HAVE LET YOURSELF OR YOUR FAMILY DOWN: 0
SUM OF ALL RESPONSES TO PHQ9 QUESTIONS 1 & 2: 0
1. LITTLE INTEREST OR PLEASURE IN DOING THINGS: 0
SUM OF ALL RESPONSES TO PHQ QUESTIONS 1-9: 2
3. TROUBLE FALLING OR STAYING ASLEEP: 0
9. THOUGHTS THAT YOU WOULD BE BETTER OFF DEAD, OR OF HURTING YOURSELF: 0
4. FEELING TIRED OR HAVING LITTLE ENERGY: 2
10. IF YOU CHECKED OFF ANY PROBLEMS, HOW DIFFICULT HAVE THESE PROBLEMS MADE IT FOR YOU TO DO YOUR WORK, TAKE CARE OF THINGS AT HOME, OR GET ALONG WITH OTHER PEOPLE: 0
5. POOR APPETITE OR OVEREATING: 0
8. MOVING OR SPEAKING SO SLOWLY THAT OTHER PEOPLE COULD HAVE NOTICED. OR THE OPPOSITE, BEING SO FIGETY OR RESTLESS THAT YOU HAVE BEEN MOVING AROUND A LOT MORE THAN USUAL: 0
2. FEELING DOWN, DEPRESSED OR HOPELESS: 0
SUM OF ALL RESPONSES TO PHQ QUESTIONS 1-9: 2
SUM OF ALL RESPONSES TO PHQ QUESTIONS 1-9: 2

## 2023-03-08 ASSESSMENT — ENCOUNTER SYMPTOMS
VOMITING: 0
ABDOMINAL PAIN: 0
WHEEZING: 0
SHORTNESS OF BREATH: 1
DIARRHEA: 0
CHEST TIGHTNESS: 0
CONSTIPATION: 0
ABDOMINAL DISTENTION: 0
NAUSEA: 0
APNEA: 1

## 2023-03-08 NOTE — PROGRESS NOTES
Visit Information    Have you changed or started any medications since your last visit including any over-the-counter medicines, vitamins, or herbal medicines? YES  Have you stopped taking any of your medications? Is so, why? -  yes -   Are you having any side effects from any of your medications? - no    Have you seen any other physician or provider since your last visit? yes -    Have you had any other diagnostic tests since your last visit?  no   Have you been seen in the emergency room and/or had an admission in a hospital since we last saw you?  no   Have you had your routine dental cleaning in the past 6 months?  yes -      Do you have an active MyChart account? If no, what is the barrier?   Yes    Patient Care Team:  Melly Carver MD as PCP - General (Family Medicine)  Melly Carver MD as PCP - Empaneled Provider  Antonia Cardenas MD as Resident (Cardiology)  Gianna Chavira MD as Consulting Physician (Urology)  Valery Quintero MD as Consulting Physician (Pulmonology)  Shan Homans, MD as Consulting Physician (Cardiology)  Alia Nagel MD as Consulting Physician (Internal Medicine Cardiovascular Disease)  Yesi Ball DO as Consulting Physician (General Surgery)  Shanna Baron MD as Consulting Physician (Dermatology)    Medical History Review  Past Medical, Family, and Social History reviewed and does contribute to the patient presenting condition    Health Maintenance   Topic Date Due    Shingles vaccine (1 of 2) Never done    Low dose CT lung screening  12/22/2022    Prostate Specific Antigen (PSA) Screening or Monitoring  02/25/2023    A1C test (Diabetic or Prediabetic)  08/22/2023    Lipids  08/22/2023    Depression Monitoring  10/06/2023    Annual Wellness Visit (AWV)  10/07/2023    DTaP/Tdap/Td vaccine (2 - Td or Tdap) 11/21/2026    Colorectal Cancer Screen  02/27/2028    Flu vaccine  Completed    Pneumococcal 65+ years Vaccine  Completed    COVID-19 Vaccine  Completed    AAA screen Completed    Hepatitis C screen  Completed    Hepatitis A vaccine  Aged Out    Hib vaccine  Aged Out    Meningococcal (ACWY) vaccine  Aged Out

## 2023-03-08 NOTE — PATIENT INSTRUCTIONS

## 2023-03-08 NOTE — RESULT ENCOUNTER NOTE
Addressed during office visit today, A1c 6 improved, continue treatment recommended during the office visit.

## 2023-03-08 NOTE — PROGRESS NOTES
Alayna Galvez (:  1955) is a 67 y.o. male,Established patient, here for evaluation of the following chief complaint(s): Hypertension, COPD, Hyperlipidemia, and Hyperglycemia      ASSESSMENT/PLAN:    1. Essential hypertension  Well controlled.  Continue current treatment.  Lisinopril 20 Mg daily, furosemide 20 Mg daily, metoprolol 75 Mg twice a day  Will recheck labs.     -     metoprolol 75 MG TABS; Take 75 mg by mouth 2 times daily \"Cut back to 75 mg 2x daily.\" From cardiology, Disp-60 tablet, R-4NO PRINT  -     CBC with Auto Differential; Future  -     Comprehensive Metabolic Panel; Future  -     TSH; Future  -     Uric Acid; Future  -     Urinalysis with Reflex to Culture; Future  -     Magnesium; Future  2. Paroxysmal atrial fibrillation (HCC)  Stable  He would benefit from SGLT2 inhibitor to improve his prediabetes and for cardioprotection  Also continue beta-blocker for rate control and chronic anticoagulation Xarelto 20 Mg daily  -     empagliflozin (JARDIANCE) 10 MG tablet; Take 1 tablet by mouth daily, Disp-30 tablet, R-3Normal  -     metoprolol 75 MG TABS; Take 75 mg by mouth 2 times daily \"Cut back to 75 mg 2x daily.\" From cardiology, Disp-60 tablet, R-4NO PRINT  3. Hyperlipidemia with target LDL less than 100  Improved with medication  Continue Crestor 40 Mg daily    Lab Results   Component Value Date    LDLCALC 67 2016    LDLCHOLESTEROL 51 2022       4. Hyperglycemia  Improved but still moderate severe  He would benefit from Jardiance, continue lifestyle changes low-carb diet, low-fat diet, and exercise  -     POCT glycosylated hemoglobin (Hb A1C)  Lab Results   Component Value Date    LABA1C 6.0 2023    LABA1C 6.2 (H) 2022    LABA1C 5.9 2021       -     empagliflozin (JARDIANCE) 10 MG tablet; Take 1 tablet by mouth daily, Disp-30 tablet, R-3Normal  5. Chronic obstructive pulmonary disease, unspecified COPD type (HCC)  Improved since he quit smoking  Albuterol as  needed  Incruse Ellipta too expensive, cannot afford  6. ADRIAN on CPAP  Improved with CPAP, patient benefits from CPAP  Continue CPAP managed by pulmonologist, has appointment  7. Obesity, Class III, BMI 40-49.9 (morbid obesity) (HCC)  Worsening  Low carb, low fat diet, increase fruits and vegetables, and exercise 4-5 times a week 30-40 minutes a day, or walk 1-2 hours per day, or wear a pedometer and get at least 10,000 steps per day. 8. Recurrent major depressive disorder, in partial remission (HCC)  Improved  Continue Prozac 20 Mg daily, tolerating well, denies side effects    9. Personal history of prostate cancer  Stable  S/p radical prostatectomy in 2017  Pending PSA, I reprinted the order for him    10. Personal history of tobacco use  -     FL VISIT TO DISCUSS LUNG CA SCREEN W LDCT  -     CT Lung Screen (Annual/Baseline); Future    Discussed with the patient the current USPSTF guidelines released March 9, 2021 for screening for lung cancer. For adults aged 48 to [de-identified] years who have a 20 pack-year smoking history and currently smoke or have quit within the past 15 years the grade B recommendation is to:  Screen for lung cancer with low-dose computed tomography (LDCT) every year. Stop screening once a person has not smoked for 15 years or has a health problem that limits life expectancy or the ability to have lung surgery. The patient  reports that he quit smoking about 6 years ago. His smoking use included cigarettes. He started smoking about 47 years ago. He has a 40.00 pack-year smoking history. He has never used smokeless tobacco.. Discussed with patient the risks and benefits of screening, including over-diagnosis, false positive rate, and total radiation exposure. The patient currently exhibits no signs or symptoms suggestive of lung cancer.   Discussed with patient the importance of compliance with yearly annual lung cancer screenings and willingness to undergo diagnosis and treatment if screening scan is positive. In addition, the patient was counseled regarding the importance of remaining smoke free and/or total smoking cessation. Also reviewed the following if the patient has Medicare that as of February 10, 2022, Medicare only covers LDCT screening in patients aged 51-72 with at least a 20 pack-year smoking history who currently smoke or have quit in the last 15 years  6. Encounter for immunization  -     zoster recombinant adjuvanted vaccine [facility]) 50 MCG/0.5ML SUSR injection; Inject 0.5 mLs into the muscle See Admin Instructions 1 dose now and repeat in 2-6 months, Disp-0.5 mL, R-0Normal      Sangeetha Mir received counseling on the following healthy behaviors: nutrition, exercise, medication adherence, and weight loss  Reviewed prior labs and health maintenance  Discussed use, benefit, and side effects of prescribed medications. Barriers to medication compliance addressed. Patient given educational materials - see patient instructions  Was a self-tracking handout given in paper form or via [employer]? Yes  All patient questions answered. Patient voiced understanding. The patient's past medical,surgical, social, and family history as well as his current medications and allergies were reviewed as documented in today's encounter. Medications, labs, diagnostic studies, consultations and follow-up as documented in this encounter. Return for KEEP APPT. Data Unavailable    Future Appointments   Date Time Provider Madeleine William   3/15/2023  2:00 PM Kirill Coelho DO [facility]   4/19/2023  1:30 PM Brinda Morris MD [facility]   5/24/2023  1:00 PM Carroll Workman MD [facility]   8/21/2023 11:00 AM Cyndie Steve MD  derm [facility]   10/11/2023  1:00 PM Moses Melendez MD [facility]         SUBJECTIVE/OBJECTIVE:    Hypertension, Paroxismal AFib  Patient here for follow-up. He is not exercising, but staying active, and is adherent to low salt diet. Blood pressure is well controlled at home. Cardiac symptoms dyspnea, fatigue, and lower extremity edema. Patient denies chest pain, chest pressure/discomfort, claudication, exertional chest pressure/discomfort, irregular heart beat, near-syncope, orthopnea, palpitations, paroxysmal nocturnal dyspnea, syncope, and tachypnea. Cardiovascular risk factors: advanced age (older than 54 for men, 72 for women), dyslipidemia, hypertension, male gender, and obesity (BMI >= 30 kg/m2). Use of agents associated with hypertension: none. History of target organ damage: . Abnormal EKG on 10/6/2021 atrial flutter with variable AV block  Cardiac cath 9/19/2017, nonobstructive coronary artery disease 30% LAD and RCA    Patient says he saw cardiology recently    On Xarelto as chronic anticoagulation for atrial fibrillation      BP controlled. Eleazar Peguero reports compliance with BP medications, and tolerates them well, denies side effects. BP Readings from Last 3 Encounters:   03/08/23 130/76   02/27/23 (!) 94/54   02/13/23 (!) 143/84          Pulse is Normal.    Pulse Readings from Last 3 Encounters:   03/08/23 79   02/27/23 88   02/13/23 87           Hyperlipidemia:  No new myalgias or GI upset on rosuvastatin (Crestor). Medication compliance: compliant all of the time. Patient is  following a low fat, low cholesterol diet.     LDL is low with high triglycerides  Lab Results   Component Value Date    LDLCALC 67 08/02/2016    LDLCHOLESTEROL 51 08/22/2022     Lab Results   Component Value Date    TRIG 162 (H) 08/22/2022    TRIG 137 06/18/2021    TRIG 151 (H) 06/20/2019     Patient has history of prostate cancer and had Prostatectomy   Has appointment and new order diagnostic PSA  Last PSA normal, very low as expected  Lab Results   Component Value Date    PSA <0.02 02/25/2022       Prediabetes   Tried Metformin in the past, but gave him shortness of breath  Prediabetes improving  Lab Results   Component Value Date    LABA1C 6.0 03/08/2023    LABA1C 6.2 (H) 08/22/2022    LABA1C 5.9 12/22/2021     Morbid obesity per BMI  Weight has been increasing, has gained 6 pounds in 6 months  He admits of eating more during the holiday season. Wt Readings from Last 3 Encounters:   03/08/23 (!) 313 lb 3.2 oz (142.1 kg)   02/27/23 (!) 310 lb (140.6 kg)   02/16/23 (!) 310 lb (140.6 kg)     10/06/22 (!) 307 lb (139.3 kg)       COPD:  Current treatment includes short-acting beta agonist inhaler, Incruse ellipta too expensive not using  Patient says he does not feel he needs an inhaler. Residual symptoms: chronic dyspnea. He denies cough, purulent sputum, wheezing, chest tightness, chest pain. Didn't need to use the albuterol  Didn't feel any difference when using the incruse ellipta    Nicotine dependence. No  quit smoking. Counseling given: Yes        Sleep Apnea:  Current treatment: cPAP. Compliance: compliant all of the time. Residual symptoms include: daytime fatigue. Going to the dentist    Depression is better  He is planning to visit his 11month-old new grandson  Prozac \"working\"    PHQ-2 Over the past 2 weeks, how often have you been bothered by any of the following problems? Little interest or pleasure in doing things: Not at all  Feeling down, depressed, or hopeless: Not at all  PHQ-2 Score: 0  PHQ-9 Over the past 2 weeks, how often have you been bothered by any of the following problems? Trouble falling or staying asleep, or sleeping too much: Not at all  Feeling tired or having little energy: More than half the days  Poor appetite or overeating: Not at all  Feeling bad about yourself - or that you are a failure or have let yourself or your family down: Not at all  Trouble concentrating on things, such as reading the newspaper or watching television: Not at all  Moving or speaking so slowly that other people could have noticed.  Or the opposite - being so fidgety or restless that you have been moving around a lot more than usual: Not at all  Thoughts that you would be better off dead, or of hurting yourself in some way: Not at all  If you checked off any problems, how difficult have these problems made it for you to do your work, take care of things at home, or get along with other people?: Not difficult at all  PHQ-9 Total Score: 2  PHQ-9 Total Score: 2      PHQ Scores 3/8/2023 10/6/2022 7/27/2022 2/18/2022 10/11/2021 9/29/2021 6/11/2021   PHQ2 Score 0 0 6 0 2 2 1   PHQ9 Score 2 0 9 0 2 2 1       Prior to Visit Medications    Medication Sig Taking? Authorizing Provider   amoxicillin (AMOXIL) 500 MG capsule  Yes Historical Provider, MD   chlorhexidine (PERIDEX) 0.12 % solution  Yes Historical Provider, MD   metoprolol tartrate (LOPRESSOR) 100 MG tablet Take 1 tablet by mouth 2 times daily  Patient taking differently: Take by mouth 2 times daily \"Cut back to 75 mg 2x daily. \" Yes Moses Melendez MD   furosemide (LASIX) 20 MG tablet Take 1 tablet by mouth daily Frequency increased 7/27/2022 Yes Moses Melendez MD   lisinopril (PRINIVIL;ZESTRIL) 20 MG tablet Take 1 tablet by mouth daily Dose increased 10/6/2022 Yes Moses Melendez MD   FLUoxetine (PROZAC) 20 MG capsule TAKE 1 CAPSULE BY MOUTH IN THE MORNING Yes Moses Melendez MD   rosuvastatin (CRESTOR) 40 MG tablet TAKE 1 TABLET BY MOUTH DAILY Yes Moses Melendez MD   XARELTO 20 MG TABS tablet TAKE 1 TABLET BY MOUTH EVERY DAY Yes Historical Provider, MD   Umeclidinium Bromide (INCRUSE ELLIPTA) 62.5 MCG/INH AEPB Inhale 1 actuation into the lungs daily Maintenance  Patient not taking: Reported on 3/8/2023  Moses Melendez MD   albuterol sulfate HFA (VENTOLIN HFA) 108 (90 Base) MCG/ACT inhaler Inhale 2 puffs into the lungs 4 times daily as needed for Wheezing or Shortness of Breath Rescue  Patient not taking: Reported on 3/8/2023  Moses Melendez MD       Social History     Tobacco Use    Smoking status: Former     Packs/day: 1.00     Years: 40.00     Pack years: 40.00     Types: Cigarettes     Start date: 12     Quit date: 2016     Years since quittin.9    Smokeless tobacco: Never   Vaping Use    Vaping Use: Never used   Substance Use Topics    Alcohol use: Yes     Alcohol/week: 5.0 standard drinks     Types: 5 Cans of beer per week     Comment: per week    Drug use: No         Review of Systems   Constitutional:  Positive for fatigue and unexpected weight change. Negative for activity change, appetite change, chills, diaphoresis and fever. Eyes:  Positive for visual disturbance. Respiratory:  Positive for apnea (on CPAP) and shortness of breath (FARFAN). Negative for chest tightness and wheezing. Cardiovascular:  Positive for leg swelling. Negative for chest pain and palpitations. Gastrointestinal:  Negative for abdominal distention, abdominal pain, constipation, diarrhea, nausea and vomiting. Endocrine: Negative for cold intolerance, heat intolerance, polydipsia, polyphagia and polyuria. Genitourinary:  Positive for frequency. Negative for difficulty urinating and urgency. Erectile dysfunction post prostatectomy   Neurological:  Negative for weakness. Hematological:  Does not bruise/bleed easily. Psychiatric/Behavioral:  Negative for dysphoric mood and sleep disturbance. The patient is not nervous/anxious.        -vital signs stable and within normal limits except morbid obesity per BMI    /76   Pulse 79   Temp 98 °F (36.7 °C)   Ht 5' 10\" (1.778 m)   Wt (!) 313 lb 3.2 oz (142.1 kg)   SpO2 99%   BMI 44.94 kg/m²        Physical Exam  Vitals and nursing note reviewed. Constitutional:       General: He is not in acute distress. Appearance: Normal appearance. He is well-developed. He is obese. He is not diaphoretic. HENT:      Head: Normocephalic and atraumatic.       Right Ear: External ear normal.      Left Ear: External ear normal.      Mouth/Throat:      Comments: I did not examine the mouth due to coronavirus pandemic and wearing masks. Eyes:      General: Lids are normal. No scleral icterus. Right eye: No discharge. Left eye: No discharge. Extraocular Movements: Extraocular movements intact. Conjunctiva/sclera: Conjunctivae normal.   Neck:      Thyroid: No thyromegaly. Cardiovascular:      Rate and Rhythm: Normal rate and regular rhythm. Heart sounds: Heart sounds are distant. No murmur heard. Comments: Wearing compression stockings   Pulmonary:      Effort: Pulmonary effort is normal. No respiratory distress. Breath sounds: Normal breath sounds. No wheezing or rales. Chest:      Chest wall: No tenderness. Abdominal:      General: Bowel sounds are normal. There is no distension. Palpations: Abdomen is soft. There is no hepatomegaly or splenomegaly. Tenderness: There is no abdominal tenderness. Comments: Obese abdomen. Musculoskeletal:         General: No tenderness. Normal range of motion. Cervical back: Normal range of motion and neck supple. Right lower leg: Pitting Edema present. Left lower leg: Pitting Edema present. Lymphadenopathy:      Cervical: No cervical adenopathy. Skin:     General: Skin is warm and dry. Capillary Refill: Capillary refill takes less than 2 seconds. Findings: No rash. Neurological:      Mental Status: He is alert and oriented to person, place, and time. Deep Tendon Reflexes: Reflexes are normal and symmetric. Psychiatric:         Attention and Perception: Attention normal.         Mood and Affect: Mood is anxious. Behavior: Behavior normal.         Thought Content: Thought content normal.         Cognition and Memory: Cognition normal.         Judgment: Judgment normal.         I personally reviewed testing with patient and all questions fully answered.   Mild hyperglycemia  Adenomatous polyp, precancerous, had a recent colonoscopy  Mild anemia      Otherwise labs within normal limits    Admission on 02/27/2023, Discharged on 02/27/2023   Component Date Value Ref Range Status    POC Glucose 02/27/2023 120 (A)  75 - 110 mg/dL Final    Surgical Pathology Report 02/27/2023    Final                    Value:-- Diagnosis --  A. Colon, descending, colonoscopic polypectomy:  Cauterized adenomatous polyp. B.  Colon, sigmoid, colonoscopic biopsy:  Colonic mucosa with mild edema in the lamina propria and reactive  epithelial changes. No active inflammation seen. C.  Rectum, colonoscopic biopsy of polyp:  Adenomatous polyp. Yadi Nicole  **Electronically Signed Out**         /3/1/2023       Clinical Information  Pre-Op Diagnosis:  RECTAL BLEED  Operative Findings:  DESCENDING COLON POLYP; SIGMOID COLON BIOPSY;  RECTAL BIOPSY  Operation Performed:  COLONOSCOPY POLYPECTOMY HOT BIOPSY WITH CLIP  PLACEMENT  mj    Source of Specimen  A: DESCENDING COLON POLYP  B: SIGMOID COLON BIOPSY  C: RECTAL BIOPSY    Gross Description  A. \"CHEMA MAZE, DESCENDING COLON POLYP\" One tan-white tissue fragment,  0.3 x 0.2 x 0.2 cm. Entirely 1cs. B. \"CHEMA MAZE, SIGMOID COLON BIOPSY\" Two tan-white tissue fragments  from 0.3 to 0.4 cm and are 0.7 x 0.2 x 0.2 cm in aggregate. Entirely  1cs. C                          .  \"CHEMA MAZE, RECTAL POLYP BIOPSY\" Two tan-white tissue fragments  from 0.4 to 0.5 cm and are 0.9 x 0.2 x 0.2 cm in aggregate. Entirely  1cs. ep tm      Microscopic Description  A-C.  2 H&E reviewed for each. Microscopic examination performed. SURGICAL PATHOLOGY CONSULTATION       Patient Name: Cristobal Pratt [facility] Rec: [id number]  Path Number: [id number]    [facility]  ANATOMIC PATHOLOGY  [address] [place] [address],  [address]   [facility], [address]  [phone]  Fax: [phone]           Lab Results   Component Value Date    WBC 8.5 01/24/2023    HGB 12.6 (L) 01/24/2023    HCT 43.3 01/24/2023    MCV 83.8 01/24/2023     01/24/2023       Lab Results   Component Value Date/Time     01/24/2023 01:35 PM    K 4.8 01/24/2023 01:35 PM     01/24/2023 01:35 PM    CO2 25 01/24/2023 01:35 PM    BUN 16 01/24/2023 01:35 PM    CREATININE 0.78 01/24/2023 01:35 PM    GLUCOSE 96 01/24/2023 01:35 PM    CALCIUM 9.6 01/24/2023 01:35 PM        Lab Results   Component Value Date    ALT 21 08/22/2022    AST 25 08/22/2022    ALKPHOS 84 08/22/2022    BILITOT 0.60 08/22/2022       Lab Results   Component Value Date    TSH 1.46 08/22/2022       Lab Results   Component Value Date    CHOL 106 08/22/2022    CHOL 105 06/18/2021    CHOL 110 06/20/2019     Lab Results   Component Value Date    TRIG 162 (H) 08/22/2022    TRIG 137 06/18/2021    TRIG 151 (H) 06/20/2019     Lab Results   Component Value Date    HDL 23 (L) 08/22/2022    HDL 28 (L) 06/18/2021    HDL 25 (L) 06/20/2019     Lab Results   Component Value Date    LDLCALC 67 08/02/2016    LDLCHOLESTEROL 51 08/22/2022    LDLCHOLESTEROL 50 06/18/2021    LDLCHOLESTEROL 55 06/20/2019     Lab Results   Component Value Date    CHOLHDLRATIO 4.6 08/22/2022    CHOLHDLRATIO 3.8 06/18/2021    CHOLHDLRATIO 4.4 06/20/2019       No results found for: [id number]  No results found for: FOLATE  No results found for: VITD25      Orders Placed This Encounter   Medications    empagliflozin (JARDIANCE) 10 MG tablet     Sig: Take 1 tablet by mouth daily     Dispense:  30 tablet     Refill:  3    metoprolol 75 MG TABS     Sig: Take 75 mg by mouth 2 times daily \"Cut back to 75 mg 2x daily. \" From cardiology     Dispense:  60 tablet     Refill:  4    zoster recombinant adjuvanted vaccine (SHINGRIX) 50 MCG/0.5ML SUSR injection     Sig: Inject 0.5 mLs into the muscle See Admin Instructions 1 dose now and repeat in 2-6 months     Dispense:  0.5 mL     Refill:  0       Orders Placed This Encounter   Procedures    CT Lung Screen (Annual/Baseline)     Age: Patient is 79 y.o.     Smoking History: Social History    Tobacco Use      Smoking status: Former        Packs/day: 1.00        Years: 40.00        Pack years: 36        Types: Cigarettes        Start date:         Quit date: 2016        Years since quittin.9      Smokeless tobacco: Never    Vaping Use      Vaping Use: Never used    Alcohol use: Yes      Alcohol/week: 5.0 standard drinks      Types: 5 Cans of beer per week      Comment: per week    Drug use: No   Pack years: 40    Date of last lung cancer screenin2021     Standing Status:   Future     Standing Expiration Date:   2024     Order Specific Question:   Is there documentation of shared decision making? Answer:   Yes     Order Specific Question:   Is this a low dose CT or a routine CT? Answer:   Low Dose CT [1]     Order Specific Question:   Is this the first (baseline) CT or an annual exam?     Answer: Annual [2]     Order Specific Question:   Does the patient show any signs or symptoms of lung cancer? Answer:   No     Order Specific Question:   Smoking Status? Answer: Former [4]     Order Specific Question:   Date quit smoking? (must be within 15 years)     Answer:   2016     Order Specific Question:   Smoking packs per day? Answer:   1     Order Specific Question:   Years smoking? Answer:   40    CBC with Auto Differential     Standing Status:   Future     Standing Expiration Date:   3/8/2024    Comprehensive Metabolic Panel     Standing Status:   Future     Standing Expiration Date:   3/8/2024    TSH     Standing Status:   Future     Standing Expiration Date:   3/8/2024    Uric Acid     Standing Status:   Future     Standing Expiration Date:   3/8/2024    Urinalysis with Reflex to Culture     Standing Status:   Future     Standing Expiration Date:   3/8/2024     Order Specific Question:   SPECIFY(EX-CATH,MIDSTREAM,CYSTO,ETC)?      Answer:   MIDSTREAM    Magnesium     Standing Status:   Future     Standing Expiration Date:   3/8/2024    POCT glycosylated hemoglobin (Hb A1C)    MO VISIT TO DISCUSS LUNG CA SCREEN W LDCT       Medications Discontinued During This Encounter   Medication Reason    Umeclidinium Bromide (INCRUSE ELLIPTA) 62.5 MCG/INH AEPB Patient Choice    metoprolol tartrate (LOPRESSOR) 100 MG tablet REORDER         On this date 3/8/2023 I have spent 35 minutes reviewing previous notes, test results and face to face with the patient discussing the diagnosis and importance of compliance with the treatment plan as well as documenting on the day of the visit. This note was completed by using the assistance of a speech-recognition program. However, inadvertent computerized transcription errors may be present. Although every effort was made to ensure accuracy, no guarantees can be provided that every mistake has been identified and corrected by editing. An electronic signature was used to authenticate this note.   Electronically signed by Braeden Gonzalez MD on 3/14/2023 at 8:34 AM

## 2023-03-14 PROBLEM — M79.89 SYMPTOM OF LEG SWELLING: Status: RESOLVED | Noted: 2021-10-06 | Resolved: 2023-03-14

## 2023-03-14 PROBLEM — F33.41 RECURRENT MAJOR DEPRESSIVE DISORDER, IN PARTIAL REMISSION (HCC): Status: ACTIVE | Noted: 2021-06-12

## 2023-03-14 PROBLEM — R06.09 DOE (DYSPNEA ON EXERTION): Status: RESOLVED | Noted: 2021-10-06 | Resolved: 2023-03-14

## 2023-03-15 ENCOUNTER — OFFICE VISIT (OUTPATIENT)
Dept: SURGERY | Age: 68
End: 2023-03-15

## 2023-03-15 VITALS
WEIGHT: 312 LBS | DIASTOLIC BLOOD PRESSURE: 86 MMHG | SYSTOLIC BLOOD PRESSURE: 142 MMHG | HEART RATE: 78 BPM | BODY MASS INDEX: 44.77 KG/M2

## 2023-03-15 DIAGNOSIS — K64.9 BLEEDING HEMORRHOID: Primary | ICD-10-CM

## 2023-03-15 DIAGNOSIS — K59.00 CONSTIPATION, UNSPECIFIED CONSTIPATION TYPE: ICD-10-CM

## 2023-03-15 NOTE — PROGRESS NOTES
Office Note -- Post op visit      Patient: Awa Gorman  MRN: [id number]  YOB: 1955 (79 y.o.)    Date of Office Visit: March 15, 2023     CC: Post op follow up    SUBJECTIVE:  Awa Gorman is a 79 y.o. male who returns to the [facility] surgery clinic for post op follow up from diagnostic colonoscopy on 2/27/2023. During colonoscopy the patient was found to have grade 3 external and internal hemorrhoids with one obvious hemorrhoid that is likely the cause of bleeding. He states over the last week, he has had a couple bowel movements that did not have blood. He has been taking Metamucil daily, which she states his bowel movements are getting easier. He states the consistency of his bowel movements are varied, sometimes they are pellets, sometimes they are 1 long stool, and sometimes diarrhea. He states he averages about 15 to 20 minutes on the stool with each bowel movement. Review of Systems:  GEN: Denies fevers, chills. CV: Denies chest pain. RESP: Denies shortness of breath, COPD, asthma. GI: Denies abdominal pain, nausea, vomiting  : Denies increased frequency or dysuria. SKIN: Denies skin lesions     Physical Exam:    Vitals:    03/15/23 1404   BP: (!) 142/86   Pulse: 78       General: Alert and oriented x 3. Non toxic in appearance. No apparent distress. Heart: Regular rate and rhythm. Lungs: symmetrical chest rise bilaterally, no respiratory distress. Clear breath sounds bilaterally. Abdomen: Soft, nondistended, nontender to palpation. Umbilical hernia noted. Skin: No rashes or nodules noted. Pathology:     -- Diagnosis --   A. Colon, descending, colonoscopic polypectomy:   Cauterized adenomatous polyp. B.  Colon, sigmoid, colonoscopic biopsy:   Colonic mucosa with mild edema in the lamina propria and reactive   epithelial changes. No active inflammation seen. C.  Rectum, colonoscopic biopsy of polyp:   Adenomatous polyp.        Adan Siemens, M. D. **Electronically Signed Out**         sf/3/1/2023       Assessment:  Grade 3 external and internal hemorrhoids  Rectal bleeding  On anticoagulation    Plan:  I had a long discussion with Livier Antonio and his wife at bedside. We discussed hemorrhoidectomy, which the patient will likely need, however there are potential conservative changes to the way he has bowel movements that could help with bleeding hemorrhoids. We discussed in detail healthy bowel habits, which include not sitting on the toilet for greater than 5 minutes at a time. Due to the patient having easier bowel movements after starting Metamucil 2 weeks ago, I recommend he continue Metamucil daily and add Colace 100 mg twice daily. We discussed sitting on the toilet and after having a bowel movement, spending no longer on the toilet. We discussed the goal of consistency of stool is to be soft and the consistency of a banana. We talked that if he is having pellets, this is a sign of constipation. If he continues to have constipated type stools, will add MiraLAX in 2 weeks. If the patient continues to have bleeding, will need to undergo the risk of hemorrhoidectomy. We did discuss pain postoperatively along with the risk of bleeding and infection. The patient verbalized understanding. We will follow-up with the patient in 2 to 4 weeks, or sooner if the patient has consistent large gross amount of bloody bowel movements.         Maria Antonia Coelho,   3/15/2023

## 2023-04-21 ENCOUNTER — PATIENT MESSAGE (OUTPATIENT)
Dept: FAMILY MEDICINE CLINIC | Age: 68
End: 2023-04-21

## 2023-04-21 NOTE — TELEPHONE ENCOUNTER
From: Paulo Galvez  To: Dr. Cheng Balloon: 4/21/2023 11:01 AM EDT  Subject: Referral to [id number] [facility],  Please send a new referral for [facility]. I called the office this morning and the previous referral is no longer in the system. If you feel I do not need this referral, please let me know! Thank you!   Jackelyn Cifuentes

## 2023-04-23 DIAGNOSIS — E78.5 HYPERLIPIDEMIA WITH TARGET LDL LESS THAN 100: ICD-10-CM

## 2023-04-24 RX ORDER — ROSUVASTATIN CALCIUM 40 MG/1
40 TABLET, COATED ORAL DAILY
Qty: 90 TABLET | Refills: 3 | Status: SHIPPED | OUTPATIENT
Start: 2023-04-24

## 2023-04-24 NOTE — TELEPHONE ENCOUNTER
Please Approve or Refuse.   Send to Pharmacy per Pt's Request:      Next Visit Date:  10/11/2023   Last Visit Date: 3/8/2023    Hemoglobin A1C (%)   Date Value   03/08/2023 6.0   08/22/2022 6.2 (H)   12/22/2021 5.9             ( goal A1C is < 7)   BP Readings from Last 3 Encounters:   03/15/23 (!) 142/86   03/08/23 130/76   02/27/23 (!) 94/54          (goal 120/80)  BUN   Date Value Ref Range Status   04/14/2023 13 8 - 23 mg/dL Final     Creatinine   Date Value Ref Range Status   04/14/2023 0.88 0.70 - 1.20 mg/dL Final     Potassium   Date Value Ref Range Status   04/14/2023 4.1 3.7 - 5.3 mmol/L Final

## 2023-05-16 NOTE — TELEPHONE ENCOUNTER
Order was faxed to [id number] [facility] [address]. Spoke with pt daughter who is healthcare proxy, pt DNR/DNI, hypotensive, febrile, hx of COPD, CKD, will get CT, labs admission. Spoke with pt daughter who is healthcare proxy, pt DNR/DNI, hypotensive, febrile, hx of COPD, CKD, will get CT, labs admission.    fever, sepsis, hypotensive shock, requiring presser, ICU consult and admission, as well as abx and fluid resuscitation.

## 2023-05-24 ENCOUNTER — OFFICE VISIT (OUTPATIENT)
Dept: PULMONOLOGY | Age: 68
End: 2023-05-24
Payer: MEDICARE

## 2023-05-24 VITALS
HEART RATE: 82 BPM | TEMPERATURE: 97.3 F | OXYGEN SATURATION: 97 % | RESPIRATION RATE: 16 BRPM | HEIGHT: 70 IN | WEIGHT: 312 LBS | SYSTOLIC BLOOD PRESSURE: 117 MMHG | DIASTOLIC BLOOD PRESSURE: 85 MMHG | BODY MASS INDEX: 44.67 KG/M2

## 2023-05-24 DIAGNOSIS — G47.33 OSA ON CPAP: Primary | ICD-10-CM

## 2023-05-24 DIAGNOSIS — Z99.89 OSA ON CPAP: Primary | ICD-10-CM

## 2023-05-24 PROBLEM — E11.9 TYPE 2 DIABETES MELLITUS (HCC): Status: ACTIVE | Noted: 2023-05-24

## 2023-05-24 PROCEDURE — 3017F COLORECTAL CA SCREEN DOC REV: CPT | Performed by: INTERNAL MEDICINE

## 2023-05-24 PROCEDURE — G8427 DOCREV CUR MEDS BY ELIG CLIN: HCPCS | Performed by: INTERNAL MEDICINE

## 2023-05-24 PROCEDURE — 99214 OFFICE O/P EST MOD 30 MIN: CPT | Performed by: INTERNAL MEDICINE

## 2023-05-24 PROCEDURE — G8417 CALC BMI ABV UP PARAM F/U: HCPCS | Performed by: INTERNAL MEDICINE

## 2023-05-24 PROCEDURE — 1036F TOBACCO NON-USER: CPT | Performed by: INTERNAL MEDICINE

## 2023-05-24 PROCEDURE — 1123F ACP DISCUSS/DSCN MKR DOCD: CPT | Performed by: INTERNAL MEDICINE

## 2023-05-24 ASSESSMENT — SLEEP AND FATIGUE QUESTIONNAIRES
HOW LIKELY ARE YOU TO NOD OFF OR FALL ASLEEP WHILE SITTING AND TALKING TO SOMEONE: 0
HOW LIKELY ARE YOU TO NOD OFF OR FALL ASLEEP WHILE SITTING AND READING: 0
ESS TOTAL SCORE: 0
HOW LIKELY ARE YOU TO NOD OFF OR FALL ASLEEP IN A CAR, WHILE STOPPED FOR A FEW MINUTES IN TRAFFIC: 0
HOW LIKELY ARE YOU TO NOD OFF OR FALL ASLEEP WHEN YOU ARE A PASSENGER IN A CAR FOR AN HOUR WITHOUT A BREAK: 0
HOW LIKELY ARE YOU TO NOD OFF OR FALL ASLEEP WHILE WATCHING TV: 0
HOW LIKELY ARE YOU TO NOD OFF OR FALL ASLEEP WHILE SITTING QUIETLY AFTER LUNCH WITHOUT ALCOHOL: 0
HOW LIKELY ARE YOU TO NOD OFF OR FALL ASLEEP WHILE SITTING INACTIVE IN A PUBLIC PLACE: 0
HOW LIKELY ARE YOU TO NOD OFF OR FALL ASLEEP WHILE LYING DOWN TO REST IN THE AFTERNOON WHEN CIRCUMSTANCES PERMIT: 0

## 2023-05-24 NOTE — PROGRESS NOTES
granuloma. Under good control. He has history of carcinoma the prostate which is stable. His blood sugar is stable.   Blood pressure is  History of smoking anymore  I am  He uses CPAP regularly CPAP has been effective in relieving the apnea improving daytime symptoms  Regular COVID-vaccine flu vaccine Pneumovax    Direct

## 2023-06-22 DIAGNOSIS — R73.9 HYPERGLYCEMIA: ICD-10-CM

## 2023-06-22 DIAGNOSIS — I48.0 PAROXYSMAL ATRIAL FIBRILLATION (HCC): ICD-10-CM

## 2023-06-22 RX ORDER — EMPAGLIFLOZIN 10 MG/1
TABLET, FILM COATED ORAL
Qty: 90 TABLET | Refills: 3 | Status: SHIPPED | OUTPATIENT
Start: 2023-06-22

## 2023-06-22 NOTE — TELEPHONE ENCOUNTER
Please Approve or Refuse.   Send to Pharmacy per Pt's Request: luis alberto     Next Visit Date:  10/11/2023   Last Visit Date: 3/8/2023    Hemoglobin A1C (%)   Date Value   03/08/2023 6.0   08/22/2022 6.2 (H)   12/22/2021 5.9             ( goal A1C is < 7)   BP Readings from Last 3 Encounters:   05/24/23 117/85   03/15/23 (!) 142/86   03/08/23 130/76          (goal 120/80)  BUN   Date Value Ref Range Status   04/14/2023 13 8 - 23 mg/dL Final     Creatinine   Date Value Ref Range Status   04/14/2023 0.88 0.70 - 1.20 mg/dL Final     Potassium   Date Value Ref Range Status   04/14/2023 4.1 3.7 - 5.3 mmol/L Final

## 2023-08-21 ENCOUNTER — OFFICE VISIT (OUTPATIENT)
Dept: DERMATOLOGY | Age: 68
End: 2023-08-21
Payer: MEDICARE

## 2023-08-21 VITALS
HEART RATE: 82 BPM | WEIGHT: 304 LBS | SYSTOLIC BLOOD PRESSURE: 141 MMHG | TEMPERATURE: 97.7 F | HEIGHT: 70 IN | OXYGEN SATURATION: 97 % | DIASTOLIC BLOOD PRESSURE: 96 MMHG | BODY MASS INDEX: 43.52 KG/M2

## 2023-08-21 DIAGNOSIS — L57.0 ACTINIC KERATOSES: ICD-10-CM

## 2023-08-21 DIAGNOSIS — I87.2 VENOUS STASIS DERMATITIS, UNSPECIFIED LATERALITY: ICD-10-CM

## 2023-08-21 DIAGNOSIS — D22.9 MULTIPLE NEVI: ICD-10-CM

## 2023-08-21 DIAGNOSIS — L82.1 SEBORRHEIC KERATOSIS: ICD-10-CM

## 2023-08-21 DIAGNOSIS — Z85.828 HISTORY OF BASAL CELL CARCINOMA: Primary | ICD-10-CM

## 2023-08-21 DIAGNOSIS — R61 HYPERHIDROSIS: ICD-10-CM

## 2023-08-21 PROCEDURE — G8427 DOCREV CUR MEDS BY ELIG CLIN: HCPCS | Performed by: DERMATOLOGY

## 2023-08-21 PROCEDURE — 17000 DESTRUCT PREMALG LESION: CPT | Performed by: DERMATOLOGY

## 2023-08-21 PROCEDURE — 3080F DIAST BP >= 90 MM HG: CPT | Performed by: DERMATOLOGY

## 2023-08-21 PROCEDURE — 3077F SYST BP >= 140 MM HG: CPT | Performed by: DERMATOLOGY

## 2023-08-21 PROCEDURE — G8417 CALC BMI ABV UP PARAM F/U: HCPCS | Performed by: DERMATOLOGY

## 2023-08-21 PROCEDURE — 1036F TOBACCO NON-USER: CPT | Performed by: DERMATOLOGY

## 2023-08-21 PROCEDURE — 3017F COLORECTAL CA SCREEN DOC REV: CPT | Performed by: DERMATOLOGY

## 2023-08-21 PROCEDURE — 99213 OFFICE O/P EST LOW 20 MIN: CPT | Performed by: DERMATOLOGY

## 2023-08-21 PROCEDURE — 1123F ACP DISCUSS/DSCN MKR DOCD: CPT | Performed by: DERMATOLOGY

## 2023-08-21 RX ORDER — TRIAMCINOLONE ACETONIDE 1 MG/G
CREAM TOPICAL
Qty: 80 G | Refills: 1 | Status: SHIPPED | OUTPATIENT
Start: 2023-08-21

## 2023-08-21 NOTE — PATIENT INSTRUCTIONS
combination-is right for you. SUN PROTECTION AND OBSERVATION  Your dermatologist may recommend that you use a sun block, wear a hat and clothing to prevent sun exposure, and have regular skin examinations. Some AKs go away without further treatment, provided that the skin is not subjected to more sun damage. However, regular examinations will help catch the lesions that need to be treated. LESION-TARGETED THERAPIES   Liquid nitrogen (cryotherapy) destroys AKs by freezing them. This results in blistering and shedding of the AKs. Cryotherapy is the most common treatment when a patient has a few, small AK lesions. Topical chemotherapy is a cream that targets sun-damaged and pre-cancerous cells and destroys them. Seborrheic Keratosis  Seborrheic keratoses are common benign growths of unknown cause seen in adults due to a thickening of an area of the top skin layer. Who's At Risk  Although they can occur anytime after puberty, almost everyone over 48 has one or more of these and they increase in number with age. Some families have an inherited tendency to grow multiple lesions. Men and women are equally as likely to develop seborrheic keratoses. Dark-skinned people are less affected than those with light skin; a variant seen in blacks is called dermatosis papulosa nigra. Signs & Symptoms  One or more spots can occur anywhere on the body, except for palms, soles, and mucous membranes (eg, in the mouth or rectum). They do not go away. They do not turn into cancers, but some cancers resemble seborrheic keratosis. They start as light brown to skin-colored, flat areas, which are round to oval and of varying size (usually less than a half inch, but sometimes much larger). As they grow thicker and rise above the skin surface, seborrheic keratoses may become dark brown to almost black with a \"stuck on\" appearance. The surface may feel smooth or rough.   Self-Care Guidelines  No treatment is needed unless there is

## 2023-08-21 NOTE — PROGRESS NOTES
Dermatology Patient Note  [address]  [address] [place] [address] [address] [address]  Dept: [phone]  Dept Fax: [phone]      VISITDATE: 8/21/2023   REFERRING PROVIDER: No ref. provider found      Edgar Good is a 79 y.o. male  who presents today in the office for:    Other (FBSC-hx of bcc,sk, ak. No new concerns )      HISTORY OF PRESENT ILLNESS:  1 year FBSE. History of [facility] 2005. No areas of concern. Patient reports mild pruritis from dry skin and excessive sweating. He is compliant with wearing compression socks daily.     MEDICAL PROBLEMS:  Patient Active Problem List    Diagnosis Date Noted    Iron deficiency anemia due to chronic blood loss 01/30/2023     Priority: Medium    Bleeding hemorrhoids 01/30/2023     Priority: Medium    Borderline glaucoma of both eyes 10/10/2022     Priority: Medium    Bilateral hearing loss 10/10/2022     Priority: Medium    Morbid obesity ([address]) 07/27/2022     Priority: Medium    Type 2 diabetes mellitus 05/24/2023    Coronary artery disease involving native coronary artery of native heart without angina pectoris 02/20/2022    Chronic obstructive pulmonary disease, unspecified COPD type ([address]) 02/18/2022    Polycythemia 10/06/2021    Hyperglycemia 10/06/2021    History of basal cell carcinoma 08/04/2021     ~2005 BCC left upper lip s/p excision        ADRIAN on CPAP 06/12/2021    Tinnitus aurium, bilateral 06/12/2021    Recurrent major depressive disorder, in partial remission ([address]) 06/12/2021    Hyperlipidemia with target LDL less than 100 06/12/2021    Family history of colon cancer in mother 06/11/2021    Paroxysmal atrial fibrillation ([address]) 02/21/2019    Essential hypertension 02/21/2019    Erectile dysfunction after radical prostatectomy 08/27/2018    Personal history of prostate cancer 08/27/2018    Male stress incontinence 11/20/2017    Hx of prostatectomy 11/14/2017    Personal history of

## 2023-09-12 ENCOUNTER — PATIENT MESSAGE (OUTPATIENT)
Dept: FAMILY MEDICINE CLINIC | Age: 68
End: 2023-09-12

## 2023-09-12 DIAGNOSIS — E11.65 TYPE 2 DIABETES MELLITUS WITH HYPERGLYCEMIA, WITHOUT LONG-TERM CURRENT USE OF INSULIN (HCC): Primary | ICD-10-CM

## 2023-09-13 RX ORDER — METFORMIN HYDROCHLORIDE 500 MG/1
500 TABLET, EXTENDED RELEASE ORAL
Qty: 90 TABLET | Refills: 3
Start: 2023-09-13

## 2023-09-13 NOTE — TELEPHONE ENCOUNTER
Diagnosis Orders   1.  Type 2 diabetes mellitus with hyperglycemia, without long-term current use of insulin (HCC)  metFORMIN (GLUCOPHAGE-XR) 500 MG extended release tablet           Future Appointments   Date Time Provider [address] [place] [address]   10/11/2023  1:00 PM [facility]   11/6/2023  2:00 PM Tereso Maxwell MD [facility]   11/29/2023  1:30 [facility]   8/21/2024 11:30 AM Amrit Montalvo MD  derm [facility]

## 2023-09-13 NOTE — TELEPHONE ENCOUNTER
Yanet, Processor 9/12/2023 5:38 PM EDT    I have metformin er 500 mg that is taken once a day with breakfast.

## 2023-09-24 DIAGNOSIS — I10 ESSENTIAL HYPERTENSION: ICD-10-CM

## 2023-09-24 DIAGNOSIS — F32.0 CURRENT MILD EPISODE OF MAJOR DEPRESSIVE DISORDER WITHOUT PRIOR EPISODE (HCC): ICD-10-CM

## 2023-09-24 DIAGNOSIS — D50.0 IRON DEFICIENCY ANEMIA DUE TO CHRONIC BLOOD LOSS: ICD-10-CM

## 2023-09-25 RX ORDER — FERROUS SULFATE 325(65) MG
1 TABLET ORAL 2 TIMES DAILY
Qty: 180 TABLET | Refills: 1 | Status: SHIPPED | OUTPATIENT
Start: 2023-09-25

## 2023-09-25 RX ORDER — LISINOPRIL 20 MG/1
20 TABLET ORAL DAILY
Qty: 90 TABLET | Refills: 3 | Status: SHIPPED | OUTPATIENT
Start: 2023-09-25

## 2023-09-25 RX ORDER — FLUOXETINE HYDROCHLORIDE 20 MG/1
CAPSULE ORAL
Qty: 90 CAPSULE | Refills: 3 | Status: SHIPPED | OUTPATIENT
Start: 2023-09-25

## 2023-09-25 NOTE — TELEPHONE ENCOUNTER
Please Approve or Refuse.   Send to Pharmacy per Pt's Request:      Next Visit Date:  10/11/2023   Last Visit Date: 3/8/2023    Hemoglobin A1C (%)   Date Value   03/08/2023 6.0   08/22/2022 6.2 (H)   12/22/2021 5.9             ( goal A1C is < 7)   BP Readings from Last 3 Encounters:   08/21/23 (!) 141/96   05/24/23 117/85   03/15/23 (!) 142/86          (goal 120/80)  BUN   Date Value Ref Range Status   04/14/2023 13 8 - 23 mg/dL Final     Creatinine   Date Value Ref Range Status   04/14/2023 0.88 0.70 - 1.20 mg/dL Final     Potassium   Date Value Ref Range Status   04/14/2023 4.1 3.7 - 5.3 mmol/L Final

## 2023-10-11 ENCOUNTER — OFFICE VISIT (OUTPATIENT)
Dept: FAMILY MEDICINE CLINIC | Age: 68
End: 2023-10-11
Payer: MEDICARE

## 2023-10-11 VITALS
BODY MASS INDEX: 44.15 KG/M2 | SYSTOLIC BLOOD PRESSURE: 128 MMHG | OXYGEN SATURATION: 97 % | HEART RATE: 87 BPM | HEIGHT: 70 IN | TEMPERATURE: 97.4 F | WEIGHT: 308.4 LBS | DIASTOLIC BLOOD PRESSURE: 84 MMHG

## 2023-10-11 DIAGNOSIS — E78.5 HYPERLIPIDEMIA WITH TARGET LDL LESS THAN 100: ICD-10-CM

## 2023-10-11 DIAGNOSIS — E11.65 TYPE 2 DIABETES MELLITUS WITH HYPERGLYCEMIA, WITHOUT LONG-TERM CURRENT USE OF INSULIN (HCC): ICD-10-CM

## 2023-10-11 DIAGNOSIS — D50.0 IRON DEFICIENCY ANEMIA DUE TO CHRONIC BLOOD LOSS: ICD-10-CM

## 2023-10-11 DIAGNOSIS — I10 ESSENTIAL HYPERTENSION: ICD-10-CM

## 2023-10-11 DIAGNOSIS — Z11.59 ENCOUNTER FOR SCREENING FOR OTHER VIRAL DISEASES: ICD-10-CM

## 2023-10-11 DIAGNOSIS — Z87.891 PERSONAL HISTORY OF TOBACCO USE: ICD-10-CM

## 2023-10-11 DIAGNOSIS — Z00.00 MEDICARE ANNUAL WELLNESS VISIT, SUBSEQUENT: Primary | ICD-10-CM

## 2023-10-11 DIAGNOSIS — H61.21 IMPACTED CERUMEN OF RIGHT EAR: ICD-10-CM

## 2023-10-11 PROCEDURE — 3017F COLORECTAL CA SCREEN DOC REV: CPT | Performed by: FAMILY MEDICINE

## 2023-10-11 PROCEDURE — 3074F SYST BP LT 130 MM HG: CPT | Performed by: FAMILY MEDICINE

## 2023-10-11 PROCEDURE — G0439 PPPS, SUBSEQ VISIT: HCPCS | Performed by: FAMILY MEDICINE

## 2023-10-11 PROCEDURE — G8484 FLU IMMUNIZE NO ADMIN: HCPCS | Performed by: FAMILY MEDICINE

## 2023-10-11 PROCEDURE — G0296 VISIT TO DETERM LDCT ELIG: HCPCS | Performed by: FAMILY MEDICINE

## 2023-10-11 PROCEDURE — 3078F DIAST BP <80 MM HG: CPT | Performed by: FAMILY MEDICINE

## 2023-10-11 PROCEDURE — 1123F ACP DISCUSS/DSCN MKR DOCD: CPT | Performed by: FAMILY MEDICINE

## 2023-10-11 PROCEDURE — 3044F HG A1C LEVEL LT 7.0%: CPT | Performed by: FAMILY MEDICINE

## 2023-10-11 RX ORDER — MULTIVITAMIN
TABLET ORAL
COMMUNITY

## 2023-10-11 SDOH — ECONOMIC STABILITY: FOOD INSECURITY: WITHIN THE PAST 12 MONTHS, THE FOOD YOU BOUGHT JUST DIDN'T LAST AND YOU DIDN'T HAVE MONEY TO GET MORE.: NEVER TRUE

## 2023-10-11 SDOH — ECONOMIC STABILITY: INCOME INSECURITY: HOW HARD IS IT FOR YOU TO PAY FOR THE VERY BASICS LIKE FOOD, HOUSING, MEDICAL CARE, AND HEATING?: NOT HARD AT ALL

## 2023-10-11 SDOH — ECONOMIC STABILITY: FOOD INSECURITY: WITHIN THE PAST 12 MONTHS, YOU WORRIED THAT YOUR FOOD WOULD RUN OUT BEFORE YOU GOT MONEY TO BUY MORE.: NEVER TRUE

## 2023-10-11 ASSESSMENT — PATIENT HEALTH QUESTIONNAIRE - PHQ9
3. TROUBLE FALLING OR STAYING ASLEEP: 0
5. POOR APPETITE OR OVEREATING: 0
SUM OF ALL RESPONSES TO PHQ QUESTIONS 1-9: 0
7. TROUBLE CONCENTRATING ON THINGS, SUCH AS READING THE NEWSPAPER OR WATCHING TELEVISION: 0
SUM OF ALL RESPONSES TO PHQ QUESTIONS 1-9: 0
SUM OF ALL RESPONSES TO PHQ QUESTIONS 1-9: 0
4. FEELING TIRED OR HAVING LITTLE ENERGY: 0
8. MOVING OR SPEAKING SO SLOWLY THAT OTHER PEOPLE COULD HAVE NOTICED. OR THE OPPOSITE, BEING SO FIGETY OR RESTLESS THAT YOU HAVE BEEN MOVING AROUND A LOT MORE THAN USUAL: 0
1. LITTLE INTEREST OR PLEASURE IN DOING THINGS: 0
9. THOUGHTS THAT YOU WOULD BE BETTER OFF DEAD, OR OF HURTING YOURSELF: 0
6. FEELING BAD ABOUT YOURSELF - OR THAT YOU ARE A FAILURE OR HAVE LET YOURSELF OR YOUR FAMILY DOWN: 0
SUM OF ALL RESPONSES TO PHQ QUESTIONS 1-9: 0
10. IF YOU CHECKED OFF ANY PROBLEMS, HOW DIFFICULT HAVE THESE PROBLEMS MADE IT FOR YOU TO DO YOUR WORK, TAKE CARE OF THINGS AT HOME, OR GET ALONG WITH OTHER PEOPLE: 0
2. FEELING DOWN, DEPRESSED OR HOPELESS: 0
SUM OF ALL RESPONSES TO PHQ9 QUESTIONS 1 & 2: 0

## 2023-10-11 ASSESSMENT — VISUAL ACUITY
OS_CC: 20/25
OD_CC: 20/25

## 2023-10-11 ASSESSMENT — LIFESTYLE VARIABLES
HOW OFTEN DURING THE LAST YEAR HAVE YOU FAILED TO DO WHAT WAS NORMALLY EXPECTED FROM YOU BECAUSE OF DRINKING: 0
HOW OFTEN DURING THE LAST YEAR HAVE YOU FOUND THAT YOU WERE NOT ABLE TO STOP DRINKING ONCE YOU HAD STARTED: 0
HOW OFTEN DURING THE LAST YEAR HAVE YOU NEEDED AN ALCOHOLIC DRINK FIRST THING IN THE MORNING TO GET YOURSELF GOING AFTER A NIGHT OF HEAVY DRINKING: 0
HAS A RELATIVE, FRIEND, DOCTOR, OR ANOTHER HEALTH PROFESSIONAL EXPRESSED CONCERN ABOUT YOUR DRINKING OR SUGGESTED YOU CUT DOWN: 0
HOW OFTEN DO YOU HAVE A DRINK CONTAINING ALCOHOL: 2-3 TIMES A WEEK
HOW OFTEN DURING THE LAST YEAR HAVE YOU HAD A FEELING OF GUILT OR REMORSE AFTER DRINKING: 0
HOW MANY STANDARD DRINKS CONTAINING ALCOHOL DO YOU HAVE ON A TYPICAL DAY: 1 OR 2
HAVE YOU OR SOMEONE ELSE BEEN INJURED AS A RESULT OF YOUR DRINKING: 0
HOW OFTEN DURING THE LAST YEAR HAVE YOU BEEN UNABLE TO REMEMBER WHAT HAPPENED THE NIGHT BEFORE BECAUSE YOU HAD BEEN DRINKING: 0

## 2023-10-11 NOTE — PROGRESS NOTES
Visit Information    Have you changed or started any medications since your last visit including any over-the-counter medicines, vitamins, or herbal medicines? yes -    Have you stopped taking any of your medications? Is so, why? -  yes -   Are you having any side effects from any of your medications? - no    Have you seen any other physician or provider since your last visit? yes -    Have you had any other diagnostic tests since your last visit?  no   Have you been seen in the emergency room and/or had an admission in a hospital since we last saw you?  no   Have you had your routine dental cleaning in the past 6 months?  yes -      Do you have an active MyChart account? If no, what is the barrier?   Yes    Patient Care Team:  Gabby Atkins MD as PCP - General (Family Medicine)  Gabby Atkins MD as PCP - Empaneled Provider  Zoya Wan MD as Resident (Cardiology)  Laurel Trejo MD as Consulting Physician (Urology)  Diane Barboza MD as Consulting Physician (Pulmonology)  Tor Moncada MD as Consulting Physician (Cardiology)  Bettina Myrick MD as Consulting Physician (Internal Medicine Cardiovascular Disease)  Maday Coelho DO as Consulting Physician (General Surgery)  Timbo Cha MD as Consulting Physician (Dermatology)    Medical History Review  Past Medical, Family, and Social History reviewed and does contribute to the patient presenting condition    Health Maintenance   Topic Date Due    Diabetic foot exam  Never done    Diabetic Alb to Cr ratio (uACR) test  Never done    Shingles vaccine (1 of 2) Never done    Hepatitis B vaccine (1 of 3 - Risk 3-dose series) Never done    Pneumococcal 65+ years Vaccine (2 - PCV) 11/14/2018    Low dose CT lung screening &/or counseling  12/22/2022    COVID-19 Vaccine (5 - Dasha Lesches series) 02/07/2023    Flu vaccine (1) 08/01/2023    Lipids  08/22/2023    Annual Wellness Visit (AWV)  10/07/2023    Diabetic retinal exam  12/27/2023    A1C test

## 2023-10-11 NOTE — PROGRESS NOTES
Medicare Annual Wellness Visit    Talisha Guzman is here for Medicare AWV and Immunizations (NO TO ALL VACCINES DUE GOING TO [facility])    Assessment & Plan   Medicare annual wellness visit, subsequent  Personal history of tobacco use  -     SC VISIT TO DISCUSS LUNG CA SCREEN W LDCT  -     CT Lung Screen (Initial/Annual/Baseline); Future      Discussed with the patient the current USPSTF guidelines released March 9, 2021 for screening for lung cancer. For adults aged 48 to 80 years who have a 20 pack-year smoking history and currently smoke or have quit within the past 15 years the grade B recommendation is to:  Screen for lung cancer with low-dose computed tomography (LDCT) every year. Stop screening once a person has not smoked for 15 years or has a health problem that limits life expectancy or the ability to have lung surgery. The patient  reports that he quit smoking about 7 years ago. His smoking use included cigarettes. He started smoking about 47 years ago. He has a 40.00 pack-year smoking history. He has been exposed to tobacco smoke. He has never used smokeless tobacco.. Discussed with patient the risks and benefits of screening, including over-diagnosis, false positive rate, and total radiation exposure. The patient currently exhibits no signs or symptoms suggestive of lung cancer. Discussed with patient the importance of compliance with yearly annual lung cancer screenings and willingness to undergo diagnosis and treatment if screening scan is positive. In addition, the patient was counseled regarding the importance of remaining smoke free and/or total smoking cessation. Also reviewed the following if the patient has Medicare that as of February 10, 2022, Medicare only covers LDCT screening in patients aged 53-69 with at least a 20 pack-year smoking history who currently smoke or have quit in the last 15 years  Essential hypertension  Well controlled. Continue current treatment.    Will recheck

## 2023-10-17 ENCOUNTER — TELEPHONE (OUTPATIENT)
Dept: FAMILY MEDICINE CLINIC | Age: 68
End: 2023-10-17

## 2023-10-17 DIAGNOSIS — E11.65 TYPE 2 DIABETES MELLITUS WITH HYPERGLYCEMIA, WITHOUT LONG-TERM CURRENT USE OF INSULIN (HCC): Primary | ICD-10-CM

## 2023-10-17 NOTE — TELEPHONE ENCOUNTER
Referral placed please let him know or send him Sarenza message     Diagnosis Orders   1.  Type 2 diabetes mellitus with hyperglycemia, without long-term current use of insulin ([address])  Ailyn Ornelas DPM, [place], [place]           Future Appointments   Date Time Provider [address]   11/29/2023  1:30 PM Enrico Proctor MD [facility] [place] [facility]   1/2/2024  2:15 PM Gabby Brown MD [facility]   2/13/2024  1:30 PM Vahid Kerns MD [facility]   8/21/2024 11:30 AM Florina Matos MD  derm [facility]   10/18/2024  2:00 PM Vahid Kerns MD [facility] Cristela Severs

## 2023-10-18 NOTE — TELEPHONE ENCOUNTER
10/18/2023  Patient is MY-CHART STATUS ACTIVE. A My-Chart message has been sent out to patient. Per Provider request to deliver message to patient in regard to referral information.   Future Appointments   Date Time Provider [address]  [facility]   11/13/2023 11:00 AM Iona Mina DPM [facility]   11/29/2023  1:30 PM Diane Barboza MD [facility]   1/2/2024  2:15 PM Tor Moncada MD [facility]   2/13/2024  1:30 PM Gabby Atkins MD [facility]   8/21/2024 11:30 AM Camila Velásquez MD  derm [facility]   10/18/2024  2:00 PM Gabby Atkins MD [facility] Josy Ribera

## 2023-11-02 ENCOUNTER — TELEPHONE (OUTPATIENT)
Dept: ONCOLOGY | Age: 68
End: 2023-11-02

## 2023-11-06 ENCOUNTER — HOSPITAL ENCOUNTER (OUTPATIENT)
Age: 68
Setting detail: SPECIMEN
Discharge: HOME | End: 2023-11-06

## 2023-11-06 DIAGNOSIS — E11.65 TYPE 2 DIABETES MELLITUS WITH HYPERGLYCEMIA, WITHOUT LONG-TERM CURRENT USE OF INSULIN (HCC): ICD-10-CM

## 2023-11-06 DIAGNOSIS — E78.5 HYPERLIPIDEMIA WITH TARGET LDL LESS THAN 100: ICD-10-CM

## 2023-11-06 DIAGNOSIS — I10 ESSENTIAL HYPERTENSION: ICD-10-CM

## 2023-11-06 DIAGNOSIS — D50.0 IRON DEFICIENCY ANEMIA DUE TO CHRONIC BLOOD LOSS: ICD-10-CM

## 2023-11-06 DIAGNOSIS — Z11.59 ENCOUNTER FOR SCREENING FOR OTHER VIRAL DISEASES: ICD-10-CM

## 2023-11-06 LAB
ALBUMIN SERPL-MCNC: 4.1 G/DL (ref 3.5–5.2)
ALBUMIN/GLOB SERPL: 1.4 {RATIO} (ref 1–2.5)
ALP SERPL-CCNC: 91 U/L (ref 40–129)
ALT SERPL-CCNC: 43 U/L (ref 5–41)
ANION GAP SERPL CALCULATED.3IONS-SCNC: 13 MMOL/L (ref 9–17)
AST SERPL-CCNC: 29 U/L
BASOPHILS # BLD: 0.08 K/UL (ref 0–0.2)
BASOPHILS NFR BLD: 1 % (ref 0–2)
BILIRUB SERPL-MCNC: 0.4 MG/DL (ref 0.3–1.2)
BUN SERPL-MCNC: 13 MG/DL (ref 8–23)
CALCIUM SERPL-MCNC: 9 MG/DL (ref 8.6–10.4)
CHLORIDE SERPL-SCNC: 104 MMOL/L (ref 98–107)
CHOLEST SERPL-MCNC: 124 MG/DL
CHOLESTEROL/HDL RATIO: 4.8
CO2 SERPL-SCNC: 26 MMOL/L (ref 20–31)
CREAT SERPL-MCNC: 0.8 MG/DL (ref 0.7–1.2)
EOSINOPHIL # BLD: 0.19 K/UL (ref 0–0.44)
EOSINOPHILS RELATIVE PERCENT: 2 % (ref 1–4)
ERYTHROCYTE [DISTWIDTH] IN BLOOD BY AUTOMATED COUNT: 13.2 % (ref 11.8–14.4)
EST. AVERAGE GLUCOSE BLD GHB EST-MCNC: 120 MG/DL
FOLATE SERPL-MCNC: >20 NG/ML
GFR SERPL CREATININE-BSD FRML MDRD: >60 ML/MIN/1.73M2
GLUCOSE SERPL-MCNC: 129 MG/DL (ref 70–99)
HBA1C MFR BLD: 5.8 % (ref 4–6)
HBV SURFACE AB SERPL IA-ACNC: 29.93 MIU/ML
HCT VFR BLD AUTO: 52 % (ref 40.7–50.3)
HDLC SERPL-MCNC: 26 MG/DL
HGB BLD-MCNC: 17.2 G/DL (ref 13–17)
IMM GRANULOCYTES # BLD AUTO: 0.08 K/UL (ref 0–0.3)
IMM GRANULOCYTES NFR BLD: 1 %
LDLC SERPL CALC-MCNC: 56 MG/DL (ref 0–130)
LYMPHOCYTES NFR BLD: 1.58 K/UL (ref 1.1–3.7)
LYMPHOCYTES RELATIVE PERCENT: 19 % (ref 24–43)
MAGNESIUM SERPL-MCNC: 1.9 MG/DL (ref 1.6–2.6)
MCH RBC QN AUTO: 32.4 PG (ref 25.2–33.5)
MCHC RBC AUTO-ENTMCNC: 33.1 G/DL (ref 28.4–34.8)
MCV RBC AUTO: 97.9 FL (ref 82.6–102.9)
MONOCYTES NFR BLD: 0.59 K/UL (ref 0.1–1.2)
MONOCYTES NFR BLD: 7 % (ref 3–12)
NEUTROPHILS NFR BLD: 70 % (ref 36–65)
NEUTS SEG NFR BLD: 5.71 K/UL (ref 1.5–8.1)
NRBC BLD-RTO: 0 PER 100 WBC
PLATELET # BLD AUTO: 204 K/UL (ref 138–453)
PMV BLD AUTO: 9.3 FL (ref 8.1–13.5)
POTASSIUM SERPL-SCNC: 4.3 MMOL/L (ref 3.7–5.3)
PROT SERPL-MCNC: 7.1 G/DL (ref 6.4–8.3)
RBC # BLD AUTO: 5.31 M/UL (ref 4.21–5.77)
SODIUM SERPL-SCNC: 143 MMOL/L (ref 135–144)
TRIGL SERPL-MCNC: 210 MG/DL
TSH SERPL DL<=0.05 MIU/L-ACNC: 1.62 UIU/ML (ref 0.3–5)
URATE SERPL-MCNC: 5.3 MG/DL (ref 3.4–7)
VIT B12 SERPL-MCNC: 641 PG/ML (ref 232–1245)
WBC OTHER # BLD: 8.2 K/UL (ref 3.5–11.3)

## 2023-11-07 DIAGNOSIS — R80.8 OTHER PROTEINURIA: Primary | ICD-10-CM

## 2023-11-07 DIAGNOSIS — K75.2 NONSPECIFIC REACTIVE HEPATITIS: ICD-10-CM

## 2023-11-07 LAB
CREAT UR-MCNC: 176.8 MG/DL (ref 39–259)
MICROALBUMIN UR-MCNC: 995 MG/L
MICROALBUMIN/CREAT UR-RTO: 563 MCG/MG CREAT

## 2023-11-07 NOTE — RESULT ENCOUNTER NOTE
Please notify patient: Very high level of proteins in the urine, he is already on lisinopril I will place a referral to nephrologist to look at it  He is immune against hepatitis B, does not need additional shots for hepatitis B  High triglycerides,take fish oil 2 capsules a day from over-the-counter, it is already on his list  Glucose well controlled 129  Liver test is high, which is new, I will order an ultrasound of the liver  Very high hemoglobin hematocrit which is new from 6 months ago  Improved diabetes, hemoglobin A1c 5.8  Otherwise labs within normal limits  continue current treatment    Future Appointments  11/8/2023  2:00 PM    [facility] CT RM 2 FAST SCANNER   STCZ CT SCAN        [facility] Radiolog  11/13/2023 11:00 AM   Alexander Pires DPM  [facility] [facility]          [facility]  11/29/2023 1:30 PM    Mani Jean Baptiste MD       [facility]         [facility]  1/2/2024   2:15 PM    Liset Salgado MD        [facility] TCC TOLE        AFL [facility]  2/13/2024  1:30 PM    Arie Quintana MD    [facility]               [facility]  8/21/2024  11:30 AM   Brooke Velásquez MD         derm             [facility]  10/18/2024 2:00 PM    Arie Quintana MD    [facility]               Diamond Cruz

## 2023-11-08 ENCOUNTER — HOSPITAL ENCOUNTER (OUTPATIENT)
Dept: CT IMAGING | Age: 68
Discharge: HOME | End: 2023-11-10
Attending: FAMILY MEDICINE
Payer: MEDICARE

## 2023-11-08 DIAGNOSIS — Z87.891 PERSONAL HISTORY OF TOBACCO USE: ICD-10-CM

## 2023-11-08 PROCEDURE — 71271 CT THORAX LUNG CANCER SCR C-: CPT

## 2023-11-09 DIAGNOSIS — I48.0 PAROXYSMAL ATRIAL FIBRILLATION (HCC): ICD-10-CM

## 2023-11-09 DIAGNOSIS — I25.10 CORONARY ARTERY DISEASE INVOLVING NATIVE CORONARY ARTERY OF NATIVE HEART WITHOUT ANGINA PECTORIS: ICD-10-CM

## 2023-11-09 DIAGNOSIS — I31.39 PERICARDIAL EFFUSION: Primary | ICD-10-CM

## 2023-11-09 NOTE — RESULT ENCOUNTER NOTE
Please notify patient: CT lung shows an enlarging right apical lung nodule, patient has appointment with pulmonologist, discussed the lung nodule with him, might need biopsy or PET scan      stable other lung nodules  Degenerative changes  Small fluid around the heart and coronary artery disease, he already follows with cardiologist, I will order an echo of the heart    Future Appointments  11/13/2023 11:00 AM   Ligia Sorto DPM  [facility]          [facility]  11/29/2023 1:30 PM    Brooke Manzano MD       [facility]  1/2/2024   2:15 PM    Aggie Huynh MD        AFL TCC TOLE        [facility]  2/13/2024  1:30 PM    [facility] Morrison [facility]    [facility]  8/21/2024  11:30 AM   jV Velásquez MD        [facility]             [facility]  10/18/2024 2:00 PM    Chaka Morrison MD    [facility]               Victoriano Sanchez

## 2023-11-13 ENCOUNTER — TELEPHONE (OUTPATIENT)
Dept: CASE MANAGEMENT | Age: 68
End: 2023-11-13

## 2023-11-13 ENCOUNTER — OFFICE VISIT (OUTPATIENT)
Dept: PODIATRY | Age: 68
End: 2023-11-13
Payer: MEDICARE

## 2023-11-13 VITALS — BODY MASS INDEX: 42.95 KG/M2 | HEIGHT: 70 IN | WEIGHT: 300 LBS

## 2023-11-13 DIAGNOSIS — M79.675 PAIN OF TOES OF BOTH FEET: ICD-10-CM

## 2023-11-13 DIAGNOSIS — M79.674 PAIN OF TOES OF BOTH FEET: ICD-10-CM

## 2023-11-13 DIAGNOSIS — B35.1 ONYCHOMYCOSIS OF TOENAIL: Primary | ICD-10-CM

## 2023-11-13 PROCEDURE — G8427 DOCREV CUR MEDS BY ELIG CLIN: HCPCS | Performed by: PODIATRIST

## 2023-11-13 PROCEDURE — 11721 DEBRIDE NAIL 6 OR MORE: CPT | Performed by: PODIATRIST

## 2023-11-13 PROCEDURE — 1036F TOBACCO NON-USER: CPT | Performed by: PODIATRIST

## 2023-11-13 PROCEDURE — 1123F ACP DISCUSS/DSCN MKR DOCD: CPT | Performed by: PODIATRIST

## 2023-11-13 PROCEDURE — G8417 CALC BMI ABV UP PARAM F/U: HCPCS | Performed by: PODIATRIST

## 2023-11-13 PROCEDURE — 99202 OFFICE O/P NEW SF 15 MIN: CPT | Performed by: PODIATRIST

## 2023-11-13 PROCEDURE — G8484 FLU IMMUNIZE NO ADMIN: HCPCS | Performed by: PODIATRIST

## 2023-11-13 PROCEDURE — 3017F COLORECTAL CA SCREEN DOC REV: CPT | Performed by: PODIATRIST

## 2023-11-13 ASSESSMENT — ENCOUNTER SYMPTOMS
COLOR CHANGE: 0
SHORTNESS OF BREATH: 0
DIARRHEA: 0
NAUSEA: 0
BACK PAIN: 0

## 2023-11-13 NOTE — PROGRESS NOTES
Packs/day: 1.00     Years: 40.00     Additional pack years: 0.00     Total pack years: 40.00     Types: Cigarettes     Start date: 12     Quit date: 2016     Years since quittin.6     Passive exposure: Past    Smokeless tobacco: Never   Substance Use Topics    Alcohol use: Yes     Alcohol/week: 5.0 standard drinks of alcohol     Types: 5 Cans of beer per week     Comment: per week       Review of Systems   Constitutional:  Negative for activity change, appetite change, chills, diaphoresis, fatigue and fever. Respiratory:  Negative for shortness of breath. Cardiovascular:  Negative for leg swelling. Gastrointestinal:  Negative for diarrhea and nausea. Endocrine: Negative for cold intolerance, heat intolerance and polyuria. Musculoskeletal:  Positive for arthralgias. Negative for back pain, gait problem, joint swelling and myalgias. Skin:  Negative for color change, pallor, rash and wound. Allergic/Immunologic: Negative for environmental allergies and food allergies. Neurological:  Negative for dizziness, weakness, light-headedness and numbness. Hematological:  Does not bruise/bleed easily. Psychiatric/Behavioral:  Negative for behavioral problems, confusion and self-injury. The patient is not nervous/anxious. Vitals: There were no vitals filed for this visit. General: AAO x 3 in NAD. Integument: There are no rashes, ulcers, or breaks in the skin noted to the bilateral lower extremities. There is no induration, subcutaneous nodules, or tightening of the skin noted to the bilateral.     Toenails 1-5 of the right foot do present with thickness, elongation, discoloration, brittleness, subungual debris. Toenails 1-5 of the left foot do present with thickness, elongation, discoloration, brittleness, subungual debris. There is pain with palpation and debridement of toenails 1-5 of the right foot and 1-5 of the left foot.     Interdigital maceration absent to web

## 2023-11-29 ENCOUNTER — OFFICE VISIT (OUTPATIENT)
Dept: PULMONOLOGY | Age: 68
End: 2023-11-29
Payer: MEDICARE

## 2023-11-29 VITALS
BODY MASS INDEX: 43.95 KG/M2 | TEMPERATURE: 97.2 F | WEIGHT: 307 LBS | SYSTOLIC BLOOD PRESSURE: 138 MMHG | RESPIRATION RATE: 12 BRPM | HEIGHT: 70 IN | HEART RATE: 80 BPM | DIASTOLIC BLOOD PRESSURE: 86 MMHG | OXYGEN SATURATION: 98 %

## 2023-11-29 DIAGNOSIS — R91.1 LUNG NODULE: Primary | ICD-10-CM

## 2023-11-29 PROCEDURE — G8484 FLU IMMUNIZE NO ADMIN: HCPCS | Performed by: INTERNAL MEDICINE

## 2023-11-29 PROCEDURE — 99214 OFFICE O/P EST MOD 30 MIN: CPT | Performed by: INTERNAL MEDICINE

## 2023-11-29 PROCEDURE — G8417 CALC BMI ABV UP PARAM F/U: HCPCS | Performed by: INTERNAL MEDICINE

## 2023-11-29 PROCEDURE — 3017F COLORECTAL CA SCREEN DOC REV: CPT | Performed by: INTERNAL MEDICINE

## 2023-11-29 PROCEDURE — 1036F TOBACCO NON-USER: CPT | Performed by: INTERNAL MEDICINE

## 2023-11-29 PROCEDURE — 3074F SYST BP LT 130 MM HG: CPT | Performed by: INTERNAL MEDICINE

## 2023-11-29 PROCEDURE — 1123F ACP DISCUSS/DSCN MKR DOCD: CPT | Performed by: INTERNAL MEDICINE

## 2023-11-29 PROCEDURE — G8427 DOCREV CUR MEDS BY ELIG CLIN: HCPCS | Performed by: INTERNAL MEDICINE

## 2023-11-29 PROCEDURE — 3078F DIAST BP <80 MM HG: CPT | Performed by: INTERNAL MEDICINE

## 2023-11-29 RX ORDER — ALBUTEROL SULFATE 90 UG/1
2 AEROSOL, METERED RESPIRATORY (INHALATION) 4 TIMES DAILY PRN
Qty: 54 G | Refills: 1 | Status: SHIPPED | OUTPATIENT
Start: 2023-11-29

## 2023-11-29 NOTE — PROGRESS NOTES
REASON FOR THE CONSULTATION:  COPD  Right lung nodule  Obesity  Hypertension   obstructive sleep apnea syndrome        HISTORY OF PRESENT ILLNESS: Patient is known to have chronic obstructive lung disease due to chronic    Edema secondary to prior smoking. He has given up smoking 8 years back. Pulm status is stable although he does complain of effort dyspnea. He is not taking any bronchodilators. No evidence of exacerbation of COPD no yellow sputum no hemoptysis no chest pain no pedal edema    He is obese he has obstructive sleep apnea syndrome that is being treated with CPAP that he uses regularly every night. Does not feel sleepy tired fatigue in the daytime. His blood pressure is under good control. He continues to be obese and has not been able to lose much weight. He also been noted to have lung nodules which have been stable I reviewed his recent CT scan comparing with previous 1 and there is only 1 mm change. I do not think is significant. There are no large lymph nodes no pleural effusion. He already had COVID-vaccine. Isaac Munoz He also flu vaccine Pneumovax.       Cancer screening    CRITERIA MET    [x]     CT ORDERED  []      CRITERIA NOT MET   []      REFUSED                    []        REASON CRITERIA NOT MET     SMOKING LESS THAN 30 PY  []      AGE LESS THAN 55 or GREATER 77 YEARS  []      QUIT SMOKING 15 YEARS OR GREATER   []      RECENT CT WITH IN 11 MONTHS    []      LIFE EXPECTANCY < 5 YEARS   []      SIGNS  AND SYMPTOMS OF LUNG CANCER   []         Immunization   Immunization History   Administered Date(s) Administered    COVID-19, MODERNA BLUE border, Primary or Immunocompromised, (age 12y+), IM, 100 mcg/0.5mL 02/26/2021, 03/27/2021, 11/04/2021    COVID-19, MODERNA Bivalent, (age 12y+), IM, 48 mcg/0.5 mL 10/07/2022    COVID-19, Raad Goldberg, (2023-24 formula), (age 12y+), IM, 50mcg/0.5mL 10/13/2023    Influenza Vaccine, unspecified formulation 10/26/2016    Influenza, FLUAD, (age 72 y+),
,JVD not elevated    Back:     Symmetric, no curvature, ROM normal, no CVA tenderness   Lungs:     AP diameter is increased. Percussion note is hyperresonant expiration prolonged. No rales, rhonchi are audible. No pleural friction rub is audible    Chest Wall:    No tenderness or deformity      Heart:    Regular rate and rhythm, S1 and S2 normal, no murmur, rub        or gallop no rvh                           Abdomen:                                                 Pulses:                              Skin:                  Lymph nodes:                    Neurologic:                  Soft, non-tender, bowel sounds active all four quadrants,     no masses, no organomegaly         2+ and symmetric all extremities     Skin color, texture, turgor normal, no rashes or lesions       Cervical, supraclavicular not enlarged or matted or tender      CNII-XII intact, normal strength 5/5 . Sensation grossly normal  and reflexes normal 2+  throughout     Clubbing No  Lower ext edema . Negative  Upper ext edema . Absent         Musculoskeletal no synovitis. No joint swelling or tenderness          Thyroid:   Lab Results   Component Value Date/Time    TSH 1.62 11/06/2023 08:54 AM     Urinalysis: No results for input(s): \"BACTERIA\", \"BLOODU\", \"CLARITYU\", \"COLORU\", \"PHUR\", \"PROTEINU\", \"RBCUA\", \"SPECGRAV\", \"BILIRUBINUR\", \"NITRU\", \"WBCUA\", \"LEUKOCYTESUR\", \"GLUCOSEU\" in the last 72 hours. CXR  No recent chest x-ray  CT Scans  . CT scan reviewed going back to 2019 the nodule in the right upper lobe is unchanged and has been PET negative.   We will get repeat CT scan done next visit which nodule is unchanged in the CGM 22  Echo    No recent echo            IMPRESSION:    COPD no acute exacerbation  Hypertension  Possible cor pulmonale  Morbid obesity  Sleep apnea syndrome  History of carcinoma of the prostate treated with prostatectomy  Lung nodule PET negative  :                PLAN: Pulmonary status is stable no

## 2023-12-01 ENCOUNTER — TELEPHONE (OUTPATIENT)
Dept: PULMONOLOGY | Age: 68
End: 2023-12-01

## 2023-12-01 NOTE — TELEPHONE ENCOUNTER
Patient informed not covered and a new script that is covered on insurance will be sent in to the pharmacy. Pt and wife verbalized understanding.

## 2023-12-01 NOTE — TELEPHONE ENCOUNTER
Dr. Carroll Lies insurance has DENIED Spiriva Respimat for this patient. Pt has to try and fail 2 formularies that are on the insurance plan. Please place a script for one of the following inhalers that are covered. Anoro, Advair Diskus, Breo 100 or 200, Striverdi.

## 2023-12-07 RX ORDER — UMECLIDINIUM BROMIDE AND VILANTEROL TRIFENATATE 62.5; 25 UG/1; UG/1
1 POWDER RESPIRATORY (INHALATION) DAILY
Qty: 1 EACH | Refills: 4 | Status: SHIPPED | OUTPATIENT
Start: 2023-12-07

## 2023-12-14 ENCOUNTER — HOSPITAL ENCOUNTER (OUTPATIENT)
Dept: ULTRASOUND IMAGING | Age: 68
Discharge: HOME | End: 2023-12-16
Attending: FAMILY MEDICINE
Payer: MEDICARE

## 2023-12-14 ENCOUNTER — TELEPHONE (OUTPATIENT)
Dept: FAMILY MEDICINE CLINIC | Age: 68
End: 2023-12-14

## 2023-12-14 ENCOUNTER — HOSPITAL ENCOUNTER (OUTPATIENT)
Age: 68
Discharge: HOME | End: 2023-12-16
Attending: FAMILY MEDICINE
Payer: MEDICARE

## 2023-12-14 DIAGNOSIS — I48.0 PAROXYSMAL ATRIAL FIBRILLATION (HCC): ICD-10-CM

## 2023-12-14 DIAGNOSIS — I50.42 CHRONIC COMBINED SYSTOLIC AND DIASTOLIC CHF (CONGESTIVE HEART FAILURE) (HCC): Primary | ICD-10-CM

## 2023-12-14 DIAGNOSIS — I25.10 CORONARY ARTERY DISEASE INVOLVING NATIVE CORONARY ARTERY OF NATIVE HEART WITHOUT ANGINA PECTORIS: ICD-10-CM

## 2023-12-14 DIAGNOSIS — K75.2 NONSPECIFIC REACTIVE HEPATITIS: ICD-10-CM

## 2023-12-14 DIAGNOSIS — I31.39 PERICARDIAL EFFUSION: ICD-10-CM

## 2023-12-14 PROBLEM — N18.1 CKD (CHRONIC KIDNEY DISEASE), STAGE I: Status: ACTIVE | Noted: 2023-12-14

## 2023-12-14 LAB
ECHO AO ROOT DIAM: 3.3 CM
ECHO AV AREA PEAK VELOCITY: 2.6 CM2
ECHO AV AREA VTI: 2.4 CM2
ECHO AV MEAN GRADIENT: 3 MMHG
ECHO AV MEAN VELOCITY: 0.9 M/S
ECHO AV PEAK GRADIENT: 6 MMHG
ECHO AV PEAK VELOCITY: 1.3 M/S
ECHO AV VELOCITY RATIO: 0.77
ECHO AV VTI: 26.4 CM
ECHO EST RA PRESSURE: 8 MMHG
ECHO LA AREA 2C: 32.1 CM2
ECHO LA AREA 4C: 32.2 CM2
ECHO LA DIAMETER: 5.6 CM
ECHO LA MAJOR AXIS: 7.1 CM
ECHO LA MINOR AXIS: 6.8 CM
ECHO LA TO AORTIC ROOT RATIO: 1.7
ECHO LA VOL BP: 122 ML (ref 18–58)
ECHO LA VOL MOD A2C: 123 ML (ref 18–58)
ECHO LA VOL MOD A4C: 116 ML (ref 18–58)
ECHO LV EDV A2C: 92 ML
ECHO LV EDV A4C: 102 ML
ECHO LV EJECTION FRACTION A2C: 45 %
ECHO LV EJECTION FRACTION A4C: 53 %
ECHO LV EJECTION FRACTION BIPLANE: 49 % (ref 55–100)
ECHO LV ESV A2C: 50 ML
ECHO LV ESV A4C: 48 ML
ECHO LV FRACTIONAL SHORTENING: 33 % (ref 28–44)
ECHO LV INTERNAL DIMENSION DIASTOLIC: 5.2 CM (ref 4.2–5.9)
ECHO LV INTERNAL DIMENSION SYSTOLIC: 3.5 CM
ECHO LV IVSD: 1.1 CM (ref 0.6–1)
ECHO LV MASS 2D: 220.8 G (ref 88–224)
ECHO LV POSTERIOR WALL DIASTOLIC: 1.1 CM (ref 0.6–1)
ECHO LV RELATIVE WALL THICKNESS RATIO: 0.42
ECHO LVOT AREA: 3.1 CM2
ECHO LVOT AV VTI INDEX: 0.77
ECHO LVOT DIAM: 2 CM
ECHO LVOT MEAN GRADIENT: 2 MMHG
ECHO LVOT PEAK GRADIENT: 4 MMHG
ECHO LVOT PEAK VELOCITY: 1 M/S
ECHO LVOT SV: 64.1 ML
ECHO LVOT VTI: 20.4 CM
ECHO MV AREA VTI: 2.2 CM2
ECHO MV E DECELERATION TIME (DT): 141 MS
ECHO MV E VELOCITY: 1.12 M/S
ECHO MV LVOT VTI INDEX: 1.43
ECHO MV MAX VELOCITY: 1.3 M/S
ECHO MV MEAN GRADIENT: 2 MMHG
ECHO MV MEAN VELOCITY: 0.7 M/S
ECHO MV PEAK GRADIENT: 6 MMHG
ECHO MV VTI: 29.2 CM
ECHO RA AREA 4C: 25.7 CM2
ECHO RA VOLUME: 84 ML
ECHO RIGHT VENTRICULAR SYSTOLIC PRESSURE (RVSP): 49 MMHG
ECHO RV TAPSE: 1.4 CM (ref 1.7–?)
ECHO TV REGURGITANT MAX VELOCITY: 3.19 M/S
ECHO TV REGURGITANT PEAK GRADIENT: 41 MMHG
LEFT VENTRICULAR EJECTION FRACTION MODE: NORMAL
LV EF: 49 % (ref 49–?)

## 2023-12-14 PROCEDURE — 76705 ECHO EXAM OF ABDOMEN: CPT

## 2023-12-14 PROCEDURE — 93308 TTE F-UP OR LMTD: CPT

## 2023-12-14 NOTE — TELEPHONE ENCOUNTER
Echo 2D ejection fraction 45 to 50% mildly decreased, updated diagnosis     Diagnosis Orders   1.  Chronic combined systolic and diastolic CHF (congestive heart failure) ([address] [facility])

## 2023-12-15 DIAGNOSIS — I25.10 CORONARY ARTERY DISEASE INVOLVING NATIVE CORONARY ARTERY OF NATIVE HEART WITHOUT ANGINA PECTORIS: ICD-10-CM

## 2023-12-15 DIAGNOSIS — I10 ESSENTIAL HYPERTENSION: ICD-10-CM

## 2023-12-15 PROBLEM — K80.20 GALLSTONES: Status: ACTIVE | Noted: 2023-12-15

## 2023-12-15 RX ORDER — FUROSEMIDE 40 MG/1
40 TABLET ORAL DAILY
Qty: 90 TABLET | Refills: 3 | Status: SHIPPED | OUTPATIENT
Start: 2023-12-15

## 2023-12-15 NOTE — RESULT ENCOUNTER NOTE
Please notify patient: Ultrasound of the liver shows gallstones   If any right upper quadrant pain, let me know and we will refer to surgeon to have the gallbladder removed otherwise avoid fried food, fatty foods, heavy meals, more than 1 egg at the time, alcohol and pop, and if right upper quadrant pain develops, then to let us know right away, if severe right upper quadrant pain develops, then go to ER      Lab Results       Component                Value               Date                       ALT                      43 (H)              11/06/2023                 AST                      29                  11/06/2023                 ALKPHOS                  91                  11/06/2023                 BILITOT                  0.4                 11/06/2023              Future Appointments  1/2/2024   2:15 PM    Juan Nova MD        [facility] TCC TOLE        [facility] FRANCOIS C  1/29/2024  1:00 PM    Stacie Norris MD  [facility] RenalSrv        AFL Renal Se  2/12/2024  10:45 AM   Genna Garcia [facility] Pod          [facility]  2/13/2024  1:30 PM    Ariana Hutchinson MD    [facility]               [facility]  5/13/2024  11:15 AM   STC CT RM 1                STCZ CT SCAN        [address] [facility] Radiolog  5/29/2024  1:00 PM    Sarah Beth Phillips MD       [facility]         [facility]  8/21/2024  11:30 AM   Ida Velásquez MD        [facility]             [facility]  10/18/2024 2:00 PM    Ariana Hutchinson MD    [facility]               [address] [facility]

## 2023-12-15 NOTE — RESULT ENCOUNTER NOTE
Please notify patient: New prescription for furosemide 40 Mg sent to the pharmacy, to do the blood work within 1 week of increased dosage    If worsening shortness of breath, chest pain, or any new symptoms, to go to ER otherwise continue current treatment and do the labs in about 1 week        Future Appointments  1/2/2024   2:15 PM    Rosalee Winter MD        [facility] TCC TOLE        AFL FRANCOIS C  1/29/2024  1:00 PM    Yifan Norris MD  [facility] RenalSrv        AFL Renal Se  2/12/2024  10:45 AM   Pepe Miranda [facility] Pod          [facility]  2/13/2024  1:30 PM    Clifton Solis MD    [facility]               [facility]  5/13/2024  11:15 AM   [facility] CT RM 1                ST CT SCAN        [address] [facility] Radiolog  5/29/2024  1:00 PM    Shimon Avila MD       [facility]         [facility]  8/21/2024  11:30 AM   Alejandrina Calzada MD        [facility]             [facility]  10/18/2024 2:00 PM    Clifton Solis MD    [facility]               Hasmukh Read

## 2023-12-15 NOTE — TELEPHONE ENCOUNTER
Please see result note  Has appointment with cardiology    Future Appointments   Date Time Provider [address]   1/2/2024  2:15 PM Zain Still MD [facility] TCC TOLE [facility] FRANCOIS C   1/29/2024  1:00 PM Ayaka Norris MD [facility] RenalSrv AFL Renal Se   2/12/2024 10:45 AM Kailee Smith DPM [place] Pod [facility]   2/13/2024  1:30 PM Jared Hernández MD [facility]   5/13/2024 11:15 AM ST CT RM 1 STCZ CT SCAN [facility] Radiolog   5/29/2024  1:00 PM Sara Kay MD [facility]   8/21/2024 11:30 AM Diana Leon MD [facility]   10/18/2024  2:00 PM Jared Hernández MD [facility] Melody France

## 2023-12-15 NOTE — PROGRESS NOTES
Diagnosis Orders   1. Essential hypertension  furosemide (LASIX) 40 MG tablet      2.  Coronary artery disease involving native coronary artery of native heart without angina pectoris  furosemide (LASIX) 40 MG tablet           Future Appointments   Date Time Provider [address]   1/2/2024  2:15 PM Nadeem Chua MD [facility] TCC TOLE AFL FRANCOIS C   1/29/2024  1:00 PM Thierno Norris MD [facility] RenalSrv AFL Renal Se   2[facility]12/2024 10:[facility]   2/13/2024  1:30 PM [facility]   5/13/2024 11:15 AM ST CT RM 1 STCZ CT SCAN [facility] Radiolog   5/29/2024  1:00 PM Kimberly Stokes MD [facility]   8/21/2024 11:30 AM Kacey Hammer MD [facility]   10/18/2024  2:00 PM Berta Cronin MD [facility]

## 2024-01-04 ENCOUNTER — HOSPITAL ENCOUNTER (OUTPATIENT)
Age: 69
Setting detail: SPECIMEN
Discharge: HOME | End: 2024-01-04

## 2024-01-04 DIAGNOSIS — R80.9 MICROALBUMINURIA: ICD-10-CM

## 2024-01-04 DIAGNOSIS — E13.22 SECONDARY DIABETES MELLITUS WITH STAGE 1 CHRONIC KIDNEY DISEASE (HCC): ICD-10-CM

## 2024-01-04 DIAGNOSIS — C61 MALIGNANT NEOPLASM OF PROSTATE (HCC): Primary | ICD-10-CM

## 2024-01-04 DIAGNOSIS — N18.1 BENIGN HYPERTENSION WITH CKD (CHRONIC KIDNEY DISEASE) STAGE I: ICD-10-CM

## 2024-01-04 DIAGNOSIS — C61 MALIGNANT NEOPLASM OF PROSTATE (HCC): ICD-10-CM

## 2024-01-04 DIAGNOSIS — I12.9 BENIGN HYPERTENSION WITH CKD (CHRONIC KIDNEY DISEASE) STAGE I: ICD-10-CM

## 2024-01-04 DIAGNOSIS — N18.1 SECONDARY DIABETES MELLITUS WITH STAGE 1 CHRONIC KIDNEY DISEASE (HCC): ICD-10-CM

## 2024-01-04 DIAGNOSIS — N18.1 CKD (CHRONIC KIDNEY DISEASE) STAGE 1, GFR 90 ML/MIN OR GREATER: ICD-10-CM

## 2024-01-04 LAB
BACTERIA URNS QL MICRO: ABNORMAL
BASOPHILS # BLD: 0.08 K/UL (ref 0–0.2)
BASOPHILS NFR BLD: 1 % (ref 0–2)
BILIRUB UR QL STRIP: NEGATIVE
CASTS #/AREA URNS LPF: ABNORMAL /LPF (ref 0–8)
CLARITY UR: CLEAR
COLOR UR: YELLOW
CREAT UR-MCNC: 149.8 MG/DL (ref 39–259)
EOSINOPHIL # BLD: 0.18 K/UL (ref 0–0.44)
EOSINOPHILS RELATIVE PERCENT: 2 % (ref 1–4)
EPI CELLS #/AREA URNS HPF: ABNORMAL /HPF (ref 0–5)
ERYTHROCYTE [DISTWIDTH] IN BLOOD BY AUTOMATED COUNT: 13.7 % (ref 11.8–14.4)
GLUCOSE UR STRIP-MCNC: NEGATIVE MG/DL
HCT VFR BLD AUTO: 51.3 % (ref 40.7–50.3)
HGB BLD-MCNC: 16.7 G/DL (ref 13–17)
HGB UR QL STRIP.AUTO: NEGATIVE
IMM GRANULOCYTES # BLD AUTO: 0.1 K/UL (ref 0–0.3)
IMM GRANULOCYTES NFR BLD: 1 %
KETONES UR STRIP-MCNC: ABNORMAL MG/DL
LEUKOCYTE ESTERASE UR QL STRIP: NEGATIVE
LYMPHOCYTES NFR BLD: 1.4 K/UL (ref 1.1–3.7)
LYMPHOCYTES RELATIVE PERCENT: 18 % (ref 24–43)
MCH RBC QN AUTO: 32.7 PG (ref 25.2–33.5)
MCHC RBC AUTO-ENTMCNC: 32.6 G/DL (ref 28.4–34.8)
MCV RBC AUTO: 100.4 FL (ref 82.6–102.9)
MICROALBUMIN UR-MCNC: 97 MG/L
MICROALBUMIN/CREAT UR-RTO: 65 MCG/MG CREAT
MONOCYTES NFR BLD: 0.6 K/UL (ref 0.1–1.2)
MONOCYTES NFR BLD: 8 % (ref 3–12)
NEUTROPHILS NFR BLD: 70 % (ref 36–65)
NEUTS SEG NFR BLD: 5.31 K/UL (ref 1.5–8.1)
NITRITE UR QL STRIP: NEGATIVE
NRBC BLD-RTO: 0 PER 100 WBC
PH UR STRIP: 5.5 [PH] (ref 5–8)
PLATELET # BLD AUTO: 195 K/UL (ref 138–453)
PMV BLD AUTO: 9.5 FL (ref 8.1–13.5)
PROT UR STRIP-MCNC: ABNORMAL MG/DL
RBC # BLD AUTO: 5.11 M/UL (ref 4.21–5.77)
RBC #/AREA URNS HPF: ABNORMAL /HPF (ref 0–4)
SP GR UR STRIP: 1.02 (ref 1–1.03)
UROBILINOGEN UR STRIP-ACNC: NORMAL EU/DL (ref 0–1)
WBC #/AREA URNS HPF: ABNORMAL /HPF (ref 0–5)
WBC OTHER # BLD: 7.7 K/UL (ref 3.5–11.3)

## 2024-01-05 LAB
ANION GAP SERPL CALCULATED.3IONS-SCNC: 15 MMOL/L (ref 9–17)
BUN SERPL-MCNC: 17 MG/DL (ref 8–23)
CALCIUM SERPL-MCNC: 9.3 MG/DL (ref 8.6–10.4)
CHLORIDE SERPL-SCNC: 100 MMOL/L (ref 98–107)
CO2 SERPL-SCNC: 22 MMOL/L (ref 20–31)
CREAT SERPL-MCNC: 0.7 MG/DL (ref 0.7–1.2)
GFR SERPL CREATININE-BSD FRML MDRD: >60 ML/MIN/1.73M2
GLUCOSE SERPL-MCNC: 144 MG/DL (ref 70–99)
MAGNESIUM SERPL-MCNC: 2.2 MG/DL (ref 1.6–2.6)
PHOSPHATE SERPL-MCNC: 3.2 MG/DL (ref 2.5–4.5)
POTASSIUM SERPL-SCNC: 5.2 MMOL/L (ref 3.7–5.3)
PSA SERPL-MCNC: <0.02 NG/ML
SODIUM SERPL-SCNC: 137 MMOL/L (ref 135–144)

## 2024-01-18 ENCOUNTER — HOSPITAL ENCOUNTER (OUTPATIENT)
Dept: ULTRASOUND IMAGING | Age: 69
Discharge: HOME | End: 2024-01-20
Attending: INTERNAL MEDICINE
Payer: MEDICARE

## 2024-01-18 DIAGNOSIS — R80.8 OTHER PROTEINURIA: ICD-10-CM

## 2024-01-18 DIAGNOSIS — N18.1 SECONDARY DIABETES MELLITUS WITH STAGE 1 CHRONIC KIDNEY DISEASE (HCC): ICD-10-CM

## 2024-01-18 DIAGNOSIS — R80.9 MICROALBUMINURIA: ICD-10-CM

## 2024-01-18 DIAGNOSIS — I12.9 BENIGN HYPERTENSION WITH CKD (CHRONIC KIDNEY DISEASE) STAGE I: ICD-10-CM

## 2024-01-18 DIAGNOSIS — E13.22 SECONDARY DIABETES MELLITUS WITH STAGE 1 CHRONIC KIDNEY DISEASE (HCC): ICD-10-CM

## 2024-01-18 DIAGNOSIS — N18.1 CKD (CHRONIC KIDNEY DISEASE) STAGE 1, GFR 90 ML/MIN OR GREATER: ICD-10-CM

## 2024-01-18 DIAGNOSIS — E87.5 HYPERKALEMIA: ICD-10-CM

## 2024-01-18 DIAGNOSIS — N18.1 BENIGN HYPERTENSION WITH CKD (CHRONIC KIDNEY DISEASE) STAGE I: ICD-10-CM

## 2024-01-18 PROCEDURE — 76770 US EXAM ABDO BACK WALL COMP: CPT

## 2024-02-12 ENCOUNTER — OFFICE VISIT (OUTPATIENT)
Dept: PODIATRY | Age: 69
End: 2024-02-12

## 2024-02-12 VITALS — WEIGHT: 304 LBS | HEIGHT: 70 IN | BODY MASS INDEX: 43.52 KG/M2

## 2024-02-12 DIAGNOSIS — M79.675 PAIN OF TOES OF BOTH FEET: ICD-10-CM

## 2024-02-12 DIAGNOSIS — B35.1 ONYCHOMYCOSIS OF TOENAIL: Primary | ICD-10-CM

## 2024-02-12 DIAGNOSIS — M79.674 PAIN OF TOES OF BOTH FEET: ICD-10-CM

## 2024-02-15 ENCOUNTER — OFFICE VISIT (OUTPATIENT)
Dept: FAMILY MEDICINE CLINIC | Age: 69
End: 2024-02-15

## 2024-02-15 VITALS
DIASTOLIC BLOOD PRESSURE: 76 MMHG | TEMPERATURE: 96.4 F | HEIGHT: 70 IN | HEART RATE: 74 BPM | OXYGEN SATURATION: 98 % | WEIGHT: 306.6 LBS | BODY MASS INDEX: 43.89 KG/M2 | SYSTOLIC BLOOD PRESSURE: 116 MMHG

## 2024-02-15 DIAGNOSIS — F33.41 RECURRENT MAJOR DEPRESSIVE DISORDER, IN PARTIAL REMISSION (HCC): ICD-10-CM

## 2024-02-15 DIAGNOSIS — E11.65 TYPE 2 DIABETES MELLITUS WITH HYPERGLYCEMIA, WITHOUT LONG-TERM CURRENT USE OF INSULIN (HCC): Primary | ICD-10-CM

## 2024-02-15 DIAGNOSIS — J44.9 CHRONIC OBSTRUCTIVE PULMONARY DISEASE, UNSPECIFIED COPD TYPE (HCC): ICD-10-CM

## 2024-02-15 DIAGNOSIS — I10 ESSENTIAL HYPERTENSION: ICD-10-CM

## 2024-02-15 DIAGNOSIS — E66.01 OBESITY, CLASS III, BMI 40-49.9 (MORBID OBESITY) (HCC): ICD-10-CM

## 2024-02-15 LAB — HBA1C MFR BLD: 6.4 %

## 2024-02-15 RX ORDER — METFORMIN HYDROCHLORIDE 500 MG/1
500 TABLET, EXTENDED RELEASE ORAL
Qty: 90 TABLET | Refills: 3 | Status: SHIPPED | OUTPATIENT
Start: 2024-02-15

## 2024-02-15 NOTE — PROGRESS NOTES
Visit Information    Have you changed or started any medications since your last visit including any over-the-counter medicines, vitamins, or herbal medicines? no   Have you stopped taking any of your medications? Is so, why? -  no  Are you having any side effects from any of your medications? - no    Have you seen any other physician or provider since your last visit?  yes    Have you had any other diagnostic tests since your last visit?  no   Have you been seen in the emergency room and/or had an admission in a hospital since we last saw you?  no   Have you had your routine dental cleaning in the past 6 months?  yes      Do you have an active MyChart account? If no, what is the barrier?  Yes    Patient Care Team:  Anamaria Marino MD as PCP - General (Family Medicine)  Anamaria Marino MD as PCP - Empaneled Provider  Skip Freitas MD as Consulting Physician (Urology)  Julio Arango MD as Consulting Physician (Pulmonology)  Emilia Bonilla MD as Consulting Physician (Cardiology)  He Becker MD as Consulting Physician (Internal Medicine Cardiovascular Disease)  Shannon Velásquez MD as Consulting Physician (Dermatology)  Johnny Hendrickson MD as Consulting Physician (Cardiology)  Shannon Velásquez MD as Consulting Physician (Dermatology)    Medical History Review  Past Medical, Family, and Social History reviewed and does contribute to the patient presenting condition    Health Maintenance   Topic Date Due    Shingles vaccine (1 of 2) Never done    Respiratory Syncytial Virus (RSV) Pregnant or age 60 yrs+ (1 - 1-dose 60+ series) Never done    Pneumococcal 65+ years Vaccine (2 - PCV) 11/14/2018    Diabetic retinal exam  12/27/2023    Diabetic foot exam  10/11/2024    Depression Monitoring  10/11/2024    Annual Wellness Visit (Medicare)  10/11/2024    A1C test (Diabetic or Prediabetic)  11/06/2024    Lipids  11/06/2024    Low dose CT lung screening &/or counseling  11/08/2024    Diabetic Alb to Cr 
     General: No tenderness. Normal range of motion.      Cervical back: Normal range of motion and neck supple.      Right lower leg: No edema.      Left lower leg: No edema.   Lymphadenopathy:      Cervical: No cervical adenopathy.   Skin:     General: Skin is warm and dry.      Capillary Refill: Capillary refill takes less than 2 seconds.      Findings: No rash.   Neurological:      General: No focal deficit present.      Mental Status: He is alert and oriented to person, place, and time.      Cranial Nerves: No cranial nerve deficit.      Sensory: No sensory deficit.      Motor: No abnormal muscle tone.      Coordination: Coordination normal.      Gait: Gait normal.      Deep Tendon Reflexes: Reflexes are normal and symmetric. Reflexes normal.   Psychiatric:         Attention and Perception: Attention normal.         Mood and Affect: Mood normal.         Speech: Speech normal.         Behavior: Behavior normal.         Thought Content: Thought content normal.         Cognition and Memory: Cognition normal.         Judgment: Judgment normal.         I personally reviewed testing with patient, and all questions answered.      Lab Results   Component Value Date    WBC 7.7 01/04/2024    HGB 16.7 01/04/2024    HCT 51.3 (H) 01/04/2024    .4 01/04/2024     01/04/2024       Lab Results   Component Value Date/Time     01/04/2024 08:45 AM    K 5.2 01/04/2024 08:45 AM     01/04/2024 08:45 AM    CO2 22 01/04/2024 08:45 AM    BUN 17 01/04/2024 08:45 AM    CREATININE 0.7 01/04/2024 08:45 AM    GLUCOSE 144 01/04/2024 08:45 AM    CALCIUM 9.3 01/04/2024 08:45 AM        Lab Results   Component Value Date    ALT 43 (H) 11/06/2023    AST 29 11/06/2023    ALKPHOS 91 11/06/2023    BILITOT 0.4 11/06/2023       Lab Results   Component Value Date    TSH 1.62 11/06/2023       Lab Results   Component Value Date    CHOL 124 11/06/2023    CHOL 106 08/22/2022    CHOL 105 06/18/2021     Lab Results   Component Value

## 2024-02-16 ENCOUNTER — TELEPHONE (OUTPATIENT)
Dept: SURGERY | Age: 69
End: 2024-02-16

## 2024-03-12 RX ORDER — UMECLIDINIUM BROMIDE AND VILANTEROL TRIFENATATE 62.5; 25 UG/1; UG/1
1 POWDER RESPIRATORY (INHALATION) DAILY
Qty: 3 EACH | Refills: 2 | Status: SHIPPED | OUTPATIENT
Start: 2024-03-12

## 2024-03-12 NOTE — TELEPHONE ENCOUNTER
LAST VISIT: 11/29/23  NEXT VISIT: 5/29/24    Per telephone encounter from 12/1/23 patient is on this medication. Please sign for refill if ok. Thank you.

## 2024-04-10 ENCOUNTER — OFFICE VISIT (OUTPATIENT)
Age: 69
End: 2024-04-10
Payer: MEDICARE

## 2024-04-10 VITALS — BODY MASS INDEX: 43.81 KG/M2 | WEIGHT: 306 LBS | HEIGHT: 70 IN

## 2024-04-10 DIAGNOSIS — N52.31 ERECTILE DYSFUNCTION AFTER RADICAL PROSTATECTOMY: ICD-10-CM

## 2024-04-10 DIAGNOSIS — N28.1 RENAL CYST, RIGHT: Primary | ICD-10-CM

## 2024-04-10 DIAGNOSIS — Z85.46 PERSONAL HISTORY OF PROSTATE CANCER: ICD-10-CM

## 2024-04-10 LAB
BILIRUBIN, POC: NORMAL
BLOOD URINE, POC: NORMAL
CLARITY, POC: CLEAR
COLOR, POC: YELLOW
GLUCOSE URINE, POC: NORMAL
KETONES, POC: NORMAL
LEUKOCYTE EST, POC: NORMAL
NITRITE, POC: NORMAL
PH, POC: NORMAL
PROTEIN, POC: NORMAL
SPECIFIC GRAVITY, POC: NORMAL
UROBILINOGEN, POC: NORMAL

## 2024-04-10 PROCEDURE — G8417 CALC BMI ABV UP PARAM F/U: HCPCS | Performed by: SPECIALIST

## 2024-04-10 PROCEDURE — G8427 DOCREV CUR MEDS BY ELIG CLIN: HCPCS | Performed by: SPECIALIST

## 2024-04-10 PROCEDURE — 81003 URINALYSIS AUTO W/O SCOPE: CPT | Performed by: SPECIALIST

## 2024-04-10 PROCEDURE — 1123F ACP DISCUSS/DSCN MKR DOCD: CPT | Performed by: SPECIALIST

## 2024-04-10 PROCEDURE — 3017F COLORECTAL CA SCREEN DOC REV: CPT | Performed by: SPECIALIST

## 2024-04-10 PROCEDURE — 99214 OFFICE O/P EST MOD 30 MIN: CPT | Performed by: SPECIALIST

## 2024-04-10 PROCEDURE — 1036F TOBACCO NON-USER: CPT | Performed by: SPECIALIST

## 2024-04-10 NOTE — PROGRESS NOTES
HCT 51.3 (H) 2024    .4 2024     2024       Additional Lab/Culture results: none    Imaging Reviewed during this Office Visit:   24-US RENAL COMPLETE   IMPRESSION:  Right renal cyst.  (results were independently reviewed by physician and radiology report verified)    Physical Exam:    There were no vitals filed for this visit.  Body mass index is 43.91 kg/m².  Patient is a 68 y.o. male in no acute distress and alert and oriented to person, place and time.    Assessment and Plan      1. Renal cyst, right    2. Erectile dysfunction after radical prostatectomy    3. Personal history of prostate cancer           Plan:      Return in about 1 year (around 4/10/2025) for PSA.  No evidence of prostate cancer recurrence s/p 10/17/17 Robotic assisted laparoscopic radical prostatectomy since PSA is 0.02.   Patient noted to have a 14 mm Right simple renal cyst on 24 renal US.  No further urologic workup or intervention required.   Not interested in medical Rx for ED since patient not sexually active.        Skip Freitas MD FACS  4/10/2024 2:50 PM     Quality Measure Documentation: Patient has an advance care plan and/or living will and the durable power of  (POA) is wife Solange.  Social History     Tobacco Use   Smoking Status Former    Current packs/day: 0.00    Average packs/day: 1 pack/day for 40.2 years (40.2 ttl pk-yrs)    Types: Cigarettes    Start date: 1976    Quit date: 2016    Years since quittin.0    Passive exposure: Past   Smokeless Tobacco Never     (If patient a smoker, smoking cessation counseling offered)

## 2024-05-03 DIAGNOSIS — D50.0 IRON DEFICIENCY ANEMIA DUE TO CHRONIC BLOOD LOSS: ICD-10-CM

## 2024-05-03 RX ORDER — FERROUS SULFATE 325(65) MG
1 TABLET ORAL 2 TIMES DAILY
Qty: 180 TABLET | Refills: 1 | OUTPATIENT
Start: 2024-05-03

## 2024-05-03 NOTE — TELEPHONE ENCOUNTER
Please Approve or Refuse.  Send to Pharmacy per Pt's Request:      Next Visit Date:  5/23/2024   Last Visit Date: 2/15/2024    Hemoglobin A1C (%)   Date Value   02/15/2024 6.4   11/06/2023 5.8   03/08/2023 6.0             ( goal A1C is < 7)   BP Readings from Last 3 Encounters:   02/15/24 116/76   01/10/24 132/86   01/02/24 112/86          (goal 120/80)  BUN   Date Value Ref Range Status   01/04/2024 17 8 - 23 mg/dL Final     Creatinine   Date Value Ref Range Status   01/04/2024 0.7 0.7 - 1.2 mg/dL Final     Potassium   Date Value Ref Range Status   01/04/2024 5.2 3.7 - 5.3 mmol/L Final

## 2024-05-03 NOTE — TELEPHONE ENCOUNTER
No results found for: \"IRON\", \"TIBC\", \"FERRITIN\"  Lab Results   Component Value Date    WBC 7.7 01/04/2024    HGB 16.7 01/04/2024    HCT 51.3 (H) 01/04/2024    .4 01/04/2024     01/04/2024     Patient needs to stop iron, his anemia has resolved, see labs above, too much iron can deposits in his organs

## 2024-05-06 ENCOUNTER — OFFICE VISIT (OUTPATIENT)
Dept: PODIATRY | Age: 69
End: 2024-05-06
Payer: MEDICARE

## 2024-05-06 VITALS — BODY MASS INDEX: 43.81 KG/M2 | HEIGHT: 70 IN | WEIGHT: 306 LBS

## 2024-05-06 DIAGNOSIS — B35.1 ONYCHOMYCOSIS OF TOENAIL: Primary | ICD-10-CM

## 2024-05-06 DIAGNOSIS — M79.674 PAIN OF TOES OF BOTH FEET: ICD-10-CM

## 2024-05-06 DIAGNOSIS — M79.675 PAIN OF TOES OF BOTH FEET: ICD-10-CM

## 2024-05-06 PROCEDURE — 99999 PR OFFICE/OUTPT VISIT,PROCEDURE ONLY: CPT | Performed by: PODIATRIST

## 2024-05-06 PROCEDURE — 11721 DEBRIDE NAIL 6 OR MORE: CPT | Performed by: PODIATRIST

## 2024-05-06 ASSESSMENT — ENCOUNTER SYMPTOMS
SHORTNESS OF BREATH: 0
DIARRHEA: 0
NAUSEA: 0
BACK PAIN: 0
COLOR CHANGE: 0

## 2024-05-06 NOTE — PROGRESS NOTES
SUBJECTIVE: Alayna Galvez is a 68 y.o. male who returns to the office with chief complaint of painful fungal toenails. Patient relates toe nails are thickened/difficult to trim as well as painful with ambulation and with shoe gear.   Chief Complaint   Patient presents with    Nail Problem     B/l nail trim/ last seen Dr. Anamaria Marino 2/15/2024     Review of Systems   Constitutional:  Negative for activity change, appetite change, chills, diaphoresis, fatigue and fever.   Respiratory:  Negative for shortness of breath.    Cardiovascular:  Negative for leg swelling.   Gastrointestinal:  Negative for diarrhea and nausea.   Endocrine: Negative for cold intolerance, heat intolerance and polyuria.   Musculoskeletal:  Positive for arthralgias. Negative for back pain, gait problem, joint swelling and myalgias.   Skin:  Negative for color change, pallor, rash and wound.   Allergic/Immunologic: Negative for environmental allergies and food allergies.   Neurological:  Negative for dizziness, weakness, light-headedness and numbness.   Hematological:  Does not bruise/bleed easily.   Psychiatric/Behavioral:  Negative for behavioral problems, confusion and self-injury. The patient is not nervous/anxious.      OBJECTIVE: Clinical evaluation of patient reveals nails 1,2,3,4,5 of the right foot and nails 1,2,3,4,5 of the left foot to present with thickness, elongation, discoloration, brittleness, and subungual debris. There was pain with palpation and debridement of the toenails of the bilateral feet. No open lesions noted to either foot today.     Class A Findings (1 needed)   [] Non-traumatic amputation of foot or integral skeleton portion thereof.   [] Q7.      Class B Findings (2 needed)   1. [] Absent posterior tibial pulse   2. [] Absent dorsalis pedis pulse   3. [] Advanced trophic changes; three of the following are required:   ·         [] hair growth (decrease or absence)   ·         [] nail changes (thickening)   ·

## 2024-05-13 ENCOUNTER — HOSPITAL ENCOUNTER (OUTPATIENT)
Dept: CT IMAGING | Age: 69
Discharge: HOME | End: 2024-05-15
Attending: INTERNAL MEDICINE
Payer: MEDICARE

## 2024-05-13 DIAGNOSIS — R91.1 LUNG NODULE: ICD-10-CM

## 2024-05-13 PROCEDURE — 71250 CT THORAX DX C-: CPT

## 2024-05-14 DIAGNOSIS — E78.5 HYPERLIPIDEMIA WITH TARGET LDL LESS THAN 100: ICD-10-CM

## 2024-05-14 RX ORDER — ROSUVASTATIN CALCIUM 40 MG/1
40 TABLET, COATED ORAL DAILY
Qty: 90 TABLET | Refills: 3 | Status: SHIPPED | OUTPATIENT
Start: 2024-05-14

## 2024-05-23 ENCOUNTER — OFFICE VISIT (OUTPATIENT)
Dept: FAMILY MEDICINE CLINIC | Age: 69
End: 2024-05-23
Payer: MEDICARE

## 2024-05-23 DIAGNOSIS — N18.2 BENIGN HYPERTENSION WITH CKD (CHRONIC KIDNEY DISEASE), STAGE II: ICD-10-CM

## 2024-05-23 DIAGNOSIS — Z23 ENCOUNTER FOR IMMUNIZATION: ICD-10-CM

## 2024-05-23 DIAGNOSIS — Z79.4 TYPE 2 DIABETES MELLITUS WITH STAGE 2 CHRONIC KIDNEY DISEASE, WITH LONG-TERM CURRENT USE OF INSULIN (HCC): Primary | ICD-10-CM

## 2024-05-23 DIAGNOSIS — I50.42 CHRONIC COMBINED SYSTOLIC AND DIASTOLIC CHF (CONGESTIVE HEART FAILURE) (HCC): ICD-10-CM

## 2024-05-23 DIAGNOSIS — R71.8 OTHER ABNORMALITY OF RED BLOOD CELLS: ICD-10-CM

## 2024-05-23 DIAGNOSIS — I48.0 PAROXYSMAL ATRIAL FIBRILLATION (HCC): ICD-10-CM

## 2024-05-23 DIAGNOSIS — I12.9 BENIGN HYPERTENSION WITH CKD (CHRONIC KIDNEY DISEASE), STAGE II: ICD-10-CM

## 2024-05-23 DIAGNOSIS — G47.33 OSA ON CPAP: ICD-10-CM

## 2024-05-23 DIAGNOSIS — N18.2 TYPE 2 DIABETES MELLITUS WITH STAGE 2 CHRONIC KIDNEY DISEASE, WITH LONG-TERM CURRENT USE OF INSULIN (HCC): Primary | ICD-10-CM

## 2024-05-23 DIAGNOSIS — F33.42 RECURRENT MAJOR DEPRESSIVE DISORDER, IN FULL REMISSION (HCC): ICD-10-CM

## 2024-05-23 DIAGNOSIS — E78.2 MIXED HYPERLIPIDEMIA: ICD-10-CM

## 2024-05-23 DIAGNOSIS — D75.1 POLYCYTHEMIA: ICD-10-CM

## 2024-05-23 DIAGNOSIS — E11.22 TYPE 2 DIABETES MELLITUS WITH STAGE 2 CHRONIC KIDNEY DISEASE, WITH LONG-TERM CURRENT USE OF INSULIN (HCC): Primary | ICD-10-CM

## 2024-05-23 DIAGNOSIS — M89.9 BONE DISORDER: ICD-10-CM

## 2024-05-23 DIAGNOSIS — Z85.46 PERSONAL HISTORY OF PROSTATE CANCER: ICD-10-CM

## 2024-05-23 PROCEDURE — G8427 DOCREV CUR MEDS BY ELIG CLIN: HCPCS | Performed by: FAMILY MEDICINE

## 2024-05-23 PROCEDURE — G8417 CALC BMI ABV UP PARAM F/U: HCPCS | Performed by: FAMILY MEDICINE

## 2024-05-23 PROCEDURE — 3017F COLORECTAL CA SCREEN DOC REV: CPT | Performed by: FAMILY MEDICINE

## 2024-05-23 PROCEDURE — 3044F HG A1C LEVEL LT 7.0%: CPT | Performed by: FAMILY MEDICINE

## 2024-05-23 PROCEDURE — G0009 ADMIN PNEUMOCOCCAL VACCINE: HCPCS | Performed by: FAMILY MEDICINE

## 2024-05-23 PROCEDURE — 99214 OFFICE O/P EST MOD 30 MIN: CPT | Performed by: FAMILY MEDICINE

## 2024-05-23 PROCEDURE — 90677 PCV20 VACCINE IM: CPT | Performed by: FAMILY MEDICINE

## 2024-05-23 PROCEDURE — 2022F DILAT RTA XM EVC RTNOPTHY: CPT | Performed by: FAMILY MEDICINE

## 2024-05-23 PROCEDURE — 1036F TOBACCO NON-USER: CPT | Performed by: FAMILY MEDICINE

## 2024-05-23 PROCEDURE — 1123F ACP DISCUSS/DSCN MKR DOCD: CPT | Performed by: FAMILY MEDICINE

## 2024-05-23 RX ORDER — FLUOXETINE 10 MG/1
10 TABLET, FILM COATED ORAL DAILY
Qty: 30 TABLET | Refills: 0 | Status: SHIPPED | OUTPATIENT
Start: 2024-05-23

## 2024-05-23 SDOH — ECONOMIC STABILITY: INCOME INSECURITY: HOW HARD IS IT FOR YOU TO PAY FOR THE VERY BASICS LIKE FOOD, HOUSING, MEDICAL CARE, AND HEATING?: NOT HARD AT ALL

## 2024-05-23 SDOH — ECONOMIC STABILITY: FOOD INSECURITY: WITHIN THE PAST 12 MONTHS, THE FOOD YOU BOUGHT JUST DIDN'T LAST AND YOU DIDN'T HAVE MONEY TO GET MORE.: NEVER TRUE

## 2024-05-23 SDOH — ECONOMIC STABILITY: FOOD INSECURITY: WITHIN THE PAST 12 MONTHS, YOU WORRIED THAT YOUR FOOD WOULD RUN OUT BEFORE YOU GOT MONEY TO BUY MORE.: NEVER TRUE

## 2024-05-23 ASSESSMENT — PATIENT HEALTH QUESTIONNAIRE - PHQ9
SUM OF ALL RESPONSES TO PHQ QUESTIONS 1-9: 0
SUM OF ALL RESPONSES TO PHQ QUESTIONS 1-9: 0
1. LITTLE INTEREST OR PLEASURE IN DOING THINGS: NOT AT ALL
SUM OF ALL RESPONSES TO PHQ9 QUESTIONS 1 & 2: 0
SUM OF ALL RESPONSES TO PHQ QUESTIONS 1-9: 0
2. FEELING DOWN, DEPRESSED OR HOPELESS: NOT AT ALL
SUM OF ALL RESPONSES TO PHQ QUESTIONS 1-9: 0

## 2024-05-23 NOTE — PROGRESS NOTES
Visit Information    Have you changed or started any medications since your last visit including any over-the-counter medicines, vitamins, or herbal medicines? no   Have you stopped taking any of your medications? Is so, why? -  no  Are you having any side effects from any of your medications? - no    Have you seen any other physician or provider since your last visit?  yes    Have you had any other diagnostic tests since your last visit?  yes   Have you been seen in the emergency room and/or had an admission in a hospital since we last saw you?  no   Have you had your routine dental cleaning in the past 6 months?  yes     Do you have an active MyChart account? If no, what is the barrier?  Yes    Patient Care Team:  Aanmaria Marino MD as PCP - General (Family Medicine)  Anamaria Marino MD as PCP - Empaneled Provider  Skip Freitas MD as Consulting Physician (Urology)  Julio Arango MD as Consulting Physician (Pulmonology)  Emilia Bonilla MD as Consulting Physician (Cardiology)  He Becker MD as Consulting Physician (Internal Medicine Cardiovascular Disease)  Shannon Velásquez MD as Consulting Physician (Dermatology)  Johnny Hendrickson MD as Consulting Physician (Cardiology)  Shannon Velásquez MD as Consulting Physician (Dermatology)    Medical History Review  Past Medical, Family, and Social History reviewed and does contribute to the patient presenting condition    Health Maintenance   Topic Date Due    Shingles vaccine (1 of 2) Never done    Respiratory Syncytial Virus (RSV) Pregnant or age 60 yrs+ (1 - 1-dose 60+ series) Never done    Pneumococcal 65+ years Vaccine (2 of 2 - PCV) 11/14/2018    Diabetic retinal exam  12/27/2023    Diabetic foot exam  10/11/2024    Annual Wellness Visit (Medicare)  10/11/2024    Lipids  11/06/2024    Low dose CT lung screening &/or counseling  11/08/2024    Diabetic Alb to Cr ratio (uACR) test  01/04/2025    GFR test (Diabetes, CKD 3-4, OR last GFR 15-59)  
irregularly irregular.      Heart sounds: Heart sounds are distant. No murmur heard.     Comments: bilateral stasis dermatitis , no cellulitis,advised compression stockings , has them at home, but not using them  Pulmonary:      Effort: Pulmonary effort is normal. No respiratory distress.      Breath sounds: Examination of the right-middle field reveals decreased breath sounds. Examination of the left-middle field reveals decreased breath sounds. Examination of the right-lower field reveals decreased breath sounds. Examination of the left-lower field reveals decreased breath sounds. Decreased breath sounds present. No wheezing or rales.   Chest:      Chest wall: No tenderness.   Abdominal:      General: Bowel sounds are normal. There is no distension.      Palpations: Abdomen is soft.      Tenderness: There is no abdominal tenderness.      Comments: Very Obese abdomen.    Musculoskeletal:         General: No tenderness. Normal range of motion.      Cervical back: Normal range of motion and neck supple.      Right lower le+ Pitting Edema present.      Left lower le+ Pitting Edema present.   Lymphadenopathy:      Cervical: No cervical adenopathy.   Skin:     General: Skin is warm and dry.      Capillary Refill: Capillary refill takes less than 2 seconds.      Findings: Rash present.      Comments: bilateral stasis dermatitis worse on the left anterior shin, varicose veins   Neurological:      General: No focal deficit present.      Mental Status: He is alert and oriented to person, place, and time.      Cranial Nerves: No cranial nerve deficit.      Sensory: No sensory deficit.      Motor: No abnormal muscle tone.      Coordination: Coordination normal.      Gait: Gait normal.      Deep Tendon Reflexes: Reflexes are normal and symmetric. Reflexes normal.   Psychiatric:         Attention and Perception: Attention normal.         Mood and Affect: Mood normal.         Speech: Speech normal.         Behavior:

## 2024-05-27 VITALS
OXYGEN SATURATION: 98 % | WEIGHT: 315 LBS | TEMPERATURE: 96.8 F | SYSTOLIC BLOOD PRESSURE: 124 MMHG | HEART RATE: 79 BPM | HEIGHT: 70 IN | DIASTOLIC BLOOD PRESSURE: 80 MMHG | BODY MASS INDEX: 45.1 KG/M2

## 2024-05-27 PROBLEM — K64.9 BLEEDING HEMORRHOIDS: Status: RESOLVED | Noted: 2023-01-30 | Resolved: 2024-05-27

## 2024-05-27 PROBLEM — K75.2 NONSPECIFIC REACTIVE HEPATITIS: Status: RESOLVED | Noted: 2023-11-07 | Resolved: 2024-05-27

## 2024-05-27 PROBLEM — N18.1 CKD (CHRONIC KIDNEY DISEASE), STAGE I: Status: RESOLVED | Noted: 2023-12-14 | Resolved: 2024-05-27

## 2024-05-27 PROBLEM — E78.2 MIXED HYPERLIPIDEMIA: Status: ACTIVE | Noted: 2021-06-12

## 2024-05-27 PROBLEM — E66.01 MORBID OBESITY (HCC): Status: RESOLVED | Noted: 2022-07-27 | Resolved: 2024-05-27

## 2024-05-27 PROBLEM — I10 ESSENTIAL HYPERTENSION: Status: RESOLVED | Noted: 2019-02-21 | Resolved: 2024-05-27

## 2024-05-27 PROBLEM — R73.9 HYPERGLYCEMIA: Status: RESOLVED | Noted: 2021-10-06 | Resolved: 2024-05-27

## 2024-05-29 ENCOUNTER — OFFICE VISIT (OUTPATIENT)
Dept: PULMONOLOGY | Age: 69
End: 2024-05-29
Payer: MEDICARE

## 2024-05-29 VITALS
WEIGHT: 315 LBS | HEIGHT: 70 IN | TEMPERATURE: 97.8 F | OXYGEN SATURATION: 99 % | BODY MASS INDEX: 45.1 KG/M2 | HEART RATE: 74 BPM | RESPIRATION RATE: 17 BRPM | DIASTOLIC BLOOD PRESSURE: 82 MMHG | SYSTOLIC BLOOD PRESSURE: 130 MMHG

## 2024-05-29 DIAGNOSIS — I10 PRIMARY HYPERTENSION: Primary | ICD-10-CM

## 2024-05-29 DIAGNOSIS — E66.09 OBESITY DUE TO EXCESS CALORIES, UNSPECIFIED CLASSIFICATION, UNSPECIFIED WHETHER SERIOUS COMORBIDITY PRESENT: ICD-10-CM

## 2024-05-29 DIAGNOSIS — R91.1 LUNG NODULE: ICD-10-CM

## 2024-05-29 DIAGNOSIS — J44.9 CHRONIC OBSTRUCTIVE PULMONARY DISEASE, UNSPECIFIED COPD TYPE (HCC): ICD-10-CM

## 2024-05-29 DIAGNOSIS — G47.33 OSA ON CPAP: ICD-10-CM

## 2024-05-29 PROCEDURE — 3023F SPIROM DOC REV: CPT | Performed by: INTERNAL MEDICINE

## 2024-05-29 PROCEDURE — 1123F ACP DISCUSS/DSCN MKR DOCD: CPT | Performed by: INTERNAL MEDICINE

## 2024-05-29 PROCEDURE — 3075F SYST BP GE 130 - 139MM HG: CPT | Performed by: INTERNAL MEDICINE

## 2024-05-29 PROCEDURE — 3079F DIAST BP 80-89 MM HG: CPT | Performed by: INTERNAL MEDICINE

## 2024-05-29 PROCEDURE — 1036F TOBACCO NON-USER: CPT | Performed by: INTERNAL MEDICINE

## 2024-05-29 PROCEDURE — G8427 DOCREV CUR MEDS BY ELIG CLIN: HCPCS | Performed by: INTERNAL MEDICINE

## 2024-05-29 PROCEDURE — 99214 OFFICE O/P EST MOD 30 MIN: CPT | Performed by: INTERNAL MEDICINE

## 2024-05-29 PROCEDURE — G8417 CALC BMI ABV UP PARAM F/U: HCPCS | Performed by: INTERNAL MEDICINE

## 2024-05-29 PROCEDURE — 3017F COLORECTAL CA SCREEN DOC REV: CPT | Performed by: INTERNAL MEDICINE

## 2024-05-29 ASSESSMENT — SLEEP AND FATIGUE QUESTIONNAIRES
HOW LIKELY ARE YOU TO NOD OFF OR FALL ASLEEP WHILE SITTING QUIETLY AFTER LUNCH WITHOUT ALCOHOL: WOULD NEVER DOZE
HOW LIKELY ARE YOU TO NOD OFF OR FALL ASLEEP IN A CAR, WHILE STOPPED FOR A FEW MINUTES IN TRAFFIC: SLIGHT CHANCE OF DOZING
HOW LIKELY ARE YOU TO NOD OFF OR FALL ASLEEP WHILE SITTING INACTIVE IN A PUBLIC PLACE: WOULD NEVER DOZE
HOW LIKELY ARE YOU TO NOD OFF OR FALL ASLEEP WHILE WATCHING TV: SLIGHT CHANCE OF DOZING
HOW LIKELY ARE YOU TO NOD OFF OR FALL ASLEEP WHILE SITTING AND READING: WOULD NEVER DOZE
HOW LIKELY ARE YOU TO NOD OFF OR FALL ASLEEP WHEN YOU ARE A PASSENGER IN A CAR FOR AN HOUR WITHOUT A BREAK: SLIGHT CHANCE OF DOZING
ESS TOTAL SCORE: 6
HOW LIKELY ARE YOU TO NOD OFF OR FALL ASLEEP WHILE LYING DOWN TO REST IN THE AFTERNOON WHEN CIRCUMSTANCES PERMIT: HIGH CHANCE OF DOZING
HOW LIKELY ARE YOU TO NOD OFF OR FALL ASLEEP WHILE SITTING AND TALKING TO SOMEONE: WOULD NEVER DOZE

## 2024-05-29 NOTE — PROGRESS NOTES
for nausea, vomiting, change in bowel habits, diarrhea, constipation, abdominal pain, pruritus, abdominal mass and abdominal distention.  No nausea, vomiting or diarrhea or abdominal pain  GENITOURINARY:  negative for frequency, dysuria, nocturia, urinary incontinence and hesitancy history of stress incontinence, history of prostatectomy.  Her lung Due to prostate cancer.    erectile dysfunction after the surgery  INTEGUMENT  negative for rash, skin lesion(s), dryness, skin color change, changes in lesion, pruritus and changes in hair  HEMATOLOGIC/LYMPHATIC:  negative for easy bruising, bleeding, lymphadenopathy, petechiae and swelling/edema.  No anemia, no enlarged lymph nodes  ALLERGIC/IMMUNOLOGIC:  negative for recurrent infections, urticaria and drug reactions  ENDOCRINE:  negative for heat intolerance, cold intolerance, tremor, weight changes and change in bowel habits.  No diabetes  MUSCULOSKELETAL:  negative for  myalgias, arthralgias, pain, joint swelling, stiff joints and decreased range of motion no acute arthritis  NEUROLOGICAL:  negative for headaches, dizziness, seizures, memory problems, speech problems, visual disturbance and coordination problems, no deficit.  Motor SENSORY  BEHAVIOR/PSYCH:  negative for poor appetite, increased appetite, decreased sleep, increased sleep, decreased energy level, increased energy level and poor concentration no psychotic problem  Skin no rash no dermatitis, no skin  Vitals:  /82 (Site: Left Lower Arm, Position: Sitting)   Pulse 74   Temp 97.8 °F (36.6 °C)   Resp 17   Ht 1.778 m (5' 10\")   Wt (!) 144.2 kg (318 lb)   SpO2 99% Comment: at rest room air  BMI 45.63 kg/m²     PHYSICAL EXAM: Cooperative no respiratory distress not using accessory muscles  General Appearance:    Alert, cooperative, no distress, appears stated age, obese, not in any respiratory distress, not using accessory muscles    Head:    Normocephalic, without obvious abnormality, atraumatic

## 2024-05-30 ENCOUNTER — HOSPITAL ENCOUNTER (OUTPATIENT)
Age: 69
Setting detail: SPECIMEN
Discharge: HOME | End: 2024-05-30

## 2024-05-30 DIAGNOSIS — R71.8 OTHER ABNORMALITY OF RED BLOOD CELLS: ICD-10-CM

## 2024-05-30 DIAGNOSIS — Z79.4 TYPE 2 DIABETES MELLITUS WITH STAGE 2 CHRONIC KIDNEY DISEASE, WITH LONG-TERM CURRENT USE OF INSULIN (HCC): ICD-10-CM

## 2024-05-30 DIAGNOSIS — M89.9 BONE DISORDER: ICD-10-CM

## 2024-05-30 DIAGNOSIS — I50.42 CHRONIC COMBINED SYSTOLIC AND DIASTOLIC CHF (CONGESTIVE HEART FAILURE) (HCC): ICD-10-CM

## 2024-05-30 DIAGNOSIS — E11.22 TYPE 2 DIABETES MELLITUS WITH STAGE 2 CHRONIC KIDNEY DISEASE, WITH LONG-TERM CURRENT USE OF INSULIN (HCC): ICD-10-CM

## 2024-05-30 DIAGNOSIS — N18.2 TYPE 2 DIABETES MELLITUS WITH STAGE 2 CHRONIC KIDNEY DISEASE, WITH LONG-TERM CURRENT USE OF INSULIN (HCC): ICD-10-CM

## 2024-05-30 DIAGNOSIS — E78.2 MIXED HYPERLIPIDEMIA: ICD-10-CM

## 2024-05-30 DIAGNOSIS — D75.1 POLYCYTHEMIA: ICD-10-CM

## 2024-05-30 DIAGNOSIS — N18.2 BENIGN HYPERTENSION WITH CKD (CHRONIC KIDNEY DISEASE), STAGE II: ICD-10-CM

## 2024-05-30 DIAGNOSIS — I12.9 BENIGN HYPERTENSION WITH CKD (CHRONIC KIDNEY DISEASE), STAGE II: ICD-10-CM

## 2024-05-30 LAB
25(OH)D3 SERPL-MCNC: 15.2 NG/ML (ref 30–100)
ALBUMIN SERPL-MCNC: 4 G/DL (ref 3.5–5.2)
ALBUMIN/GLOB SERPL: 1 {RATIO} (ref 1–2.5)
ALP SERPL-CCNC: 70 U/L (ref 40–129)
ALT SERPL-CCNC: 27 U/L (ref 10–50)
ANION GAP SERPL CALCULATED.3IONS-SCNC: 10 MMOL/L (ref 9–16)
AST SERPL-CCNC: 29 U/L (ref 10–50)
BASOPHILS # BLD: 0.07 K/UL (ref 0–0.2)
BASOPHILS NFR BLD: 1 % (ref 0–2)
BILIRUB SERPL-MCNC: 0.6 MG/DL (ref 0–1.2)
BNP SERPL-MCNC: 1011 PG/ML (ref 0–300)
BUN SERPL-MCNC: 13 MG/DL (ref 8–23)
CALCIUM SERPL-MCNC: 9 MG/DL (ref 8.6–10.4)
CHLORIDE SERPL-SCNC: 105 MMOL/L (ref 98–107)
CHOLEST SERPL-MCNC: 102 MG/DL (ref 0–199)
CHOLESTEROL/HDL RATIO: 4
CO2 SERPL-SCNC: 25 MMOL/L (ref 20–31)
CREAT SERPL-MCNC: 0.9 MG/DL (ref 0.7–1.2)
EOSINOPHIL # BLD: 0.19 K/UL (ref 0–0.44)
EOSINOPHILS RELATIVE PERCENT: 3 % (ref 1–4)
ERYTHROCYTE [DISTWIDTH] IN BLOOD BY AUTOMATED COUNT: 13.2 % (ref 11.8–14.4)
EST. AVERAGE GLUCOSE BLD GHB EST-MCNC: 126 MG/DL
FERRITIN SERPL-MCNC: 65 NG/ML (ref 30–400)
FOLATE SERPL-MCNC: >20 NG/ML (ref 4.8–24.2)
GFR, ESTIMATED: >90 ML/MIN/1.73M2
GLUCOSE SERPL-MCNC: 156 MG/DL (ref 74–99)
HBA1C MFR BLD: 6 % (ref 4–6)
HCT VFR BLD AUTO: 45.9 % (ref 40.7–50.3)
HDLC SERPL-MCNC: 23 MG/DL
HGB BLD-MCNC: 15.2 G/DL (ref 13–17)
IMM GRANULOCYTES # BLD AUTO: 0.05 K/UL (ref 0–0.3)
IMM GRANULOCYTES NFR BLD: 1 %
IMM RETICS NFR: 21.9 % (ref 2.7–18.3)
IRON SATN MFR SERPL: 27 % (ref 20–55)
IRON SERPL-MCNC: 97 UG/DL (ref 61–157)
LDLC SERPL CALC-MCNC: 40 MG/DL (ref 0–100)
LYMPHOCYTES NFR BLD: 1.39 K/UL (ref 1.1–3.7)
LYMPHOCYTES RELATIVE PERCENT: 19 % (ref 24–43)
MAGNESIUM SERPL-MCNC: 2 MG/DL (ref 1.6–2.4)
MCH RBC QN AUTO: 33.3 PG (ref 25.2–33.5)
MCHC RBC AUTO-ENTMCNC: 33.1 G/DL (ref 28.4–34.8)
MCV RBC AUTO: 100.4 FL (ref 82.6–102.9)
MONOCYTES NFR BLD: 0.44 K/UL (ref 0.1–1.2)
MONOCYTES NFR BLD: 6 % (ref 3–12)
NEUTROPHILS NFR BLD: 70 % (ref 36–65)
NEUTS SEG NFR BLD: 5.09 K/UL (ref 1.5–8.1)
NRBC BLD-RTO: 0 PER 100 WBC
PLATELET # BLD AUTO: 192 K/UL (ref 138–453)
PMV BLD AUTO: 9.5 FL (ref 8.1–13.5)
POTASSIUM SERPL-SCNC: 4.5 MMOL/L (ref 3.7–5.3)
PROT SERPL-MCNC: 6.9 G/DL (ref 6.6–8.7)
RBC # BLD AUTO: 4.57 M/UL (ref 4.21–5.77)
RETIC HEMOGLOBIN: 36 PG (ref 28.2–35.7)
RETICS # AUTO: 0.1 M/UL (ref 0.03–0.08)
RETICS/RBC NFR AUTO: 2.3 % (ref 0.5–1.9)
SODIUM SERPL-SCNC: 140 MMOL/L (ref 136–145)
TIBC SERPL-MCNC: 354 UG/DL (ref 250–450)
TRIGL SERPL-MCNC: 196 MG/DL
TSH SERPL DL<=0.05 MIU/L-ACNC: 1.3 UIU/ML (ref 0.27–4.2)
UNSATURATED IRON BINDING CAPACITY: 257 UG/DL (ref 112–347)
URATE SERPL-MCNC: 7 MG/DL (ref 3.4–7)
VIT B12 SERPL-MCNC: 631 PG/ML (ref 232–1245)
VLDLC SERPL CALC-MCNC: 39 MG/DL
WBC OTHER # BLD: 7.2 K/UL (ref 3.5–11.3)

## 2024-05-31 DIAGNOSIS — E55.9 VITAMIN D DEFICIENCY: Primary | ICD-10-CM

## 2024-05-31 DIAGNOSIS — I25.10 CORONARY ARTERY DISEASE INVOLVING NATIVE CORONARY ARTERY OF NATIVE HEART WITHOUT ANGINA PECTORIS: ICD-10-CM

## 2024-05-31 DIAGNOSIS — I10 ESSENTIAL HYPERTENSION: ICD-10-CM

## 2024-05-31 LAB
PATH REV BLD -IMP: NORMAL
SURGICAL PATHOLOGY REPORT: NORMAL

## 2024-05-31 RX ORDER — ERGOCALCIFEROL 1.25 MG/1
50000 CAPSULE ORAL WEEKLY
Qty: 12 CAPSULE | Refills: 0 | Status: SHIPPED | OUTPATIENT
Start: 2024-05-31

## 2024-05-31 NOTE — RESULT ENCOUNTER NOTE
Please notify patient: Mild increased triglycerides, low-carb diet, low-fat diet advisable  Vitamin D very low I will send high doses vitamin D to the pharmacy to take weekly with food, to stop taking any other vitamin D supplementation  Blood glucose increased 156, improved diet, diabetes improved hemoglobin A1c 6    Very high proBNP that means congestive heart failure flaring up and retaining water, he is currently taking furosemide 40 Mg daily, he needs to take an additional furosemide for the next 2 days and to eat bananas, and then resume furosemide 40 Mg daily      Otherwise labs within normal limits  continue current treatment    I will send him a [employer] message, if he does not check the [employer] message we will inform him by phone  Future Appointments  7/29/2024  10:15 AM   Tavon Murphy DPM  [facility]          [facility]  8/21/2024  11:30 AM   Shannon Velásquez MD         derm             T[facility]PP  8/22/2024  10:45 AM   Johnny Hendrickson MD        [facility]        [facility]  10/18/2024 2:00 PM    [facility]  11/27/2024 1:20 PM    Julio Arango MD       [facility]

## 2024-06-06 ENCOUNTER — TELEPHONE (OUTPATIENT)
Dept: FAMILY MEDICINE CLINIC | Age: 69
End: 2024-06-06

## 2024-06-06 DIAGNOSIS — F33.42 RECURRENT MAJOR DEPRESSIVE DISORDER, IN FULL REMISSION (HCC): Primary | ICD-10-CM

## 2024-06-06 RX ORDER — FLUOXETINE 10 MG/1
10 CAPSULE ORAL DAILY
Qty: 90 CAPSULE | Refills: 1 | Status: SHIPPED | OUTPATIENT
Start: 2024-06-06

## 2024-06-06 NOTE — TELEPHONE ENCOUNTER
"[employer]hart message sent, refill given   Diagnosis Orders   1. Recurrent major depressive disorder, in full remission (HCC)  FLUoxetine (PROZAC) 10 MG capsule           Future Appointments   Date Time Provider Department Center   7/29/2024 10:15 AM [facility]   7/31/2024 12:00 PM Fadi Norris MD [facility]   8/21/2024 11:30 AM Shannon Velásquez MD  derm [facility]   8/22/2024 10:45 AM Johnny Hendrickson MD AFL [facility] AFL [facility]   10/18/2024  2:00 PM [facility]   11/27/2024  1:20 PM [facility]

## 2024-07-24 ENCOUNTER — HOSPITAL ENCOUNTER (OUTPATIENT)
Age: 69
Setting detail: SPECIMEN
Discharge: HOME | End: 2024-07-24

## 2024-07-24 DIAGNOSIS — N18.1 BENIGN HYPERTENSION WITH CKD (CHRONIC KIDNEY DISEASE) STAGE I: ICD-10-CM

## 2024-07-24 DIAGNOSIS — N18.1 SECONDARY DIABETES MELLITUS WITH STAGE 1 CHRONIC KIDNEY DISEASE (HCC): ICD-10-CM

## 2024-07-24 DIAGNOSIS — I12.9 BENIGN HYPERTENSION WITH CKD (CHRONIC KIDNEY DISEASE) STAGE I: ICD-10-CM

## 2024-07-24 DIAGNOSIS — E87.5 HYPERKALEMIA: ICD-10-CM

## 2024-07-24 DIAGNOSIS — R80.9 MICROALBUMINURIA: ICD-10-CM

## 2024-07-24 DIAGNOSIS — E13.22 SECONDARY DIABETES MELLITUS WITH STAGE 1 CHRONIC KIDNEY DISEASE (HCC): ICD-10-CM

## 2024-07-24 DIAGNOSIS — R80.8 OTHER PROTEINURIA: ICD-10-CM

## 2024-07-24 DIAGNOSIS — N18.1 CKD (CHRONIC KIDNEY DISEASE) STAGE 1, GFR 90 ML/MIN OR GREATER: ICD-10-CM

## 2024-07-24 LAB
BACTERIA URNS QL MICRO: NORMAL
BASOPHILS # BLD: 0.06 K/UL (ref 0–0.2)
BASOPHILS NFR BLD: 1 % (ref 0–2)
BILIRUB UR QL STRIP: NEGATIVE
CASTS #/AREA URNS LPF: NORMAL /LPF (ref 0–8)
CLARITY UR: CLEAR
COLOR UR: YELLOW
CREAT UR-MCNC: 27.1 MG/DL (ref 39–259)
EOSINOPHIL # BLD: 0.15 K/UL (ref 0–0.44)
EOSINOPHILS RELATIVE PERCENT: 2 % (ref 1–4)
EPI CELLS #/AREA URNS HPF: NORMAL /HPF (ref 0–5)
ERYTHROCYTE [DISTWIDTH] IN BLOOD BY AUTOMATED COUNT: 12.2 % (ref 11.8–14.4)
GLUCOSE UR STRIP-MCNC: NEGATIVE MG/DL
HCT VFR BLD AUTO: 43.4 % (ref 40.7–50.3)
HGB BLD-MCNC: 13.4 G/DL (ref 13–17)
HGB UR QL STRIP.AUTO: NEGATIVE
IMM GRANULOCYTES # BLD AUTO: 0.04 K/UL (ref 0–0.3)
IMM GRANULOCYTES NFR BLD: 1 %
KETONES UR STRIP-MCNC: NEGATIVE MG/DL
LEUKOCYTE ESTERASE UR QL STRIP: NEGATIVE
LYMPHOCYTES NFR BLD: 1.5 K/UL (ref 1.1–3.7)
LYMPHOCYTES RELATIVE PERCENT: 19 % (ref 24–43)
MCH RBC QN AUTO: 29.8 PG (ref 25.2–33.5)
MCHC RBC AUTO-ENTMCNC: 30.9 G/DL (ref 28.4–34.8)
MCV RBC AUTO: 96.4 FL (ref 82.6–102.9)
MONOCYTES NFR BLD: 0.62 K/UL (ref 0.1–1.2)
MONOCYTES NFR BLD: 8 % (ref 3–12)
NEUTROPHILS NFR BLD: 69 % (ref 36–65)
NEUTS SEG NFR BLD: 5.54 K/UL (ref 1.5–8.1)
NITRITE UR QL STRIP: NEGATIVE
NRBC BLD-RTO: 0 PER 100 WBC
PH UR STRIP: 7 [PH] (ref 5–8)
PLATELET # BLD AUTO: 221 K/UL (ref 138–453)
PMV BLD AUTO: 9.5 FL (ref 8.1–13.5)
PROT UR STRIP-MCNC: NEGATIVE MG/DL
RBC # BLD AUTO: 4.5 M/UL (ref 4.21–5.77)
RBC #/AREA URNS HPF: NORMAL /HPF (ref 0–4)
SP GR UR STRIP: 1.01 (ref 1–1.03)
TOTAL PROTEIN, URINE: 8 MG/DL
URINE TOTAL PROTEIN CREATININE RATIO: 0.3
UROBILINOGEN UR STRIP-ACNC: NORMAL EU/DL (ref 0–1)
WBC #/AREA URNS HPF: NORMAL /HPF (ref 0–5)
WBC OTHER # BLD: 7.9 K/UL (ref 3.5–11.3)

## 2024-07-25 LAB
ALBUMIN PERCENT: 57 % (ref 45–65)
ALBUMIN SERPL-MCNC: 3.9 G/DL (ref 3.2–5.2)
ALPHA 2 PERCENT: 12 % (ref 6–13)
ALPHA1 GLOB SERPL ELPH-MCNC: 0.3 G/DL (ref 0.1–0.4)
ALPHA1 GLOB SERPL ELPH-MCNC: 4 % (ref 3–6)
ALPHA2 GLOB SERPL ELPH-MCNC: 0.8 G/DL (ref 0.5–0.9)
ANION GAP SERPL CALCULATED.3IONS-SCNC: 11 MMOL/L (ref 9–16)
B-GLOBULIN SERPL ELPH-MCNC: 0.9 G/DL (ref 0.5–1.1)
B-GLOBULIN SERPL ELPH-MCNC: 13 % (ref 11–19)
BUN SERPL-MCNC: 13 MG/DL (ref 8–23)
CALCIUM SERPL-MCNC: 9.7 MG/DL (ref 8.6–10.4)
CHLORIDE SERPL-SCNC: 105 MMOL/L (ref 98–107)
CO2 SERPL-SCNC: 27 MMOL/L (ref 20–31)
CREAT SERPL-MCNC: 0.9 MG/DL (ref 0.7–1.2)
GAMMA GLOB SERPL ELPH-MCNC: 1 G/DL (ref 0.5–1.5)
GAMMA GLOBULIN %: 14 % (ref 9–20)
GFR, ESTIMATED: >90 ML/MIN/1.73M2
GLUCOSE SERPL-MCNC: 157 MG/DL (ref 74–99)
MAGNESIUM SERPL-MCNC: 2.2 MG/DL (ref 1.6–2.4)
PATHOLOGIST: NORMAL
PHOSPHATE SERPL-MCNC: 3.3 MG/DL (ref 2.5–4.5)
POTASSIUM SERPL-SCNC: 4.7 MMOL/L (ref 3.7–5.3)
PROT PATTERN SERPL ELPH-IMP: NORMAL
PROT SERPL-MCNC: 6.9 G/DL (ref 6.6–8.7)
SODIUM SERPL-SCNC: 143 MMOL/L (ref 136–145)
TOTAL PROT. SUM,%: 100 % (ref 98–102)
TOTAL PROT. SUM: 6.9 G/DL (ref 6.3–8.2)

## 2024-07-26 LAB
ANA SER QL IA: NEGATIVE
DSDNA IGG SER QL IA: <0.5 IU/ML
NUCLEAR IGG SER IA-RTO: <0.1 U/ML
P E INTERPRETATION, U: NORMAL
PATHOLOGIST: NORMAL
SPECIMEN TYPE: NORMAL
URINE TOTAL PROTEIN: 8 MG/DL

## 2024-07-29 ENCOUNTER — OFFICE VISIT (OUTPATIENT)
Dept: PODIATRY | Age: 69
End: 2024-07-29
Payer: MEDICARE

## 2024-07-29 VITALS — BODY MASS INDEX: 45.1 KG/M2 | WEIGHT: 315 LBS | HEIGHT: 70 IN

## 2024-07-29 DIAGNOSIS — M79.674 PAIN OF TOES OF BOTH FEET: ICD-10-CM

## 2024-07-29 DIAGNOSIS — M79.675 PAIN OF TOES OF BOTH FEET: ICD-10-CM

## 2024-07-29 DIAGNOSIS — B35.1 ONYCHOMYCOSIS OF TOENAIL: Primary | ICD-10-CM

## 2024-07-29 PROCEDURE — 11721 DEBRIDE NAIL 6 OR MORE: CPT | Performed by: PODIATRIST

## 2024-07-29 PROCEDURE — 99999 PR OFFICE/OUTPT VISIT,PROCEDURE ONLY: CPT | Performed by: PODIATRIST

## 2024-07-30 ASSESSMENT — ENCOUNTER SYMPTOMS
COLOR CHANGE: 0
NAUSEA: 0
DIARRHEA: 0
BACK PAIN: 0
SHORTNESS OF BREATH: 0

## 2024-07-30 NOTE — PROGRESS NOTES
SUBJECTIVE: Alayna Galvez is a 68 y.o. male who returns to the office with chief complaint of painful fungal toenails. Patient relates toe nails are thickened/difficult to trim as well as painful with ambulation and with shoe gear.   Chief Complaint   Patient presents with    Nail Problem     Nail trim/last saw Dr.Florentina Marino 5/23/24    Diabetes     Last blood sugar 167     Review of Systems   Constitutional:  Negative for activity change, appetite change, chills, diaphoresis, fatigue and fever.   Respiratory:  Negative for shortness of breath.    Cardiovascular:  Negative for leg swelling.   Gastrointestinal:  Negative for diarrhea and nausea.   Endocrine: Negative for cold intolerance, heat intolerance and polyuria.   Musculoskeletal:  Positive for arthralgias. Negative for back pain, gait problem, joint swelling and myalgias.   Skin:  Negative for color change, pallor, rash and wound.   Allergic/Immunologic: Negative for environmental allergies and food allergies.   Neurological:  Negative for dizziness, weakness, light-headedness and numbness.   Hematological:  Does not bruise/bleed easily.   Psychiatric/Behavioral:  Negative for behavioral problems, confusion and self-injury. The patient is not nervous/anxious.      OBJECTIVE: Clinical evaluation of patient reveals nails 1,2,3,4,5 of the right foot and nails 1,2,3,4,5 of the left foot to present with thickness, elongation, discoloration, brittleness, and subungual debris. There was pain with palpation and debridement of the toenails of the bilateral feet. No open lesions noted to either foot today.     Class A Findings (1 needed)   [] Non-traumatic amputation of foot or integral skeleton portion thereof.   [] Q7.      Class B Findings (2 needed)   1. [] Absent posterior tibial pulse   2. [] Absent dorsalis pedis pulse   3. [] Advanced trophic changes; three of the following are required:   ·         [] hair growth (decrease or absence)   ·         [] nail

## 2024-08-15 NOTE — PROGRESS NOTES
Vitamin (MULTI VITAMIN DAILY) TABS Take by mouth      Omega-3 Fatty Acids (FISH OIL PO) Take by mouth      lisinopril (PRINIVIL;ZESTRIL) 20 MG tablet TAKE 1 TABLET BY MOUTH DAILY 90 tablet 3     No current facility-administered medications for this visit.       ALLERGIES:   No Known Allergies    SOCIAL HISTORY:  Social History     Tobacco Use    Smoking status: Former     Current packs/day: 0.00     Average packs/day: 1 pack/day for 40.2 years (40.2 ttl pk-yrs)     Types: Cigarettes     Start date: 1976     Quit date: 2016     Years since quittin.3     Passive exposure: Past    Smokeless tobacco: Never   Substance Use Topics    Alcohol use: Yes     Alcohol/week: 5.0 standard drinks of alcohol     Types: 5 Cans of beer per week     Comment: per week       Pertinent ROS:  Review of Systems  Skin: Denies any new changing, growing or bleeding lesions or rashes except as described in the HPI   Constitutional: Denies fevers, chills, and malaise.    PHYSICAL EXAM:   /84   Pulse 85   Temp 97.7 °F (36.5 °C)   Ht 1.778 m (5' 10\")   Wt (!) 142.4 kg (314 lb)   SpO2 100%   BMI 45.05 kg/m²     The patient is generally well appearing, well nourished, alert and conversational. Affect is normal.    Cutaneous Exam:  Physical Exam  Total body skin exam excluding external genitalia: head/face, neck, both arms, chest, back, abdomen, both legs, buttocks, digits and/or nails, was examined. Genital exam was deferred as patient denied having any lesions in this area. Complete visualization of scalp may be limited by hair density, length, and/or style    Facial covering was removed during examination.    Diagnoses/exam findings/medical history pertinent to this visit are listed below:    Assessment:   Diagnosis Orders   1. History of basal cell carcinoma        2. Multiple nevi        3. Seborrheic keratosis        4. Venous stasis dermatitis, unspecified laterality        5. Hyperhidrosis        6. Actinic keratoses

## 2024-08-21 ENCOUNTER — OFFICE VISIT (OUTPATIENT)
Dept: DERMATOLOGY | Age: 69
End: 2024-08-21
Payer: MEDICARE

## 2024-08-21 VITALS
HEART RATE: 85 BPM | SYSTOLIC BLOOD PRESSURE: 130 MMHG | WEIGHT: 314 LBS | OXYGEN SATURATION: 100 % | DIASTOLIC BLOOD PRESSURE: 84 MMHG | HEIGHT: 70 IN | BODY MASS INDEX: 44.95 KG/M2 | TEMPERATURE: 97.7 F

## 2024-08-21 DIAGNOSIS — D22.9 MULTIPLE NEVI: ICD-10-CM

## 2024-08-21 DIAGNOSIS — I87.2 VENOUS STASIS DERMATITIS, UNSPECIFIED LATERALITY: ICD-10-CM

## 2024-08-21 DIAGNOSIS — L81.4 SOLAR LENTIGO: ICD-10-CM

## 2024-08-21 DIAGNOSIS — L57.0 ACTINIC KERATOSES: ICD-10-CM

## 2024-08-21 DIAGNOSIS — Z85.828 HISTORY OF BASAL CELL CARCINOMA: Primary | ICD-10-CM

## 2024-08-21 DIAGNOSIS — L82.1 SEBORRHEIC KERATOSIS: ICD-10-CM

## 2024-08-21 PROCEDURE — 3017F COLORECTAL CA SCREEN DOC REV: CPT | Performed by: DERMATOLOGY

## 2024-08-21 PROCEDURE — 1123F ACP DISCUSS/DSCN MKR DOCD: CPT | Performed by: DERMATOLOGY

## 2024-08-21 PROCEDURE — G8417 CALC BMI ABV UP PARAM F/U: HCPCS | Performed by: DERMATOLOGY

## 2024-08-21 PROCEDURE — 17003 DESTRUCT PREMALG LES 2-14: CPT | Performed by: DERMATOLOGY

## 2024-08-21 PROCEDURE — 17000 DESTRUCT PREMALG LESION: CPT | Performed by: DERMATOLOGY

## 2024-08-21 PROCEDURE — 99213 OFFICE O/P EST LOW 20 MIN: CPT | Performed by: DERMATOLOGY

## 2024-08-21 PROCEDURE — 1036F TOBACCO NON-USER: CPT | Performed by: DERMATOLOGY

## 2024-08-21 PROCEDURE — G8427 DOCREV CUR MEDS BY ELIG CLIN: HCPCS | Performed by: DERMATOLOGY

## 2024-08-21 RX ORDER — TRIAMCINOLONE ACETONIDE 1 MG/G
CREAM TOPICAL
Qty: 80 G | Refills: 1 | Status: SHIPPED | OUTPATIENT
Start: 2024-08-21

## 2024-08-21 NOTE — PATIENT INSTRUCTIONS
Sun Protection     There are two types of sun rays that are harmful to the skin.  UVA rays cause skin aging and skin cancer, such as melanoma.  UVB rays cause sunburns, cataracts, and also contribute to skin cancer.    The American-Academy of Dermatology recommends that children and adults wear a broad spectrum, waterproof sunscreen with a Sun Protection Factor (SPF) of 30 or higher.  It is important to check the ingredient label to be sure the sunscreen will protect the skin from both UVA and UVB sunrays.  Your sunscreen should contain at least one of the following ingredients: titanium dioxide, zinc oxide, or avobenzone.    Sunscreen will not be effective unless it is applied to all exposed skin.  Sunscreens work best if they are applied 30 minutes before sun exposure.  They should be reapplied every 2 hours and after any water exposure.    Sunscreen is not perfect.  It is important to use other methods to protect the skin from sun exposure also.  Wear hats, sunglasses and other sun protective clothing when outdoors.  Stay in the shade during the peak hours of sun exposure between 10 AM and 4 PM.    Cryotherapy    Liquid Nitrogen - \"freeze\" (Cryotherapy)  Your doctor has treated your skin lesions with a very cold substance.  The liquid nitrogen is so cold that it may feel like the skin is burning during application.  A clear blister or blood blister may form after treatment and may later form a scab.  Leave the area alone.  Usually this scab will fall of within 1-2 weeks.  The area should be kept clean and can be covered with Vaseline and a Band-Aid if needed. If a large blister develops it is ok to use a clean needle to gently pop the blister. Please call our office with any concerns at [phone].

## 2024-09-01 DIAGNOSIS — I10 ESSENTIAL HYPERTENSION: ICD-10-CM

## 2024-09-03 RX ORDER — LISINOPRIL 20 MG/1
20 TABLET ORAL DAILY
Qty: 90 TABLET | Refills: 3 | Status: SHIPPED | OUTPATIENT
Start: 2024-09-03

## 2024-09-03 NOTE — TELEPHONE ENCOUNTER
Please Approve or Refuse.  Send to Pharmacy per Pt's Request:      Next Visit Date:  10/18/2024   Last Visit Date: 5/23/2024    Hemoglobin A1C (%)   Date Value   05/30/2024 6.0   02/15/2024 6.4   11/06/2023 5.8             ( goal A1C is < 7)   BP Readings from Last 3 Encounters:   08/22/24 110/78   08/21/24 130/84   07/31/24 120/80          (goal 120/80)  BUN   Date Value Ref Range Status   07/24/2024 13 8 - 23 mg/dL Final     Creatinine   Date Value Ref Range Status   07/24/2024 0.9 0.70 - 1.20 mg/dL Final     Potassium   Date Value Ref Range Status   07/24/2024 4.7 3.7 - 5.3 mmol/L Final     Comment:     SPECIMEN SLIGHTLY HEMOLYZED, RESULTS MAY BE ADVERSELY AFFECTED.

## 2024-10-18 ENCOUNTER — OFFICE VISIT (OUTPATIENT)
Dept: FAMILY MEDICINE CLINIC | Age: 69
End: 2024-10-18

## 2024-10-18 VITALS
WEIGHT: 315 LBS | HEART RATE: 81 BPM | BODY MASS INDEX: 45.1 KG/M2 | OXYGEN SATURATION: 98 % | HEIGHT: 70 IN | SYSTOLIC BLOOD PRESSURE: 118 MMHG | DIASTOLIC BLOOD PRESSURE: 76 MMHG | TEMPERATURE: 97.6 F

## 2024-10-18 DIAGNOSIS — I50.42 CHRONIC COMBINED SYSTOLIC AND DIASTOLIC CHF (CONGESTIVE HEART FAILURE) (HCC): ICD-10-CM

## 2024-10-18 DIAGNOSIS — E55.9 VITAMIN D DEFICIENCY: ICD-10-CM

## 2024-10-18 DIAGNOSIS — Z00.00 MEDICARE ANNUAL WELLNESS VISIT, SUBSEQUENT: Primary | ICD-10-CM

## 2024-10-18 DIAGNOSIS — I10 PRIMARY HYPERTENSION: ICD-10-CM

## 2024-10-18 DIAGNOSIS — I25.10 CORONARY ARTERY DISEASE INVOLVING NATIVE CORONARY ARTERY OF NATIVE HEART WITHOUT ANGINA PECTORIS: ICD-10-CM

## 2024-10-18 DIAGNOSIS — I48.0 PAROXYSMAL ATRIAL FIBRILLATION (HCC): ICD-10-CM

## 2024-10-18 DIAGNOSIS — E11.65 TYPE 2 DIABETES MELLITUS WITH HYPERGLYCEMIA, WITHOUT LONG-TERM CURRENT USE OF INSULIN (HCC): ICD-10-CM

## 2024-10-18 DIAGNOSIS — E78.2 MIXED HYPERLIPIDEMIA: ICD-10-CM

## 2024-10-18 DIAGNOSIS — E11.21 TYPE 2 DIABETES MELLITUS WITH DIABETIC NEPHROPATHY, WITHOUT LONG-TERM CURRENT USE OF INSULIN (HCC): ICD-10-CM

## 2024-10-18 LAB — HBA1C MFR BLD: 7.3 %

## 2024-10-18 RX ORDER — METOPROLOL SUCCINATE 50 MG/1
75 TABLET, EXTENDED RELEASE ORAL DAILY
Qty: 90 TABLET | Refills: 1 | Status: SHIPPED | OUTPATIENT
Start: 2024-10-18

## 2024-10-18 SDOH — ECONOMIC STABILITY: FOOD INSECURITY: WITHIN THE PAST 12 MONTHS, YOU WORRIED THAT YOUR FOOD WOULD RUN OUT BEFORE YOU GOT MONEY TO BUY MORE.: NEVER TRUE

## 2024-10-18 SDOH — ECONOMIC STABILITY: INCOME INSECURITY: HOW HARD IS IT FOR YOU TO PAY FOR THE VERY BASICS LIKE FOOD, HOUSING, MEDICAL CARE, AND HEATING?: NOT HARD AT ALL

## 2024-10-18 SDOH — ECONOMIC STABILITY: FOOD INSECURITY: WITHIN THE PAST 12 MONTHS, THE FOOD YOU BOUGHT JUST DIDN'T LAST AND YOU DIDN'T HAVE MONEY TO GET MORE.: NEVER TRUE

## 2024-10-18 ASSESSMENT — ENCOUNTER SYMPTOMS
CHEST TIGHTNESS: 0
DIARRHEA: 0
ABDOMINAL PAIN: 0
COUGH: 0
CONSTIPATION: 0
WHEEZING: 0
NAUSEA: 0
ABDOMINAL DISTENTION: 0
VOMITING: 0
SHORTNESS OF BREATH: 1

## 2024-10-18 ASSESSMENT — PATIENT HEALTH QUESTIONNAIRE - PHQ9
SUM OF ALL RESPONSES TO PHQ QUESTIONS 1-9: 0
10. IF YOU CHECKED OFF ANY PROBLEMS, HOW DIFFICULT HAVE THESE PROBLEMS MADE IT FOR YOU TO DO YOUR WORK, TAKE CARE OF THINGS AT HOME, OR GET ALONG WITH OTHER PEOPLE: NOT DIFFICULT AT ALL
SUM OF ALL RESPONSES TO PHQ QUESTIONS 1-9: 0
2. FEELING DOWN, DEPRESSED OR HOPELESS: NOT AT ALL
8. MOVING OR SPEAKING SO SLOWLY THAT OTHER PEOPLE COULD HAVE NOTICED. OR THE OPPOSITE, BEING SO FIGETY OR RESTLESS THAT YOU HAVE BEEN MOVING AROUND A LOT MORE THAN USUAL: NOT AT ALL
SUM OF ALL RESPONSES TO PHQ9 QUESTIONS 1 & 2: 0
6. FEELING BAD ABOUT YOURSELF - OR THAT YOU ARE A FAILURE OR HAVE LET YOURSELF OR YOUR FAMILY DOWN: NOT AT ALL
4. FEELING TIRED OR HAVING LITTLE ENERGY: NOT AT ALL
7. TROUBLE CONCENTRATING ON THINGS, SUCH AS READING THE NEWSPAPER OR WATCHING TELEVISION: NOT AT ALL
9. THOUGHTS THAT YOU WOULD BE BETTER OFF DEAD, OR OF HURTING YOURSELF: NOT AT ALL
SUM OF ALL RESPONSES TO PHQ QUESTIONS 1-9: 0
1. LITTLE INTEREST OR PLEASURE IN DOING THINGS: NOT AT ALL
5. POOR APPETITE OR OVEREATING: NOT AT ALL
3. TROUBLE FALLING OR STAYING ASLEEP: NOT AT ALL
SUM OF ALL RESPONSES TO PHQ QUESTIONS 1-9: 0

## 2024-10-18 ASSESSMENT — LIFESTYLE VARIABLES
HOW OFTEN DO YOU HAVE A DRINK CONTAINING ALCOHOL: 2-3 TIMES A WEEK
HAVE YOU OR SOMEONE ELSE BEEN INJURED AS A RESULT OF YOUR DRINKING: NO
HOW OFTEN DURING THE LAST YEAR HAVE YOU BEEN UNABLE TO REMEMBER WHAT HAPPENED THE NIGHT BEFORE BECAUSE YOU HAD BEEN DRINKING: NEVER
HOW OFTEN DURING THE LAST YEAR HAVE YOU FOUND THAT YOU WERE NOT ABLE TO STOP DRINKING ONCE YOU HAD STARTED: NEVER
HOW OFTEN DURING THE LAST YEAR HAVE YOU FAILED TO DO WHAT WAS NORMALLY EXPECTED FROM YOU BECAUSE OF DRINKING: NEVER
HOW OFTEN DURING THE LAST YEAR HAVE YOU NEEDED AN ALCOHOLIC DRINK FIRST THING IN THE MORNING TO GET YOURSELF GOING AFTER A NIGHT OF HEAVY DRINKING: NEVER
HOW MANY STANDARD DRINKS CONTAINING ALCOHOL DO YOU HAVE ON A TYPICAL DAY: 1 OR 2
HAS A RELATIVE, FRIEND, DOCTOR, OR ANOTHER HEALTH PROFESSIONAL EXPRESSED CONCERN ABOUT YOUR DRINKING OR SUGGESTED YOU CUT DOWN: NO
HOW OFTEN DURING THE LAST YEAR HAVE YOU HAD A FEELING OF GUILT OR REMORSE AFTER DRINKING: NEVER

## 2024-10-18 ASSESSMENT — VISUAL ACUITY
OS_CC: 20/25
OD_CC: 20/20

## 2024-10-18 NOTE — PATIENT INSTRUCTIONS
want.  There are two main types of advance directives. You can change them any time your wishes change.  Living will.  This form tells your family and your doctor your wishes about life support and other treatment. The form is also called a declaration.  Medical power of .  This form lets you name a person to make treatment decisions for you when you can't speak for yourself. This person is called a health care agent (health care proxy, health care surrogate). The form is also called a durable power of  for health care.  If you do not have an advance directive, decisions about your medical care may be made by a family member, or by a doctor or a  who doesn't know you.  It may help to think of an advance directive as a gift to the people who care for you. If you have one, they won't have to make tough decisions by themselves.  For more information, including forms for your state, see the [employer] website (www.caringinfo.org/planning/advance-directives/).  Follow-up care is a key part of your treatment and safety. Be sure to make and go to all appointments, and call your doctor if you are having problems. It's also a good idea to know your test results and keep a list of the medicines you take.  What should you include in an advance directive?  Many states have a unique advance directive form. (It may ask you to address specific issues.) Or you might use a universal form that's approved by many states.  If your form doesn't tell you what to address, it may be hard to know what to include in your advance directive. Use the questions below to help you get started.  Who do you want to make decisions about your medical care if you are not able to?  What life-support measures do you want if you have a serious illness that gets worse over time or can't be cured?  What are you most afraid of that might happen? (Maybe you're afraid of having pain, losing your independence, or being kept alive by

## 2024-10-18 NOTE — ASSESSMENT & PLAN NOTE
Chronic, worsening type 2 diabetes mellitus  Hemoglobin A1c 7.3, worsening  Lab Results   Component Value Date    LABA1C 7.3 10/18/2024    LABA1C 6.0 05/30/2024    LABA1C 6.4 02/15/2024     Will change metformin to Jardiance due to combined congestive heart failure    Orders:    POCT glycosylated hemoglobin (Hb A1C)    [place] OFFICE OUTPATIENT VISIT 25 MINUTES [61043]    Microalbumin / Creatinine Urine Ratio; Future    empagliflozin (JARDIANCE) 25 MG tablet; Take 1 tablet by mouth daily ** stop metformin**    CBC with Auto Differential; Future    Comprehensive Metabolic Panel; Future    Uric Acid; Future    TSH; Future    Magnesium; Future    Vitamin B12 & Folate; Future      Will continue monitoring with hemoglobin A1c every 3 months  -daily feet exam, Foot care: advised to wash feet daily, pat dry and apply lotion at night, avoiding between toes. Need to look at feet daily and report to a physician any signs of inflammation or skin damage  -annual dilated eye exam  -Low carb, low fat diet, increase fruits and vegetables, and exercise 4-5 times a day 30-40 minutes a day discussed  To stop metformin if Jardiance is covered, he will let me know    -continue lisinopril ACEI/ARB  and statin Crestor

## 2024-10-18 NOTE — PROGRESS NOTES
Visit Information    Have you changed or started any medications since your last visit including any over-the-counter medicines, vitamins, or herbal medicines? no   Have you stopped taking any of your medications? Is so, why? -  no  Are you having any side effects from any of your medications? - no    Have you seen any other physician or provider since your last visit?  yes -    Have you had any other diagnostic tests since your last visit?  no   Have you been seen in the emergency room and/or had an admission in a hospital since we last saw you?  no   Have you had your routine dental cleaning in the past 6 months?  yes -      Do you have an active MyChart account? If no, what is the barrier?  Yes    Patient Care Team:  Anamaria Marino MD as PCP - General (Family Medicine)  Anamaria Marino MD as PCP - Empaneled Provider  Skip Freitas MD as Consulting Physician (Urology)  Julio Arango MD as Consulting Physician (Pulmonology)  He Becker MD as Consulting Physician (Cardiovascular Disease)  Shannon Velásquez MD as Consulting Physician (Dermatology)  Johnny Hendrickson MD as Consulting Physician (Cardiology)  Shannon Velásquez MD as Consulting Physician (Dermatology)  Tavon Murphy DPM as Consulting Physician (Podiatry)    Medical History Review  Past Medical, Family, and Social History reviewed and does contribute to the patient presenting condition    Health Maintenance   Topic Date Due    Shingles vaccine (1 of 2) Never done    Respiratory Syncytial Virus (RSV) Pregnant or age 60 yrs+ (1 - 1-dose 60+ series) Never done    Diabetic retinal exam  12/27/2023    Flu vaccine (1) 08/01/2024    COVID-19 Vaccine (6 - 2023-24 season) 09/01/2024    Annual Wellness Visit (Medicare)  10/11/2024    Diabetic foot exam  10/11/2024    Diabetic Alb to Cr ratio (uACR) test  01/04/2025    Prostate Specific Antigen (PSA) Screening or Monitoring  01/04/2025    Lung Cancer Screening &/or Counseling  
96.4  82.6 - 102.9 fL Final    MCH 07/24/2024 29.8  25.2 - 33.5 pg Final    MCHC 07/24/2024 30.9  28.4 - 34.8 g/dL Final    RDW 07/24/2024 12.2  11.8 - 14.4 % Final    Platelets 07/24/2024 221  138 - 453 k/uL Final    MPV 07/24/2024 9.5  8.1 - 13.5 fL Final    NRBC Automated 07/24/2024 0.0  0.0 per 100 WBC Final    Neutrophils % 07/24/2024 69 (H)  36 - 65 % Final    Lymphocytes % 07/24/2024 19 (L)  24 - 43 % Final    Monocytes % 07/24/2024 8  3 - 12 % Final    Eosinophils % 07/24/2024 2  1 - 4 % Final    Basophils % 07/24/2024 1  0 - 2 % Final    Immature Granulocytes % 07/24/2024 1 (H)  0 % Final    Neutrophils Absolute 07/24/2024 5.54  1.50 - 8.10 k/uL Final    Lymphocytes Absolute 07/24/2024 1.50  1.10 - 3.70 k/uL Final    Monocytes Absolute 07/24/2024 0.62  0.10 - 1.20 k/uL Final    Eosinophils Absolute 07/24/2024 0.15  0.00 - 0.44 k/uL Final    Basophils Absolute 07/24/2024 0.06  0.00 - 0.20 k/uL Final    Immature Granulocytes Absolute 07/24/2024 0.04  0.00 - 0.30 k/uL Final    Sodium 07/24/2024 143  136 - 145 mmol/L Final    Potassium 07/24/2024 4.7  3.7 - 5.3 mmol/L Final    SPECIMEN SLIGHTLY HEMOLYZED, RESULTS MAY BE ADVERSELY AFFECTED.    Chloride 07/24/2024 105  98 - 107 mmol/L Final    CO2 07/24/2024 27  20 - 31 mmol/L Final    Anion Gap 07/24/2024 11  9 - 16 mmol/L Final    Glucose 07/24/2024 157 (H)  74 - 99 mg/dL Final    BUN 07/24/2024 13  8 - 23 mg/dL Final    Creatinine 07/24/2024 0.9  0.70 - 1.20 mg/dL Final    Est, Glom Filt Rate 07/24/2024 >90  >60 mL/min/1.73m2 Final    Comment:       These results are not intended for use in patients <18 years of age.        eGFR results are calculated without a race factor using the 2021 CKD-EPI equation.  Careful clinical correlation is recommended, particularly when comparing to results   calculated using previous equations.  The CKD-EPI equation is less accurate in patients with extremes of muscle mass, extra-renal   metabolism of creatine, excessive

## 2024-10-18 NOTE — ASSESSMENT & PLAN NOTE
Chronic, at goal (stable), continue current treatment plan    We discussed daily blood pressure, pulse, pulse ox, daily weight.  He declines referral to care coordination and remote monitoring  He is going to get a pulse oximeter    We discussed to take additional 40 mg if unintended weight gain of 3 lbs over night and eat one banana   Will change metoprolol to tartrate to metoprolol succinate 75 Mg daily  We will add Jardiance due to type 2 diabetes mellitus and combined CHF  Will stop metformin due to risk of lactic acidosis if CHF flareup  To continue lisinopril 20 Mg daily, furosemide 40 Mg daily  Follow-up with cardiologist as scheduled    Will check labs  Orders:    [place] OFFICE OUTPATIENT VISIT 25 MINUTES [[id number]]    metoprolol succinate (TOPROL XL) 50 MG extended release tablet; Take 1.5 tablets by mouth daily **stop metoprolol tartrate twice a day** Goal BP bellow 130/80 and above 115/65, heart rate 56 to 90 bpm    empagliflozin (JARDIANCE) 25 MG tablet; Take 1 tablet by mouth daily ** stop metformin**    CBC with Auto Differential; Future    Brain Natriuretic Peptide; Future      Lab Results   Component Value Date    LVEF 49 12/14/2023    LVEFMODE Echo 12/14/2023

## 2024-10-18 NOTE — ASSESSMENT & PLAN NOTE
Chronic, stable, LDL at goal, but triglycerides still high, check lipid panel, continue Crestor 40 Mg daily high intensity    Lab Results   Component Value Date    LDL 40 05/30/2024         Orders:    [place] OFFICE OUTPATIENT VISIT 25 MINUTES [[id number]]    Lipid Panel; Future

## 2024-10-18 NOTE — ASSESSMENT & PLAN NOTE
Chronic, stable, on chronic anticoagulation with Xarelto 20 Mg daily, rate control with metoprolol     Orders:    [place] OFFICE OUTPATIENT VISIT 25 MINUTES [[id number]]

## 2024-10-18 NOTE — ASSESSMENT & PLAN NOTE
Chronic, at goal, stable  Discussed low salt diet and BP and pulse monitoring.  Check labs  Continue with furosemide 40 Mg daily, lisinopril 20 Mg daily, will change metoprolol to tartrate to metoprolol succinate 75 Mg daily due to combined CHF and per guidelines he is to take metoprolol succinate and not tartrate    Orders:    [place] OFFICE OUTPATIENT VISIT 25 MINUTES [[id number]]    Brain Natriuretic Peptide; Future    Comprehensive Metabolic Panel; Future    Uric Acid; Future    TSH; Future    Magnesium; Future

## 2024-10-18 NOTE — RESULT ENCOUNTER NOTE
Addressed during office visit today, hemoglobin A1c 7.3, worsening type 2 diabetes,, continue treatment recommended during the office visit.

## 2024-10-18 NOTE — ASSESSMENT & PLAN NOTE
Chronic, stable, at goal  Will change metoprolol to tartrate to metoprolol succinate 75 Mg daily, continue lisinopril 20 Mg daily, Crestor 40 Mg daily, high intensity  Follow-up with cardiologist as scheduled    Orders:    [place] OFFICE OUTPATIENT VISIT 25 MINUTES [[id number]]

## 2024-10-20 PROBLEM — E55.9 VITAMIN D DEFICIENCY: Status: ACTIVE | Noted: 2024-10-20

## 2024-10-20 PROBLEM — I10 PRIMARY HYPERTENSION: Status: ACTIVE | Noted: 2024-05-23

## 2024-10-20 PROBLEM — E11.21 TYPE 2 DIABETES MELLITUS WITH DIABETIC NEPHROPATHY, WITHOUT LONG-TERM CURRENT USE OF INSULIN (HCC): Status: ACTIVE | Noted: 2024-10-20

## 2024-10-20 PROBLEM — E11.65 TYPE 2 DIABETES MELLITUS WITH HYPERGLYCEMIA, WITHOUT LONG-TERM CURRENT USE OF INSULIN (HCC): Status: ACTIVE | Noted: 2023-05-24

## 2024-10-20 PROBLEM — F33.41 RECURRENT MAJOR DEPRESSIVE DISORDER, IN PARTIAL REMISSION (HCC): Status: RESOLVED | Noted: 2021-06-12 | Resolved: 2024-10-20

## 2024-10-21 RX ORDER — METOPROLOL SUCCINATE 50 MG/1
75 TABLET, EXTENDED RELEASE ORAL DAILY
Qty: 135 TABLET | Refills: 3 | Status: SHIPPED | OUTPATIENT
Start: 2024-10-21

## 2024-10-21 NOTE — TELEPHONE ENCOUNTER
Please Approve or Refuse.  Send to Pharmacy per Pt's Request:      Next Visit Date:  2/7/2025   Last Visit Date: 10/18/2024    Hemoglobin A1C (%)   Date Value   10/18/2024 7.3   05/30/2024 6.0   02/15/2024 6.4             ( goal A1C is < 7)   BP Readings from Last 3 Encounters:   10/18/24 118/76   08/22/24 110/78   08/21/24 130/84          (goal 120/80)  BUN   Date Value Ref Range Status   07/24/2024 13 8 - 23 mg/dL Final     Creatinine   Date Value Ref Range Status   07/24/2024 0.9 0.70 - 1.20 mg/dL Final     Potassium   Date Value Ref Range Status   07/24/2024 4.7 3.7 - 5.3 mmol/L Final     Comment:     SPECIMEN SLIGHTLY HEMOLYZED, RESULTS MAY BE ADVERSELY AFFECTED.

## 2024-11-06 ENCOUNTER — TELEPHONE (OUTPATIENT)
Dept: PULMONOLOGY | Age: 69
End: 2024-11-06

## 2024-11-06 ENCOUNTER — HOSPITAL ENCOUNTER (OUTPATIENT)
Age: 69
Setting detail: SPECIMEN
Discharge: HOME | End: 2024-11-06

## 2024-11-06 DIAGNOSIS — I50.42 CHRONIC COMBINED SYSTOLIC AND DIASTOLIC CHF (CONGESTIVE HEART FAILURE) (HCC): ICD-10-CM

## 2024-11-06 DIAGNOSIS — E55.9 VITAMIN D DEFICIENCY: ICD-10-CM

## 2024-11-06 DIAGNOSIS — I10 PRIMARY HYPERTENSION: ICD-10-CM

## 2024-11-06 DIAGNOSIS — E11.65 TYPE 2 DIABETES MELLITUS WITH HYPERGLYCEMIA, WITHOUT LONG-TERM CURRENT USE OF INSULIN (HCC): ICD-10-CM

## 2024-11-06 DIAGNOSIS — E78.2 MIXED HYPERLIPIDEMIA: ICD-10-CM

## 2024-11-06 DIAGNOSIS — E78.2 MIXED HYPERLIPIDEMIA: Primary | ICD-10-CM

## 2024-11-06 LAB
25(OH)D3 SERPL-MCNC: 22.1 NG/ML (ref 30–100)
ALBUMIN SERPL-MCNC: 4.3 G/DL (ref 3.5–5.2)
ALBUMIN/GLOB SERPL: 1.4 {RATIO} (ref 1–2.5)
ALP SERPL-CCNC: 87 U/L (ref 40–129)
ALT SERPL-CCNC: 28 U/L (ref 10–50)
ANION GAP SERPL CALCULATED.3IONS-SCNC: 12 MMOL/L (ref 9–16)
AST SERPL-CCNC: 26 U/L (ref 10–50)
BASOPHILS # BLD: 0.09 K/UL (ref 0–0.2)
BASOPHILS NFR BLD: 1 % (ref 0–2)
BILIRUB SERPL-MCNC: 0.4 MG/DL (ref 0–1.2)
BNP SERPL-MCNC: 142 PG/ML (ref 0–125)
BUN SERPL-MCNC: 19 MG/DL (ref 8–23)
CALCIUM SERPL-MCNC: 9.2 MG/DL (ref 8.6–10.4)
CHLORIDE SERPL-SCNC: 105 MMOL/L (ref 98–107)
CHOLEST SERPL-MCNC: 110 MG/DL (ref 0–199)
CHOLESTEROL/HDL RATIO: 5.2
CO2 SERPL-SCNC: 23 MMOL/L (ref 20–31)
CREAT SERPL-MCNC: 0.9 MG/DL (ref 0.7–1.2)
CREAT UR-MCNC: 113 MG/DL (ref 39–259)
EOSINOPHIL # BLD: 0.2 K/UL (ref 0–0.44)
EOSINOPHILS RELATIVE PERCENT: 3 % (ref 1–4)
ERYTHROCYTE [DISTWIDTH] IN BLOOD BY AUTOMATED COUNT: 17.7 % (ref 11.8–14.4)
FOLATE SERPL-MCNC: 29.1 NG/ML (ref 4.8–24.2)
GFR, ESTIMATED: >90 ML/MIN/1.73M2
GLUCOSE SERPL-MCNC: 153 MG/DL (ref 74–99)
HCT VFR BLD AUTO: 49.6 % (ref 40.7–50.3)
HDLC SERPL-MCNC: 21 MG/DL
HGB BLD-MCNC: 15.2 G/DL (ref 13–17)
IMM GRANULOCYTES # BLD AUTO: 0.04 K/UL (ref 0–0.3)
IMM GRANULOCYTES NFR BLD: 1 %
LDLC SERPL CALC-MCNC: 36 MG/DL (ref 0–100)
LYMPHOCYTES NFR BLD: 1.81 K/UL (ref 1.1–3.7)
LYMPHOCYTES RELATIVE PERCENT: 24 % (ref 24–43)
MAGNESIUM SERPL-MCNC: 2.3 MG/DL (ref 1.6–2.4)
MCH RBC QN AUTO: 26.5 PG (ref 25.2–33.5)
MCHC RBC AUTO-ENTMCNC: 30.6 G/DL (ref 28.4–34.8)
MCV RBC AUTO: 86.4 FL (ref 82.6–102.9)
MICROALBUMIN UR-MCNC: 18 MG/L (ref 0–20)
MICROALBUMIN/CREAT UR-RTO: 16 MCG/MG CREAT (ref 0–17)
MONOCYTES NFR BLD: 0.56 K/UL (ref 0.1–1.2)
MONOCYTES NFR BLD: 7 % (ref 3–12)
NEUTROPHILS NFR BLD: 64 % (ref 36–65)
NEUTS SEG NFR BLD: 4.95 K/UL (ref 1.5–8.1)
NRBC BLD-RTO: 0 PER 100 WBC
PLATELET # BLD AUTO: 246 K/UL (ref 138–453)
PMV BLD AUTO: 9.4 FL (ref 8.1–13.5)
POTASSIUM SERPL-SCNC: 4.1 MMOL/L (ref 3.7–5.3)
PROT SERPL-MCNC: 7.4 G/DL (ref 6.6–8.7)
RBC # BLD AUTO: 5.74 M/UL (ref 4.21–5.77)
RBC # BLD: ABNORMAL 10*6/UL
SODIUM SERPL-SCNC: 140 MMOL/L (ref 136–145)
TRIGL SERPL-MCNC: 266 MG/DL
TSH SERPL DL<=0.05 MIU/L-ACNC: 1.59 UIU/ML (ref 0.27–4.2)
URATE SERPL-MCNC: 4.6 MG/DL (ref 3.4–7)
VIT B12 SERPL-MCNC: 694 PG/ML (ref 232–1245)
VLDLC SERPL CALC-MCNC: 53 MG/DL (ref 1–30)
WBC OTHER # BLD: 7.7 K/UL (ref 3.5–11.3)

## 2024-11-06 RX ORDER — ERGOCALCIFEROL 1.25 MG/1
50000 CAPSULE, LIQUID FILLED ORAL WEEKLY
Qty: 12 CAPSULE | Refills: 0 | Status: SHIPPED | OUTPATIENT
Start: 2024-11-06

## 2024-11-06 RX ORDER — OMEGA-3-ACID ETHYL ESTERS 1 G/1
2 CAPSULE, LIQUID FILLED ORAL 2 TIMES DAILY
Qty: 360 CAPSULE | Refills: 3 | Status: SHIPPED | OUTPATIENT
Start: 2024-11-06

## 2024-11-06 NOTE — TELEPHONE ENCOUNTER
Patient called and questioning if he still needs his annual CT lung screening that he had done last November.  Patient just had a CT chest done 5/13/24.  Please advise and place order and we will get scheduled.  This is a Dr Arango patient

## 2024-11-07 ENCOUNTER — OFFICE VISIT (OUTPATIENT)
Dept: PODIATRY | Age: 69
End: 2024-11-07
Payer: MEDICARE

## 2024-11-07 VITALS — WEIGHT: 315 LBS | BODY MASS INDEX: 45.1 KG/M2 | HEIGHT: 70 IN

## 2024-11-07 DIAGNOSIS — M79.674 PAIN OF TOES OF BOTH FEET: ICD-10-CM

## 2024-11-07 DIAGNOSIS — M79.675 PAIN OF TOES OF BOTH FEET: ICD-10-CM

## 2024-11-07 DIAGNOSIS — B35.1 ONYCHOMYCOSIS OF TOENAIL: Primary | ICD-10-CM

## 2024-11-07 PROCEDURE — 99999 PR OFFICE/OUTPT VISIT,PROCEDURE ONLY: CPT | Performed by: PODIATRIST

## 2024-11-07 PROCEDURE — 11721 DEBRIDE NAIL 6 OR MORE: CPT | Performed by: PODIATRIST

## 2024-11-07 NOTE — RESULT ENCOUNTER NOTE
Please notify patient: High triglycerides, low-carb diet advisable--I will refill omega-3 fish oil, and avoid pop    Vitamin D deficiency but improved from prior, I will refill the high dosage vitamin D to take weekly with food  Blood glucose 153, well-controlled diabetes    Otherwise labs within normal limits  continue current treatment    Future Appointments  11/7/2024  10:15 AM   Tavon Murphy DPM  [facility]          [facility]  1/15/2025  2:20 PM    Julio Arango MD       [facility]         [facility]  2/7/2025   1:30 PM    Anamaria Marino MD    [facility]  8/21/2025  11:30 AM   Shannon Velásquez MD         derm             [facility]  10/21/2025 1:30 PM    Anamaria Marino MD    [facility]

## 2024-11-11 ASSESSMENT — ENCOUNTER SYMPTOMS
SHORTNESS OF BREATH: 0
BACK PAIN: 0
NAUSEA: 0
DIARRHEA: 0
COLOR CHANGE: 0

## 2024-11-11 NOTE — PROGRESS NOTES
SUBJECTIVE: Alayna Galvez is a 68 y.o. male who returns to the office with chief complaint of painful fungal toenails. Patient relates toe nails are thickened/difficult to trim as well as painful with ambulation and with shoe gear.   Chief Complaint   Patient presents with    Nail Problem     Nail trim/last saw Dr.Florentina Marino 10/18/24     Review of Systems   Constitutional:  Negative for activity change, appetite change, chills, diaphoresis, fatigue and fever.   Respiratory:  Negative for shortness of breath.    Cardiovascular:  Negative for leg swelling.   Gastrointestinal:  Negative for diarrhea and nausea.   Endocrine: Negative for cold intolerance, heat intolerance and polyuria.   Musculoskeletal:  Positive for arthralgias. Negative for back pain, gait problem, joint swelling and myalgias.   Skin:  Negative for color change, pallor, rash and wound.   Allergic/Immunologic: Negative for environmental allergies and food allergies.   Neurological:  Negative for dizziness, weakness, light-headedness and numbness.   Hematological:  Does not bruise/bleed easily.   Psychiatric/Behavioral:  Negative for behavioral problems, confusion and self-injury. The patient is not nervous/anxious.      OBJECTIVE: Clinical evaluation of patient reveals nails 1,2,3,4,5 of the right foot and nails 1,2,3,4,5 of the left foot to present with thickness, elongation, discoloration, brittleness, and subungual debris. There was pain with palpation and debridement of the toenails of the bilateral feet. No open lesions noted to either foot today.     Class A Findings (1 needed)   [] Non-traumatic amputation of foot or integral skeleton portion thereof.   [] Q7.      Class B Findings (2 needed)   1. [] Absent posterior tibial pulse   2. [] Absent dorsalis pedis pulse   3. [] Advanced trophic changes; three of the following are required:   ·         [] hair growth (decrease or absence)   ·         [] nail changes (thickening)   ·         []

## 2024-12-15 DIAGNOSIS — I10 ESSENTIAL HYPERTENSION: ICD-10-CM

## 2024-12-15 DIAGNOSIS — I25.10 CORONARY ARTERY DISEASE INVOLVING NATIVE CORONARY ARTERY OF NATIVE HEART WITHOUT ANGINA PECTORIS: ICD-10-CM

## 2024-12-16 RX ORDER — FUROSEMIDE 40 MG/1
40 TABLET ORAL DAILY
Qty: 90 TABLET | Refills: 3 | Status: SHIPPED | OUTPATIENT
Start: 2024-12-16

## 2024-12-16 RX ORDER — FUROSEMIDE 40 MG/1
40 TABLET ORAL DAILY
Qty: 90 TABLET | Refills: 3 | OUTPATIENT
Start: 2024-12-16

## 2024-12-16 NOTE — TELEPHONE ENCOUNTER
Please Approve or Refuse.  Send to Pharmacy per Pt's Request:      Next Visit Date:  12/15/2024   Last Visit Date: 10/18/2024    Hemoglobin A1C (%)   Date Value   10/18/2024 7.3   05/30/2024 6.0   02/15/2024 6.4             ( goal A1C is < 7)   BP Readings from Last 3 Encounters:   10/18/24 118/76   08/22/24 110/78   08/21/24 130/84          (goal 120/80)  BUN   Date Value Ref Range Status   11/06/2024 19 8 - 23 mg/dL Final     Creatinine   Date Value Ref Range Status   11/06/2024 0.9 0.7 - 1.2 mg/dL Final     Potassium   Date Value Ref Range Status   11/06/2024 4.1 3.7 - 5.3 mmol/L Final

## 2024-12-16 NOTE — TELEPHONE ENCOUNTER
Please Approve or Refuse.  Send to Pharmacy per Pt's Request:      Next Visit Date:  2/7/2025   Last Visit Date: 10/18/2024    Hemoglobin A1C (%)   Date Value   10/18/2024 7.3   05/30/2024 6.0   02/15/2024 6.4             ( goal A1C is < 7)   BP Readings from Last 3 Encounters:   10/18/24 118/76   08/22/24 110/78   08/21/24 130/84          (goal 120/80)  BUN   Date Value Ref Range Status   11/06/2024 19 8 - 23 mg/dL Final     Creatinine   Date Value Ref Range Status   11/06/2024 0.9 0.7 - 1.2 mg/dL Final     Potassium   Date Value Ref Range Status   11/06/2024 4.1 3.7 - 5.3 mmol/L Final

## 2025-01-15 ENCOUNTER — OFFICE VISIT (OUTPATIENT)
Dept: PULMONOLOGY | Age: 70
End: 2025-01-15
Payer: MEDICARE

## 2025-01-15 VITALS
HEART RATE: 79 BPM | BODY MASS INDEX: 45.2 KG/M2 | WEIGHT: 315 LBS | OXYGEN SATURATION: 97 % | RESPIRATION RATE: 22 BRPM | DIASTOLIC BLOOD PRESSURE: 78 MMHG | TEMPERATURE: 97.6 F | SYSTOLIC BLOOD PRESSURE: 117 MMHG

## 2025-01-15 DIAGNOSIS — J44.9 CHRONIC OBSTRUCTIVE PULMONARY DISEASE, UNSPECIFIED COPD TYPE (HCC): ICD-10-CM

## 2025-01-15 DIAGNOSIS — I10 HYPERTENSION, UNSPECIFIED TYPE: Primary | ICD-10-CM

## 2025-01-15 DIAGNOSIS — G47.33 OSA ON CPAP: ICD-10-CM

## 2025-01-15 DIAGNOSIS — E66.01 OBESITY, CLASS III, BMI 40-49.9 (MORBID OBESITY): ICD-10-CM

## 2025-01-15 DIAGNOSIS — R91.1 LUNG NODULE: ICD-10-CM

## 2025-01-15 PROCEDURE — 1159F MED LIST DOCD IN RCRD: CPT | Performed by: INTERNAL MEDICINE

## 2025-01-15 PROCEDURE — G8427 DOCREV CUR MEDS BY ELIG CLIN: HCPCS | Performed by: INTERNAL MEDICINE

## 2025-01-15 PROCEDURE — G8417 CALC BMI ABV UP PARAM F/U: HCPCS | Performed by: INTERNAL MEDICINE

## 2025-01-15 PROCEDURE — 1126F AMNT PAIN NOTED NONE PRSNT: CPT | Performed by: INTERNAL MEDICINE

## 2025-01-15 PROCEDURE — 1036F TOBACCO NON-USER: CPT | Performed by: INTERNAL MEDICINE

## 2025-01-15 PROCEDURE — 1123F ACP DISCUSS/DSCN MKR DOCD: CPT | Performed by: INTERNAL MEDICINE

## 2025-01-15 PROCEDURE — 99214 OFFICE O/P EST MOD 30 MIN: CPT | Performed by: INTERNAL MEDICINE

## 2025-01-15 PROCEDURE — 3078F DIAST BP <80 MM HG: CPT | Performed by: INTERNAL MEDICINE

## 2025-01-15 PROCEDURE — 3074F SYST BP LT 130 MM HG: CPT | Performed by: INTERNAL MEDICINE

## 2025-01-15 PROCEDURE — 3017F COLORECTAL CA SCREEN DOC REV: CPT | Performed by: INTERNAL MEDICINE

## 2025-01-15 PROCEDURE — 3023F SPIROM DOC REV: CPT | Performed by: INTERNAL MEDICINE

## 2025-01-15 RX ORDER — UMECLIDINIUM BROMIDE AND VILANTEROL TRIFENATATE 62.5; 25 UG/1; UG/1
1 POWDER RESPIRATORY (INHALATION) DAILY
Qty: 3 EACH | Refills: 2 | Status: SHIPPED | OUTPATIENT
Start: 2025-01-15

## 2025-01-15 ASSESSMENT — SLEEP AND FATIGUE QUESTIONNAIRES
HOW LIKELY ARE YOU TO NOD OFF OR FALL ASLEEP WHILE SITTING QUIETLY AFTER LUNCH WITHOUT ALCOHOL: WOULD NEVER DOZE
HOW LIKELY ARE YOU TO NOD OFF OR FALL ASLEEP WHILE SITTING AND TALKING TO SOMEONE: WOULD NEVER DOZE
HOW LIKELY ARE YOU TO NOD OFF OR FALL ASLEEP WHILE SITTING INACTIVE IN A PUBLIC PLACE: WOULD NEVER DOZE
HOW LIKELY ARE YOU TO NOD OFF OR FALL ASLEEP WHILE WATCHING TV: WOULD NEVER DOZE
HOW LIKELY ARE YOU TO NOD OFF OR FALL ASLEEP WHILE LYING DOWN TO REST IN THE AFTERNOON WHEN CIRCUMSTANCES PERMIT: WOULD NEVER DOZE
HOW LIKELY ARE YOU TO NOD OFF OR FALL ASLEEP WHEN YOU ARE A PASSENGER IN A CAR FOR AN HOUR WITHOUT A BREAK: SLIGHT CHANCE OF DOZING
HOW LIKELY ARE YOU TO NOD OFF OR FALL ASLEEP IN A CAR, WHILE STOPPED FOR A FEW MINUTES IN TRAFFIC: WOULD NEVER DOZE
ESS TOTAL SCORE: 1
HOW LIKELY ARE YOU TO NOD OFF OR FALL ASLEEP WHILE SITTING AND READING: WOULD NEVER DOZE

## 2025-01-15 NOTE — PROGRESS NOTES
TSH 1.59 11/06/2024 08:20 AM     Urinalysis: No results for input(s): \"BACTERIA\", \"BLOODU\", \"CLARITYU\", \"COLORU\", \"PHUR\", \"PROTEINU\", \"RBCUA\", \"SPECGRAV\", \"BILIRUBINUR\", \"NITRU\", \"WBCUA\", \"LEUKOCYTESUR\", \"GLUCOSEU\" in the last 72 hours.            CXR  No recent chest x-ray  CT Scans  New CT scan reviewed and compared with the previous ones.  There is a nodule in the right upper lobe which is unchanged no cavitation.  No pleural effusion.  This is very likely benign.  Patient was reassured.  Above the nodule has been PET negative  80-year-old CT scans were reviewed and the lung nodule is unchanged will get a repeat CT and CT in 6 months    Echo    No recent echo            IMPRESSION:    COPD no acute exacerbation  Hypertension  Possible cor pulmonale  Morbid obesity  Sleep apnea syndrome  History of carcinoma of the prostate treated with prostatectomy  Lung nodule PET negative  :                PLAN: Patient has sleep apnea which responding well to the use of CPAP he was encouraged to use CPAP as before.    He is not feeling sleepy tired fatigue during the daytime    He continues to morbidly obese.  He was advised it was emphasized to him that he must lose weight.  I advised him to start walking.  He will continue the use of Anoro and albuterol.  Results of the CT scan and the lung nodule evaluation were discussed with the patient and he was reassured.  Will get a repeat CT scan of the chest in 6 months next visit.  Most likely the nodule is benign but we will continue to follow for another year.  Hypertension is under good control no angina no PND.  He likely has some degree of cor pulmonale and 1+ edema which is a manifestation of right heart failure.    There is no evidence of an acute exacerbation of COPD.    Patient questions were answered.  Will see him in 6 months    Dictated by Dr. Nba SHAIKH dictation over thank you

## 2025-01-16 NOTE — PATIENT INSTRUCTIONS
Called pt to schedule CT scan - tried 3x- phone didn't ring.  Mailed CT order with scheduling phone number.   LS

## 2025-01-21 ENCOUNTER — HOSPITAL ENCOUNTER (OUTPATIENT)
Age: 70
Setting detail: SPECIMEN
Discharge: HOME | End: 2025-01-21

## 2025-01-21 DIAGNOSIS — E13.22 SECONDARY DIABETES MELLITUS WITH STAGE 1 CHRONIC KIDNEY DISEASE (HCC): ICD-10-CM

## 2025-01-21 DIAGNOSIS — I12.9 BENIGN HYPERTENSION WITH CKD (CHRONIC KIDNEY DISEASE) STAGE I: ICD-10-CM

## 2025-01-21 DIAGNOSIS — N18.1 CKD (CHRONIC KIDNEY DISEASE) STAGE 1, GFR 90 ML/MIN OR GREATER: ICD-10-CM

## 2025-01-21 DIAGNOSIS — N18.1 SECONDARY DIABETES MELLITUS WITH STAGE 1 CHRONIC KIDNEY DISEASE (HCC): ICD-10-CM

## 2025-01-21 DIAGNOSIS — N18.1 BENIGN HYPERTENSION WITH CKD (CHRONIC KIDNEY DISEASE) STAGE I: ICD-10-CM

## 2025-01-21 LAB
ANION GAP SERPL CALCULATED.3IONS-SCNC: 11 MMOL/L (ref 9–16)
BACTERIA URNS QL MICRO: ABNORMAL
BASOPHILS # BLD: 0.06 K/UL (ref 0–0.2)
BASOPHILS NFR BLD: 1 % (ref 0–2)
BILIRUB UR QL STRIP: NEGATIVE
BUN SERPL-MCNC: 13 MG/DL (ref 8–23)
CALCIUM SERPL-MCNC: 9.6 MG/DL (ref 8.6–10.4)
CASTS #/AREA URNS LPF: ABNORMAL /LPF (ref 0–8)
CHLORIDE SERPL-SCNC: 105 MMOL/L (ref 98–107)
CLARITY UR: CLEAR
CO2 SERPL-SCNC: 26 MMOL/L (ref 20–31)
COLOR UR: YELLOW
CREAT SERPL-MCNC: 0.8 MG/DL (ref 0.7–1.2)
CREAT UR-MCNC: 99.4 MG/DL (ref 39–259)
EOSINOPHIL # BLD: 0.16 K/UL (ref 0–0.44)
EOSINOPHILS RELATIVE PERCENT: 2 % (ref 1–4)
EPI CELLS #/AREA URNS HPF: ABNORMAL /HPF (ref 0–5)
ERYTHROCYTE [DISTWIDTH] IN BLOOD BY AUTOMATED COUNT: 14.7 % (ref 11.8–14.4)
GFR, ESTIMATED: >90 ML/MIN/1.73M2
GLUCOSE SERPL-MCNC: 156 MG/DL (ref 74–99)
GLUCOSE UR STRIP-MCNC: ABNORMAL MG/DL
HCT VFR BLD AUTO: 50 % (ref 40.7–50.3)
HGB BLD-MCNC: 15.3 G/DL (ref 13–17)
HGB UR QL STRIP.AUTO: NEGATIVE
IMM GRANULOCYTES # BLD AUTO: 0.03 K/UL (ref 0–0.3)
IMM GRANULOCYTES NFR BLD: 0 %
KETONES UR STRIP-MCNC: NEGATIVE MG/DL
LEUKOCYTE ESTERASE UR QL STRIP: NEGATIVE
LYMPHOCYTES NFR BLD: 1.44 K/UL (ref 1.1–3.7)
LYMPHOCYTES RELATIVE PERCENT: 19 % (ref 24–43)
MAGNESIUM SERPL-MCNC: 2.1 MG/DL (ref 1.6–2.4)
MCH RBC QN AUTO: 27.4 PG (ref 25.2–33.5)
MCHC RBC AUTO-ENTMCNC: 30.6 G/DL (ref 28.4–34.8)
MCV RBC AUTO: 89.4 FL (ref 82.6–102.9)
MICROALBUMIN UR-MCNC: <12 MG/L (ref 0–20)
MICROALBUMIN/CREAT UR-RTO: NORMAL MCG/MG CREAT (ref 0–17)
MONOCYTES NFR BLD: 0.52 K/UL (ref 0.1–1.2)
MONOCYTES NFR BLD: 7 % (ref 3–12)
NEUTROPHILS NFR BLD: 71 % (ref 36–65)
NEUTS SEG NFR BLD: 5.26 K/UL (ref 1.5–8.1)
NITRITE UR QL STRIP: NEGATIVE
NRBC BLD-RTO: 0 PER 100 WBC
PH UR STRIP: 5.5 [PH] (ref 5–8)
PHOSPHATE SERPL-MCNC: 3.4 MG/DL (ref 2.5–4.5)
PLATELET # BLD AUTO: 225 K/UL (ref 138–453)
PMV BLD AUTO: 9.2 FL (ref 8.1–13.5)
POTASSIUM SERPL-SCNC: 3.9 MMOL/L (ref 3.7–5.3)
PROT UR STRIP-MCNC: NEGATIVE MG/DL
RBC # BLD AUTO: 5.59 M/UL (ref 4.21–5.77)
RBC # BLD: ABNORMAL 10*6/UL
RBC #/AREA URNS HPF: ABNORMAL /HPF (ref 0–4)
SODIUM SERPL-SCNC: 142 MMOL/L (ref 136–145)
SP GR UR STRIP: 1.04 (ref 1–1.03)
UROBILINOGEN UR STRIP-ACNC: NORMAL EU/DL (ref 0–1)
WBC #/AREA URNS HPF: ABNORMAL /HPF (ref 0–5)
WBC OTHER # BLD: 7.5 K/UL (ref 3.5–11.3)

## 2025-01-27 PROBLEM — I05.9 MITRAL VALVE DISORDER: Status: ACTIVE | Noted: 2025-01-27

## 2025-01-30 ENCOUNTER — OFFICE VISIT (OUTPATIENT)
Dept: PODIATRY | Age: 70
End: 2025-01-30

## 2025-01-30 VITALS — WEIGHT: 303 LBS | HEIGHT: 70 IN | BODY MASS INDEX: 43.38 KG/M2

## 2025-01-30 DIAGNOSIS — M79.674 PAIN OF TOES OF BOTH FEET: ICD-10-CM

## 2025-01-30 DIAGNOSIS — M79.675 PAIN OF TOES OF BOTH FEET: ICD-10-CM

## 2025-01-30 DIAGNOSIS — B35.1 ONYCHOMYCOSIS OF TOENAIL: Primary | ICD-10-CM

## 2025-02-07 ENCOUNTER — OFFICE VISIT (OUTPATIENT)
Dept: FAMILY MEDICINE CLINIC | Age: 70
End: 2025-02-07

## 2025-02-07 VITALS
BODY MASS INDEX: 43.63 KG/M2 | HEART RATE: 89 BPM | HEIGHT: 70 IN | WEIGHT: 304.8 LBS | OXYGEN SATURATION: 98 % | DIASTOLIC BLOOD PRESSURE: 80 MMHG | SYSTOLIC BLOOD PRESSURE: 120 MMHG | TEMPERATURE: 98.6 F

## 2025-02-07 DIAGNOSIS — I50.42 CHRONIC COMBINED SYSTOLIC AND DIASTOLIC CHF (CONGESTIVE HEART FAILURE) (HCC): ICD-10-CM

## 2025-02-07 DIAGNOSIS — I25.10 CORONARY ARTERY DISEASE INVOLVING NATIVE CORONARY ARTERY OF NATIVE HEART WITHOUT ANGINA PECTORIS: ICD-10-CM

## 2025-02-07 DIAGNOSIS — G89.29 CHRONIC BILATERAL LOW BACK PAIN WITHOUT SCIATICA: ICD-10-CM

## 2025-02-07 DIAGNOSIS — I48.0 PAROXYSMAL ATRIAL FIBRILLATION (HCC): ICD-10-CM

## 2025-02-07 DIAGNOSIS — M54.50 CHRONIC BILATERAL LOW BACK PAIN WITHOUT SCIATICA: ICD-10-CM

## 2025-02-07 DIAGNOSIS — E55.9 VITAMIN D DEFICIENCY: ICD-10-CM

## 2025-02-07 DIAGNOSIS — I10 PRIMARY HYPERTENSION: ICD-10-CM

## 2025-02-07 DIAGNOSIS — E11.65 TYPE 2 DIABETES MELLITUS WITH HYPERGLYCEMIA, WITHOUT LONG-TERM CURRENT USE OF INSULIN (HCC): Primary | ICD-10-CM

## 2025-02-07 DIAGNOSIS — E78.2 MIXED HYPERLIPIDEMIA: ICD-10-CM

## 2025-02-07 DIAGNOSIS — G47.33 OSA ON CPAP: ICD-10-CM

## 2025-02-07 LAB — HBA1C MFR BLD: 6.8 %

## 2025-02-07 RX ORDER — BACLOFEN 10 MG/1
10 TABLET ORAL
Qty: 30 TABLET | Refills: 0 | Status: SHIPPED | OUTPATIENT
Start: 2025-02-07

## 2025-02-07 RX ORDER — ERGOCALCIFEROL 1.25 MG/1
50000 CAPSULE, LIQUID FILLED ORAL WEEKLY
Qty: 12 CAPSULE | Refills: 0 | Status: SHIPPED | OUTPATIENT
Start: 2025-02-07

## 2025-02-07 RX ORDER — YEAST,DRIED (S. CEREVISIAE)
1 POWDER (GRAM) ORAL DAILY
Qty: 90 TABLET | Refills: 0
Start: 2025-02-07

## 2025-02-07 SDOH — ECONOMIC STABILITY: FOOD INSECURITY: WITHIN THE PAST 12 MONTHS, YOU WORRIED THAT YOUR FOOD WOULD RUN OUT BEFORE YOU GOT MONEY TO BUY MORE.: NEVER TRUE

## 2025-02-07 SDOH — ECONOMIC STABILITY: FOOD INSECURITY: WITHIN THE PAST 12 MONTHS, THE FOOD YOU BOUGHT JUST DIDN'T LAST AND YOU DIDN'T HAVE MONEY TO GET MORE.: NEVER TRUE

## 2025-02-07 ASSESSMENT — PATIENT HEALTH QUESTIONNAIRE - PHQ9
2. FEELING DOWN, DEPRESSED OR HOPELESS: NOT AT ALL
SUM OF ALL RESPONSES TO PHQ QUESTIONS 1-9: 0
8. MOVING OR SPEAKING SO SLOWLY THAT OTHER PEOPLE COULD HAVE NOTICED. OR THE OPPOSITE, BEING SO FIGETY OR RESTLESS THAT YOU HAVE BEEN MOVING AROUND A LOT MORE THAN USUAL: NOT AT ALL
7. TROUBLE CONCENTRATING ON THINGS, SUCH AS READING THE NEWSPAPER OR WATCHING TELEVISION: NOT AT ALL
SUM OF ALL RESPONSES TO PHQ QUESTIONS 1-9: 0
3. TROUBLE FALLING OR STAYING ASLEEP: NOT AT ALL
7. TROUBLE CONCENTRATING ON THINGS, SUCH AS READING THE NEWSPAPER OR WATCHING TELEVISION: NOT AT ALL
3. TROUBLE FALLING OR STAYING ASLEEP: NOT AT ALL
5. POOR APPETITE OR OVEREATING: NOT AT ALL
10. IF YOU CHECKED OFF ANY PROBLEMS, HOW DIFFICULT HAVE THESE PROBLEMS MADE IT FOR YOU TO DO YOUR WORK, TAKE CARE OF THINGS AT HOME, OR GET ALONG WITH OTHER PEOPLE: NOT DIFFICULT AT ALL
SUM OF ALL RESPONSES TO PHQ QUESTIONS 1-9: 0
1. LITTLE INTEREST OR PLEASURE IN DOING THINGS: NOT AT ALL
4. FEELING TIRED OR HAVING LITTLE ENERGY: NOT AT ALL
SUM OF ALL RESPONSES TO PHQ9 QUESTIONS 1 & 2: 0
9. THOUGHTS THAT YOU WOULD BE BETTER OFF DEAD, OR OF HURTING YOURSELF: NOT AT ALL
6. FEELING BAD ABOUT YOURSELF - OR THAT YOU ARE A FAILURE OR HAVE LET YOURSELF OR YOUR FAMILY DOWN: NOT AT ALL
9. THOUGHTS THAT YOU WOULD BE BETTER OFF DEAD, OR OF HURTING YOURSELF: NOT AT ALL
2. FEELING DOWN, DEPRESSED OR HOPELESS: NOT AT ALL
6. FEELING BAD ABOUT YOURSELF - OR THAT YOU ARE A FAILURE OR HAVE LET YOURSELF OR YOUR FAMILY DOWN: NOT AT ALL
4. FEELING TIRED OR HAVING LITTLE ENERGY: NOT AT ALL
5. POOR APPETITE OR OVEREATING: NOT AT ALL
10. IF YOU CHECKED OFF ANY PROBLEMS, HOW DIFFICULT HAVE THESE PROBLEMS MADE IT FOR YOU TO DO YOUR WORK, TAKE CARE OF THINGS AT HOME, OR GET ALONG WITH OTHER PEOPLE: NOT DIFFICULT AT ALL
8. MOVING OR SPEAKING SO SLOWLY THAT OTHER PEOPLE COULD HAVE NOTICED. OR THE OPPOSITE - BEING SO FIDGETY OR RESTLESS THAT YOU HAVE BEEN MOVING AROUND A LOT MORE THAN USUAL: NOT AT ALL
1. LITTLE INTEREST OR PLEASURE IN DOING THINGS: NOT AT ALL
SUM OF ALL RESPONSES TO PHQ QUESTIONS 1-9: 0
SUM OF ALL RESPONSES TO PHQ QUESTIONS 1-9: 0

## 2025-02-07 NOTE — RESULT ENCOUNTER NOTE
Addressed during office visit today, POC A1c 6.8, improved from prior 7.3, continue treatment recommended during the office visit.

## 2025-02-07 NOTE — PROGRESS NOTES
Visit Information    Have you changed or started any medications since your last visit including any over-the-counter medicines, vitamins, or herbal medicines? no   Have you stopped taking any of your medications? Is so, why? -  yes -   Are you having any side effects from any of your medications? - no    Have you seen any other physician or provider since your last visit?  yes -    Have you had any other diagnostic tests since your last visit?  no   Have you been seen in the emergency room and/or had an admission in a hospital since we last saw you?  no   Have you had your routine dental cleaning in the past 6 months?  no     Do you have an active MyChart account? If no, what is the barrier?  Yes    Patient Care Team:  Anamaria Marino MD as PCP - General (Family Medicine)  Anamaria Marino MD as PCP - Empaneled Provider  Skip Freitas MD as Consulting Physician (Urology)  Julio Arango MD as Consulting Physician (Pulmonology)  He Becker MD as Consulting Physician (Cardiovascular Disease)  Shannon Velásquez MD as Consulting Physician (Dermatology)  Johnny Hendrickson MD as Consulting Physician (Cardiology)  Shannon Velásquez MD as Consulting Physician (Dermatology)  Tavon Murphy DPM as Consulting Physician (Podiatry)    Medical History Review  Past Medical, Family, and Social History reviewed and does contribute to the patient presenting condition    Health Maintenance   Topic Date Due    Shingles vaccine (1 of 2) Never done    Respiratory Syncytial Virus (RSV) Pregnant or age 60 yrs+ (1 - Risk 60-74 years 1-dose series) Never done    Diabetic retinal exam  12/27/2023    Diabetic foot exam  10/11/2024    Prostate Specific Antigen (PSA) Screening or Monitoring  01/04/2025    Lung Cancer Screening &/or Counseling  05/13/2025    A1C test (Diabetic or Prediabetic)  10/18/2025    Depression Monitoring  10/18/2025    Annual Wellness Visit (Medicare)  10/19/2025    Lipids  11/06/2025     Will forward to correct Dr. Pasquale Moncada.

## 2025-02-07 NOTE — ASSESSMENT & PLAN NOTE
Chronic type 2 diabetes mellitus, improved and very well-controlled    Lab Results   Component Value Date    LABA1C 6.8 02/07/2025    LABA1C 7.3 10/18/2024    LABA1C 6.0 05/30/2024   Hemoglobin A1c 6.8, improving and very well-controlled type 2 diabetes  Check labs  Continue current treatment: Jardiance 25 Mg daily  Orders:     DIABETES FOOT EXAM    POCT glycosylated hemoglobin (Hb A1C)    Comprehensive Metabolic Panel; Future    CBC with Auto Differential; Future    Vitamin B12 & Folate; Future    Uric Acid; Future    TSH; Future    Continue monitoring with hemoglobin A1c every 3 to 6 months     -daily feet exam, Foot care: advised to wash feet daily, pat dry and apply lotion at night, avoiding between toes. Need to look at feet daily and report to a physician any signs of inflammation or skin damage  -annual dilated eye exam  -Low carb, low fat diet, increase fruits and vegetables, and exercise 4-5 times a day 30-40 minutes a day discussed      -continue lisinopril  ACEI and statin crestor

## 2025-02-07 NOTE — ASSESSMENT & PLAN NOTE
Chronic, stable    Lab Results   Component Value Date    LVEF 49 12/14/2023    LVEFMODE Echo 12/14/2023       Check labs  Continue lisinopril 20 Mg daily, metoprolol XL 75 Mg daily, furosemide 40 Mg daily, Jardiance 25 Mg daily         Orders:    Brain Natriuretic Peptide; Future    Comprehensive Metabolic Panel; Future

## 2025-02-07 NOTE — PROGRESS NOTES
Alayna Galvez (:  1955) is a 69 y.o. male,Established patient, here for evaluation of the following chief complaint(s): Diabetes, Hypertension, Congestive Heart Failure, Numbness (B/L 5TH FINGER/PINKY/RIGHT 5TH FINGER NUMB SINCE LAST VISIT ), and Back Pain      ASSESSMENT/PLAN:    Assessment & Plan  Type 2 diabetes mellitus with hyperglycemia, without long-term current use of insulin (HCC)     Chronic type 2 diabetes mellitus, improved and very well-controlled    Lab Results   Component Value Date    LABA1C 6.8 2025    LABA1C 7.3 10/18/2024    LABA1C 6.0 2024   Hemoglobin A1c 6.8, improving and very well-controlled type 2 diabetes  Check labs  Continue current treatment: Jardiance 25 Mg daily  Orders:     DIABETES FOOT EXAM    POCT glycosylated hemoglobin (Hb A1C)    Comprehensive Metabolic Panel; Future    CBC with Auto Differential; Future    Vitamin B12 & Folate; Future    Uric Acid; Future    TSH; Future    Continue monitoring with hemoglobin A1c every 3 to 6 months     -daily feet exam, Foot care: advised to wash feet daily, pat dry and apply lotion at night, avoiding between toes. Need to look at feet daily and report to a physician any signs of inflammation or skin damage  -annual dilated eye exam  -Low carb, low fat diet, increase fruits and vegetables, and exercise 4-5 times a day 30-40 minutes a day discussed      -continue lisinopril  ACEI and statin crestor    Primary hypertension   Chronic, stable, Well controlled.   Discussed low salt diet and BP and pulse monitoring.  Check labs  Continue metoprolol 75 Mg daily, lisinopril 20 Mg daily, furosemide 40 Mg daily  Kidney function now improved  Orders:    Comprehensive Metabolic Panel; Future    CBC with Auto Differential; Future    Uric Acid; Future    TSH; Future    Magnesium; Future    Paroxysmal atrial fibrillation (HCC)  Chronic, stable  Continue chronic anticoagulation on Xarelto 20 Mg daily and rate control with metoprolol XL

## 2025-02-07 NOTE — ASSESSMENT & PLAN NOTE
Unsure if improving or not.  Will recheck level  Continue supplementation.      Lab Results   Component Value Date    VITD25 22.1 (L) 11/06/2024       Orders:    vitamin D (ERGOCALCIFEROL) 1.25 MG (91783 UT) CAPS capsule; Take 1 capsule by mouth once a week    Vitamin D 25 Hydroxy; Future

## 2025-02-07 NOTE — ASSESSMENT & PLAN NOTE
Chronic, stable  Continue chronic anticoagulation on Xarelto 20 Mg daily and rate control with metoprolol XL 75 Mg daily  Orders:    Comprehensive Metabolic Panel; Future

## 2025-02-07 NOTE — ASSESSMENT & PLAN NOTE
Chronic, improved  Continue Crestor 40 Mg daily and Lovaza 2 g twice a day, low-carb diet, low-fat diet, exercise and check lipid panel    Orders:    Lipid Panel; Future

## 2025-02-07 NOTE — ASSESSMENT & PLAN NOTE
Chronic, stable, Well controlled.   Discussed low salt diet and BP and pulse monitoring.  Check labs  Continue metoprolol 75 Mg daily, lisinopril 20 Mg daily, furosemide 40 Mg daily  Kidney function now improved  Orders:    Comprehensive Metabolic Panel; Future    CBC with Auto Differential; Future    Uric Acid; Future    TSH; Future    Magnesium; Future

## 2025-02-13 NOTE — TELEPHONE ENCOUNTER
Patient received a recall letter stating he got a letter stating he was due for a colonoscopy this year but he wanted to update his chart that he had a colonoscopy 2/27/2023.  
Updated chart. Looks like recall/HM4 in 5 yrs.  
0

## 2025-02-15 PROBLEM — I31.39 PERICARDIAL EFFUSION: Status: RESOLVED | Noted: 2023-11-09 | Resolved: 2025-02-15

## 2025-02-15 PROBLEM — G89.29 CHRONIC BILATERAL LOW BACK PAIN WITHOUT SCIATICA: Status: ACTIVE | Noted: 2025-02-15

## 2025-02-15 PROBLEM — M54.50 CHRONIC BILATERAL LOW BACK PAIN WITHOUT SCIATICA: Status: ACTIVE | Noted: 2025-02-15

## 2025-03-06 DIAGNOSIS — M54.50 CHRONIC BILATERAL LOW BACK PAIN WITHOUT SCIATICA: ICD-10-CM

## 2025-03-06 DIAGNOSIS — G89.29 CHRONIC BILATERAL LOW BACK PAIN WITHOUT SCIATICA: ICD-10-CM

## 2025-03-07 RX ORDER — BACLOFEN 10 MG/1
TABLET ORAL
Qty: 30 TABLET | Refills: 3 | Status: SHIPPED | OUTPATIENT
Start: 2025-03-07

## 2025-03-07 NOTE — TELEPHONE ENCOUNTER
Please Approve or Refuse.  Send to Pharmacy per Pt's Request:      Next Visit Date:  6/19/2025   Last Visit Date: 2/7/2025    Hemoglobin A1C (%)   Date Value   02/07/2025 6.8   10/18/2024 7.3   05/30/2024 6.0             ( goal A1C is < 7)   BP Readings from Last 3 Encounters:   02/20/25 120/74   02/07/25 120/80   01/27/25 126/78          (goal 120/80)  BUN   Date Value Ref Range Status   01/21/2025 13 8 - 23 mg/dL Final     Creatinine   Date Value Ref Range Status   01/21/2025 0.8 0.7 - 1.2 mg/dL Final     Potassium   Date Value Ref Range Status   01/21/2025 3.9 3.7 - 5.3 mmol/L Final     Comment:     Specimen hemolysis has exceeded the interference as defined by Roche. Value may be falsely   increased. Suggest recollection if clinically indicated.

## 2025-04-09 ENCOUNTER — HOSPITAL ENCOUNTER (OUTPATIENT)
Dept: LAB | Age: 70
Discharge: HOME | End: 2025-04-09
Payer: MEDICARE

## 2025-04-09 DIAGNOSIS — Z85.46 PERSONAL HISTORY OF PROSTATE CANCER: ICD-10-CM

## 2025-04-09 LAB — PSA SERPL-MCNC: <0.03 NG/ML (ref 0–4)

## 2025-04-09 PROCEDURE — 36415 COLL VENOUS BLD VENIPUNCTURE: CPT

## 2025-04-09 PROCEDURE — 84153 ASSAY OF PSA TOTAL: CPT

## 2025-04-24 ENCOUNTER — OFFICE VISIT (OUTPATIENT)
Dept: PODIATRY | Age: 70
End: 2025-04-24
Payer: MEDICARE

## 2025-04-24 VITALS — BODY MASS INDEX: 42.23 KG/M2 | HEIGHT: 70 IN | WEIGHT: 295 LBS

## 2025-04-24 DIAGNOSIS — M79.674 PAIN OF TOES OF BOTH FEET: ICD-10-CM

## 2025-04-24 DIAGNOSIS — B35.1 ONYCHOMYCOSIS OF TOENAIL: Primary | ICD-10-CM

## 2025-04-24 DIAGNOSIS — M79.675 PAIN OF TOES OF BOTH FEET: ICD-10-CM

## 2025-04-24 PROCEDURE — 99999 PR OFFICE/OUTPT VISIT,PROCEDURE ONLY: CPT | Performed by: PODIATRIST

## 2025-04-24 PROCEDURE — 11721 DEBRIDE NAIL 6 OR MORE: CPT | Performed by: PODIATRIST

## 2025-04-28 ASSESSMENT — ENCOUNTER SYMPTOMS
BACK PAIN: 0
COLOR CHANGE: 0
NAUSEA: 0
SHORTNESS OF BREATH: 0
DIARRHEA: 0

## 2025-04-28 NOTE — PROGRESS NOTES
SUBJECTIVE: Alayna Galvez is a 69 y.o. male who returns to the office with chief complaint of painful fungal toenails. Patient relates toe nails are thickened/difficult to trim as well as painful with ambulation and with shoe gear.   Chief Complaint   Patient presents with    Nail Problem     B/l nail trim, last seen Anamaria Marino MD 2/7/25     Review of Systems   Constitutional:  Negative for activity change, appetite change, chills, diaphoresis, fatigue and fever.   Respiratory:  Negative for shortness of breath.    Cardiovascular:  Negative for leg swelling.   Gastrointestinal:  Negative for diarrhea and nausea.   Endocrine: Negative for cold intolerance, heat intolerance and polyuria.   Musculoskeletal:  Positive for arthralgias. Negative for back pain, gait problem, joint swelling and myalgias.   Skin:  Negative for color change, pallor, rash and wound.   Allergic/Immunologic: Negative for environmental allergies and food allergies.   Neurological:  Negative for dizziness, weakness, light-headedness and numbness.   Hematological:  Does not bruise/bleed easily.   Psychiatric/Behavioral:  Negative for behavioral problems, confusion and self-injury. The patient is not nervous/anxious.      OBJECTIVE: Clinical evaluation of patient reveals nails 1,2,3,4,5 of the right foot and nails 1,2,3,4,5 of the left foot to present with thickness, elongation, discoloration, brittleness, and subungual debris. There was pain with palpation and debridement of the toenails of the bilateral feet. No open lesions noted to either foot today.     Class A Findings (1 needed)   [] Non-traumatic amputation of foot or integral skeleton portion thereof.   [] Q7.      Class B Findings (2 needed)   1. [] Absent posterior tibial pulse   2. [] Absent dorsalis pedis pulse   3. [] Advanced trophic changes; three of the following are required:   ·         [] hair growth (decrease or absence)   ·         [] nail changes (thickening)   ·

## 2025-05-06 ENCOUNTER — HOSPITAL ENCOUNTER (OUTPATIENT)
Age: 70
Setting detail: SPECIMEN
Discharge: HOME | End: 2025-05-06

## 2025-05-06 ENCOUNTER — RESULTS FOLLOW-UP (OUTPATIENT)
Dept: FAMILY MEDICINE CLINIC | Age: 70
End: 2025-05-06

## 2025-05-06 DIAGNOSIS — I48.0 PAROXYSMAL ATRIAL FIBRILLATION (HCC): ICD-10-CM

## 2025-05-06 DIAGNOSIS — I50.42 CHRONIC COMBINED SYSTOLIC AND DIASTOLIC CHF (CONGESTIVE HEART FAILURE) (HCC): ICD-10-CM

## 2025-05-06 DIAGNOSIS — E11.65 TYPE 2 DIABETES MELLITUS WITH HYPERGLYCEMIA, WITHOUT LONG-TERM CURRENT USE OF INSULIN (HCC): ICD-10-CM

## 2025-05-06 DIAGNOSIS — E55.9 VITAMIN D DEFICIENCY: ICD-10-CM

## 2025-05-06 DIAGNOSIS — I10 PRIMARY HYPERTENSION: ICD-10-CM

## 2025-05-06 DIAGNOSIS — E78.2 MIXED HYPERLIPIDEMIA: ICD-10-CM

## 2025-05-06 LAB
25(OH)D3 SERPL-MCNC: 37.1 NG/ML (ref 30–100)
ALBUMIN SERPL-MCNC: 4.2 G/DL (ref 3.5–5.2)
ALBUMIN/GLOB SERPL: 1.4 {RATIO} (ref 1–2.5)
ALP SERPL-CCNC: 88 U/L (ref 40–129)
ALT SERPL-CCNC: 29 U/L (ref 10–50)
ANION GAP SERPL CALCULATED.3IONS-SCNC: 12 MMOL/L (ref 9–16)
AST SERPL-CCNC: 25 U/L (ref 10–50)
BASOPHILS # BLD: 0.06 K/UL (ref 0–0.2)
BASOPHILS NFR BLD: 1 % (ref 0–2)
BILIRUB SERPL-MCNC: 0.6 MG/DL (ref 0–1.2)
BNP SERPL-MCNC: 191 PG/ML (ref 0–125)
BUN SERPL-MCNC: 17 MG/DL (ref 8–23)
CALCIUM SERPL-MCNC: 9.5 MG/DL (ref 8.6–10.4)
CHLORIDE SERPL-SCNC: 105 MMOL/L (ref 98–107)
CHOLEST SERPL-MCNC: 82 MG/DL (ref 0–199)
CHOLESTEROL/HDL RATIO: 3.6
CO2 SERPL-SCNC: 25 MMOL/L (ref 20–31)
CREAT SERPL-MCNC: 0.9 MG/DL (ref 0.7–1.2)
EOSINOPHIL # BLD: 0.15 K/UL (ref 0–0.44)
EOSINOPHILS RELATIVE PERCENT: 2 % (ref 1–4)
ERYTHROCYTE [DISTWIDTH] IN BLOOD BY AUTOMATED COUNT: 17.2 % (ref 11.8–14.4)
FOLATE SERPL-MCNC: 30.8 NG/ML (ref 4.8–24.2)
GFR, ESTIMATED: >90 ML/MIN/1.73M2
GLUCOSE SERPL-MCNC: 134 MG/DL (ref 74–99)
HCT VFR BLD AUTO: 52.7 % (ref 40.7–50.3)
HDLC SERPL-MCNC: 23 MG/DL
HGB BLD-MCNC: 16.5 G/DL (ref 13–17)
IMM GRANULOCYTES # BLD AUTO: 0.07 K/UL (ref 0–0.3)
IMM GRANULOCYTES NFR BLD: 1 %
LDLC SERPL CALC-MCNC: 32 MG/DL (ref 0–100)
LYMPHOCYTES NFR BLD: 1.64 K/UL (ref 1.1–3.7)
LYMPHOCYTES RELATIVE PERCENT: 20 % (ref 24–43)
MAGNESIUM SERPL-MCNC: 2.1 MG/DL (ref 1.6–2.4)
MCH RBC QN AUTO: 27.3 PG (ref 25.2–33.5)
MCHC RBC AUTO-ENTMCNC: 31.3 G/DL (ref 28.4–34.8)
MCV RBC AUTO: 87.1 FL (ref 82.6–102.9)
MONOCYTES NFR BLD: 0.59 K/UL (ref 0.1–1.2)
MONOCYTES NFR BLD: 7 % (ref 3–12)
NEUTROPHILS NFR BLD: 69 % (ref 36–65)
NEUTS SEG NFR BLD: 5.51 K/UL (ref 1.5–8.1)
NRBC BLD-RTO: 0 PER 100 WBC
PLATELET # BLD AUTO: 216 K/UL (ref 138–453)
PMV BLD AUTO: 9.3 FL (ref 8.1–13.5)
POTASSIUM SERPL-SCNC: 3.8 MMOL/L (ref 3.7–5.3)
PROT SERPL-MCNC: 7.1 G/DL (ref 6.6–8.7)
RBC # BLD AUTO: 6.05 M/UL (ref 4.21–5.77)
RBC # BLD: ABNORMAL 10*6/UL
SODIUM SERPL-SCNC: 142 MMOL/L (ref 136–145)
TRIGL SERPL-MCNC: 136 MG/DL
TSH SERPL DL<=0.05 MIU/L-ACNC: 1.37 UIU/ML (ref 0.27–4.2)
URATE SERPL-MCNC: 4.9 MG/DL (ref 3.4–7)
VIT B12 SERPL-MCNC: 686 PG/ML (ref 232–1245)
VLDLC SERPL CALC-MCNC: 27 MG/DL (ref 1–30)
WBC OTHER # BLD: 8 K/UL (ref 3.5–11.3)

## 2025-05-06 NOTE — RESULT ENCOUNTER NOTE
Please notify patient: Improved lipids  Blood glucose 134, well-controlled diabetes  The marker for congestive heart failure is stable, to stay away from salty foods  Red blood cells are increased which is new from prior--is he taking any iron supplement or multivitamin with iron?  Will recheck the CBC with differential at the next appointment, if still high then we will refer him to a blood doctor  Vitamin D improved but still borderline low    Otherwise labs within normal limits  continue current treatment    Future Appointments  5/12/2025  11:10 AM   Skip Freitas MD   [facility] URO         [facility]  5/14/2025  11:15 AM   [facility] CT RM 1                [facility] CT SCAN        [facility] Radiolog  6/19/2025  3:15 PM    Anamaria Marino MD    fp [facility] ECC DEP  7/16/2025  2:20 PM    Julio [facility]ango [facility]       [facility]  7/17/2025  9:45 AM    Tavon Murphy DPM  [facility]          [facility]  8/21/2025  11:30 AM   [facility] Velásquez [facility]        [facility]  10/21/2025 1:30 PM    Anamaria Marino MD    fp [facility] DEP

## 2025-05-07 DIAGNOSIS — E78.5 HYPERLIPIDEMIA WITH TARGET LDL LESS THAN 100: ICD-10-CM

## 2025-05-07 RX ORDER — ROSUVASTATIN CALCIUM 40 MG/1
40 TABLET, COATED ORAL DAILY
Qty: 90 TABLET | Refills: 3 | Status: SHIPPED | OUTPATIENT
Start: 2025-05-07

## 2025-05-07 NOTE — TELEPHONE ENCOUNTER
Please Approve or Refuse.  Send to Pharmacy per Pt's Request:      Next Visit Date:  6/19/2025   Last Visit Date: 2/7/2025    Hemoglobin A1C (%)   Date Value   02/07/2025 6.8   10/18/2024 7.3   05/30/2024 6.0             ( goal A1C is < 7)   BP Readings from Last 3 Encounters:   02/20/25 120/74   02/07/25 120/80   01/27/25 126/78          (goal 120/80)  BUN   Date Value Ref Range Status   05/06/2025 17 8 - 23 mg/dL Final     Creatinine   Date Value Ref Range Status   05/06/2025 0.9 0.7 - 1.2 mg/dL Final     Potassium   Date Value Ref Range Status   05/06/2025 3.8 3.7 - 5.3 mmol/L Final

## 2025-05-08 NOTE — RESULT ENCOUNTER NOTE
Please notify patient: I do suggest a referral to a rectal specialist for hemorrhoids if they are bleeding and they are so bothersome  We can also discuss at appointment if you would like    Future Appointments  5/12/2025  11:10 AM   Skip Freitas MD   [facility] URO         [facility]  5/14/2025  11:15 AM   [facility] CT RM 1                STCZ CT SCAN        [facility] Radiolog  6/19/2025  3:15 PM    Anamaria Marino MD fp [facility] ECC DEP  7/16/2025  2:20 PM    Julio Arango [facility]       [facility]  7/17/2025  9:45 AM    Tavon Murphy DPM  [facility]          [facility]  8/21/2025  11:30 AM   [facility] Velásquez [facility]        [facility]  10/21/2025 1:30 PM    Anamaria Marino MD    [facility] DEP

## 2025-05-11 DIAGNOSIS — E55.9 VITAMIN D DEFICIENCY: ICD-10-CM

## 2025-05-11 RX ORDER — ERGOCALCIFEROL 1.25 MG/1
50000 CAPSULE, LIQUID FILLED ORAL WEEKLY
Qty: 12 CAPSULE | Refills: 0 | Status: SHIPPED | OUTPATIENT
Start: 2025-05-11

## 2025-05-12 ENCOUNTER — OFFICE VISIT (OUTPATIENT)
Age: 70
End: 2025-05-12
Payer: MEDICARE

## 2025-05-12 VITALS — WEIGHT: 295 LBS | BODY MASS INDEX: 42.23 KG/M2 | HEIGHT: 70 IN

## 2025-05-12 DIAGNOSIS — N52.31 ERECTILE DYSFUNCTION AFTER RADICAL PROSTATECTOMY: Primary | ICD-10-CM

## 2025-05-12 DIAGNOSIS — Z85.46 PERSONAL HISTORY OF PROSTATE CANCER: ICD-10-CM

## 2025-05-12 LAB
BILIRUBIN, POC: NORMAL
BLOOD URINE, POC: NORMAL
CLARITY, POC: CLEAR
COLOR, POC: YELLOW
GLUCOSE URINE, POC: NORMAL MG/DL
KETONES, POC: NORMAL
LEUKOCYTE EST, POC: NORMAL
NITRITE, POC: NORMAL
PH, POC: NORMAL
PROTEIN, POC: NORMAL MG/DL
SPECIFIC GRAVITY, POC: NORMAL
UROBILINOGEN, POC: NORMAL

## 2025-05-12 PROCEDURE — 1159F MED LIST DOCD IN RCRD: CPT | Performed by: SPECIALIST

## 2025-05-12 PROCEDURE — 1123F ACP DISCUSS/DSCN MKR DOCD: CPT | Performed by: SPECIALIST

## 2025-05-12 PROCEDURE — 81003 URINALYSIS AUTO W/O SCOPE: CPT | Performed by: SPECIALIST

## 2025-05-12 PROCEDURE — 1036F TOBACCO NON-USER: CPT | Performed by: SPECIALIST

## 2025-05-12 PROCEDURE — 3017F COLORECTAL CA SCREEN DOC REV: CPT | Performed by: SPECIALIST

## 2025-05-12 PROCEDURE — G8417 CALC BMI ABV UP PARAM F/U: HCPCS | Performed by: SPECIALIST

## 2025-05-12 PROCEDURE — G8427 DOCREV CUR MEDS BY ELIG CLIN: HCPCS | Performed by: SPECIALIST

## 2025-05-12 PROCEDURE — 99213 OFFICE O/P EST LOW 20 MIN: CPT | Performed by: SPECIALIST

## 2025-05-12 NOTE — PROGRESS NOTES
Skip Freitas MD FACS    [facility] Urology Office Progress Note    Patient:  Alayna Galvez  YOB: 1955  Date: 5/12/2025    HISTORY OF PRESENT ILLNESS:   The patient is a 69 y.o. male  No evidence of prostate cancer recurrence s/p 10/17/17 Robotic assisted laparoscopic radical prostatectomy since PSA is 0.03.  Will repeat a PSA in 12 months.   Not interested in medical Rx for ED since patient not sexually active.     Last AUA Symptom Score (QOL): 3 (1)    Summary of old records:   Prostate cancer with elevated PSA of 19.94 on 7/17/17; 7/28/17 bx (48.64 mL); G6 RLM (30%), G3+4=7 RM (50%), RA (90%); 10/17/17 RALP T2c N0 G4+3=7 (tertiary G5), positive anterior margin  MANOLO: 0 ppd, resolved  ED: 2/27/19 gave Rx for generic sildenafil 100 mg prn ED; wife not interested (getting chemoRx for lung cancer)  Hypogonadism    Additional History: none    Procedures Today: N/A    Urinalysis today:  Results for orders placed or performed in visit on 05/12/25   POCT Urinalysis No Micro (Auto)   Result Value Ref Range    Color, UA yellow     Clarity, UA clear     Glucose, UA POC >=1000 mg mg/dL    Bilirubin, UA      Ketones, UA      Spec Grav, UA      Blood, UA POC trace     pH, UA      Protein, UA POC neg mg/dL    Urobilinogen, UA      Leukocytes, UA neg     Nitrite, UA neg        Last several PSA's:  Lab Results   Component Value Date    PSA <0.03 04/09/2025    PSA <0.02 01/04/2024    PSA <0.02 04/14/2023       Last total testosterone:  Lab Results   Component Value Date    TESTOSTERONE 264 08/30/2019       Last BUN and creatinine:  Lab Results   Component Value Date    BUN 17 05/06/2025     Lab Results   Component Value Date    CREATININE 0.9 05/06/2025       Last CBC:  Lab Results   Component Value Date    WBC 8.0 05/06/2025    HGB 16.5 05/06/2025    HCT 52.7 (H) 05/06/2025    MCV 87.1 05/06/2025     05/06/2025       Additional Lab/Culture results: none    Imaging Reviewed during this Office

## 2025-05-14 ENCOUNTER — HOSPITAL ENCOUNTER (OUTPATIENT)
Dept: CT IMAGING | Age: 70
Discharge: HOME | End: 2025-05-16
Attending: INTERNAL MEDICINE
Payer: MEDICARE

## 2025-05-14 DIAGNOSIS — R91.1 LUNG NODULE: ICD-10-CM

## 2025-05-14 PROCEDURE — 71250 CT THORAX DX C-: CPT

## 2025-06-19 ENCOUNTER — RESULTS FOLLOW-UP (OUTPATIENT)
Dept: FAMILY MEDICINE CLINIC | Age: 70
End: 2025-06-19

## 2025-06-19 ENCOUNTER — OFFICE VISIT (OUTPATIENT)
Dept: FAMILY MEDICINE CLINIC | Age: 70
End: 2025-06-19

## 2025-06-19 VITALS
SYSTOLIC BLOOD PRESSURE: 118 MMHG | HEART RATE: 80 BPM | DIASTOLIC BLOOD PRESSURE: 78 MMHG | WEIGHT: 294.6 LBS | OXYGEN SATURATION: 96 % | TEMPERATURE: 97.6 F | BODY MASS INDEX: 42.18 KG/M2 | HEIGHT: 70 IN

## 2025-06-19 DIAGNOSIS — E11.21 TYPE 2 DIABETES MELLITUS WITH DIABETIC NEPHROPATHY, WITHOUT LONG-TERM CURRENT USE OF INSULIN (HCC): ICD-10-CM

## 2025-06-19 DIAGNOSIS — I50.42 CHRONIC COMBINED SYSTOLIC AND DIASTOLIC CHF (CONGESTIVE HEART FAILURE) (HCC): ICD-10-CM

## 2025-06-19 DIAGNOSIS — G89.29 CHRONIC BILATERAL LOW BACK PAIN WITHOUT SCIATICA: ICD-10-CM

## 2025-06-19 DIAGNOSIS — R49.0 HOARSENESS OF VOICE: ICD-10-CM

## 2025-06-19 DIAGNOSIS — Z71.1 CONCERN ABOUT MEMORY: ICD-10-CM

## 2025-06-19 DIAGNOSIS — E11.65 TYPE 2 DIABETES MELLITUS WITH HYPERGLYCEMIA, WITHOUT LONG-TERM CURRENT USE OF INSULIN (HCC): Primary | ICD-10-CM

## 2025-06-19 DIAGNOSIS — I48.0 PAROXYSMAL ATRIAL FIBRILLATION (HCC): ICD-10-CM

## 2025-06-19 DIAGNOSIS — G47.33 OSA ON CPAP: ICD-10-CM

## 2025-06-19 DIAGNOSIS — M54.50 CHRONIC BILATERAL LOW BACK PAIN WITHOUT SCIATICA: ICD-10-CM

## 2025-06-19 DIAGNOSIS — J30.2 SEASONAL ALLERGIC RHINITIS, UNSPECIFIED TRIGGER: ICD-10-CM

## 2025-06-19 DIAGNOSIS — E78.2 MIXED HYPERLIPIDEMIA: ICD-10-CM

## 2025-06-19 DIAGNOSIS — J44.9 CHRONIC OBSTRUCTIVE PULMONARY DISEASE, UNSPECIFIED COPD TYPE (HCC): ICD-10-CM

## 2025-06-19 LAB — HBA1C MFR BLD: 6.1 %

## 2025-06-19 RX ORDER — OMEGA-3-ACID ETHYL ESTERS 1 G/1
2 CAPSULE, LIQUID FILLED ORAL 2 TIMES DAILY
Qty: 360 CAPSULE | Refills: 3 | Status: SHIPPED | OUTPATIENT
Start: 2025-06-19

## 2025-06-19 RX ORDER — CETIRIZINE HYDROCHLORIDE 10 MG/1
10 TABLET ORAL
Qty: 90 TABLET | Refills: 0
Start: 2025-06-19

## 2025-06-19 ASSESSMENT — ENCOUNTER SYMPTOMS
NAUSEA: 0
DIARRHEA: 0
SHORTNESS OF BREATH: 0
CONSTIPATION: 0
WHEEZING: 0
ABDOMINAL PAIN: 0
COUGH: 0
VOMITING: 0
ABDOMINAL DISTENTION: 0
VOICE CHANGE: 1
BACK PAIN: 1
CHEST TIGHTNESS: 0
RHINORRHEA: 1

## 2025-06-19 NOTE — ASSESSMENT & PLAN NOTE
Chronic, well-controlled and greatly improved     Hemoglobin A1c has improved from 7.3 in 10/2024 to 6.1 currently.  - Advised to continue Jardiance 25 mg daily and maintain a low-carb diet.  - Improvement noted after discontinuing metformin.  He declines decreasing the dosage of Jardiance, reports not always being compliant with a low-carb diet.  Recheck labs in 3 months, prior to the next appointment    Lab Results   Component Value Date    LABA1C 6.1 06/19/2025    LABA1C 6.8 02/07/2025    LABA1C 7.3 10/18/2024       Orders:    POCT glycosylated hemoglobin (Hb A1C)    CBC; Future    Comprehensive Metabolic Panel; Future    Uric Acid; Future    Magnesium; Future    Albumin/Creatinine Ratio, Urine; Future

## 2025-06-19 NOTE — ASSESSMENT & PLAN NOTE
Chronic, intermittent  - Reports improvement in back pain with increased exercise.  Likely also due to losing weight  - Prefers to discontinue baclofen as it does not seem effective.  - Advised to use a heating pad and over-the-counter creams like IcyHot for pain relief.  - Tizanidine can be considered as an alternative muscle relaxant if needed.  He declines any muscle relaxant at this time  Continue  move free supplement

## 2025-06-19 NOTE — ASSESSMENT & PLAN NOTE
Chronic and improving  - Lost 21 pounds since 10/2024 and 10 pounds since 02/2025.  Praise given.  - Advised to continue current regimen and aim to lose another 10 pounds in the next three months.  - Encouraged to continue lifestyle changes that have contributed to weight loss.  He reports he cut down on beer tremendously and he has improved his diet          Wt Readings from Last 3 Encounters:   06/19/25 133.6 kg (294 lb 9.6 oz)   05/12/25 133.8 kg (295 lb)   04/24/25 133.8 kg (295 lb)     Weight: (!) 143.2 kg (315 lb 9.6 oz) 10/18/2024         Wt Readings from Last 3 Encounters:   02/07/25 (!) 138.3 kg (304 lb 12.8 oz)

## 2025-06-19 NOTE — PROGRESS NOTES
Visit Information    Have you changed or started any medications since your last visit including any over-the-counter medicines, vitamins, or herbal medicines? yes -    Have you stopped taking any of your medications? Is so, why? -  yes -   Are you having any side effects from any of your medications? - no    Have you seen any other physician or provider since your last visit?  yes -    Have you had any other diagnostic tests since your last visit?  yes -    Have you been seen in the emergency room and/or had an admission in a hospital since we last saw you?  no   Have you had your routine dental cleaning in the past 6 months?  no     Do you have an active MyChart account? If no, what is the barrier?  Yes    Patient Care Team:  Anamaria Marino MD as PCP - General (Family Medicine)  Anamaria Marino MD as PCP - Empaneled Provider  Skip Freitas MD as Consulting Physician (Urology)  Julio Arango MD as Consulting Physician (Pulmonology)  He Becker MD as Consulting Physician (Cardiovascular Disease)  Shannon Velásquez MD as Consulting Physician (Dermatology)  Johnny Hendrickson MD as Consulting Physician (Cardiology)  Tavon Murphy DPM as Consulting Physician (Podiatry)    Medical History Review  Past Medical, Family, and Social History reviewed and does contribute to the patient presenting condition    Health Maintenance   Topic Date Due    Shingles vaccine (1 of 2) Never done    Respiratory Syncytial Virus (RSV) Pregnant or age 60 yrs+ (1 - Risk 60-74 years 1-dose series) Never done    Diabetic retinal exam  12/27/2023    COVID-19 Vaccine (7 - 2024-25 season) 05/01/2025    Annual Wellness Visit (Medicare)  10/19/2025    Diabetic Alb to Cr ratio (uACR) test  01/21/2026    Diabetic foot exam  02/07/2026    A1C test (Diabetic or Prediabetic)  02/07/2026    Depression Monitoring  02/07/2026    Prostate Specific Antigen (PSA) Screening or Monitoring  04/09/2026    Lipids  05/06/2026

## 2025-06-19 NOTE — ASSESSMENT & PLAN NOTE
Chronic and stable  Follows regularly with cardiologist    Lab Results   Component Value Date    LVEF 49 12/14/2023    LVEFMODE Echo 12/14/2023       - On furosemide 40 mg daily, Jardiance 25 Mg daily, metoprolol XL 75 Mg daily, lisinopril 20 Mg daily  - Leg swelling has improved but persists slightly.  - Advised to continue current medication regimen and monitor symptoms.  - Regular follow-up with cardiologist recommended.      Orders:    Brain Natriuretic Peptide; Future

## 2025-06-19 NOTE — ASSESSMENT & PLAN NOTE
Chronic and improved with CPAP, he reports compliance with CPAP wearing it for 8 hours  He reports he is not always refreshed in the morning, has appointment with pulmonologist for possible adjustments  Benefits from CPAP, continue CPAP

## 2025-06-19 NOTE — ASSESSMENT & PLAN NOTE
Chronic, failing to resolve  He reports  Raspy voice may be due to allergies and constant postnasal drip.  - Advised to take Zyrtec 10 mg at night.  - Referral to an ENT specialist will be made to evaluate the vocal cords for any growths or polyps.  He does have history of smoking  - Advised to start taking Zyrtec daily to potentially avoid an ENT appointment.  Orders:    Graham Hoffmann MD, Otolaryngology, [place]

## 2025-06-19 NOTE — PROGRESS NOTES
Alayna Galvez (:  1955) is a 69 y.o. male, Established patient, here for evaluation of the following chief complaint(s):   Diabetes, Hyperlipidemia, Hypertension, Allergies, Hoarse, Memory Loss, and Congestive Heart Failure           Assessment & Plan        Assessment & Plan  Type 2 diabetes mellitus with hyperglycemia, without long-term current use of insulin (HCC)  Chronic, well-controlled and greatly improved     Hemoglobin A1c has improved from 7.3 in 10/2024 to 6.1 currently.  - Advised to continue Jardiance 25 mg daily and maintain a low-carb diet.  - Improvement noted after discontinuing metformin.  He declines decreasing the dosage of Jardiance, reports not always being compliant with a low-carb diet.  Recheck labs in 3 months, prior to the next appointment    Lab Results   Component Value Date    LABA1C 6.1 2025    LABA1C 6.8 2025    LABA1C 7.3 10/18/2024       Orders:    POCT glycosylated hemoglobin (Hb A1C)    CBC; Future    Comprehensive Metabolic Panel; Future    Uric Acid; Future    Magnesium; Future    Albumin/Creatinine Ratio, Urine; Future    Type 2 diabetes mellitus with diabetic nephropathy, without long-term current use of insulin (HCC)   Chronic diabetes mellitus and improved  Chronic nephropathy, improved  Continue Jardiance 25 Mg daily, and lisinopril 20 Mg daily  Microalbumin creatinine ratio 563 on 2023, improved afterwards  Continue to monitor every 3 months  Orders:    CBC; Future    Comprehensive Metabolic Panel; Future    Uric Acid; Future    Magnesium; Future    Albumin/Creatinine Ratio, Urine; Future    Chronic obstructive pulmonary disease, unspecified COPD type (HCC)  Chronic, stable   Uses Anoro Ellipta one puff daily and has albuterol as a rescue inhaler.  - Advised to continue these medications and use the rescue inhaler as needed for shortness of breath or wheezing.  - No shortness of breath reported during recent activities.         Paroxysmal atrial

## 2025-06-19 NOTE — ASSESSMENT & PLAN NOTE
Chronic, stable   Uses Anoro Ellipta one puff daily and has albuterol as a rescue inhaler.  - Advised to continue these medications and use the rescue inhaler as needed for shortness of breath or wheezing.  - No shortness of breath reported during recent activities.

## 2025-06-19 NOTE — ASSESSMENT & PLAN NOTE
Chronic and improved     Triglycerides have improved with the use of fish oil.  - Prescription for a 90-day supply of fish oil will be sent to pharmacy.  - Encouraged to continue fish oil for joint pain relief and triglyceride management  Continue with Crestor 40 Mg daily  Orders:    omega-3 acid ethyl esters (LOVAZA) 1 g capsule; Take 2 capsules by mouth 2 times daily For high triglycerides

## 2025-06-19 NOTE — ASSESSMENT & PLAN NOTE
Chronic and worsening, taking Zyrtec only as needed, not consistently  Advised to start taking Zyrtec at night every night.  He declines Flonase  Orders:    cetirizine (ZYRTEC ALLERGY) 10 MG tablet; Take 1 tablet by mouth Daily with supper OTC

## 2025-06-19 NOTE — ASSESSMENT & PLAN NOTE
Chronic, stable  - Atrial fibrillation is stable with a heart rate that is not fast.  - Advised to continue current medications, including metoprolol XL 75 mg daily for rate control, and chronic anticoagulation with Xarelto 20 Mg daily.  - Regular follow-up with cardiologist recommended.

## 2025-06-19 NOTE — ASSESSMENT & PLAN NOTE
Chronic diabetes mellitus and improved  Chronic nephropathy, improved  Continue Jardiance 25 Mg daily, and lisinopril 20 Mg daily  Microalbumin creatinine ratio 563 on 11/6/2023, improved afterwards  Continue to monitor every 3 months  Orders:    CBC; Future    Comprehensive Metabolic Panel; Future    Uric Acid; Future    Magnesium; Future    Albumin/Creatinine Ratio, Urine; Future

## 2025-06-20 ENCOUNTER — TELEPHONE (OUTPATIENT)
Dept: ADMINISTRATIVE | Age: 70
End: 2025-06-20

## 2025-07-16 ENCOUNTER — OFFICE VISIT (OUTPATIENT)
Dept: PULMONOLOGY | Age: 70
End: 2025-07-16
Payer: MEDICARE

## 2025-07-16 VITALS
OXYGEN SATURATION: 95 % | BODY MASS INDEX: 42.09 KG/M2 | DIASTOLIC BLOOD PRESSURE: 81 MMHG | HEART RATE: 85 BPM | HEIGHT: 70 IN | SYSTOLIC BLOOD PRESSURE: 131 MMHG | WEIGHT: 294 LBS

## 2025-07-16 DIAGNOSIS — R91.1 LUNG NODULE: Primary | ICD-10-CM

## 2025-07-16 PROCEDURE — 3079F DIAST BP 80-89 MM HG: CPT | Performed by: INTERNAL MEDICINE

## 2025-07-16 PROCEDURE — 1036F TOBACCO NON-USER: CPT | Performed by: INTERNAL MEDICINE

## 2025-07-16 PROCEDURE — 99214 OFFICE O/P EST MOD 30 MIN: CPT | Performed by: INTERNAL MEDICINE

## 2025-07-16 PROCEDURE — 1123F ACP DISCUSS/DSCN MKR DOCD: CPT | Performed by: INTERNAL MEDICINE

## 2025-07-16 PROCEDURE — 1159F MED LIST DOCD IN RCRD: CPT | Performed by: INTERNAL MEDICINE

## 2025-07-16 PROCEDURE — G8427 DOCREV CUR MEDS BY ELIG CLIN: HCPCS | Performed by: INTERNAL MEDICINE

## 2025-07-16 PROCEDURE — 3075F SYST BP GE 130 - 139MM HG: CPT | Performed by: INTERNAL MEDICINE

## 2025-07-16 PROCEDURE — G8417 CALC BMI ABV UP PARAM F/U: HCPCS | Performed by: INTERNAL MEDICINE

## 2025-07-16 PROCEDURE — 3017F COLORECTAL CA SCREEN DOC REV: CPT | Performed by: INTERNAL MEDICINE

## 2025-07-16 NOTE — PROGRESS NOTES
Atrial fibrillation rate controlled on anticoagulation           PLAN: COPD is under good control no acute exacerbation no evidence of chest infection.  He was advised to continue Anoro and albuterol as before.  He did not require any new prescriptions.  Continue to be obese has not been able to lose weight although he is trying.  Sleep apnea responding well to the use of CPAP does not feel sleepy tired fatigue during the daytime.  No evidence of any heart failure very likely cor pulmonale however   Because of persistent worrying about the nodule turning into clinic cancer I did repeat that she PET scan.    Atrial fibrillation is rate controlled and he is on anticoagulation no bleeding.  Blood pressure is stable.  I advised him to continue his effort to lose weight.    He is not a candidate for home oxygen.  I plan to see in follow-up in 6 months.  We will request a repeat CT scan in a year.    Dictated by Dr. Arango      Patient questions were answered.  Will see him in 6 months    Dictated by Dr. Nba SHAIKH dictation over thank you

## 2025-07-17 ENCOUNTER — OFFICE VISIT (OUTPATIENT)
Dept: PODIATRY | Age: 70
End: 2025-07-17

## 2025-07-17 VITALS — HEIGHT: 70 IN | WEIGHT: 290 LBS | BODY MASS INDEX: 41.52 KG/M2

## 2025-07-17 DIAGNOSIS — M79.675 PAIN OF TOES OF BOTH FEET: ICD-10-CM

## 2025-07-17 DIAGNOSIS — B35.1 ONYCHOMYCOSIS OF TOENAIL: Primary | ICD-10-CM

## 2025-07-17 DIAGNOSIS — M79.674 PAIN OF TOES OF BOTH FEET: ICD-10-CM

## 2025-07-17 ASSESSMENT — ENCOUNTER SYMPTOMS
BACK PAIN: 0
DIARRHEA: 0
NAUSEA: 0
SHORTNESS OF BREATH: 0
COLOR CHANGE: 0

## 2025-07-17 NOTE — PROGRESS NOTES
SUBJECTIVE: Alayna Galvez is a 69 y.o. male who returns to the office with chief complaint of painful fungal toenails. Patient relates toe nails are thickened/difficult to trim as well as painful with ambulation and with shoe gear.   Chief Complaint   Patient presents with    Nail Problem     B/l nail trim, last seen Dr Marino 6/19/25     Review of Systems   Constitutional:  Negative for activity change, appetite change, chills, diaphoresis, fatigue and fever.   Respiratory:  Negative for shortness of breath.    Cardiovascular:  Negative for leg swelling.   Gastrointestinal:  Negative for diarrhea and nausea.   Endocrine: Negative for cold intolerance, heat intolerance and polyuria.   Musculoskeletal:  Positive for arthralgias. Negative for back pain, gait problem, joint swelling and myalgias.   Skin:  Negative for color change, pallor, rash and wound.   Allergic/Immunologic: Negative for environmental allergies and food allergies.   Neurological:  Negative for dizziness, weakness, light-headedness and numbness.   Hematological:  Does not bruise/bleed easily.   Psychiatric/Behavioral:  Negative for behavioral problems, confusion and self-injury. The patient is not nervous/anxious.      OBJECTIVE: Clinical evaluation of patient reveals nails 1,2,3,4,5 of the right foot and nails 1,2,3,4,5 of the left foot to present with thickness, elongation, discoloration, brittleness, and subungual debris. There was pain with palpation and debridement of the toenails of the bilateral feet. No open lesions noted to either foot today.     Class A Findings (1 needed)   [] Non-traumatic amputation of foot or integral skeleton portion thereof.   [] Q7.      Class B Findings (2 needed)   1. [] Absent posterior tibial pulse   2. [] Absent dorsalis pedis pulse   3. [] Advanced trophic changes; three of the following are required:   ·         [] hair growth (decrease or absence)   ·         [] nail changes (thickening)   ·         []

## 2025-07-24 ENCOUNTER — HOSPITAL ENCOUNTER (OUTPATIENT)
Dept: NUCLEAR MEDICINE | Age: 70
Discharge: HOME | End: 2025-07-26
Attending: INTERNAL MEDICINE
Payer: MEDICARE

## 2025-07-24 DIAGNOSIS — R91.1 LUNG NODULE: ICD-10-CM

## 2025-07-24 LAB — GLUCOSE BLD-MCNC: 134 MG/DL (ref 75–110)

## 2025-07-24 PROCEDURE — 2500000003 HC RX 250 WO HCPCS: Performed by: INTERNAL MEDICINE

## 2025-07-24 PROCEDURE — 82947 ASSAY GLUCOSE BLOOD QUANT: CPT

## 2025-07-24 PROCEDURE — A9609 HC RX DIAGNOSTIC RADIOPHARMACEUTICAL: HCPCS | Performed by: INTERNAL MEDICINE

## 2025-07-24 PROCEDURE — 78815 PET IMAGE W/CT SKULL-THIGH: CPT

## 2025-07-24 PROCEDURE — 3430000000 HC RX DIAGNOSTIC RADIOPHARMACEUTICAL: Performed by: INTERNAL MEDICINE

## 2025-07-24 RX ORDER — FLUDEOXYGLUCOSE F 18 200 MCI/ML
10 INJECTION, SOLUTION INTRAVENOUS
Status: COMPLETED | OUTPATIENT
Start: 2025-07-24 | End: 2025-07-24

## 2025-07-24 RX ORDER — SODIUM CHLORIDE 0.9 % (FLUSH) 0.9 %
10 SYRINGE (ML) INJECTION
Status: COMPLETED | OUTPATIENT
Start: 2025-07-24 | End: 2025-07-24

## 2025-07-24 RX ADMIN — SODIUM CHLORIDE, PRESERVATIVE FREE 10 ML: 5 INJECTION INTRAVENOUS at 12:57

## 2025-07-24 RX ADMIN — FLUDEOXYGLUCOSE F 18 12.15 MILLICURIE: 200 INJECTION, SOLUTION INTRAVENOUS at 12:57

## 2025-08-01 DIAGNOSIS — E11.65 TYPE 2 DIABETES MELLITUS WITH HYPERGLYCEMIA, WITHOUT LONG-TERM CURRENT USE OF INSULIN (HCC): ICD-10-CM

## 2025-08-01 DIAGNOSIS — I50.42 CHRONIC COMBINED SYSTOLIC AND DIASTOLIC CHF (CONGESTIVE HEART FAILURE) (HCC): ICD-10-CM

## 2025-08-01 RX ORDER — METOPROLOL SUCCINATE 50 MG/1
TABLET, EXTENDED RELEASE ORAL
Qty: 135 TABLET | Refills: 3 | Status: SHIPPED | OUTPATIENT
Start: 2025-08-01

## 2025-08-01 RX ORDER — EMPAGLIFLOZIN 25 MG/1
TABLET, FILM COATED ORAL
Qty: 90 TABLET | Refills: 3 | Status: SHIPPED | OUTPATIENT
Start: 2025-08-01

## 2025-08-04 DIAGNOSIS — E55.9 VITAMIN D DEFICIENCY: ICD-10-CM

## 2025-08-04 RX ORDER — ERGOCALCIFEROL 1.25 MG/1
50000 CAPSULE, LIQUID FILLED ORAL WEEKLY
Qty: 12 CAPSULE | Refills: 0 | Status: SHIPPED | OUTPATIENT
Start: 2025-08-04

## 2025-08-21 ENCOUNTER — OFFICE VISIT (OUTPATIENT)
Age: 70
End: 2025-08-21
Payer: MEDICARE

## 2025-08-21 VITALS
TEMPERATURE: 98 F | OXYGEN SATURATION: 99 % | WEIGHT: 295.3 LBS | HEART RATE: 96 BPM | BODY MASS INDEX: 42.28 KG/M2 | DIASTOLIC BLOOD PRESSURE: 85 MMHG | HEIGHT: 70 IN | SYSTOLIC BLOOD PRESSURE: 124 MMHG

## 2025-08-21 DIAGNOSIS — D23.5 DILATED PORE OF WINER OF BACK: ICD-10-CM

## 2025-08-21 DIAGNOSIS — I87.8 VENOUS STASIS: ICD-10-CM

## 2025-08-21 DIAGNOSIS — D22.9 MULTIPLE NEVI: ICD-10-CM

## 2025-08-21 DIAGNOSIS — D48.5 NEOPLASM OF UNCERTAIN BEHAVIOR OF SKIN: ICD-10-CM

## 2025-08-21 DIAGNOSIS — L73.8 SEBACEOUS HYPERPLASIA: ICD-10-CM

## 2025-08-21 DIAGNOSIS — Z85.828 HISTORY OF BASAL CELL CARCINOMA (BCC): Primary | ICD-10-CM

## 2025-08-21 PROCEDURE — 99212 OFFICE O/P EST SF 10 MIN: CPT | Performed by: DERMATOLOGY

## 2025-08-21 PROCEDURE — 11102 TANGNTL BX SKIN SINGLE LES: CPT | Performed by: DERMATOLOGY

## 2025-08-21 RX ORDER — TRIAMCINOLONE ACETONIDE 1 MG/G
CREAM TOPICAL
Qty: 80 G | Refills: 1 | Status: SHIPPED | OUTPATIENT
Start: 2025-08-21

## 2025-08-22 ENCOUNTER — LAB (OUTPATIENT)
Dept: LAB | Age: 70
End: 2025-08-22

## (undated) DEVICE — TIP COVER ACCESSORY

## (undated) DEVICE — Device

## (undated) DEVICE — CATHETER URETH 20FR BLLN 30CC 2 W F INF CTRL BARDX

## (undated) DEVICE — SKIN AFFIX SURG ADHESIVE 72/CS 0.55ML: Brand: MEDLINE

## (undated) DEVICE — AIRSEAL 12 MM ACCESS PORT AND PALM GRIP OBTURATOR WITH BLADELESS OPTICAL TIP, 120 MM LENGTH: Brand: AIRSEAL

## (undated) DEVICE — DALE FOLEY CATHETER HOLDER, LEGBAND, FITS UP TO 30": Brand: DALE FOLEY CATHETER HOLDER

## (undated) DEVICE — 1200CC GUARDIAN II: Brand: GUARDIAN

## (undated) DEVICE — CHLORAPREP 26ML ORANGE

## (undated) DEVICE — CONNECTOR TBNG AUX H2O JET DISP FOR OLY 160/180 SER

## (undated) DEVICE — 40580 - THE PINK PAD - ADVANCED TRENDELENBURG POSITIONING KIT: Brand: 40580 - THE PINK PAD - ADVANCED TRENDELENBURG POSITIONING KIT

## (undated) DEVICE — METER URIN 350ML 2500ML INF CTRL BACTSTAT COLL SYS SFTY FLO

## (undated) DEVICE — TRI-LUMEN FILTERED TUBE SET WITH ACTIVATED CHARCOAL FILTER: Brand: AIRSEAL

## (undated) DEVICE — DRAIN SURG 15FR L3 16IN DIA47MM SIL RND HUBLESS FULL FLUT

## (undated) DEVICE — POLYP TRAP: Brand: TRAPEASE®

## (undated) DEVICE — ELECTRO LUBE IS A SINGLE PATIENT USE DEVICE THAT IS INTENDED TO BE USED ON ELECTROSURGICAL ELECTRODES TO REDUCE STICKING.: Brand: KEY SURGICAL ELECTRO LUBE

## (undated) DEVICE — SOLUTION IRRIG 1000ML STRL H2O USP PLAS POUR BTL

## (undated) DEVICE — GOWN,AURORA,NONREINFORCED,LARGE: Brand: MEDLINE

## (undated) DEVICE — SUTURE V-LOC 180 SZ 3-0 L6IN ABSRB GRN L17MM CV-23 1/2 CIR VLOCL0804

## (undated) DEVICE — TROCAR: Brand: KII® SLEEVE

## (undated) DEVICE — SUTURE VCRL SZ 0 L27IN ABSRB UD L36MM CT-1 1/2 CIR J260H

## (undated) DEVICE — Z CONVERTED USE 2271043 CONTAINER SPEC COLL 4OZ SCR ON LID PEEL PCH

## (undated) DEVICE — GLOVE ORANGE PI 7 1/2   MSG9075

## (undated) DEVICE — SUTURE V-LOC 180 SZ 3-0 L6IN ABSRB GRN V-20 L26MM 1/2 CIR VLOCL0604

## (undated) DEVICE — CANNULA SEAL

## (undated) DEVICE — TROCAR: Brand: KII FIOS FIRST ENTRY

## (undated) DEVICE — FORCEPS BX L240CM JAW DIA2.4MM ORNG L CAP W/ NDL DISP RAD

## (undated) DEVICE — 9165 UNIVERSAL PATIENT PLATE: Brand: 3M™

## (undated) DEVICE — 3M™ TEGADERM™ TRANSPARENT FILM DRESSING FRAME STYLE, 1626W, 4 IN X 4-3/4 IN (10 CM X 12 CM), 50/CT 4CT/CASE: Brand: 3M™ TEGADERM™

## (undated) DEVICE — SUTURE SZ 0 27IN 5/8 CIR UR-6  TAPER PT VIOLET ABSRB VICRYL J603H

## (undated) DEVICE — CLIP LIG L235CM RESOL 360 BX/20

## (undated) DEVICE — TRAP SURG QUAD PARABOLA SLOT DSGN SFTY SCRN TRAPEASE

## (undated) DEVICE — GLOVE ORANGE PI 8 1/2   MSG9085

## (undated) DEVICE — GLOVE SURG SZ 65 THK91MIL LTX FREE SYN POLYISOPRENE

## (undated) DEVICE — DEFENDO AIR WATER SUCTION AND BIOPSY VALVE KIT FOR  OLYMPUS: Brand: DEFENDO AIR/WATER/SUCTION AND BIOPSY VALVE

## (undated) DEVICE — SOLUTION ANTIFOG VIS SYS CLEARIFY LAPSCP

## (undated) DEVICE — SUTURE ETHLN SZ 3-0 L18IN NONABSORBABLE BLK L24MM FS-1 3/8 663G

## (undated) DEVICE — GOWN,AURORA,NONRNF,XL,30/CS: Brand: MEDLINE

## (undated) DEVICE — SUTURE VCRL SZ 3-0 L27IN ABSRB UD L26MM SH 1/2 CIR J416H

## (undated) DEVICE — MERCY DEFIANCE ENDO KIT: Brand: MEDLINE INDUSTRIES, INC.

## (undated) DEVICE — ARM DRAPE

## (undated) DEVICE — COVER,LIGHT HANDLE,FLX,2/PK: Brand: MEDLINE INDUSTRIES, INC.

## (undated) DEVICE — PROTECTOR ULN NRV PUR FOAM HK LOOP STRP ANATOMICALLY

## (undated) DEVICE — TOWEL,OR,DSP,ST,NATURAL,DLX,4/PK,20PK/CS: Brand: MEDLINE

## (undated) DEVICE — SUTURE V-LOC 180 SZ 0 L9IN ABSRB GRN GS-21 L37MM 1/2 CIR VLOCL0346

## (undated) DEVICE — FORCEPS BX L240CM JAW DIA2.8MM L CAP W/ NDL MIC MESH TOOTH

## (undated) DEVICE — GLOVE ORANGE PI 7   MSG9070

## (undated) DEVICE — AGENT HEMSTAT W2XL14IN OXIDIZED REGENERATED CELOS ABSRB FOR

## (undated) DEVICE — SHIELD SOAK PRE-KLENZ 6ML

## (undated) DEVICE — ERBE NESSY® OMEGA PLATE USA (85+23)CM² , WITH CABLE 3 M: Brand: ERBE

## (undated) DEVICE — BLADELESS OBTURATOR: Brand: WECK VISTA

## (undated) DEVICE — SCISSOR SURG CRV ENDOCUT TIP FOR LAP DISP

## (undated) DEVICE — 60 ML SYRINGE REGULAR TIP: Brand: MONOJECT

## (undated) DEVICE — GLOVE SURG SZ 7 CRM LTX FREE POLYISOPRENE POLYMER BEAD ANTI

## (undated) DEVICE — SNARE ENDOSCP L240CM LOOP W13MM DIA2.4MM SHT THROW SM OVL

## (undated) DEVICE — AGENT HEMSTAT 3GM PURIFIED PLNT STARCH PWD ABSRB ARISTA AH

## (undated) DEVICE — ENDO KIT W/SYRINGE: Brand: MEDLINE INDUSTRIES, INC.

## (undated) DEVICE — MITT PREP W575XL775IN POVIDONE IOD HAIR REMV

## (undated) DEVICE — GAUZE,SPONGE,FLUFF,6"X6.75",STRL,5/TRAY: Brand: MEDLINE

## (undated) DEVICE — BLADE CLIPPER GEN PURP NS

## (undated) DEVICE — CANNULA NSL AD L2IN ETCO2 SAMP SFT CRUSH RESIST FEM AIRLFE

## (undated) DEVICE — SHEET DRAPE FULL 70X100

## (undated) DEVICE — CATHETER FOL 2 W 18 FR 5 CC URETH SPEC TIEM BARDX IC

## (undated) DEVICE — APPLICATOR SURG XL L38CM FOR ARISTA ABSRB HEMSTAT FLEXITIP

## (undated) DEVICE — SUTURE MCRYL SZ 4-0 L18IN ABSRB UD L16MM PC-3 3/8 CIR PRIM Y845G